# Patient Record
Sex: FEMALE | Race: WHITE | Employment: OTHER | URBAN - METROPOLITAN AREA
[De-identification: names, ages, dates, MRNs, and addresses within clinical notes are randomized per-mention and may not be internally consistent; named-entity substitution may affect disease eponyms.]

---

## 2018-03-23 ENCOUNTER — HOSPITAL ENCOUNTER (OUTPATIENT)
Age: 38
Discharge: HOME | End: 2018-03-23
Attending: EMERGENCY MEDICINE
Payer: MEDICAID

## 2018-03-23 VITALS
DIASTOLIC BLOOD PRESSURE: 77 MMHG | HEIGHT: 65 IN | BODY MASS INDEX: 33.32 KG/M2 | TEMPERATURE: 99 F | HEART RATE: 93 BPM | RESPIRATION RATE: 18 BRPM | OXYGEN SATURATION: 99 % | SYSTOLIC BLOOD PRESSURE: 124 MMHG | WEIGHT: 200 LBS

## 2018-03-23 DIAGNOSIS — R05.8 NONPRODUCTIVE COUGH: ICD-10-CM

## 2018-03-23 DIAGNOSIS — J01.40 ACUTE NON-RECURRENT PANSINUSITIS: Primary | ICD-10-CM

## 2018-03-23 LAB — S PYO AG THROAT QL: NEGATIVE

## 2018-03-23 PROCEDURE — 99203 OFFICE O/P NEW LOW 30 MIN: CPT

## 2018-03-23 PROCEDURE — 87430 STREP A AG IA: CPT

## 2018-03-23 RX ORDER — BENZONATATE 100 MG/1
100 CAPSULE ORAL 3 TIMES DAILY PRN
Qty: 30 CAPSULE | Refills: 0 | Status: SHIPPED | OUTPATIENT
Start: 2018-03-23 | End: 2018-04-13 | Stop reason: ALTCHOICE

## 2018-03-23 RX ORDER — AZITHROMYCIN 250 MG/1
TABLET, FILM COATED ORAL
Qty: 1 PACKAGE | Refills: 0 | Status: SHIPPED | OUTPATIENT
Start: 2018-03-23 | End: 2018-03-28

## 2018-03-23 NOTE — ED PROVIDER NOTES
[facility]    History   Patient presents with:  Sore Throat    Stated Complaint: Sore Throat    HPI    The patient is a 77-year-old female with no significant past medical history presents now with the ab motor strength is 5 out of 5 and symmetric in the upper and lower extremities bilaterally  Extremities: No focal swelling or tenderness  Skin: No pallor, no redness or warmth to the touch      ED Course     Labs Reviewed   [facility] POCT RAPID STREP - Normal

## 2018-04-13 ENCOUNTER — OFFICE VISIT (OUTPATIENT)
Dept: INTERNAL MEDICINE CLINIC | Facility: CLINIC | Age: 38
End: 2018-04-13

## 2018-04-13 VITALS
DIASTOLIC BLOOD PRESSURE: 62 MMHG | WEIGHT: 234 LBS | RESPIRATION RATE: 18 BRPM | TEMPERATURE: 98 F | BODY MASS INDEX: 38.52 KG/M2 | SYSTOLIC BLOOD PRESSURE: 126 MMHG | HEART RATE: 89 BPM | OXYGEN SATURATION: 99 % | HEIGHT: 65.5 IN

## 2018-04-13 DIAGNOSIS — Z12.4 PAP SMEAR FOR CERVICAL CANCER SCREENING: ICD-10-CM

## 2018-04-13 DIAGNOSIS — Z00.00 WELLNESS EXAMINATION: Primary | ICD-10-CM

## 2018-04-13 PROCEDURE — 88175 CYTOPATH C/V AUTO FLUID REDO: CPT | Performed by: INTERNAL MEDICINE

## 2018-04-13 PROCEDURE — 99385 PREV VISIT NEW AGE 18-39: CPT | Performed by: INTERNAL MEDICINE

## 2018-04-13 NOTE — PROGRESS NOTES
Pt's  verified  Per Dr Eben Hays sent thin prep pap specimen for testing to [address] [place] [address]

## 2018-04-13 NOTE — PROGRESS NOTES
HPI:    Patient ID: Jaja Bojorquez is a 40year old female. Pt here to get established as a new patient . Has no past medical hx . Family hx of CAD in fatherm and OA in mother. HAving problems with weeight gain. . Has a sedentary job.     Review masses  Normal ext genitalia  Cervix normal PAP taken   No masses    Neurological: She is alert and oriented to person, place, and time. Skin: Skin is warm and dry. Psychiatric: She has a normal mood and affect.  Her behavior is normal. Judgment and tho

## 2018-04-16 ENCOUNTER — NURSE ONLY (OUTPATIENT)
Dept: INTERNAL MEDICINE CLINIC | Facility: CLINIC | Age: 38
End: 2018-04-16

## 2018-04-16 DIAGNOSIS — Z00.00 WELLNESS EXAMINATION: ICD-10-CM

## 2018-04-16 PROCEDURE — 36415 COLL VENOUS BLD VENIPUNCTURE: CPT | Performed by: INTERNAL MEDICINE

## 2018-04-16 PROCEDURE — 80061 LIPID PANEL: CPT | Performed by: INTERNAL MEDICINE

## 2018-04-16 PROCEDURE — 80050 GENERAL HEALTH PANEL: CPT | Performed by: INTERNAL MEDICINE

## 2018-12-05 NOTE — LETTER
[facility]  Administration of Anesthesia  Sandee DEL CASTILLO agree to be cared for by a physician anesthesiologist alone and/or with a nurse anesthetist, who is specially trained to monitor me and give me medicine to put me to sleep or keep me comfortable during my procedure    I understand that my anesthesiologist and/or anesthetist is not an employee or agent of [facility] or [employer]. He or she works for [facility]    As the patient asking for anesthesia services, I agree to:  Allow the anesthesiologist (anesthesia doctor) to give me medicine and do additional procedures as necessary. Some examples are: Starting or using an “IV” to give me medicine, fluids or blood during my procedure, and having a breathing tube placed to help me breathe when I’m asleep (intubation). In the event that my heart stops working properly, I understand that my anesthesiologist will make every effort to sustain my life, unless otherwise directed by [facility] Do Not Resuscitate documents.  Tell my anesthesia doctor before my procedure:  If I am pregnant.  The last time that I ate or drank.  iii. All of the medicines I take (including prescriptions, herbal supplements, and pills I can buy without a prescription (including street drugs/illegal medications). Failure to inform my anesthesiologist about these medicines may increase my risk of anesthetic complications.  iv.If I am allergic to anything or have had a reaction to anesthesia before.  I understand how the anesthesia medicine will help me (benefits).  I understand that with any type of anesthesia medicine there are risks:  The most common risks are: nausea, vomiting, sore throat, muscle soreness, damage to my eyes, mouth, or teeth (from breathing tube placement).  Rare risks include: remembering what happened during my procedure, allergic reactions to medications, injury to my airway, heart, lungs, vision, nerves, or  Telephone Encounter by Edyta Rodriguez at 03/27/18 09:01 AM     Author:  Edyta Rodriguez Service:  (none) Author Type:       Filed:  03/27/18 09:03 AM Encounter Date:  3/27/2018 Status:  Signed     :  Edyta Rodriguez ()              TRUNG XAVIER    Patient Age: 39 year old    ACCT STATUS: COPAY  MESSAGE:[KS1.1T]   Pt asking to have labs ordered. Has not had in while. Knows that Dr Walker not in office.  Advised Clinical Asst will check and return call[KS1.1M]   Next and Last Visit with Provider and Department  Next visit with VINEET WALKER is on No match found  Next visit with [facility] is on No match found  Last visit with VINEET WALKER was on 09/12/2016 at  6:20 PM in [facility]  Last visit with [facility] was on 09/07/2017 at  4:00 PM in [facility]     WEIGHT AND HEIGHT: As of 09/12/2016 weight is 174 lbs.(78.926 kg). Height is 5' 6\"(1.676 m).   BMI is 28.10 kg/(m^2) calculated from:     Height 5' 6\" (1.676 m) as of 9/12/16     Weight 174 lb (78.926 kg) as of 9/12/16[KS1.1T]      No Known Allergies[KS1.2T]  Current outpatient prescriptions       Medication  Sig Dispense Refill   • clindamycin (CLINDAGEL) 1 % gel APPLY EXTERNALLY TO THE AFFECTED AREA TWICE DAILY AS DIRECTED 30 g 1   • sumatriptan (IMITREX) 100 MG tablet TAKE 1 TABLET BY MOUTH AS NEEDED FOR MIGRAINE 30 Tab 0   • sumatriptan (IMITREX) 100 MG tablet TAKE 1 TABLET BY MOUTH AS NEEDED MIGRAINE 30 Tab 0   • clindamycin (CLINDAGEL) 1 % gel APPLY EXTERNALLY TO THE AFFECTED AREA TWICE DAILY AS DIRECTED 30 g 1   • fluocinolone (SYNALAR) 0.025 % ointment APPLY EXTERNALLY TO THE AFFECTED AREA TWICE DAILY 30 g 1      PHARMACY to use:           Pharmacy preference(s) on file:    [facility] [address]    CALL BACK INFO:[KS1.1T] Ok to leave response (including medical information) with family member or on answering  machine[KS1.1M]  ROUTING:[KS1.1T] ROUTE TO COVERING PHYSICIAN for response.[KS1.1M]        PCP: Jr Crandall MD         INS: Payor: [employer] / Plan: *No Plan* / Product Type: *No Product type* / Note: This is the primary coverage, but no account was found for this location or the patient's primary location.   ADDRESS:  [address] [place] [address][KS1.1T]       Revision History        User Key Date/Time User Provider Type Action    > KS1.2 03/27/18 09:03 AM Edyta Rodriguez  Sign     KS1.1 03/27/18 09:01 AM Edyta Rodriguez      M - Manual, T - Template             muscles and in extremely rare instances death.  My doctor has explained to me other choices available to me for my care (alternatives).  Pregnant Patients (“epidural”):  I understand that the risks of having an epidural (medicine given into my back to help control pain during labor), include itching, low blood pressure, difficulty urinating, headache or slowing of the baby’s heart. Very rare risks include infection, bleeding, seizure, irregular heart rhythms and nerve injury.  Regional Anesthesia (“spinal”, “epidural”, & “nerve blocks”):  I understand that rare but potential complications include headache, bleeding, infection, seizure, irregular heart rhythms, and nerve injury.    _____________________________________________________________________________  Patient (or Representative) Signature/Relationship to Patient  Date   Time    _____________________________________________________________________________   Name (if used)    Language/Organization   Time    _____________________________________________________________________________  Nurse Anesthetist Signature     Date   Time  _____________________________________________________________________________  Anesthesiologist Signature     Date   Time  I have discussed the procedure and information above with the patient (or patient’s representative) and answered their questions. The patient or their representative has agreed to have anesthesia services.    _____________________________________________________________________________  Witness        Date   Time  I have verified that the signature is that of the patient or patient’s representative, and that it was signed before the procedure  Patient Name: Sandee Eddy     : 1980                 Printed: 2025 at 8:06 AM    Medical Record #: [id number]                                            Page 1 of 1  ----------ANESTHESIA CONSENT----------

## 2019-10-28 ENCOUNTER — OFFICE VISIT (OUTPATIENT)
Dept: INTERNAL MEDICINE CLINIC | Facility: CLINIC | Age: 39
End: 2019-10-28
Payer: MEDICAID

## 2019-10-28 VITALS
OXYGEN SATURATION: 95 % | HEART RATE: 81 BPM | BODY MASS INDEX: 38.75 KG/M2 | HEIGHT: 65.5 IN | SYSTOLIC BLOOD PRESSURE: 120 MMHG | WEIGHT: 235.38 LBS | DIASTOLIC BLOOD PRESSURE: 80 MMHG

## 2019-10-28 DIAGNOSIS — Z00.00 WELLNESS EXAMINATION: Primary | ICD-10-CM

## 2019-10-28 DIAGNOSIS — N39.3 STRESS INCONTINENCE IN FEMALE: ICD-10-CM

## 2019-10-28 PROCEDURE — 88175 CYTOPATH C/V AUTO FLUID REDO: CPT | Performed by: INTERNAL MEDICINE

## 2019-10-28 PROCEDURE — 99395 PREV VISIT EST AGE 18-39: CPT | Performed by: INTERNAL MEDICINE

## 2019-10-28 NOTE — PROGRESS NOTES
HPI:    Patient ID: Aurora Shah is a 44year old female. HPI Pt here for an annual exam and for eval of urinary incontinence- had a vaginal sling procedure in the past.  Has normal menses - quite heavy and painful. . Vaginal deliveries.     Revie

## 2019-10-29 ENCOUNTER — NURSE ONLY (OUTPATIENT)
Dept: INTERNAL MEDICINE CLINIC | Facility: CLINIC | Age: 39
End: 2019-10-29
Payer: MEDICAID

## 2019-10-29 DIAGNOSIS — Z00.00 WELLNESS EXAMINATION: ICD-10-CM

## 2019-10-29 DIAGNOSIS — Z00.00 ANNUAL PHYSICAL EXAM: Primary | ICD-10-CM

## 2019-10-29 PROCEDURE — 36415 COLL VENOUS BLD VENIPUNCTURE: CPT | Performed by: INTERNAL MEDICINE

## 2019-10-29 NOTE — PROGRESS NOTES
Attempted blood draw, pt was hard stick- sent her to hospital as she has been fasting since lastnight.     CV

## 2019-12-04 ENCOUNTER — APPOINTMENT (OUTPATIENT)
Dept: LAB | Facility: REFERENCE LAB | Age: 39
End: 2019-12-04
Attending: INTERNAL MEDICINE
Payer: MEDICAID

## 2019-12-04 DIAGNOSIS — Z00.00 WELLNESS EXAMINATION: ICD-10-CM

## 2019-12-04 PROCEDURE — 80053 COMPREHEN METABOLIC PANEL: CPT

## 2019-12-04 PROCEDURE — 80061 LIPID PANEL: CPT

## 2019-12-04 PROCEDURE — 82306 VITAMIN D 25 HYDROXY: CPT

## 2019-12-04 PROCEDURE — 36415 COLL VENOUS BLD VENIPUNCTURE: CPT

## 2019-12-04 PROCEDURE — 84443 ASSAY THYROID STIM HORMONE: CPT

## 2020-11-06 ENCOUNTER — APPOINTMENT (OUTPATIENT)
Dept: GENERAL RADIOLOGY | Facility: HOSPITAL | Age: 40
End: 2020-11-06
Payer: MEDICAID

## 2020-11-06 ENCOUNTER — HOSPITAL ENCOUNTER (EMERGENCY)
Facility: HOSPITAL | Age: 40
Discharge: HOME | End: 2020-11-06
Attending: EMERGENCY MEDICINE
Payer: MEDICAID

## 2020-11-06 VITALS
TEMPERATURE: 98 F | WEIGHT: 230 LBS | OXYGEN SATURATION: 97 % | RESPIRATION RATE: 17 BRPM | BODY MASS INDEX: 38.32 KG/M2 | SYSTOLIC BLOOD PRESSURE: 131 MMHG | HEIGHT: 65 IN | HEART RATE: 84 BPM | DIASTOLIC BLOOD PRESSURE: 83 MMHG

## 2020-11-06 DIAGNOSIS — R07.9 ACUTE CHEST PAIN: Primary | ICD-10-CM

## 2020-11-06 DIAGNOSIS — B34.9 VIRAL SYNDROME: ICD-10-CM

## 2020-11-06 PROCEDURE — 99284 EMERGENCY DEPT VISIT MOD MDM: CPT

## 2020-11-06 PROCEDURE — 84484 ASSAY OF TROPONIN QUANT: CPT | Performed by: EMERGENCY MEDICINE

## 2020-11-06 PROCEDURE — 93005 ELECTROCARDIOGRAM TRACING: CPT

## 2020-11-06 PROCEDURE — 80048 BASIC METABOLIC PNL TOTAL CA: CPT

## 2020-11-06 PROCEDURE — 81025 URINE PREGNANCY TEST: CPT

## 2020-11-06 PROCEDURE — 85025 COMPLETE CBC W/AUTO DIFF WBC: CPT

## 2020-11-06 PROCEDURE — 71045 X-RAY EXAM CHEST 1 VIEW: CPT | Performed by: EMERGENCY MEDICINE

## 2020-11-06 PROCEDURE — 84484 ASSAY OF TROPONIN QUANT: CPT

## 2020-11-06 PROCEDURE — 96374 THER/PROPH/DIAG INJ IV PUSH: CPT

## 2020-11-06 PROCEDURE — 85025 COMPLETE CBC W/AUTO DIFF WBC: CPT | Performed by: EMERGENCY MEDICINE

## 2020-11-06 PROCEDURE — 93010 ELECTROCARDIOGRAM REPORT: CPT | Performed by: EMERGENCY MEDICINE

## 2020-11-06 PROCEDURE — 80048 BASIC METABOLIC PNL TOTAL CA: CPT | Performed by: EMERGENCY MEDICINE

## 2020-11-06 RX ORDER — CYCLOBENZAPRINE HCL 10 MG
10 TABLET ORAL 3 TIMES DAILY PRN
Qty: 20 TABLET | Refills: 0 | Status: SHIPPED | OUTPATIENT
Start: 2020-11-06 | End: 2020-11-13

## 2020-11-06 RX ORDER — IBUPROFEN 600 MG/1
600 TABLET ORAL EVERY 8 HOURS PRN
Qty: 30 TABLET | Refills: 0 | Status: SHIPPED | OUTPATIENT
Start: 2020-11-06 | End: 2020-11-13

## 2020-11-06 RX ORDER — KETOROLAC TROMETHAMINE 15 MG/ML
15 INJECTION, SOLUTION INTRAMUSCULAR; INTRAVENOUS ONCE
Status: COMPLETED | OUTPATIENT
Start: 2020-11-06 | End: 2020-11-06

## 2020-11-06 NOTE — ED INITIAL ASSESSMENT (HPI)
Left sided chest pains, consistent x 2 days.  Denies sob  Also sore throat, chill 2 days ago, but denies fever

## 2020-11-06 NOTE — ED PROVIDER NOTES
Patient Seen in: [facility] AND [facility] Emergency Department      History   Patient presents with:  Chest Pain Angina    Stated Complaint: chest pain/chills     HPI    55-year-old female presents for complaint of chest pain.   Pain began today, is left upper c present. Eyes:      General: Lids are normal.      Extraocular Movements: Extraocular movements intact. Pupils: Pupils are equal, round, and reactive to light.    Neck:      Musculoskeletal: Full passive range of motion without pain and normal range DIFFERENTIAL WITH PLATELET    Narrative: The following orders were created for panel order CBC WITH DIFFERENTIAL WITH PLATELET.   Procedure                               Abnormality         Status                     --------- vasculature. MEDIAST/SAL:   No visible mass or adenopathy. LUNGS/PLEURA: Normal.  No significant pulmonary parenchymal abnormalities. No effusion or pleural thickening. BONES: Chronic fracture deformity left clavicle. OTHER: Negative.           Orly Chery

## 2020-11-11 ENCOUNTER — TELEPHONE (OUTPATIENT)
Dept: INTERNAL MEDICINE CLINIC | Facility: CLINIC | Age: 40
End: 2020-11-11

## 2020-11-11 NOTE — TELEPHONE ENCOUNTER
Pt was seen in the ER and was having chest pain then. She tested positive for Covid. Now she is having pain in her left arm and her fingers are feeling weird.  Please advise, she made a video visit appointment for November 23rd which was Dr Florence weiss

## 2020-11-23 ENCOUNTER — TELEMEDICINE (OUTPATIENT)
Dept: INTERNAL MEDICINE CLINIC | Facility: CLINIC | Age: 40
End: 2020-11-23
Payer: MEDICAID

## 2020-11-23 DIAGNOSIS — G56.92 NEUROPATHY, ARM, LEFT: Primary | ICD-10-CM

## 2020-11-23 PROCEDURE — 99212 OFFICE O/P EST SF 10 MIN: CPT | Performed by: INTERNAL MEDICINE

## 2020-11-24 NOTE — PROGRESS NOTES
Virtual Telephone Check-In    Laurel Arimo verbally consents to a Virtual/Telephone Check-In visit on 11/23/20. Patient has been referred to he [facility] website at [email or web address] to review the yearly Consent to Treat document.     Patient anitanirav

## 2020-12-03 ENCOUNTER — TELEPHONE (OUTPATIENT)
Dept: INTERNAL MEDICINE CLINIC | Facility: CLINIC | Age: 40
End: 2020-12-03

## 2020-12-03 NOTE — TELEPHONE ENCOUNTER
Patient is asking for a return call back from Dr. Audie Dakin regarding the lingering pain that she still is experiencing in her chest and arm after Covid.     She did schedule the appointment with the specialist like the doctor asked her to schedule, but her ap

## 2020-12-23 ENCOUNTER — PROCEDURE VISIT (OUTPATIENT)
Dept: NEUROLOGY | Facility: CLINIC | Age: 40
End: 2020-12-23
Payer: MEDICAID

## 2020-12-23 VITALS — HEIGHT: 65 IN | BODY MASS INDEX: 37.49 KG/M2 | WEIGHT: 225 LBS

## 2020-12-23 DIAGNOSIS — G56.92 NEUROPATHY, ARM, LEFT: ICD-10-CM

## 2020-12-23 PROCEDURE — 3008F BODY MASS INDEX DOCD: CPT | Performed by: PHYSICAL MEDICINE & REHABILITATION

## 2020-12-23 PROCEDURE — 95886 MUSC TEST DONE W/N TEST COMP: CPT | Performed by: PHYSICAL MEDICINE & REHABILITATION

## 2020-12-23 PROCEDURE — 95908 NRV CNDJ TST 3-4 STUDIES: CPT | Performed by: PHYSICAL MEDICINE & REHABILITATION

## 2020-12-23 NOTE — PROCEDURES
[address] [place] [address]  Phone: [phone]  Fax: [phone] REPORT          Patient: Denese April Hand Dominance:  Right   Patient ID: [id number] Referring Dr:  Bacilio Wilkinson 3) There is no electrodiagnostic evidence of any other mononeuropathy, brachial plexopathy, cervical radiculopathy, or myopathy in the left upper extremity. Findings may account for some, but not all of the patient’s stated complaints.      Shay Sanabria

## 2021-01-12 ENCOUNTER — OFFICE VISIT (OUTPATIENT)
Dept: INTERNAL MEDICINE CLINIC | Facility: CLINIC | Age: 41
End: 2021-01-12
Payer: MEDICAID

## 2021-01-12 VITALS
WEIGHT: 225 LBS | HEART RATE: 95 BPM | DIASTOLIC BLOOD PRESSURE: 72 MMHG | BODY MASS INDEX: 37.04 KG/M2 | SYSTOLIC BLOOD PRESSURE: 114 MMHG | HEIGHT: 65.5 IN | OXYGEN SATURATION: 98 %

## 2021-01-12 DIAGNOSIS — Z00.00 WELLNESS EXAMINATION: Primary | ICD-10-CM

## 2021-01-12 DIAGNOSIS — Z12.31 SCREENING MAMMOGRAM, ENCOUNTER FOR: ICD-10-CM

## 2021-01-12 DIAGNOSIS — E66.01 CLASS 2 SEVERE OBESITY DUE TO EXCESS CALORIES WITH SERIOUS COMORBIDITY AND BODY MASS INDEX (BMI) OF 35.0 TO 35.9 IN ADULT (HCC): ICD-10-CM

## 2021-01-12 PROBLEM — G56.02 CARPAL TUNNEL SYNDROME OF LEFT WRIST: Status: ACTIVE | Noted: 2021-01-12

## 2021-01-12 LAB
ALBUMIN SERPL-MCNC: 3.8 G/DL (ref 3.4–5)
ALBUMIN/GLOB SERPL: 1.1 {RATIO} (ref 1–2)
ALP LIVER SERPL-CCNC: 56 U/L
ALT SERPL-CCNC: 33 U/L
ANION GAP SERPL CALC-SCNC: 4 MMOL/L (ref 0–18)
AST SERPL-CCNC: 22 U/L (ref 15–37)
BASOPHILS # BLD AUTO: 0.03 X10(3) UL (ref 0–0.2)
BASOPHILS NFR BLD AUTO: 0.6 %
BILIRUB SERPL-MCNC: 0.6 MG/DL (ref 0.1–2)
BUN BLD-MCNC: 8 MG/DL (ref 7–18)
BUN/CREAT SERPL: 10.8 (ref 10–20)
CALCIUM BLD-MCNC: 9 MG/DL (ref 8.5–10.1)
CHLORIDE SERPL-SCNC: 108 MMOL/L (ref 98–112)
CHOLEST SMN-MCNC: 199 MG/DL (ref ?–200)
CO2 SERPL-SCNC: 27 MMOL/L (ref 21–32)
CREAT BLD-MCNC: 0.74 MG/DL
DEPRECATED RDW RBC AUTO: 46.3 FL (ref 35.1–46.3)
EOSINOPHIL # BLD AUTO: 0.09 X10(3) UL (ref 0–0.7)
EOSINOPHIL NFR BLD AUTO: 1.7 %
ERYTHROCYTE [DISTWIDTH] IN BLOOD BY AUTOMATED COUNT: 13.5 % (ref 11–15)
GLOBULIN PLAS-MCNC: 3.6 G/DL (ref 2.8–4.4)
GLUCOSE BLD-MCNC: 88 MG/DL (ref 70–99)
HCT VFR BLD AUTO: 41.7 %
HDLC SERPL-MCNC: 84 MG/DL (ref 40–59)
HGB BLD-MCNC: 12.9 G/DL
IMM GRANULOCYTES # BLD AUTO: 0.01 X10(3) UL (ref 0–1)
IMM GRANULOCYTES NFR BLD: 0.2 %
LDLC SERPL CALC-MCNC: 86 MG/DL (ref ?–100)
LYMPHOCYTES # BLD AUTO: 1.39 X10(3) UL (ref 1–4)
LYMPHOCYTES NFR BLD AUTO: 26 %
M PROTEIN MFR SERPL ELPH: 7.4 G/DL (ref 6.4–8.2)
MCH RBC QN AUTO: 28.5 PG (ref 26–34)
MCHC RBC AUTO-ENTMCNC: 30.9 G/DL (ref 31–37)
MCV RBC AUTO: 92.3 FL
MONOCYTES # BLD AUTO: 0.34 X10(3) UL (ref 0.1–1)
MONOCYTES NFR BLD AUTO: 6.4 %
NEUTROPHILS # BLD AUTO: 3.49 X10 (3) UL (ref 1.5–7.7)
NEUTROPHILS # BLD AUTO: 3.49 X10(3) UL (ref 1.5–7.7)
NEUTROPHILS NFR BLD AUTO: 65.1 %
NONHDLC SERPL-MCNC: 115 MG/DL (ref ?–130)
OSMOLALITY SERPL CALC.SUM OF ELEC: 286 MOSM/KG (ref 275–295)
PATIENT FASTING Y/N/NP: YES
PATIENT FASTING Y/N/NP: YES
PLATELET # BLD AUTO: 238 10(3)UL (ref 150–450)
POTASSIUM SERPL-SCNC: 3.7 MMOL/L (ref 3.5–5.1)
RBC # BLD AUTO: 4.52 X10(6)UL
SODIUM SERPL-SCNC: 139 MMOL/L (ref 136–145)
T4 FREE SERPL-MCNC: 1.2 NG/DL (ref 0.8–1.7)
TRIGL SERPL-MCNC: 145 MG/DL (ref 30–149)
TSI SER-ACNC: 1.77 MIU/ML (ref 0.36–3.74)
VLDLC SERPL CALC-MCNC: 29 MG/DL (ref 0–30)
WBC # BLD AUTO: 5.4 X10(3) UL (ref 4–11)

## 2021-01-12 PROCEDURE — 84439 ASSAY OF FREE THYROXINE: CPT | Performed by: INTERNAL MEDICINE

## 2021-01-12 PROCEDURE — 99396 PREV VISIT EST AGE 40-64: CPT | Performed by: INTERNAL MEDICINE

## 2021-01-12 PROCEDURE — 84443 ASSAY THYROID STIM HORMONE: CPT | Performed by: INTERNAL MEDICINE

## 2021-01-12 PROCEDURE — 3078F DIAST BP <80 MM HG: CPT | Performed by: INTERNAL MEDICINE

## 2021-01-12 PROCEDURE — 85025 COMPLETE CBC W/AUTO DIFF WBC: CPT | Performed by: INTERNAL MEDICINE

## 2021-01-12 PROCEDURE — 3074F SYST BP LT 130 MM HG: CPT | Performed by: INTERNAL MEDICINE

## 2021-01-12 PROCEDURE — 80053 COMPREHEN METABOLIC PANEL: CPT | Performed by: INTERNAL MEDICINE

## 2021-01-12 PROCEDURE — 3008F BODY MASS INDEX DOCD: CPT | Performed by: INTERNAL MEDICINE

## 2021-01-12 PROCEDURE — 80061 LIPID PANEL: CPT | Performed by: INTERNAL MEDICINE

## 2021-01-12 RX ORDER — PHENTERMINE HYDROCHLORIDE 15 MG/1
15 CAPSULE ORAL EVERY MORNING
Qty: 30 CAPSULE | Refills: 0 | Status: SHIPPED | OUTPATIENT
Start: 2021-01-12 | End: 2021-06-29 | Stop reason: DRUGHIGH

## 2021-01-12 NOTE — PROGRESS NOTES
HPI:    Patient ID: Abhishek Cast is a 36year old female. HPIPt here for annual physical. Has some residual left sided chest discomfort following a Covid infection. Struggling with weight - has failed diet alone.   Would like to try medical weigh Prescriptions      No prescriptions requested or ordered in this encounter       Imaging & Referrals:  INFLUENZA REFUSED [facility] SCREENING BILAT (CPT=77067)       ND#[id number]

## 2021-01-28 ENCOUNTER — HOSPITAL ENCOUNTER (OUTPATIENT)
Dept: MAMMOGRAPHY | Age: 41
Discharge: HOME | End: 2021-01-28
Attending: INTERNAL MEDICINE
Payer: MEDICAID

## 2021-01-28 DIAGNOSIS — Z12.31 SCREENING MAMMOGRAM, ENCOUNTER FOR: ICD-10-CM

## 2021-01-28 PROCEDURE — 77067 SCR MAMMO BI INCL CAD: CPT | Performed by: INTERNAL MEDICINE

## 2021-01-28 PROCEDURE — 77063 BREAST TOMOSYNTHESIS BI: CPT | Performed by: INTERNAL MEDICINE

## 2021-02-09 ENCOUNTER — OFFICE VISIT (OUTPATIENT)
Dept: INTERNAL MEDICINE CLINIC | Facility: CLINIC | Age: 41
End: 2021-02-09
Payer: MEDICAID

## 2021-02-09 VITALS
HEIGHT: 65.5 IN | BODY MASS INDEX: 37.04 KG/M2 | SYSTOLIC BLOOD PRESSURE: 120 MMHG | DIASTOLIC BLOOD PRESSURE: 80 MMHG | HEART RATE: 100 BPM | WEIGHT: 225 LBS | TEMPERATURE: 97 F | OXYGEN SATURATION: 99 %

## 2021-02-09 DIAGNOSIS — Z71.3 WEIGHT LOSS COUNSELING, ENCOUNTER FOR: Primary | ICD-10-CM

## 2021-02-09 PROCEDURE — 3008F BODY MASS INDEX DOCD: CPT | Performed by: INTERNAL MEDICINE

## 2021-02-09 PROCEDURE — 3074F SYST BP LT 130 MM HG: CPT | Performed by: INTERNAL MEDICINE

## 2021-02-09 PROCEDURE — 99213 OFFICE O/P EST LOW 20 MIN: CPT | Performed by: INTERNAL MEDICINE

## 2021-02-09 PROCEDURE — 3079F DIAST BP 80-89 MM HG: CPT | Performed by: INTERNAL MEDICINE

## 2021-02-09 RX ORDER — PHENTERMINE HYDROCHLORIDE 37.5 MG/1
37.5 TABLET ORAL
Qty: 30 TABLET | Refills: 1 | Status: SHIPPED | OUTPATIENT
Start: 2021-02-09 | End: 2022-01-17

## 2021-02-09 NOTE — PROGRESS NOTES
HPI:    Patient ID: Giacomo Yang is a 36year old female. HPI pt here for fu after a month of Phentermine rx. Has not lost any weight . Did feel some apetite effect but did not note any weight loss. Did not have any side effects.   Would like t

## 2021-03-30 ENCOUNTER — OFFICE VISIT (OUTPATIENT)
Dept: INTERNAL MEDICINE CLINIC | Facility: CLINIC | Age: 41
End: 2021-03-30
Payer: MEDICAID

## 2021-03-30 VITALS
DIASTOLIC BLOOD PRESSURE: 80 MMHG | WEIGHT: 217 LBS | OXYGEN SATURATION: 98 % | HEIGHT: 65.5 IN | HEART RATE: 92 BPM | BODY MASS INDEX: 35.72 KG/M2 | SYSTOLIC BLOOD PRESSURE: 110 MMHG

## 2021-03-30 DIAGNOSIS — Z71.3 WEIGHT LOSS COUNSELING, ENCOUNTER FOR: Primary | ICD-10-CM

## 2021-03-30 PROCEDURE — 3008F BODY MASS INDEX DOCD: CPT | Performed by: INTERNAL MEDICINE

## 2021-03-30 PROCEDURE — 99213 OFFICE O/P EST LOW 20 MIN: CPT | Performed by: INTERNAL MEDICINE

## 2021-03-30 PROCEDURE — 3079F DIAST BP 80-89 MM HG: CPT | Performed by: INTERNAL MEDICINE

## 2021-03-30 PROCEDURE — 3074F SYST BP LT 130 MM HG: CPT | Performed by: INTERNAL MEDICINE

## 2021-03-30 RX ORDER — PHENTERMINE HYDROCHLORIDE 37.5 MG/1
37.5 TABLET ORAL
Qty: 30 TABLET | Refills: 2 | Status: SHIPPED | OUTPATIENT
Start: 2021-03-30 | End: 2021-06-29

## 2021-03-30 NOTE — PROGRESS NOTES
HPI/Subjective:   Patient ID: Mariposa Loera is a 36year old female. HPIPt here for a fu on weight loss medication- has been on phentermine for 2  Months and has lost 8 lbs. Feels more energy. No side effects from the meds.   Not excercising forma

## 2021-06-29 ENCOUNTER — OFFICE VISIT (OUTPATIENT)
Dept: INTERNAL MEDICINE CLINIC | Facility: CLINIC | Age: 41
End: 2021-06-29
Payer: MEDICAID

## 2021-06-29 VITALS
HEART RATE: 92 BPM | OXYGEN SATURATION: 99 % | HEIGHT: 65.5 IN | DIASTOLIC BLOOD PRESSURE: 78 MMHG | WEIGHT: 215 LBS | SYSTOLIC BLOOD PRESSURE: 102 MMHG | BODY MASS INDEX: 35.39 KG/M2

## 2021-06-29 DIAGNOSIS — Z86.16 HISTORY OF COVID-19: ICD-10-CM

## 2021-06-29 DIAGNOSIS — Z71.3 WEIGHT LOSS COUNSELING, ENCOUNTER FOR: Primary | ICD-10-CM

## 2021-06-29 PROBLEM — E66.9 OBESITY (BMI 35.0-39.9 WITHOUT COMORBIDITY): Status: ACTIVE | Noted: 2021-06-29

## 2021-06-29 PROCEDURE — 3008F BODY MASS INDEX DOCD: CPT | Performed by: INTERNAL MEDICINE

## 2021-06-29 PROCEDURE — 3074F SYST BP LT 130 MM HG: CPT | Performed by: INTERNAL MEDICINE

## 2021-06-29 PROCEDURE — 99214 OFFICE O/P EST MOD 30 MIN: CPT | Performed by: INTERNAL MEDICINE

## 2021-06-29 PROCEDURE — 3078F DIAST BP <80 MM HG: CPT | Performed by: INTERNAL MEDICINE

## 2021-06-29 RX ORDER — PHENTERMINE HYDROCHLORIDE 37.5 MG/1
37.5 TABLET ORAL
Qty: 30 TABLET | Refills: 2 | Status: SHIPPED | OUTPATIENT
Start: 2021-06-29 | End: 2022-01-17

## 2021-06-29 NOTE — PROGRESS NOTES
HPI/Subjective:   Patient ID: Freddy Wick is a 39year old female. HPIPt here for a fu on weight loss with there use of Phentermine - has lost over 15 lbs from the start of the rx. No side effects encountered.   History/Other:   Review of System

## 2021-06-30 ENCOUNTER — LAB ENCOUNTER (OUTPATIENT)
Dept: LAB | Facility: REFERENCE LAB | Age: 41
End: 2021-06-30
Attending: INTERNAL MEDICINE
Payer: MEDICAID

## 2021-06-30 DIAGNOSIS — Z86.16 HISTORY OF COVID-19: ICD-10-CM

## 2021-06-30 LAB — SARS-COV-2 IGG+IGM SERPL QL IA: REACTIVE

## 2021-06-30 PROCEDURE — 36415 COLL VENOUS BLD VENIPUNCTURE: CPT

## 2021-06-30 PROCEDURE — 86769 SARS-COV-2 COVID-19 ANTIBODY: CPT

## 2021-11-26 RX ORDER — PHENTERMINE HYDROCHLORIDE 37.5 MG/1
TABLET ORAL
Qty: 30 TABLET | Refills: 0 | Status: SHIPPED | OUTPATIENT
Start: 2021-11-26 | End: 2022-01-17

## 2022-01-17 ENCOUNTER — OFFICE VISIT (OUTPATIENT)
Dept: INTERNAL MEDICINE CLINIC | Facility: CLINIC | Age: 42
End: 2022-01-17
Payer: MEDICAID

## 2022-01-17 VITALS — WEIGHT: 216 LBS | HEIGHT: 65.5 IN | BODY MASS INDEX: 35.56 KG/M2

## 2022-01-17 DIAGNOSIS — J22 ACUTE LOWER RESPIRATORY INFECTION: Primary | ICD-10-CM

## 2022-01-17 DIAGNOSIS — Z71.3 WEIGHT LOSS COUNSELING, ENCOUNTER FOR: ICD-10-CM

## 2022-01-17 PROCEDURE — 3008F BODY MASS INDEX DOCD: CPT | Performed by: INTERNAL MEDICINE

## 2022-01-17 PROCEDURE — 99214 OFFICE O/P EST MOD 30 MIN: CPT | Performed by: INTERNAL MEDICINE

## 2022-01-17 RX ORDER — PHENTERMINE HYDROCHLORIDE 37.5 MG/1
37.5 TABLET ORAL
Qty: 30 TABLET | Refills: 1 | Status: SHIPPED | OUTPATIENT
Start: 2022-01-17

## 2022-01-17 RX ORDER — AZITHROMYCIN 250 MG/1
TABLET, FILM COATED ORAL
Qty: 6 TABLET | Refills: 0 | Status: SHIPPED | OUTPATIENT
Start: 2022-01-17 | End: 2022-01-22

## 2022-01-17 NOTE — PROGRESS NOTES
Subjective:   Patient ID: Alfredito Alfaro is a 39year old female. Cough  Associated symptoms include ear pain. Pertinent negatives include no chills, fever, shortness of breath or wheezing. Patient here with a a hacking cough for over a week.   [facility] & Referrals:  None

## 2022-01-18 DIAGNOSIS — Z86.16 HISTORY OF COVID-19: Primary | ICD-10-CM

## 2022-03-17 ENCOUNTER — OFFICE VISIT (OUTPATIENT)
Dept: INTERNAL MEDICINE CLINIC | Facility: CLINIC | Age: 42
End: 2022-03-17
Payer: MEDICAID

## 2022-03-17 VITALS
SYSTOLIC BLOOD PRESSURE: 110 MMHG | DIASTOLIC BLOOD PRESSURE: 70 MMHG | HEIGHT: 65.5 IN | WEIGHT: 220 LBS | BODY MASS INDEX: 36.21 KG/M2

## 2022-03-17 DIAGNOSIS — Z12.31 SCREENING MAMMOGRAM, ENCOUNTER FOR: ICD-10-CM

## 2022-03-17 DIAGNOSIS — Z71.3 WEIGHT LOSS COUNSELING, ENCOUNTER FOR: ICD-10-CM

## 2022-03-17 DIAGNOSIS — Z00.00 WELLNESS EXAMINATION: Primary | ICD-10-CM

## 2022-03-17 PROCEDURE — 99396 PREV VISIT EST AGE 40-64: CPT | Performed by: INTERNAL MEDICINE

## 2022-03-17 PROCEDURE — 3078F DIAST BP <80 MM HG: CPT | Performed by: INTERNAL MEDICINE

## 2022-03-17 PROCEDURE — 3008F BODY MASS INDEX DOCD: CPT | Performed by: INTERNAL MEDICINE

## 2022-03-17 PROCEDURE — 3074F SYST BP LT 130 MM HG: CPT | Performed by: INTERNAL MEDICINE

## 2022-03-17 RX ORDER — PHENTERMINE HYDROCHLORIDE 37.5 MG/1
37.5 TABLET ORAL
Qty: 30 TABLET | Refills: 3 | Status: SHIPPED | OUTPATIENT
Start: 2022-03-17

## 2022-04-07 ENCOUNTER — HOSPITAL ENCOUNTER (OUTPATIENT)
Dept: MAMMOGRAPHY | Age: 42
Discharge: HOME | End: 2022-04-07
Attending: INTERNAL MEDICINE
Payer: MEDICAID

## 2022-04-07 ENCOUNTER — LAB ENCOUNTER (OUTPATIENT)
Dept: LAB | Facility: REFERENCE LAB | Age: 42
End: 2022-04-07
Attending: INTERNAL MEDICINE
Payer: MEDICAID

## 2022-04-07 DIAGNOSIS — Z86.16 HISTORY OF COVID-19: ICD-10-CM

## 2022-04-07 DIAGNOSIS — Z00.00 ROUTINE GENERAL MEDICAL EXAMINATION AT A HEALTH CARE FACILITY: Primary | ICD-10-CM

## 2022-04-07 DIAGNOSIS — Z12.31 SCREENING MAMMOGRAM, ENCOUNTER FOR: ICD-10-CM

## 2022-04-07 LAB
ALBUMIN SERPL-MCNC: 3.7 G/DL (ref 3.4–5)
ALBUMIN/GLOB SERPL: 1.1 {RATIO} (ref 1–2)
ALP LIVER SERPL-CCNC: 45 U/L
ALT SERPL-CCNC: 56 U/L
ANION GAP SERPL CALC-SCNC: 6 MMOL/L (ref 0–18)
AST SERPL-CCNC: 46 U/L (ref 15–37)
BASOPHILS # BLD AUTO: 0.02 X10(3) UL (ref 0–0.2)
BASOPHILS NFR BLD AUTO: 0.6 %
BILIRUB SERPL-MCNC: 0.3 MG/DL (ref 0.1–2)
BUN BLD-MCNC: 8 MG/DL (ref 7–18)
BUN/CREAT SERPL: 11.6 (ref 10–20)
CALCIUM BLD-MCNC: 8.8 MG/DL (ref 8.5–10.1)
CHLORIDE SERPL-SCNC: 105 MMOL/L (ref 98–112)
CHOLEST SERPL-MCNC: 161 MG/DL (ref ?–200)
CO2 SERPL-SCNC: 29 MMOL/L (ref 21–32)
CREAT BLD-MCNC: 0.69 MG/DL
CRP SERPL HS-MCNC: 10.9 MG/L (ref ?–3)
DEPRECATED RDW RBC AUTO: 44.5 FL (ref 35.1–46.3)
EOSINOPHIL # BLD AUTO: 0.1 X10(3) UL (ref 0–0.7)
EOSINOPHIL NFR BLD AUTO: 2.8 %
ERYTHROCYTE [DISTWIDTH] IN BLOOD BY AUTOMATED COUNT: 13.2 % (ref 11–15)
FASTING PATIENT LIPID ANSWER: YES
FASTING STATUS PATIENT QL REPORTED: YES
GLOBULIN PLAS-MCNC: 3.5 G/DL (ref 2.8–4.4)
GLUCOSE BLD-MCNC: 87 MG/DL (ref 70–99)
HCT VFR BLD AUTO: 41.4 %
HDLC SERPL-MCNC: 79 MG/DL (ref 40–59)
HGB BLD-MCNC: 13.1 G/DL
IMM GRANULOCYTES # BLD AUTO: 0.01 X10(3) UL (ref 0–1)
IMM GRANULOCYTES NFR BLD: 0.3 %
LDLC SERPL CALC-MCNC: 69 MG/DL (ref ?–100)
LYMPHOCYTES # BLD AUTO: 1.07 X10(3) UL (ref 1–4)
LYMPHOCYTES NFR BLD AUTO: 29.5 %
MCH RBC QN AUTO: 28.8 PG (ref 26–34)
MCHC RBC AUTO-ENTMCNC: 31.6 G/DL (ref 31–37)
MCV RBC AUTO: 91 FL
MONOCYTES # BLD AUTO: 0.45 X10(3) UL (ref 0.1–1)
MONOCYTES NFR BLD AUTO: 12.4 %
NEUTROPHILS # BLD AUTO: 1.98 X10 (3) UL (ref 1.5–7.7)
NEUTROPHILS # BLD AUTO: 1.98 X10(3) UL (ref 1.5–7.7)
NEUTROPHILS NFR BLD AUTO: 54.4 %
NONHDLC SERPL-MCNC: 82 MG/DL (ref ?–130)
OSMOLALITY SERPL CALC.SUM OF ELEC: 288 MOSM/KG (ref 275–295)
PLATELET # BLD AUTO: 172 10(3)UL (ref 150–450)
POTASSIUM SERPL-SCNC: 4.4 MMOL/L (ref 3.5–5.1)
PROT SERPL-MCNC: 7.2 G/DL (ref 6.4–8.2)
RBC # BLD AUTO: 4.55 X10(6)UL
SARS-COV-2 IGG+IGM SERPL QL IA: REACTIVE
SODIUM SERPL-SCNC: 140 MMOL/L (ref 136–145)
TRIGL SERPL-MCNC: 64 MG/DL (ref 30–149)
TSI SER-ACNC: 1.42 MIU/ML (ref 0.36–3.74)
VLDLC SERPL CALC-MCNC: 10 MG/DL (ref 0–30)
WBC # BLD AUTO: 3.6 X10(3) UL (ref 4–11)

## 2022-04-07 PROCEDURE — 86769 SARS-COV-2 COVID-19 ANTIBODY: CPT

## 2022-04-07 PROCEDURE — 77067 SCR MAMMO BI INCL CAD: CPT | Performed by: INTERNAL MEDICINE

## 2022-04-07 PROCEDURE — 85025 COMPLETE CBC W/AUTO DIFF WBC: CPT | Performed by: INTERNAL MEDICINE

## 2022-04-07 PROCEDURE — 80061 LIPID PANEL: CPT

## 2022-04-07 PROCEDURE — 80053 COMPREHEN METABOLIC PANEL: CPT | Performed by: INTERNAL MEDICINE

## 2022-04-07 PROCEDURE — 86141 C-REACTIVE PROTEIN HS: CPT | Performed by: INTERNAL MEDICINE

## 2022-04-07 PROCEDURE — 77063 BREAST TOMOSYNTHESIS BI: CPT | Performed by: INTERNAL MEDICINE

## 2022-04-07 PROCEDURE — 36415 COLL VENOUS BLD VENIPUNCTURE: CPT | Performed by: INTERNAL MEDICINE

## 2022-04-07 PROCEDURE — 84443 ASSAY THYROID STIM HORMONE: CPT | Performed by: INTERNAL MEDICINE

## 2022-06-09 RX ORDER — PHENTERMINE HYDROCHLORIDE 37.5 MG/1
TABLET ORAL
Qty: 30 TABLET | Refills: 0 | Status: SHIPPED | OUTPATIENT
Start: 2022-06-09

## 2022-08-30 ENCOUNTER — OFFICE VISIT (OUTPATIENT)
Dept: INTERNAL MEDICINE CLINIC | Facility: CLINIC | Age: 42
End: 2022-08-30
Payer: MEDICAID

## 2022-08-30 VITALS
BODY MASS INDEX: 37.53 KG/M2 | WEIGHT: 228 LBS | DIASTOLIC BLOOD PRESSURE: 70 MMHG | HEIGHT: 65.5 IN | SYSTOLIC BLOOD PRESSURE: 110 MMHG

## 2022-08-30 DIAGNOSIS — R06.83 LOUD SNORING: ICD-10-CM

## 2022-08-30 DIAGNOSIS — J02.9 PHARYNGITIS, UNSPECIFIED ETIOLOGY: Primary | ICD-10-CM

## 2022-08-30 DIAGNOSIS — M79.672 LEFT FOOT PAIN: ICD-10-CM

## 2022-08-30 PROCEDURE — 3074F SYST BP LT 130 MM HG: CPT | Performed by: INTERNAL MEDICINE

## 2022-08-30 PROCEDURE — 99214 OFFICE O/P EST MOD 30 MIN: CPT | Performed by: INTERNAL MEDICINE

## 2022-08-30 PROCEDURE — 3008F BODY MASS INDEX DOCD: CPT | Performed by: INTERNAL MEDICINE

## 2022-08-30 PROCEDURE — 3078F DIAST BP <80 MM HG: CPT | Performed by: INTERNAL MEDICINE

## 2022-08-30 RX ORDER — PHENTERMINE HYDROCHLORIDE 37.5 MG/1
37.5 TABLET ORAL
Qty: 30 TABLET | Refills: 3 | Status: SHIPPED | OUTPATIENT
Start: 2022-08-30

## 2022-08-30 RX ORDER — AZITHROMYCIN 250 MG/1
TABLET, FILM COATED ORAL
Qty: 6 TABLET | Refills: 0 | Status: SHIPPED | OUTPATIENT
Start: 2022-08-30 | End: 2022-09-04

## 2022-08-31 ENCOUNTER — HOSPITAL ENCOUNTER (OUTPATIENT)
Dept: GENERAL RADIOLOGY | Age: 42
Discharge: HOME | End: 2022-08-31
Attending: INTERNAL MEDICINE
Payer: MEDICAID

## 2022-08-31 DIAGNOSIS — M79.672 LEFT FOOT PAIN: ICD-10-CM

## 2022-08-31 PROCEDURE — 73630 X-RAY EXAM OF FOOT: CPT | Performed by: INTERNAL MEDICINE

## 2022-09-16 LAB
CONTROL LINE PRESENT WITH A CLEAR BACKGROUND (YES/NO): YES YES/NO
KIT LOT #: NORMAL NUMERIC

## 2022-11-17 ENCOUNTER — OFFICE VISIT (OUTPATIENT)
Dept: SLEEP CENTER | Age: 42
End: 2022-11-17
Attending: INTERNAL MEDICINE
Payer: MEDICAID

## 2022-11-17 DIAGNOSIS — R06.83 LOUD SNORING: ICD-10-CM

## 2022-11-17 PROCEDURE — 95806 SLEEP STUDY UNATT&RESP EFFT: CPT

## 2022-12-02 ENCOUNTER — OFFICE VISIT (OUTPATIENT)
Dept: PODIATRY CLINIC | Facility: CLINIC | Age: 42
End: 2022-12-02
Payer: MEDICAID

## 2022-12-02 DIAGNOSIS — M79.672 LEFT FOOT PAIN: ICD-10-CM

## 2022-12-02 DIAGNOSIS — M19.072 ARTHRITIS OF FIRST METATARSOPHALANGEAL (MTP) JOINT OF LEFT FOOT: Primary | ICD-10-CM

## 2022-12-02 PROCEDURE — 99203 OFFICE O/P NEW LOW 30 MIN: CPT | Performed by: PODIATRIST

## 2022-12-02 RX ORDER — METHYLPREDNISOLONE 4 MG/1
TABLET ORAL
Qty: 1 EACH | Refills: 0 | Status: SHIPPED | OUTPATIENT
Start: 2022-12-02

## 2022-12-03 DIAGNOSIS — G47.33 OBSTRUCTIVE SLEEP APNEA SYNDROME: Primary | ICD-10-CM

## 2022-12-09 RX ORDER — PHENTERMINE HYDROCHLORIDE 37.5 MG/1
37.5 TABLET ORAL
Qty: 30 TABLET | Refills: 3 | Status: SHIPPED | OUTPATIENT
Start: 2022-12-09

## 2022-12-28 ENCOUNTER — OFFICE VISIT (OUTPATIENT)
Dept: PODIATRY CLINIC | Facility: CLINIC | Age: 42
End: 2022-12-28
Payer: MEDICAID

## 2022-12-28 ENCOUNTER — HOSPITAL ENCOUNTER (OUTPATIENT)
Dept: GENERAL RADIOLOGY | Age: 42
Discharge: HOME | End: 2022-12-28
Attending: PODIATRIST
Payer: MEDICAID

## 2022-12-28 DIAGNOSIS — M79.672 LEFT FOOT PAIN: ICD-10-CM

## 2022-12-28 DIAGNOSIS — M19.072 ARTHRITIS OF FIRST METATARSOPHALANGEAL (MTP) JOINT OF LEFT FOOT: Primary | ICD-10-CM

## 2022-12-28 DIAGNOSIS — M19.072 ARTHRITIS OF FIRST METATARSOPHALANGEAL (MTP) JOINT OF LEFT FOOT: ICD-10-CM

## 2022-12-28 PROCEDURE — 99212 OFFICE O/P EST SF 10 MIN: CPT | Performed by: PODIATRIST

## 2022-12-28 PROCEDURE — 73630 X-RAY EXAM OF FOOT: CPT | Performed by: PODIATRIST

## 2022-12-28 RX ORDER — MELOXICAM 7.5 MG/1
7.5 TABLET ORAL DAILY PRN
Qty: 30 TABLET | Refills: 1 | Status: SHIPPED | OUTPATIENT
Start: 2022-12-28 | End: 2023-02-26

## 2023-01-26 ENCOUNTER — OFFICE VISIT (OUTPATIENT)
Dept: INTERNAL MEDICINE CLINIC | Facility: CLINIC | Age: 43
End: 2023-01-26
Payer: MEDICAID

## 2023-01-26 VITALS
OXYGEN SATURATION: 99 % | HEIGHT: 64.5 IN | BODY MASS INDEX: 38.85 KG/M2 | DIASTOLIC BLOOD PRESSURE: 74 MMHG | HEART RATE: 105 BPM | SYSTOLIC BLOOD PRESSURE: 126 MMHG | WEIGHT: 230.38 LBS

## 2023-01-26 DIAGNOSIS — R00.0 TACHYCARDIA: Primary | ICD-10-CM

## 2023-01-26 DIAGNOSIS — Z83.3 FAMILY HISTORY OF DIABETES MELLITUS: ICD-10-CM

## 2023-01-26 LAB
ATRIAL RATE: 90 BPM
P AXIS: 52 DEGREES
P-R INTERVAL: 154 MS
Q-T INTERVAL: 372 MS
QRS DURATION: 90 MS
QTC CALCULATION (BEZET): 455 MS
R AXIS: 23 DEGREES
T AXIS: 27 DEGREES
VENTRICULAR RATE: 90 BPM

## 2023-01-26 PROCEDURE — 3078F DIAST BP <80 MM HG: CPT | Performed by: INTERNAL MEDICINE

## 2023-01-26 PROCEDURE — 3074F SYST BP LT 130 MM HG: CPT | Performed by: INTERNAL MEDICINE

## 2023-01-26 PROCEDURE — 99214 OFFICE O/P EST MOD 30 MIN: CPT | Performed by: INTERNAL MEDICINE

## 2023-01-26 PROCEDURE — 93000 ELECTROCARDIOGRAM COMPLETE: CPT | Performed by: INTERNAL MEDICINE

## 2023-01-26 PROCEDURE — 3008F BODY MASS INDEX DOCD: CPT | Performed by: INTERNAL MEDICINE

## 2023-02-02 ENCOUNTER — OFFICE VISIT (OUTPATIENT)
Dept: PULMONOLOGY | Facility: CLINIC | Age: 43
End: 2023-02-02

## 2023-02-02 VITALS
OXYGEN SATURATION: 99 % | SYSTOLIC BLOOD PRESSURE: 110 MMHG | BODY MASS INDEX: 39 KG/M2 | WEIGHT: 232 LBS | DIASTOLIC BLOOD PRESSURE: 76 MMHG | HEART RATE: 103 BPM

## 2023-02-02 DIAGNOSIS — G47.33 OSA (OBSTRUCTIVE SLEEP APNEA): Primary | ICD-10-CM

## 2023-02-02 PROCEDURE — 99243 OFF/OP CNSLTJ NEW/EST LOW 30: CPT | Performed by: PHYSICIAN ASSISTANT

## 2023-02-02 PROCEDURE — 3078F DIAST BP <80 MM HG: CPT | Performed by: PHYSICIAN ASSISTANT

## 2023-02-02 PROCEDURE — 3074F SYST BP LT 130 MM HG: CPT | Performed by: PHYSICIAN ASSISTANT

## 2023-02-02 NOTE — PROGRESS NOTES
Faxed signed orders for new CPAP Supplies, sleep study, office visit notes prior to sleep study from Dr. Halie Tesfaye (8/30/22), office visit notes from 2/2/23 and facesheet to [facility]. Received confirmation.

## 2023-02-06 NOTE — PROGRESS NOTES
Recd CPAP set-up confirm from [facility] requesting pt fu w/ provider between 3/9/23 and 5/7/23. Pt is scheduled for 4/4/23 @ 12p.

## 2023-02-09 RX ORDER — PHENTERMINE HYDROCHLORIDE 37.5 MG/1
37.5 TABLET ORAL
Qty: 30 TABLET | Refills: 3 | Status: SHIPPED | OUTPATIENT
Start: 2023-02-09

## 2023-02-22 ENCOUNTER — LAB ENCOUNTER (OUTPATIENT)
Dept: LAB | Facility: REFERENCE LAB | Age: 43
End: 2023-02-22
Attending: INTERNAL MEDICINE
Payer: MEDICAID

## 2023-02-22 DIAGNOSIS — R00.0 TACHYCARDIA: ICD-10-CM

## 2023-02-22 DIAGNOSIS — Z83.3 FAMILY HISTORY OF DIABETES MELLITUS: ICD-10-CM

## 2023-02-22 LAB
ALBUMIN SERPL-MCNC: 3.8 G/DL (ref 3.4–5)
ALBUMIN/GLOB SERPL: 1 {RATIO} (ref 1–2)
ALP LIVER SERPL-CCNC: 50 U/L
ALT SERPL-CCNC: 28 U/L
ANION GAP SERPL CALC-SCNC: 5 MMOL/L (ref 0–18)
AST SERPL-CCNC: 17 U/L (ref 15–37)
BILIRUB SERPL-MCNC: 0.5 MG/DL (ref 0.1–2)
BUN BLD-MCNC: 12 MG/DL (ref 7–18)
BUN/CREAT SERPL: 15.2 (ref 10–20)
CALCIUM BLD-MCNC: 9.1 MG/DL (ref 8.5–10.1)
CHLORIDE SERPL-SCNC: 109 MMOL/L (ref 98–112)
CHOLEST SERPL-MCNC: 196 MG/DL (ref ?–200)
CO2 SERPL-SCNC: 25 MMOL/L (ref 21–32)
CREAT BLD-MCNC: 0.79 MG/DL
EST. AVERAGE GLUCOSE BLD GHB EST-MCNC: 108 MG/DL (ref 68–126)
FASTING PATIENT LIPID ANSWER: YES
FASTING STATUS PATIENT QL REPORTED: YES
GFR SERPLBLD BASED ON 1.73 SQ M-ARVRAT: 96 ML/MIN/1.73M2 (ref 60–?)
GLOBULIN PLAS-MCNC: 3.7 G/DL (ref 2.8–4.4)
GLUCOSE BLD-MCNC: 94 MG/DL (ref 70–99)
HBA1C MFR BLD: 5.4 % (ref ?–5.7)
HDLC SERPL-MCNC: 85 MG/DL (ref 40–59)
LDLC SERPL CALC-MCNC: 88 MG/DL (ref ?–100)
NONHDLC SERPL-MCNC: 111 MG/DL (ref ?–130)
OSMOLALITY SERPL CALC.SUM OF ELEC: 288 MOSM/KG (ref 275–295)
POTASSIUM SERPL-SCNC: 4.5 MMOL/L (ref 3.5–5.1)
PROT SERPL-MCNC: 7.5 G/DL (ref 6.4–8.2)
SODIUM SERPL-SCNC: 139 MMOL/L (ref 136–145)
T4 FREE SERPL-MCNC: 1 NG/DL (ref 0.8–1.7)
TRIGL SERPL-MCNC: 137 MG/DL (ref 30–149)
TSI SER-ACNC: 1.98 MIU/ML (ref 0.36–3.74)
VLDLC SERPL CALC-MCNC: 22 MG/DL (ref 0–30)

## 2023-02-22 PROCEDURE — 83036 HEMOGLOBIN GLYCOSYLATED A1C: CPT

## 2023-02-22 PROCEDURE — 80061 LIPID PANEL: CPT

## 2023-02-22 PROCEDURE — 84443 ASSAY THYROID STIM HORMONE: CPT

## 2023-02-22 PROCEDURE — 36415 COLL VENOUS BLD VENIPUNCTURE: CPT

## 2023-02-22 PROCEDURE — 84439 ASSAY OF FREE THYROXINE: CPT

## 2023-02-22 PROCEDURE — 80053 COMPREHEN METABOLIC PANEL: CPT

## 2023-03-21 ENCOUNTER — APPOINTMENT (OUTPATIENT)
Dept: GENERAL RADIOLOGY | Age: 43
End: 2023-03-21
Attending: NURSE PRACTITIONER
Payer: MEDICAID

## 2023-03-21 ENCOUNTER — HOSPITAL ENCOUNTER (OUTPATIENT)
Age: 43
Discharge: HOME | End: 2023-03-21
Payer: MEDICAID

## 2023-03-21 ENCOUNTER — PATIENT MESSAGE (OUTPATIENT)
Dept: INTERNAL MEDICINE CLINIC | Facility: CLINIC | Age: 43
End: 2023-03-21

## 2023-03-21 VITALS
RESPIRATION RATE: 18 BRPM | TEMPERATURE: 97 F | OXYGEN SATURATION: 99 % | SYSTOLIC BLOOD PRESSURE: 129 MMHG | HEART RATE: 102 BPM | DIASTOLIC BLOOD PRESSURE: 85 MMHG

## 2023-03-21 DIAGNOSIS — M54.12 CERVICAL RADICULOPATHY: Primary | ICD-10-CM

## 2023-03-21 LAB
#MXD IC: 0.5 X10ˆ3/UL (ref 0.1–1)
ATRIAL RATE: 107 BPM
B-HCG UR QL: NEGATIVE
BUN BLD-MCNC: 11 MG/DL (ref 7–18)
CHLORIDE BLD-SCNC: 106 MMOL/L (ref 98–112)
CO2 BLD-SCNC: 25 MMOL/L (ref 21–32)
CREAT BLD-MCNC: 0.7 MG/DL
DDIMER WHOLE BLOOD: <200 NG/ML DDU (ref ?–400)
GFR SERPLBLD BASED ON 1.73 SQ M-ARVRAT: 111 ML/MIN/1.73M2 (ref 60–?)
GLUCOSE BLD-MCNC: 88 MG/DL (ref 70–99)
HCT VFR BLD AUTO: 40.8 %
HCT VFR BLD CALC: 46 %
HGB BLD-MCNC: 13 G/DL
ISTAT IONIZED CALCIUM FOR CHEM 8: 1.22 MMOL/L (ref 1.12–1.32)
LYMPHOCYTES # BLD AUTO: 2 X10ˆ3/UL (ref 1–4)
LYMPHOCYTES NFR BLD AUTO: 27.9 %
MCH RBC QN AUTO: 29 PG (ref 26–34)
MCHC RBC AUTO-ENTMCNC: 31.9 G/DL (ref 31–37)
MCV RBC AUTO: 90.9 FL (ref 80–100)
MIXED CELL %: 7.6 %
NEUTROPHILS # BLD AUTO: 4.6 X10ˆ3/UL (ref 1.5–7.7)
NEUTROPHILS NFR BLD AUTO: 64.5 %
P AXIS: 58 DEGREES
P-R INTERVAL: 156 MS
PLATELET # BLD AUTO: 185 X10ˆ3/UL (ref 150–450)
POTASSIUM BLD-SCNC: 4.2 MMOL/L (ref 3.6–5.1)
Q-T INTERVAL: 344 MS
QRS DURATION: 86 MS
QTC CALCULATION (BEZET): 459 MS
R AXIS: 27 DEGREES
RBC # BLD AUTO: 4.49 X10ˆ6/UL
SODIUM BLD-SCNC: 140 MMOL/L (ref 136–145)
T AXIS: 22 DEGREES
TROPONIN I BLD-MCNC: <0.02 NG/ML
VENTRICULAR RATE: 107 BPM
WBC # BLD AUTO: 7.1 X10ˆ3/UL (ref 4–11)

## 2023-03-21 PROCEDURE — 93000 ELECTROCARDIOGRAM COMPLETE: CPT | Performed by: NURSE PRACTITIONER

## 2023-03-21 PROCEDURE — 36415 COLL VENOUS BLD VENIPUNCTURE: CPT | Performed by: NURSE PRACTITIONER

## 2023-03-21 PROCEDURE — 85025 COMPLETE CBC W/AUTO DIFF WBC: CPT | Performed by: NURSE PRACTITIONER

## 2023-03-21 PROCEDURE — 81025 URINE PREGNANCY TEST: CPT | Performed by: NURSE PRACTITIONER

## 2023-03-21 PROCEDURE — 71046 X-RAY EXAM CHEST 2 VIEWS: CPT | Performed by: NURSE PRACTITIONER

## 2023-03-21 PROCEDURE — 99214 OFFICE O/P EST MOD 30 MIN: CPT | Performed by: NURSE PRACTITIONER

## 2023-03-21 PROCEDURE — 80047 BASIC METABLC PNL IONIZED CA: CPT | Performed by: NURSE PRACTITIONER

## 2023-03-21 PROCEDURE — 84484 ASSAY OF TROPONIN QUANT: CPT | Performed by: NURSE PRACTITIONER

## 2023-03-21 RX ORDER — CYCLOBENZAPRINE HCL 10 MG
10 TABLET ORAL 3 TIMES DAILY PRN
Qty: 15 TABLET | Refills: 0 | Status: SHIPPED | OUTPATIENT
Start: 2023-03-21 | End: 2023-03-25

## 2023-03-21 RX ORDER — PREDNISONE 20 MG/1
40 TABLET ORAL DAILY
Qty: 8 TABLET | Refills: 0 | Status: SHIPPED | OUTPATIENT
Start: 2023-03-21 | End: 2023-03-25

## 2023-03-21 NOTE — TELEPHONE ENCOUNTER
Returned call to patient messaging re: intense right arm pain and finger numbness. Per patient she began experiencing unprovoked, intermittent Rt shoulder pain with increasingly intense arm pain from right elbow down through her fingers, with 3 finger becoming numb. Denies any skin color discoloration or temperature variance in rt hand/ fingers (no mottling based on patient description). She can still move her are and grasp items with her hand, but pain is 8/10 intensity and the the intermittent intervals w/o intense pain are becoming shorter. Denies any cardiac-like chest pain, SOB/ dyspnea or other symptoms. Has tried OTC pain medication at home w/o any relief. No open appt slots w/ Dr Pearl Ugalde this week. Patient directed to seek evaluation @ [facility] and she agrees to go there now.

## 2023-03-21 NOTE — ED INITIAL ASSESSMENT (HPI)
Pt reports right sided neck/shoulder pain beginning Friday. No injury or trauma. Taking motrin with minimal relief.

## 2023-03-21 NOTE — DISCHARGE INSTRUCTIONS
You may take Motrin 600mg as needed every 6-8 hours. Take this with food. Take the steroid daily as directed. Take it at the same time each day. If additional pain control is needed, you may use a muscle relaxant as directed however be aware this medication can cause drowsiness. Rest from exacerbating activities but do not stay sedentary. Follow up with your primary care provider within the next 3 days - if symptoms do not resolve, you may need further treatment. Seek additional care in the ER immediately for numbness in your groin, loss of bowel or bladder function, inability to walk, or other new/worsening symptoms.

## 2023-04-04 ENCOUNTER — OFFICE VISIT (OUTPATIENT)
Dept: PULMONOLOGY | Facility: CLINIC | Age: 43
End: 2023-04-04

## 2023-04-04 VITALS
BODY MASS INDEX: 39.32 KG/M2 | SYSTOLIC BLOOD PRESSURE: 131 MMHG | WEIGHT: 236 LBS | DIASTOLIC BLOOD PRESSURE: 91 MMHG | RESPIRATION RATE: 14 BRPM | OXYGEN SATURATION: 99 % | HEIGHT: 65 IN | HEART RATE: 104 BPM

## 2023-04-04 DIAGNOSIS — G47.33 OSA (OBSTRUCTIVE SLEEP APNEA): Primary | ICD-10-CM

## 2023-04-04 PROBLEM — E66.01 MORBID (SEVERE) OBESITY DUE TO EXCESS CALORIES (HCC): Status: ACTIVE | Noted: 2023-04-04

## 2023-04-04 PROCEDURE — 99213 OFFICE O/P EST LOW 20 MIN: CPT | Performed by: PHYSICIAN ASSISTANT

## 2023-04-04 PROCEDURE — 3080F DIAST BP >= 90 MM HG: CPT | Performed by: PHYSICIAN ASSISTANT

## 2023-04-04 PROCEDURE — 3075F SYST BP GE 130 - 139MM HG: CPT | Performed by: PHYSICIAN ASSISTANT

## 2023-04-04 PROCEDURE — 3008F BODY MASS INDEX DOCD: CPT | Performed by: PHYSICIAN ASSISTANT

## 2023-04-04 NOTE — PROGRESS NOTES
Faxed signed SWO for oral appliance, sleep study, office visit notes and face sheet to [address] [place] [address]. Received confirmation. Faxed signed orders for new CPAP mask, office visit notes and face sheet to [facility]. Received Confirmation.

## 2023-04-04 NOTE — PATIENT INSTRUCTIONS
We will try a new style of CPAP mask that may be easier to tolerate. Let's try this for another 1-2 months. In the meantime, we will refer you to [facility] in event that you are unable to tolerate CPAP. You may be candidate for oral appliance therapy. Please let me know in 1-2 months which direction you decide to go - if you would like to continue CPAP or if you are going to pursue oral appliance therapy in combination with weight loss.

## 2023-04-04 NOTE — PROGRESS NOTES
Faxed office visit notes from 4/4/23 to Latoya Acosta at [phone] for CPAP compliance. Received confirmation.

## 2023-04-26 ENCOUNTER — OFFICE VISIT (OUTPATIENT)
Dept: INTERNAL MEDICINE CLINIC | Facility: CLINIC | Age: 43
End: 2023-04-26
Payer: MEDICAID

## 2023-04-26 VITALS
BODY MASS INDEX: 38.99 KG/M2 | HEART RATE: 110 BPM | DIASTOLIC BLOOD PRESSURE: 86 MMHG | RESPIRATION RATE: 17 BRPM | OXYGEN SATURATION: 97 % | HEIGHT: 65 IN | WEIGHT: 234 LBS | SYSTOLIC BLOOD PRESSURE: 128 MMHG

## 2023-04-26 DIAGNOSIS — G62.9 NEUROPATHY: Primary | ICD-10-CM

## 2023-04-26 DIAGNOSIS — G47.33 OSA (OBSTRUCTIVE SLEEP APNEA): ICD-10-CM

## 2023-04-26 DIAGNOSIS — E66.9 OBESITY (BMI 35.0-39.9 WITHOUT COMORBIDITY): ICD-10-CM

## 2023-04-26 DIAGNOSIS — R00.0 TACHYCARDIA: ICD-10-CM

## 2023-04-26 DIAGNOSIS — G56.01 CARPAL TUNNEL SYNDROME OF RIGHT WRIST: ICD-10-CM

## 2023-04-26 PROBLEM — E66.01 MORBID (SEVERE) OBESITY DUE TO EXCESS CALORIES (HCC): Status: RESOLVED | Noted: 2023-04-04 | Resolved: 2023-04-26

## 2023-04-26 PROBLEM — G56.02 CARPAL TUNNEL SYNDROME OF LEFT WRIST: Status: RESOLVED | Noted: 2021-01-12 | Resolved: 2023-04-26

## 2023-04-26 PROCEDURE — 99214 OFFICE O/P EST MOD 30 MIN: CPT | Performed by: FAMILY MEDICINE

## 2023-04-26 PROCEDURE — 3074F SYST BP LT 130 MM HG: CPT | Performed by: FAMILY MEDICINE

## 2023-04-26 PROCEDURE — 3079F DIAST BP 80-89 MM HG: CPT | Performed by: FAMILY MEDICINE

## 2023-04-26 PROCEDURE — 3008F BODY MASS INDEX DOCD: CPT | Performed by: FAMILY MEDICINE

## 2023-04-26 NOTE — ASSESSMENT & PLAN NOTE
She is slightly tachycardic in the office-I advised that she have a discussion with her primary care physician about long-term use of phentermine in light of her tachycardia. Already had EKG done. Consider blood work evaluation in the future for tachycardia.

## 2023-04-26 NOTE — ASSESSMENT & PLAN NOTE
Currently on phentermine 37.5 mg, primarily being managed by her PCP at this time. Can stay on this medication for now. She is slightly tachycardic in the office-I advised that she have a discussion with her primary care physician about long-term use of phentermine in light of her tachycardia.

## 2023-04-26 NOTE — ASSESSMENT & PLAN NOTE
Patient with what appears to be neuropathy which is associated with arm as well as chest discomfort. She already had a negative cardiac work-up done in the emergency department. She does have some neuropathy in the left upper extremity, however it is most predominantly in the right side at this time. Spurling's exam is positive with radicular symptoms to the right upper extremity. Phalen's maneuver also positive with numbness to the right hand. Could be a component of carpal tunnel, cubital tunnel as well as herniated disc in the neck contributing to all of her symptoms  Advised starting a wrist splint on both her wrists. Keep arms and body straight when sleeping at night. EMG ordered for further evaluation. She has plans for physical therapy. Discussed considering medication such as gabapentin-she declines for now, however she would like to start she can let me know. For now can continue use ibuprofen as needed. Heating pad as needed as well. Can follow-up in 3 months as needed. Can consider an MRI of the cervical spine depending on progress.

## 2023-04-30 ENCOUNTER — HOSPITAL ENCOUNTER (OUTPATIENT)
Age: 43
Discharge: HOME | End: 2023-04-30
Payer: MEDICAID

## 2023-04-30 VITALS
BODY MASS INDEX: 38.32 KG/M2 | SYSTOLIC BLOOD PRESSURE: 131 MMHG | DIASTOLIC BLOOD PRESSURE: 86 MMHG | OXYGEN SATURATION: 100 % | HEART RATE: 95 BPM | TEMPERATURE: 98 F | RESPIRATION RATE: 18 BRPM | WEIGHT: 230 LBS | HEIGHT: 65 IN

## 2023-04-30 DIAGNOSIS — J02.0 ACUTE STREPTOCOCCAL PHARYNGITIS: Primary | ICD-10-CM

## 2023-04-30 LAB — S PYO AG THROAT QL: POSITIVE

## 2023-04-30 PROCEDURE — 99213 OFFICE O/P EST LOW 20 MIN: CPT | Performed by: PHYSICIAN ASSISTANT

## 2023-04-30 PROCEDURE — 87880 STREP A ASSAY W/OPTIC: CPT | Performed by: PHYSICIAN ASSISTANT

## 2023-04-30 RX ORDER — CEFDINIR 300 MG/1
300 CAPSULE ORAL 2 TIMES DAILY
Qty: 20 CAPSULE | Refills: 0 | Status: SHIPPED | OUTPATIENT
Start: 2023-04-30 | End: 2023-05-10

## 2023-04-30 RX ORDER — IBUPROFEN 600 MG/1
TABLET ORAL
Qty: 20 TABLET | Refills: 0 | Status: SHIPPED | OUTPATIENT
Start: 2023-04-30

## 2023-04-30 NOTE — ED INITIAL ASSESSMENT (HPI)
Pt c/o sore throat since Tuesday. + bilateral gland swelling.  States has been taking ibuprofen with little to no relief

## 2023-10-13 RX ORDER — PHENTERMINE HYDROCHLORIDE 37.5 MG/1
37.5 TABLET ORAL
Qty: 30 TABLET | Refills: 0 | Status: SHIPPED | OUTPATIENT
Start: 2023-10-13

## 2023-10-23 ENCOUNTER — OFFICE VISIT (OUTPATIENT)
Dept: INTERNAL MEDICINE CLINIC | Facility: CLINIC | Age: 43
End: 2023-10-23
Payer: MEDICAID

## 2023-10-23 VITALS
HEART RATE: 95 BPM | WEIGHT: 241.38 LBS | BODY MASS INDEX: 40.22 KG/M2 | OXYGEN SATURATION: 96 % | DIASTOLIC BLOOD PRESSURE: 80 MMHG | HEIGHT: 65 IN | SYSTOLIC BLOOD PRESSURE: 122 MMHG

## 2023-10-23 DIAGNOSIS — R10.9 ACUTE LEFT FLANK PAIN: Primary | ICD-10-CM

## 2023-10-23 DIAGNOSIS — N12 PYELONEPHRITIS: ICD-10-CM

## 2023-10-23 DIAGNOSIS — E66.9 OBESITY (BMI 35.0-39.9 WITHOUT COMORBIDITY): ICD-10-CM

## 2023-10-23 LAB
APPEARANCE: CLEAR
BILIRUBIN: NEGATIVE
GLUCOSE (URINE DIPSTICK): NEGATIVE MG/DL
KETONES (URINE DIPSTICK): NEGATIVE MG/DL
MULTISTIX LOT#: ABNORMAL NUMERIC
NITRITE, URINE: NEGATIVE
PH, URINE: 6 (ref 4.5–8)
PROTEIN (URINE DIPSTICK): NEGATIVE MG/DL
SPECIFIC GRAVITY: 1.03 (ref 1–1.03)
URINE-COLOR: YELLOW
UROBILINOGEN,SEMI-QN: 0.2 MG/DL (ref 0–1.9)

## 2023-10-23 PROCEDURE — 87086 URINE CULTURE/COLONY COUNT: CPT | Performed by: INTERNAL MEDICINE

## 2023-10-23 RX ORDER — SODIUM FLUORIDE 5 MG/ML
1 PASTE, DENTIFRICE DENTAL DAILY
COMMUNITY
Start: 2023-08-03

## 2023-10-23 RX ORDER — PHENTERMINE HYDROCHLORIDE 37.5 MG/1
37.5 TABLET ORAL
Qty: 30 TABLET | Refills: 2 | Status: SHIPPED | OUTPATIENT
Start: 2023-10-23

## 2023-10-23 RX ORDER — CIPROFLOXACIN 250 MG/1
250 TABLET, FILM COATED ORAL 2 TIMES DAILY
Qty: 14 TABLET | Refills: 0 | Status: SHIPPED | OUTPATIENT
Start: 2023-10-23 | End: 2023-11-02

## 2023-10-23 NOTE — ADDENDUM NOTE
Addended by: Eben Roach on: 10/23/2023 04:24 PM     Modules accepted: Orders Helical Rim Text: The closure involved the helical rim.

## 2023-10-25 ENCOUNTER — HOSPITAL ENCOUNTER (OUTPATIENT)
Dept: ULTRASOUND IMAGING | Age: 43
Discharge: HOME | End: 2023-10-25
Attending: INTERNAL MEDICINE

## 2023-10-25 DIAGNOSIS — R10.9 ACUTE LEFT FLANK PAIN: ICD-10-CM

## 2023-10-25 PROCEDURE — 76775 US EXAM ABDO BACK WALL LIM: CPT | Performed by: INTERNAL MEDICINE

## 2024-01-02 ENCOUNTER — APPOINTMENT (OUTPATIENT)
Dept: GENERAL RADIOLOGY | Age: 44
End: 2024-01-02
Payer: MEDICAID

## 2024-01-02 ENCOUNTER — HOSPITAL ENCOUNTER (OUTPATIENT)
Age: 44
Discharge: HOME | End: 2024-01-02
Payer: MEDICAID

## 2024-01-02 ENCOUNTER — TELEPHONE (OUTPATIENT)
Dept: ORTHOPEDICS CLINIC | Facility: CLINIC | Age: 44
End: 2024-01-02

## 2024-01-02 VITALS
HEART RATE: 107 BPM | RESPIRATION RATE: 16 BRPM | OXYGEN SATURATION: 100 % | SYSTOLIC BLOOD PRESSURE: 132 MMHG | DIASTOLIC BLOOD PRESSURE: 89 MMHG | TEMPERATURE: 97 F

## 2024-01-02 DIAGNOSIS — M25.561 RIGHT KNEE PAIN, UNSPECIFIED CHRONICITY: Primary | ICD-10-CM

## 2024-01-02 DIAGNOSIS — S83.91XA SPRAIN OF RIGHT KNEE, UNSPECIFIED LIGAMENT, INITIAL ENCOUNTER: ICD-10-CM

## 2024-01-02 DIAGNOSIS — R52 PAIN: Primary | ICD-10-CM

## 2024-01-02 DIAGNOSIS — Z01.89 ENCOUNTER FOR LOWER EXTREMITY COMPARISON IMAGING STUDY: ICD-10-CM

## 2024-01-02 PROCEDURE — 73560 X-RAY EXAM OF KNEE 1 OR 2: CPT

## 2024-01-02 PROCEDURE — 99213 OFFICE O/P EST LOW 20 MIN: CPT

## 2024-01-02 NOTE — ED PROVIDER NOTES
Patient Seen in: [facility]      History     Chief Complaint   Patient presents with    Knee Pain     Stated Complaint: R Knee Pain    Subjective:   Sandee is a 43-year-old female presenting to the immediate care complaining of right knee pain.  Patient states she was skiing and she twisted her knee while skiing 4 days ago.  She has had continued pain to the knee since then, some swelling and difficulty putting weight on the knee.  Patient denies any weakness, numbness, tingling.  She denies any other injury or trauma.  Patient has no other complaints or concerns.          Objective:   Past Medical History:   Diagnosis Date    Obesity (BMI 35.0-39.9 without comorbidity) 06/29/2021    HERRERA (obstructive sleep apnea) 02/02/2023              Past Surgical History:   Procedure Laterality Date    OTHER SURGICAL HISTORY      RT LEG SURGERY- PINS PLACED AND REMOVED                Social History     Socioeconomic History    Marital status:    Tobacco Use    Smoking status: Never    Smokeless tobacco: Never   Substance and Sexual Activity    Alcohol use: Yes     Comment: OCC    Drug use: No              Review of Systems   Musculoskeletal:         Knee pain   All other systems reviewed and are negative.      Positive for stated complaint: R Knee Pain  Other systems are as noted in HPI.  Constitutional and vital signs reviewed.      All other systems reviewed and negative except as noted above.    Physical Exam     ED Triage Vitals [01/02/24 1358]   /89   Pulse 107   Resp 16   Temp 97.4 °F (36.3 °C)   Temp src Temporal   SpO2 100 %   O2 Device None (Room air)       Current:/89   Pulse 107   Temp 97.4 °F (36.3 °C) (Temporal)   Resp 16   LMP 09/01/2023 (Approximate)   SpO2 100%         Physical Exam  Vitals and nursing note reviewed.   Constitutional:       General: She is not in acute distress.     Appearance: Normal appearance. She is not ill-appearing, toxic-appearing or diaphoretic.   HENT:       Head: Normocephalic.   Cardiovascular:      Rate and Rhythm: Normal rate.   Pulmonary:      Effort: Pulmonary effort is normal.   Musculoskeletal:      Cervical back: Normal range of motion.      Right knee: Swelling present. No deformity, effusion, erythema, ecchymosis, lacerations, bony tenderness or crepitus. Decreased range of motion. Tenderness present. Normal alignment. Normal pulse.      Instability Tests: Lateral Art test positive. Medial Art test negative.      Left knee: Normal.      Comments: Positive CMS.  2+ DP and PT pulses.  Capillary refill less than 2 seconds.  Patient has decreased range of motion to the right knee secondary to pain.  The right lower leg, thigh, calf, hip, ankle and foot are unremarkable.  Patient has no calf pain, tenderness, swelling, erythema or warmth. No abrasions or lacerations noted.  No ecchymosis noted.  No obvious deformity noted.  No drainage or discharge noted.  Patient has anterior knee tenderness with palpation. Moderate swelling to the entire right knee.  Positive lateral Art's test.     Skin:     General: Skin is warm and dry.      Capillary Refill: Capillary refill takes less than 2 seconds.   Neurological:      General: No focal deficit present.      Mental Status: She is alert and oriented to person, place, and time.   Psychiatric:         Mood and Affect: Mood normal.         Behavior: Behavior normal.         Thought Content: Thought content normal.         Judgment: Judgment normal.              ED Course   Labs Reviewed - No data to display  XR KNEE (1 OR 2 VIEWS), RIGHT (CPT=73560)    Result Date: 1/2/2024  CONCLUSION: Normal examination.     Dictated by (CST): Hi Abarca MD on 1/02/2024 at 2:52 PM     Finalized by (CST): Hi Abarca MD on 1/02/2024 at 2:53 PM                [facility]           Medical Decision Making  Multiple medical diagnoses were considered including but not limited to bony versus soft tissue injury to the right  knee.  Patient is well appearing, non-toxic and in no acute distress.  Vital signs are stable.   There is no fracture on x-ray per the radiology read.  I independently reviewed the films, there are no obvious signs of fracture.  Patient's history and physical exam are consistent with a sprain.  Patient has a ligamentous injury/tear.  Recommended that she make an appointment to follow-up with Ortho soon as possible.  Patient has a knee immobilizer and crutches to be used at home. Recommended RICE.  Recommended using Tylenol and Motrin for pain.  Recommended that if the patient develop any numbness, tingling, acutely worsening pain, swelling or any other concerns or complaints they go to the emergency department.  Recommended that if patient has persistent pain they make an appointment to follow-up with the orthopedic specialist.  Otherwise recommended follow-up with primary care doctor.  ED precautions discussed.  Patient advised to follow up with PCP in 2-3 days.  Patient agrees with this plan of care.  Patient verbalizes understanding of discharge instructions and plan of care.      Amount and/or Complexity of Data Reviewed  Radiology: ordered and independent interpretation performed. Decision-making details documented in ED Course.    Risk  OTC drugs.        Disposition and Plan     Clinical Impression:  1. Pain    2. Sprain of right knee, unspecified ligament, initial encounter         Disposition:  Discharge  1/2/2024  2:57 pm    Follow-up:  Kasey Calderon MD  [address] [facility] [address]  [phone]          Sonja Donnelly PA  [address] [place] [address]  [phone]                Medications Prescribed:  Discharge Medication List as of 1/2/2024  2:59 PM

## 2024-01-02 NOTE — TELEPHONE ENCOUNTER
Need weight  bearing (standing) views. Ordered xrays for both knees (left knee comparison view only.) please schedule with pt. Thanks!

## 2024-01-02 NOTE — ED INITIAL ASSESSMENT (HPI)
Pt c/o right knee pain, after falling while skiing on Saturday. Taking ibuprofen every 6 hours. Pt reports being unable to ambulate d/t pain

## 2024-01-02 NOTE — TELEPHONE ENCOUNTER
Patient scheduled with Dr. Womack for right knee pain. Patient has a 2 view xray in epic, please advise if additional imaging needed.    Future Appointments   Date Time Provider Department Center   1/8/2024  9:40 AM [id number]   1/23/2024 10:30 AM Sylvia Garcia MD [facility]  [facility]

## 2024-01-02 NOTE — DISCHARGE INSTRUCTIONS
There is no fracture on your x-ray.  You likely have a sprain of your knee.  Wear the splint and use crutches for comfort.  Rest, ice and elevate.  Take Tylenol and Motrin for pain as needed.  If you develop any numbness, tingling, acutely worsening pain, swelling or any other concerning complaints you should go to the emergency department.  Make an appointment to see the orthopedic specialist.  Otherwise follow-up with your primary care doctor.

## 2024-01-05 ENCOUNTER — HOSPITAL ENCOUNTER (OUTPATIENT)
Dept: MRI IMAGING | Age: 44
Discharge: HOME | End: 2024-01-05
Attending: ORTHOPAEDIC SURGERY
Payer: MEDICAID

## 2024-01-05 ENCOUNTER — OFFICE VISIT (OUTPATIENT)
Dept: ORTHOPEDICS CLINIC | Facility: CLINIC | Age: 44
End: 2024-01-05
Payer: MEDICAID

## 2024-01-05 ENCOUNTER — HOSPITAL ENCOUNTER (OUTPATIENT)
Dept: GENERAL RADIOLOGY | Age: 44
Discharge: HOME | End: 2024-01-05
Attending: ORTHOPAEDIC SURGERY
Payer: MEDICAID

## 2024-01-05 VITALS — BODY MASS INDEX: 40.15 KG/M2 | HEIGHT: 65 IN | WEIGHT: 241 LBS

## 2024-01-05 DIAGNOSIS — Z01.89 ENCOUNTER FOR LOWER EXTREMITY COMPARISON IMAGING STUDY: ICD-10-CM

## 2024-01-05 DIAGNOSIS — S89.90XA TRAUMATIC INJURY OF KNEE: Primary | ICD-10-CM

## 2024-01-05 DIAGNOSIS — S89.90XA TRAUMATIC INJURY OF KNEE: ICD-10-CM

## 2024-01-05 PROCEDURE — 99204 OFFICE O/P NEW MOD 45 MIN: CPT | Performed by: ORTHOPAEDIC SURGERY

## 2024-01-05 PROCEDURE — 3008F BODY MASS INDEX DOCD: CPT | Performed by: ORTHOPAEDIC SURGERY

## 2024-01-05 PROCEDURE — 73562 X-RAY EXAM OF KNEE 3: CPT | Performed by: ORTHOPAEDIC SURGERY

## 2024-01-05 PROCEDURE — 73721 MRI JNT OF LWR EXTRE W/O DYE: CPT | Performed by: ORTHOPAEDIC SURGERY

## 2024-01-05 RX ORDER — MELOXICAM 15 MG/1
15 TABLET ORAL DAILY
Qty: 14 TABLET | Refills: 1 | Status: SHIPPED | OUTPATIENT
Start: 2024-01-05

## 2024-01-05 NOTE — H&P
Orthopaedic Surgery  [address] [place] [id number]  [phone]     NEW PATIENT VISIT - HISTORY AND PHYSICAL EXAMINATION     Name: Sandee Rodriguez   MRN: [id number]  Date: 1/5/2024     CC: Right Knee Pain    REFERRED BY: Kasey Calderon MD    HPI:   Sandee Eddy is a very pleasant 43 year old female who presents today for evaluation of right knee pain ongoing since 12/30/2023 when she was skiing and lost her balance.  Since that time she has had pain up to 1 out of 10 with some weakness and instability and a sense of pinching.  She has had to modify her activities she has trialed some anti-inflammatory medications that have not significantly helped.  Notably she has a history of a right leg surgery in 1998.  She is yet to trial any additional conservative measures.    She is well-known to our team for having had her daughter undergo ACL reconstruction approximately a year and a half ago.    PMH:   Past Medical History:   Diagnosis Date    Obesity (BMI 35.0-39.9 without comorbidity) 06/29/2021    HERRERA (obstructive sleep apnea) 02/02/2023       PAST SURGICAL HX:  Past Surgical History:   Procedure Laterality Date    OTHER SURGICAL HISTORY      RT LEG SURGERY- PINS PLACED AND REMOVED       FAMILY HX:  Family History   Problem Relation Age of Onset    Heart Disorder Father        ALLERGIES:  Ampicillin and Penicillins    MEDICATIONS:   Current Outpatient Medications   Medication Sig Dispense Refill    Phentermine HCl 37.5 MG Oral Tab Take 1 tablet (37.5 mg total) by mouth every morning before breakfast. 30 tablet 2    ibuprofen 600 MG Oral Tab Take 1 tablet (600 mg total) by mouth every 6 hours with food 20 tablet 0       ROS: A comprehensive 14 point review of systems was performed and was negative aside from the aforementioned per history of present illness.    SOCIAL HX:  Social History     Tobacco Use    Smoking status: Never    Smokeless tobacco: Never   Substance Use Topics    Alcohol  use: Yes     Comment: OCC       PE:   Vitals:    01/05/24 0819   Weight: 241 lb (109.3 kg)   Height: 5' 5\" (1.651 m)     Estimated body mass index is 40.1 kg/m² as calculated from the following:    Height as of this encounter: 5' 5\" (1.651 m).    Weight as of this encounter: 241 lb (109.3 kg).    Physical Exam  Constitutional:       Appearance: Normal appearance.   HENT:      Head: Normocephalic and atraumatic.   Eyes:      Extraocular Movements: Extraocular movements intact.   Neck:      Musculoskeletal: Normal range of motion and neck supple.   Cardiovascular:      Pulses: Normal pulses.   Pulmonary:      Effort: Pulmonary effort is normal. No respiratory distress.   Abdominal:      General: There is no distension.   Skin:     General: Skin is warm.      Capillary Refill: Capillary refill takes less than 2 seconds.      Findings: No bruising.   Neurological:      General: No focal deficit present.      Mental Status: Alert.   Psychiatric:         Mood and Affect: Mood normal.     Examination of the right knee demonstrates:   Skin is intact, warm and dry.   Atrophy: mild    Effusion: large    Joint line tenderness: both  Crepitation: none   Art: Positive   Patellar mobility: apprehension  J-sign: significant    ROM: Extension lacking 10 degrees  Flexion 90 degrees  ACL:   Slight increase in laxity    PCL:  Negative Posterior Drawer  Collateral Ligaments:  Increased varus and valgus laxity.  Strength: significantly weak   Hip joint: normal pain-free ROM   Gait:  using assistive device   Leg length: equal and symmetric  Alignment:  neutral     No obvious peripheral edema noted.   Distal neurovascular exam demonstrates normal perfusion, intact sensation to light touch and full strength.     Examination of the contralateral knee demonstrates:  No significant atrophy, swelling or effusion. Full range of motion. Neurovascularly intact distally    Radiographic Examination/Diagnostics:  Knee XR personally viewed,  independently interpreted and radiology report was reviewed.    XR KNEE (1 OR 2 VIEWS), RIGHT (CPT=73560)    Result Date: 1/2/2024  PROCEDURE: XR KNEE (1 OR 2 VIEWS), RIGHT (CPT=73560)  COMPARISON: None.  INDICATIONS: Generalized right knee pain post skiing accident 3 days.  TECHNIQUE: 2 views were obtained.   FINDINGS:  BONES: Normal. No significant arthropathy, fracture or acute abnormality. SOFT TISSUES: Negative. No visible soft tissue swelling. EFFUSION: None visible. OTHER: Negative.         CONCLUSION: Normal examination.     Dictated by (CST): Hi Abarca MD on 1/02/2024 at 2:52 PM     Finalized by (CST): Hi Abarca MD on 1/02/2024 at 2:53 PM            IMPRESSION: Sandee Eddy is a 43 year old female with traumatic knee injury after skiing incident on 12/30/2023.  This merits further investigation with MRI scan to further characterize internal derangement.    PLAN:   We had a detailed discussion outlining the etiology, anatomy, pathophysiology, and natural history of traumatic knee injuries. Imaging was reviewed in detail and correlated to a 3-dimensional model of the knee.     In light of the acute traumatic incident, loss of normal function, and  failure to progress conservatively we recommend an MRI to evaluate the integrity of the patient's meniscus / ACL. The patient will follow up after imaging.   Differential diagnosis includes but not limited to: cartilage injury/loose body, meniscus tear/injury, ACL tear, bone marrow edema, and osteoarthritis.     External records were also reviewed for pertinent historical findings contributing to the patients undiagnosed new problem with uncertain prognosis.     The patient had the opportunity to ask questions, and all questions were answered appropriately.         FOLLOW-UP:  Return to clinic after MRI completion.       Beverly Womack MD  Knee, Shoulder, & Elbow Surgery / Sports Medicine Specialist  Orthopaedic Surgery  [address] [place] [address]  [place] [address]   [email or web address]  [email or web address]  t: [phone]  o: [phone]  f: [phone]    This note was dictated using Dragon software.  While it was briefly proofread prior to completion, some grammatical, spelling, and word choice errors due to dictation may still occur.

## 2024-01-09 ENCOUNTER — OFFICE VISIT (OUTPATIENT)
Dept: PHYSICAL THERAPY | Age: 44
End: 2024-01-09
Attending: ORTHOPAEDIC SURGERY
Payer: MEDICAID

## 2024-01-09 DIAGNOSIS — S83.411A COMPLETE TEAR OF MEDIAL COLLATERAL LIGAMENT OF RIGHT KNEE, INITIAL ENCOUNTER: ICD-10-CM

## 2024-01-09 DIAGNOSIS — S83.271A COMPLEX TEAR OF LATERAL MENISCUS OF RIGHT KNEE AS CURRENT INJURY, INITIAL ENCOUNTER: ICD-10-CM

## 2024-01-09 DIAGNOSIS — S83.511A RUPTURE OF ANTERIOR CRUCIATE LIGAMENT OF RIGHT KNEE, INITIAL ENCOUNTER: Primary | ICD-10-CM

## 2024-01-09 PROCEDURE — 97016 VASOPNEUMATIC DEVICE THERAPY: CPT | Performed by: PHYSICAL THERAPIST

## 2024-01-09 PROCEDURE — 97110 THERAPEUTIC EXERCISES: CPT | Performed by: PHYSICAL THERAPIST

## 2024-01-09 PROCEDURE — 97161 PT EVAL LOW COMPLEX 20 MIN: CPT | Performed by: PHYSICAL THERAPIST

## 2024-01-09 NOTE — PROGRESS NOTES
LOWER EXTREMITY EVALUATION:     Diagnosis:   Rupture of anterior cruciate ligament of right knee, initial encounter (S83.511A)  Complex tear of lateral meniscus of right knee as current injury, initial encounter (S83.271A)  Complete tear of medial collateral ligament of right knee, initial encounter (S83.411A)      Referring Provider: Vu  Date of Evaluation:    1/9/2024    Precautions:  None Next MD visit:   none scheduled  Date of Surgery: n/a     PATIENT SUMMARY   Sandee Eddy is a 43 year old female who presents to therapy today with complaints of (R) knee pain after hurting it when skiing on 12/30/23. She states she slipped on a patch of ice. She reports inability to bear weight on the (R) LE, pain, stiffness and swelling. She has no previous knee injuries in the past but did have lower leg surgery. Pt reports that she is expecting to have ACL reconstruction surgery in the near future and is here for pre surgical rehab.     Pt describes pain level current 4/10, at best 3/10, at worst 7/10.     Current functional limitations include walking unsupported, prolonged standing, chair transfers, car transfers, ambulation over uneven surfaces, stair climbing, squatting, driving, and performing house chores.     Sandee describes prior level of function unrestricted to daily ADL's. Pt goals include return to normal activities.   Past medical history was reviewed with Sandee. Significant findings include  has a past medical history of Obesity (BMI 35.0-39.9 without comorbidity) (06/29/2021) and HERRERA (obstructive sleep apnea) (02/02/2023).      MRI (R) of knee on 1/5/24: \"CONCLUSION:       1. Full-thickness tears through proximal ligament fibers of the ACL and MCL with associated ligamentous laxity and extensive regional edema.      2. Radial tear of the posterior horn lateral meniscus near the posterior root insertion.      3. Shallow impaction injury of the terminal sulcus of the lateral femoral condyle with bone  contusions peripherally within the medial and lateral femoral condyles and posteriorly within the lateral tibial plateau.  No acute chondral injury.      4. Chronic high-grade partial-thickness chondral defect superiorly along the lateral patellar facet, as above.      5. Laxity of the distal patellar tendon with moderate surrounding edema.  No clearly defined tendon disruption.  This may be positional.  Correlate with clinical evidence of patellar tendon dysfunction.       6. Mild/moderate knee joint effusion.  Moderate subcutaneous edema circumferentially about the knee. \"    ASSESSMENT  Sandee presents to physical therapy evaluation with primary c/o (R) knee pain and swelling after a skiing accident. The results of the objective tests and measures show impairments in AROM/PROM, soft tissue restrictions, edema, weight bearing, strength, and gait mechanics.  Functional deficits include but are not limited to walking unsupported, prolonged standing, chair transfers, car transfers, ambulation over uneven surfaces, stair climbing, squatting, driving, and performing house chores. .  Signs and symptoms are consistent with diagnosis of (R) ACL rupture, MCL tear, and lateral meniscus tear. Pt and PT discussed evaluation findings, pathology, POC and HEP.  Pt voiced understanding and performs HEP correctly without reported pain. Skilled Physical Therapy is medically necessary to address the above impairments and reach functional goals.     OBJECTIVE:   Observation: moderate (R) knee edema  Palpation: globalize tenderness of (R) knee  Sensation: WNL    AROM: (* denotes performed with pain)  Hip Knee   All WNL Flexion: R 102*; L 132  Extension: R -15; L +2          Flexibility:  Hip Flexor: R WNL, L WNL  Hamstrings: R WNL; L WNL  Quads: R +; L WNL  Gastroc-soleus: R +; L WNL    Strength/MMT: (* denotes performed with pain)  Hip Knee   Flexion: R 5/5; L 5/5  Extension: R 4-/5; L 4/5  Abduction: R 4-/5; L 4/5  ER: R 3+/5; L 5/5    Flexion: R 4/5; L 5/5  Extension: R 4-/5; L 4/5          Gait: pt ambulates on level ground with assistive device of BL axillary crutches, NWB in hinge brace.     Balance: Deferred     Today’s Treatment and Response:   Pt education was provided on exam findings, treatment diagnosis, treatment plan, expectations, and prognosis. Pt was also provided recommendations for activity modifications, possible soreness after evaluation, modalities as needed [ice/heat], detrimental fear avoidance behaviors, importance of remaining active, and shoe wear.  Patient was instructed in and issued a HEP for:     Access Code: [email or web address]   URL: [email or web address]  Date: 01/09/2024  Prepared by: Batsheva Beckwith    Exercises (30 minutes)  - Supine Single Leg Ankle Pumps  - 3 x daily - 7 x weekly - 3 sets - 10 reps  - Long Sitting Calf Stretch with Strap  - 3 x daily - 7 x weekly - 3 sets - 30 seconds hold  - Supine Heel Slide with Strap  - 3 x daily - 7 x weekly - 3 sets - 10 reps - 5 seconds hold  - Supine Knee Extension Mobilization with Weight  - 3 x daily - 7 x weekly - 2 minutes hold  - Supine Quad Set  - 3 x daily - 7 x weekly - 10 reps - 10 seconds hold  - Supine Straight Leg Raises  - 2 x daily - 7 x weekly - 3 sets - 10 reps  - Sidelying Hip Abduction  - 2 x daily - 7 x weekly - 3 sets - 10 reps  - Seated Hamstring Stretch  - 3 x daily - 7 x weekly - 3 sets - 30 seconds hold  - Standing Weight Shift Side to Side  - 1 x daily - 7 x weekly - 3 sets - 10 reps  - Stride Stance Weight Shift  - 1 x daily - 7 x weekly - 3 sets - 10 reps    Manual tech: PROM of (R) knee flexion and extension x 5 min    Gait training: PWB and 2 axillary crutches with hinge braced locked at 0 deg (2 min)    Modalities: game ready x15 min (R) knee elevated    Charges: PT Eval Low Complexity, TherEx: 2 units, Vasopneumatic device: 1 unit      Total Timed Treatment: 30 min     Total Treatment Time: 55 min     PLAN OF CARE:    Goals: (to be met in 6  visits)  Pt will improve knee extension ROM to 0 deg to allow proper heel strike during gait and terminal knee extension in stance   Pt will improve knee AROM flexion to >130 degrees to improve ability to perform don/doff shoes and socks with ease   Pt will improve quad strength to 5/5 to ascend 1 flight of stairs reciprocally without UE assist   Pt will increase hip and knee strength to grossly 4+/5 to be able to get up and down from the floor safely   Pt will demonstrate increased hip ER/ABD strength to 4+/5 to perform stepping and squatting activities without excessive femoral IR/ADD   Pt will improve SLS to >20 s to improve safety with gait on uneven surfaces such as grass and gravel  Pt will be independent and compliant with comprehensive HEP to maintain progress achieved in PT      Frequency / Duration: Patient will be seen for 1-2 x/week or a total of 6 visits over a 90 day period. Treatment will include: Gait training, Manual Therapy, Therapeutic Activities, Therapeutic Exercise, Home Exercise Program instruction, and Modalities to include: Electrical stimulation (unattended) and vasopneumatic device    Education or treatment limitation: None  Rehab Potential:fair    LEFS Score  LEFS Score: 1.25 % (1/5/2024  5:03 PM)      Patient/Family/Caregiver was advised of these findings, precautions, and treatment options and has agreed to actively participate in planning and for this course of care.    Thank you for your referral. Please co-sign or sign and return this letter via fax as soon as possible to [phone]. If you have any questions, please contact me at Dept: [phone]    Sincerely,  Electronically signed by therapist: Batsheva Beckwith, PT  Physician's certification required: Yes  I certify the need for these services furnished under this plan of treatment and while under my care.    X___________________________________________________ Date____________________    Certification From: 1/9/2024  To:4/8/2024

## 2024-01-15 NOTE — PROGRESS NOTES
Diagnosis:   Rupture of anterior cruciate ligament of right knee, initial encounter (S83.511A)  Complex tear of lateral meniscus of right knee as current injury, initial encounter (S83.271A)  Complete tear of medial collateral ligament of right knee, initial encounter (S83.411A)         Referring Provider: Vu  Date of Evaluation:    1/9/24    Precautions:  None Next MD visit:   none scheduled  Date of Surgery: n/a   Insurance Primary/Secondary: BL[employer]OSS MEDICAID / N/A     # Auth Visits: 10 visits (1/9-3/9/24)            Subjective: Pt reports she was able to walk at home yesterday all day with out the brace and no crutches. But last night and this morning she felt more pulling and discomfort with exercises.     Pain: 1/10      Objective:     AROM: (* denotes performed with pain)  Knee   Flexion: R 115*; L 132  Extension: R 0; L +2         Gait: pt ambulates on level ground w/o assistive device, no hinge brace, mild trendelenburg and lacking TKE       Assessment: Pt demonstrating improved weigh bearing with ability to walk short distances w/o assistive device. She is introduced to NMES to promote quad recruitment and reduce extensor lad with SLR. Progressed her strengthening with 4 way leg raises, bridges and minin squats. She tolerates tx fairly well.       Goals:   (to be met in 6 visits)  Pt will improve knee extension ROM to 0 deg to allow proper heel strike during gait and terminal knee extension in stance   Pt will improve knee AROM flexion to >130 degrees to improve ability to perform don/doff shoes and socks with ease   Pt will improve quad strength to 5/5 to ascend 1 flight of stairs reciprocally without UE assist   Pt will increase hip and knee strength to grossly 4+/5 to be able to get up and down from the floor safely   Pt will demonstrate increased hip ER/ABD strength to 4+/5 to perform stepping and squatting activities without excessive femoral IR/ADD   Pt will improve SLS to >20 s to improve  safety with gait on uneven surfaces such as grass and gravel  Pt will be independent and compliant with comprehensive HEP to maintain progress achieved in PT      Plan: Progress ROM and strength as tolerated, progress to step ups next tx and leg press  Date: 2024  TX#: 2/10 Date:                 TX#: 3/ Date:                 TX#: 4/ Date:                 TX#: 5/ Date:   Tx#: 6/   TherEx: 55 min  -Nustep: 6 min  -Heel slides w/ strap : x20   -quad sets w/ NMES: x5 min (5sec on/5sec off)  -SLR w/ NMES: 2x10 reps  -s/l hip abd: 2x10  -Prone leg ext: 2x10  -SAQ: 0#, 2x10  -Prone TKE:2x10  -Mini squats: 2HHA, x10  -gait trainin min no assistive device   -Standing TKE: Gray TB, x20  -standing heel raises: 2x10  -SLB: 1-2 HHA, 2x30 sec    Hold for future  Step ups  Leg press   TRX squats   Wobble board balance  RSB wall squats                HEP: Access Code: [email or web address]  URL: [email or web address]  Date: 2024  Prepared by: Batsheva Beckwith    Exercises  - Long Sitting Calf Stretch with Strap  - 3 x daily - 7 x weekly - 3 sets - 30 seconds hold  - Supine Heel Slide with Strap  - 3 x daily - 7 x weekly - 3 sets - 10 reps - 5 seconds hold  - Supine Quad Set  - 3 x daily - 7 x weekly - 10 reps - 10 seconds hold  - Supine Straight Leg Raises  - 2 x daily - 7 x weekly - 3 sets - 10 reps  - Supine Bridge  - 1 x daily - 7 x weekly - 3 sets - 10 reps  - Sidelying Hip Abduction  - 2 x daily - 7 x weekly - 3 sets - 10 reps  - Clam with Resistance  - 1 x daily - 7 x weekly - 3 sets - 10 reps  - Beginner Prone Single Leg Raise  - 1 x daily - 7 x weekly - 3 sets - 10 reps  - Prone Terminal Knee Extension  - 1 x daily - 7 x weekly - 3 sets - 10 reps  - Prone Quadriceps Stretch with Strap  - 1 x daily - 7 x weekly - 3 sets - 10 reps  - Mini Squat with Counter Support  - 1 x daily - 7 x weekly - 3 sets - 10 reps  - Standing Heel Raises  - 1 x daily - 7 x weekly - 3 sets - 10 reps  - Standing Marching  - 1 x daily - 7 x  weekly - 3 sets - 10 reps  - Standing Knee Flexion Strengthening at Chair  - 1 x daily - 7 x weekly - 3 sets - 10 reps    Charges: TherEx: 4 units         Total Timed Treatment: 55 min  Total Treatment Time: 55 min

## 2024-01-16 ENCOUNTER — OFFICE VISIT (OUTPATIENT)
Dept: PHYSICAL THERAPY | Age: 44
End: 2024-01-16
Attending: ORTHOPAEDIC SURGERY
Payer: MEDICAID

## 2024-01-16 PROCEDURE — 97110 THERAPEUTIC EXERCISES: CPT | Performed by: PHYSICAL THERAPIST

## 2024-01-17 NOTE — PROGRESS NOTES
Diagnosis:   Rupture of anterior cruciate ligament of right knee, initial encounter (S83.511A)  Complex tear of lateral meniscus of right knee as current injury, initial encounter (S83.271A)  Complete tear of medial collateral ligament of right knee, initial encounter (S83.411A)         Referring Provider: Vu  Date of Evaluation:    1/9/24    Precautions:  None Next MD visit:   none scheduled  Date of Surgery: n/a   Insurance Primary/Secondary: BL[employer]OSS MEDICAID / N/A     # Auth Visits: 10 visits (1/9-3/9/24)            Subjective: Pt reports no pain now but she wakes up with some knee aching. After the HEP she feels fine. She has her surgery scheduled for in a couple of weeks.     Pain: 0/10      Objective:     AROM: (* denotes performed with pain)  Knee   Flexion: R 125*; L 132  Extension: R +2; L +2         Gait: pt ambulates on level ground w/o assistive device, no hinge brace, mild trendelenburg and lacking TKE       Assessment: Pt demonstrating near full knee AROM and normalized gait w/o assistive device. She is progressed with various weight bearing strength and balance exercises. She reports fatigue at end of tx. Plan to transition to [facility] next tx if all is well. Plan to see pt post ACL reconstruction surgery in February.       Goals:   (to be met in 6 visits)  Pt will improve knee extension ROM to 0 deg to allow proper heel strike during gait and terminal knee extension in stance   Pt will improve knee AROM flexion to >130 degrees to improve ability to perform don/doff shoes and socks with ease   Pt will improve quad strength to 5/5 to ascend 1 flight of stairs reciprocally without UE assist   Pt will increase hip and knee strength to grossly 4+/5 to be able to get up and down from the floor safely   Pt will demonstrate increased hip ER/ABD strength to 4+/5 to perform stepping and squatting activities without excessive femoral IR/ADD   Pt will improve SLS to >20 s to improve safety with gait on uneven  surfaces such as grass and gravel  Pt will be independent and compliant with comprehensive HEP to maintain progress achieved in PT      Plan: Transition to HEP after next visit.  Date: 2024  TX#: 2/10 Date: 24               TX#: 3/10 Date:                 TX#: / Date:                 TX#: / Date:   Tx#: 6/   TherEx: 55 min  -Nustep: 6 min  -Heel slides w/ strap : x20   -quad sets w/ NMES: x5 min (5sec on/5sec off)  -SLR w/ NMES: 2x10 reps  -s/l hip abd: 2x10  -Prone leg ext: 2x10  -SAQ: 0#, 2x10  -Prone TKE:2x10  -Mini squats: 2HHA, x10  -gait trainin min no assistive device   -Standing TKE: Gray TB, x20  -standing heel raises: 2x10  -SLB: 1-2 HHA, 2x30 sec    Hold for future  Step ups  Leg press   TRX squats   Wobble board balance  RSB wall squats   TherEx: 45 min  -Nustep: 6 min  -Heel slides w/ strap : x20   -RSB HS curls: x20   -RSB bridges: 2x10  -clams: RTB, 2x10  -SLR: 2x20 reps  -Mini squats:  no HHA, x10  -Standing TKE: Gray TB, x20  -SLB:  2x30 sec  Step ups: 4\" box 2x10  Leg press: 75#, 3x10  SL leg press: 50#, 3x10  -lateral walk: RTB, 2x50 feet  -monster walk: RTB, 2x50 feet   -standing hip abductions: RTB, 2x10 each leg  -Bosu lunges: 2x10   -airex step up and march: 2x10    Hold for future  TRX squats   Wobble board balance  RSB wall squats             HEP: Access Code: [email or web address]  URL: [email or web address]  Date: 2024  Prepared by: Batsheva Beckwith    Exercises  - Long Sitting Calf Stretch with Strap  - 3 x daily - 7 x weekly - 3 sets - 30 seconds hold  - Supine Heel Slide with Strap  - 3 x daily - 7 x weekly - 3 sets - 10 reps - 5 seconds hold  - Supine Quad Set  - 3 x daily - 7 x weekly - 10 reps - 10 seconds hold  - Supine Straight Leg Raises  - 2 x daily - 7 x weekly - 3 sets - 10 reps  - Supine Bridge  - 1 x daily - 7 x weekly - 3 sets - 10 reps  - Sidelying Hip Abduction  - 2 x daily - 7 x weekly - 3 sets - 10 reps  - Clam with Resistance  - 1 x daily - 7 x weekly - 3  sets - 10 reps  - Beginner Prone Single Leg Raise  - 1 x daily - 7 x weekly - 3 sets - 10 reps  - Prone Terminal Knee Extension  - 1 x daily - 7 x weekly - 3 sets - 10 reps  - Prone Quadriceps Stretch with Strap  - 1 x daily - 7 x weekly - 3 sets - 10 reps  - Mini Squat with Counter Support  - 1 x daily - 7 x weekly - 3 sets - 10 reps  - Standing Heel Raises  - 1 x daily - 7 x weekly - 3 sets - 10 reps  - Standing Marching  - 1 x daily - 7 x weekly - 3 sets - 10 reps  - Standing Knee Flexion Strengthening at Chair  - 1 x daily - 7 x weekly - 3 sets - 10 reps    Charges: TherEx: 3 units         Total Timed Treatment: 45 min  Total Treatment Time: 45 min

## 2024-01-18 ENCOUNTER — TELEPHONE (OUTPATIENT)
Dept: PHYSICAL THERAPY | Facility: HOSPITAL | Age: 44
End: 2024-01-18

## 2024-01-19 ENCOUNTER — OFFICE VISIT (OUTPATIENT)
Dept: ORTHOPEDICS CLINIC | Facility: CLINIC | Age: 44
End: 2024-01-19
Payer: MEDICAID

## 2024-01-19 ENCOUNTER — TELEPHONE (OUTPATIENT)
Dept: ORTHOPEDICS CLINIC | Facility: CLINIC | Age: 44
End: 2024-01-19

## 2024-01-19 ENCOUNTER — OFFICE VISIT (OUTPATIENT)
Dept: PHYSICAL THERAPY | Age: 44
End: 2024-01-19
Attending: ORTHOPAEDIC SURGERY
Payer: MEDICAID

## 2024-01-19 ENCOUNTER — HOSPITAL ENCOUNTER (OUTPATIENT)
Dept: GENERAL RADIOLOGY | Age: 44
Discharge: HOME | End: 2024-01-19
Attending: ORTHOPAEDIC SURGERY
Payer: MEDICAID

## 2024-01-19 DIAGNOSIS — S83.411A COMPLETE TEAR OF MEDIAL COLLATERAL LIGAMENT OF RIGHT KNEE, INITIAL ENCOUNTER: ICD-10-CM

## 2024-01-19 DIAGNOSIS — S83.511A RUPTURE OF ANTERIOR CRUCIATE LIGAMENT OF RIGHT KNEE, INITIAL ENCOUNTER: Primary | ICD-10-CM

## 2024-01-19 DIAGNOSIS — S83.231A COMPLEX TEAR OF MEDIAL MENISCUS OF RIGHT KNEE AS CURRENT INJURY, INITIAL ENCOUNTER: ICD-10-CM

## 2024-01-19 DIAGNOSIS — S83.271A COMPLEX TEAR OF LATERAL MENISCUS OF RIGHT KNEE AS CURRENT INJURY, INITIAL ENCOUNTER: ICD-10-CM

## 2024-01-19 DIAGNOSIS — S46.002A INJURY OF LEFT ROTATOR CUFF, INITIAL ENCOUNTER: ICD-10-CM

## 2024-01-19 PROCEDURE — 99417 PROLNG OP E/M EACH 15 MIN: CPT | Performed by: ORTHOPAEDIC SURGERY

## 2024-01-19 PROCEDURE — 97110 THERAPEUTIC EXERCISES: CPT | Performed by: PHYSICAL THERAPIST

## 2024-01-19 PROCEDURE — 73030 X-RAY EXAM OF SHOULDER: CPT | Performed by: ORTHOPAEDIC SURGERY

## 2024-01-19 PROCEDURE — 99215 OFFICE O/P EST HI 40 MIN: CPT | Performed by: ORTHOPAEDIC SURGERY

## 2024-01-19 NOTE — TELEPHONE ENCOUNTER
CALLED PATIENT AND WE SCHEDULED SURGERY, POST OP AND WENT OVER PRE OPERATIVE PROCEDURES. PCP CLEARANCE SENT AND ALL QUESTIONS ANSWERED.

## 2024-01-19 NOTE — TELEPHONE ENCOUNTER
Date of Surgery: 2024        Post Op Appt:  2024 2:00PM    Case ID: [id number]     Notes: Lets please add for  at [facility], thanks!     WAITING LIST FOR EARLIER SURGERY        OR BOOKING SHEET KNEE  Location: [x] [facility]                    [] [facility]   Name: Sandee Eddy  MRN: [id number]   : 1980  Diagnosis:  [x] Rupture of anterior cruciate ligament of right knee, initial encounter [S83.511A]  Disposition:    [x] Ambulatory  [] Overnight for HERRERA  [] Overnight for observation and pain control  [] Inpatient procedure     Operative Time Required: 2.5 hours ([facility])   Procedure:  Antibiotics: 2 g cefazolin within 60 minutes of surgical incision (3 g if > 120 kg)  Laterality:                  [x] RIGHT KNEE        [] LEFT KNEE                      [] BILATERAL  Procedures:                    [x] Arthroscopy                                                      [x] ACL Reconstruction (54268)                                      [] BTB autograft                  [] Quad autograft             [x] Hamstring autograft                       [] Partial Medial Meniscectomy (26117)                    [x] Medial Meniscus Repair (33024)                    [] Partial Lateral Meniscectomy (67271)                    [] Lateral Meniscus Repair (13036)                                                          [x] Synovectomy (39194)     Additional info:   [x] PCP Clearance Needed  [] C - arm  [x] TXA at Time of Surgery  [x] Physical Therapy [facility] [place] - Batsheva / Ronda / Rohan  [x] DME Rx Needed  [] Appt with Dr. Womack needed  Implants needed: Arthrex / [employer]  Positioning Equipment: Supine with Lateral Post

## 2024-01-19 NOTE — H&P
Orthopaedic Surgery  [address] [place] [id number]  [phone]     PRE SURGICAL - HISTORY AND PHYSICAL EXAMINATION     Name: Sandee Rodriguez   MRN: [id number]  Date: 1/19/2024     CC: Right Knee Pain and instability     REFERRED BY: Kasey Calderon MD    HPI:   Sandee Eddy is a very pleasant 43 year old female who presents today for evaluation of Right knee pain and instability and recent completion of MRI demonstrating anterior cruciate ligament rupture.     To summarize: right knee pain ongoing since 12/30/2023 when she was skiing and lost her balance.  Since that time she has had pain up to 1 out of 10 with some weakness and instability and a sense of pinching.  She has had to modify her activities she has trialed some anti-inflammatory medications that have not significantly helped.  Notably she has a history of a right leg surgery in 1998.  She is yet to trial any additional conservative measures.      She is currently engaged in PT and has been improving. She is able to walk around the house without a brace. She denies any instability.    She additionally reports left shoulder pain.    PMH:   Past Medical History:   Diagnosis Date    Obesity (BMI 35.0-39.9 without comorbidity) 06/29/2021    HERRERA (obstructive sleep apnea) 02/02/2023       PAST SURGICAL HX:  Past Surgical History:   Procedure Laterality Date    OTHER SURGICAL HISTORY      RT LEG SURGERY- PINS PLACED AND REMOVED       FAMILY HX:  Family History   Problem Relation Age of Onset    Heart Disorder Father        ALLERGIES:  Ampicillin and Penicillins    MEDICATIONS:   Current Outpatient Medications   Medication Sig Dispense Refill    Meloxicam 15 MG Oral Tab Take 1 tablet (15 mg total) by mouth daily. 14 tablet 1    Phentermine HCl 37.5 MG Oral Tab Take 1 tablet (37.5 mg total) by mouth every morning before breakfast. 30 tablet 2    ibuprofen 600 MG Oral Tab Take 1 tablet (600 mg total) by mouth every 6 hours with food 20  tablet 0       ROS: A comprehensive 14 point review of systems was performed and was negative aside from the aforementioned per history of present illness.    SOCIAL HX:  Social History     Tobacco Use    Smoking status: Never    Smokeless tobacco: Never   Substance Use Topics    Alcohol use: Yes     Comment: OCC       PE:   There were no vitals filed for this visit.  Estimated body mass index is 40.1 kg/m² as calculated from the following:    Height as of 1/5/24: 5' 5\" (1.651 m).    Weight as of 1/5/24: 241 lb.    Physical Exam  Constitutional:       Appearance: Normal appearance.   HENT:      Head: Normocephalic and atraumatic.   Eyes:      Extraocular Movements: Extraocular movements intact.   Neck:      Musculoskeletal: Normal range of motion and neck supple.   Cardiovascular:      Pulses: Normal pulses.   Pulmonary:      Effort: Pulmonary effort is normal. No respiratory distress.   Abdominal:      General: There is no distension.   Skin:     General: Skin is warm.      Capillary Refill: Capillary refill takes less than 2 seconds.      Findings: No bruising.   Neurological:      General: No focal deficit present.      Mental Status: Alert.   Psychiatric:         Mood and Affect: Mood normal.     Examination of the right knee demonstrates:     Skin is intact, warm and dry.   Atrophy: none    Effusion: none    Joint line tenderness: medial  Crepitation: none   Art: Positive   Patellar mobility: normal without apprehension  J-sign: none    ROM: Extension full  Flexion 120 degrees  ACL:  2B Lachman, 2+ Pivot Shift   PCL:  Negative Posterior Drawer  Collateral Ligaments: Stable to Varus and Valgus stress at 0 and 30 degrees  Strength: mild weakness   Hip joint: normal pain-free ROM   Gait:  mildly antalgic   Leg length: equal and symmetric  Alignment:  neutral     No obvious peripheral edema noted.   Distal neurovascular exam demonstrates normal perfusion, intact sensation to light touch and full strength.      Examination of the contralateral knee demonstrates:  No significant atrophy, swelling or effusion. Full range of motion. Neurovascularly intact distally.    Examination of the left shoulder reveals 4 out of 5 strength in supraspinatus, infraspinatus and pain with subscapularis strength testing.  Positive Ellsworth sign.  Positive Sood impingement findings.  Concern for rotator cuff pathology.  Pain with palpation overlying the biceps groove.    Radiographic Examination/Diagnostics:  XR and MRI of the knee personally viewed, independently interpreted and radiology report was reviewed.    MRI KNEE, RIGHT (QNX=54672)    Result Date: 1/5/2024  PROCEDURE:  MRI KNEE, RIGHT (CPT=73721)  COMPARISON:  None.  INDICATIONS:  S89.90XA Traumatic injury of knee  TECHNIQUE:  Axial, coronal, and sagittal proton density with and without fat saturation images were obtained.  PATIENT STATED HISTORY: (As transcribed by Technologist)  Patient has pain through out right knee due to skiing accident on Saturday.    FINDINGS:  LIGAMENTS:          Full-thickness tear through proximal ligament fibers of the ACL with associated ligamentous laxity and extensive regional edema (series 601, image 18).  Normal PCL.  Full-thickness tear through proximal ligament fibers of the MCL with  ligamentous laxity and extensive regional edema (series 501, image 19). MENISCI:            Normal medial meniscus.  Radial tear of the posterior horn lateral meniscus near the posterior root insertion (series 501, image 25). TENDONS:            Patellar tendon laxity mid with moderate regional edema, though without clear tendon disruption.  This may be positional. MUSCULATURE:        No evidence of strain, edema, or atrophy. BONY COMPARTMENTS:  No fracture or malalignment.  Shallow impaction injury of the terminal sulcus of the lateral femoral condyle with bone contusions peripherally within the medial and lateral femoral condyles and posteriorly within the  lateral tibial plateau.  Articular cartilage is preserved within the medial and lateral compartments.  Focal high-grade partial-thickness chondral defect superiorly along the lateral patellar facet measures 5 x 5 mm in cross-section with reactive subchondral marrow edema and cystic change indicating a chronic process. SYNOVIUM:           Mild/moderate knee joint effusion.  Small Corcoran's cyst contains low signal debris.  Moderate subcutaneous edema circumferentially about the knee.  The popliteal neurovascular bundle is within normal limits.             CONCLUSION:     1. Full-thickness tears through proximal ligament fibers of the ACL and MCL with associated ligamentous laxity and extensive regional edema.    2. Radial tear of the posterior horn lateral meniscus near the posterior root insertion.    3. Shallow impaction injury of the terminal sulcus of the lateral femoral condyle with bone contusions peripherally within the medial and lateral femoral condyles and posteriorly within the lateral tibial plateau.  No acute chondral injury.    4. Chronic high-grade partial-thickness chondral defect superiorly along the lateral patellar facet, as above.    5. Laxity of the distal patellar tendon with moderate surrounding edema.  No clearly defined tendon disruption.  This may be positional.  Correlate with clinical evidence of patellar tendon dysfunction.     6. Mild/moderate knee joint effusion.  Moderate subcutaneous edema circumferentially about the knee.     LOCATION:  [facility]          Dictated by (CST): Jane Hernandez MD on 1/05/2024 at 10:58 AM     Finalized by (CST): Jane Hernandez MD on 1/05/2024 at 11:12 AM       XR KNEE (3 VIEWS), LEFT (CPT=73562)    Result Date: 1/5/2024  PROCEDURE:  XR KNEE ROUTINE (3 VIEWS), LEFT (CPT=73562)  TECHNIQUE:  Three views were obtained including patellar view.  COMPARISON:  None.  INDICATIONS:  Right knee pain, left knee for comparison  PATIENT STATED HISTORY: (As transcribed by  Technologist)  Patient here for ortho evaluation. Patient stated right knee pain after injury while skiing on 12/30/23. Left knee for comparison      FINDINGS:  No fracture or dislocation.  Mild medial joint space narrowing.            CONCLUSION:  Mild medial joint space narrowing.   LOCATION:  [facility]   Dictated by (CST): Sg Sawant MD on 1/05/2024 at 9:35 AM     Finalized by (CST): Sg Sawant MD on 1/05/2024 at 9:36 AM       XR KNEE, COMPLETE (4 OR MORE VIEWS), RIGHT (CPT=73564)    Result Date: 1/5/2024  PROCEDURE:  XR KNEE, COMPLETE (4 OR MORE VIEWS), RIGHT (CPT=73564)  TECHNIQUE:  AP, lateral, sunrise, and tunnel views were obtained  COMPARISON:  None.  INDICATIONS:  Right knee pain  PATIENT STATED HISTORY: (As transcribed by Technologist)  Patient here for ortho evaluation. Patient stated right knee pain after injury while skiing on 12/30/23. Left knee for comparison    FINDINGS:  No fracture or dislocation.  Mild medial joint space narrowing.  Small marginal osteophytes of patella.            CONCLUSION:  Mild medial and patellofemoral osteoarthritic changes.   LOCATION:  [facility]   Dictated by (CST): Sg Sawant MD on 1/05/2024 at 9:34 AM     Finalized by (CST): Sg Sawant MD on 1/05/2024 at 9:35 AM       XR KNEE (1 OR 2 VIEWS), RIGHT (CPT=73560)    Result Date: 1/2/2024  PROCEDURE: XR KNEE (1 OR 2 VIEWS), RIGHT (CPT=73560)  COMPARISON: None.  INDICATIONS: Generalized right knee pain post skiing accident 3 days.  TECHNIQUE: 2 views were obtained.   FINDINGS:  BONES: Normal. No significant arthropathy, fracture or acute abnormality. SOFT TISSUES: Negative. No visible soft tissue swelling. EFFUSION: None visible. OTHER: Negative.         CONCLUSION: Normal examination.     Dictated by (CST): Hi Abarca MD on 1/02/2024 at 2:52 PM     Finalized by (CST): Hi Abarca MD on 1/02/2024 at 2:53 PM          XR SHOULDER, COMPLETE (MIN 2 VIEWS), LEFT (CPT=73030)    Result Date: 1/19/2024  CONCLUSION:  No  acute fracture or other acute bony process.  LOCATION:  [facility]   Dictated by (CST): Rodriguez Garzon MD on 1/19/2024 at 11:40 AM     Finalized by (CST): Rodriguez Garzon MD on 1/19/2024 at 11:40 AM            IMPRESSION: Sandee Eddy is a 43 year old female with right anterior cruciate ligament rupture, medial collateral ligament tear, and complex medial meniscus tear sustained 12/30/23 after a skiing accident.  To successfully achieve goals of return to functional activity and avoiding future knee instability events, I advocate for anterior cruciate ligament reconstruction. Graft recommendation of Hamstring (quadrupled Semitendinosis and Gracilis) autograft.  She will additionally undergo meniscus repair.    Patient also presents with concern for left rotator cuff injury. We elected to order an MRI for further evaluation.    PLAN:   We had a detailed discussion outlining the etiology, anatomy, pathophysiology, and natural history of anterior cruciate ligament injuries. Imaging was reviewed in detail and correlated to a 3-dimensional model of the knee.     I had a detailed discussion with the patient  regarding an overview in approach to anterior cruciate ligament injuries. We discussed the role of possible nonsurgical management versus operative ACL reconstruction, ultimately, we agreed that proceeding with surgical intervention would likely offer the best opportunity for symptomatic relief and functional recovery.     I used radiographic studies and a 3-dimensional model to outline her pathology, as well as general surgical principles. I outlined that the ACL is a dynamic structure that loses integrity after injury, which does not render it capable of repair.  As such, reconstruction is required with a separate structure to take the place of the native ACL.  This can be accomplished via allograft, cadaveric tissue, or autograft-the patient's native tissue.  My recommendation is to proceed with autograft  reconstruction as this is the gold standard for return to play, healing rates, and reduced risk of reinjury.    Amongst different types of graft choices for anterior cruciate ligament reconstruction, I outlined patellar tendon autograft which includes 2 small 20 mm segments of bone from the patella and tibia, quadriceps tendon autograft which is in all soft tissue graft, and hamstring tendon autograft which involves the semitendinosis and gracilis tendons.  Each of these graft choices has its own strengths and weaknesses.  For an active individual such as Sandee, I recommended hamstring tendon autograft as this has overall reduced postsurgical pain and relatively timely clinical recovery with minimal risk of arthrofibrosis.     We reviewed the risks associated with arthroscopic-assisted anterior cruciate ligament (ACL) reconstruction. In particular we discussed risks that include, but are not limited to infection, potential transient or permanent injury to nerves or blood vessels, joint stiffness, persistent pain, need for future operation, failure of healing, wound complications, failure of therapeutic intervention, risk of re-injury, fixation failure, deep vein thrombosis and pulmonary embolism. We discussed the proposed rehabilitation timeline as well as expected postoperative restrictions. Sandee voiced a good understanding of treatment options, risks and benefits, postoperative instructions, rehabilitation timeline, and restrictions. She was given the opportunity to ask questions, which were all answered to the best of my ability and to her satisfaction. Sandee will work with my office to arrange a time for surgery and obtain any medical clearance information necessary. My pre-operative information packet, which details the process and answers many FAQ's will be provided. She was encouraged to call the office with any further questions or concerns.     Regarding the shoulder, I elected to order an MRI scan of the  shoulder to further characterize internal derangement, I will plan to follow-up with the patient after completion of the MRI scan.     I spent 60 minutes in preparation to see the patient, counseling/education of relevant pathology, discussing imaging results, ordering physical therapy intervention - including pre and post surgery, DME fitting, surgical counseling and care coordination.        FOLLOW-UP:  Post-Operative Visit, POD 6 with Sincer CORIE Donnelly PA-C. Post op XR of the knee at this visit.         Beverly Womack MD  Knee, Shoulder, & Elbow Surgery / Sports Medicine Specialist  Orthopaedic Surgery  [address] [place] [address].[facility]  [email or web address]  t: [phone]  o: [phone]  f: [phone]      This note was dictated using Dragon software.  While it was briefly proofread prior to completion, some grammatical, spelling, and word choice errors due to dictation may still occur.

## 2024-01-19 NOTE — H&P (VIEW-ONLY)
Orthopaedic Surgery  [address] [place] [id number]  [phone]     PRE SURGICAL - HISTORY AND PHYSICAL EXAMINATION     Name: Sandee Rodriguez   MRN: [id number]  Date: 1/19/2024     CC: Right Knee Pain and instability     REFERRED BY: Kasey Calderon MD    HPI:   Sandee Eddy is a very pleasant 43 year old female who presents today for evaluation of Right knee pain and instability and recent completion of MRI demonstrating anterior cruciate ligament rupture.     To summarize: right knee pain ongoing since 12/30/2023 when she was skiing and lost her balance.  Since that time she has had pain up to 1 out of 10 with some weakness and instability and a sense of pinching.  She has had to modify her activities she has trialed some anti-inflammatory medications that have not significantly helped.  Notably she has a history of a right leg surgery in 1998.  She is yet to trial any additional conservative measures.      She is currently engaged in PT and has been improving. She is able to walk around the house without a brace. She denies any instability.    She additionally reports left shoulder pain.    PMH:   Past Medical History:   Diagnosis Date    Obesity (BMI 35.0-39.9 without comorbidity) 06/29/2021    HERRERA (obstructive sleep apnea) 02/02/2023       PAST SURGICAL HX:  Past Surgical History:   Procedure Laterality Date    OTHER SURGICAL HISTORY      RT LEG SURGERY- PINS PLACED AND REMOVED       FAMILY HX:  Family History   Problem Relation Age of Onset    Heart Disorder Father        ALLERGIES:  Ampicillin and Penicillins    MEDICATIONS:   Current Outpatient Medications   Medication Sig Dispense Refill    Meloxicam 15 MG Oral Tab Take 1 tablet (15 mg total) by mouth daily. 14 tablet 1    Phentermine HCl 37.5 MG Oral Tab Take 1 tablet (37.5 mg total) by mouth every morning before breakfast. 30 tablet 2    ibuprofen 600 MG Oral Tab Take 1 tablet (600 mg total) by mouth every 6 hours with food 20  tablet 0       ROS: A comprehensive 14 point review of systems was performed and was negative aside from the aforementioned per history of present illness.    SOCIAL HX:  Social History     Tobacco Use    Smoking status: Never    Smokeless tobacco: Never   Substance Use Topics    Alcohol use: Yes     Comment: OCC       PE:   There were no vitals filed for this visit.  Estimated body mass index is 40.1 kg/m² as calculated from the following:    Height as of 1/5/24: 5' 5\" (1.651 m).    Weight as of 1/5/24: 241 lb.    Physical Exam  Constitutional:       Appearance: Normal appearance.   HENT:      Head: Normocephalic and atraumatic.   Eyes:      Extraocular Movements: Extraocular movements intact.   Neck:      Musculoskeletal: Normal range of motion and neck supple.   Cardiovascular:      Pulses: Normal pulses.   Pulmonary:      Effort: Pulmonary effort is normal. No respiratory distress.   Abdominal:      General: There is no distension.   Skin:     General: Skin is warm.      Capillary Refill: Capillary refill takes less than 2 seconds.      Findings: No bruising.   Neurological:      General: No focal deficit present.      Mental Status: Alert.   Psychiatric:         Mood and Affect: Mood normal.     Examination of the right knee demonstrates:     Skin is intact, warm and dry.   Atrophy: none    Effusion: none    Joint line tenderness: medial  Crepitation: none   Art: Positive   Patellar mobility: normal without apprehension  J-sign: none    ROM: Extension full  Flexion 120 degrees  ACL:  2B Lachman, 2+ Pivot Shift   PCL:  Negative Posterior Drawer  Collateral Ligaments: Stable to Varus and Valgus stress at 0 and 30 degrees  Strength: mild weakness   Hip joint: normal pain-free ROM   Gait:  mildly antalgic   Leg length: equal and symmetric  Alignment:  neutral     No obvious peripheral edema noted.   Distal neurovascular exam demonstrates normal perfusion, intact sensation to light touch and full strength.      Examination of the contralateral knee demonstrates:  No significant atrophy, swelling or effusion. Full range of motion. Neurovascularly intact distally.    Examination of the left shoulder reveals 4 out of 5 strength in supraspinatus, infraspinatus and pain with subscapularis strength testing.  Positive Commerce sign.  Positive Sood impingement findings.  Concern for rotator cuff pathology.  Pain with palpation overlying the biceps groove.    Radiographic Examination/Diagnostics:  XR and MRI of the knee personally viewed, independently interpreted and radiology report was reviewed.    MRI KNEE, RIGHT (AZT=30509)    Result Date: 1/5/2024  PROCEDURE:  MRI KNEE, RIGHT (CPT=73721)  COMPARISON:  None.  INDICATIONS:  S89.90XA Traumatic injury of knee  TECHNIQUE:  Axial, coronal, and sagittal proton density with and without fat saturation images were obtained.  PATIENT STATED HISTORY: (As transcribed by Technologist)  Patient has pain through out right knee due to skiing accident on Saturday.    FINDINGS:  LIGAMENTS:          Full-thickness tear through proximal ligament fibers of the ACL with associated ligamentous laxity and extensive regional edema (series 601, image 18).  Normal PCL.  Full-thickness tear through proximal ligament fibers of the MCL with  ligamentous laxity and extensive regional edema (series 501, image 19). MENISCI:            Normal medial meniscus.  Radial tear of the posterior horn lateral meniscus near the posterior root insertion (series 501, image 25). TENDONS:            Patellar tendon laxity mid with moderate regional edema, though without clear tendon disruption.  This may be positional. MUSCULATURE:        No evidence of strain, edema, or atrophy. BONY COMPARTMENTS:  No fracture or malalignment.  Shallow impaction injury of the terminal sulcus of the lateral femoral condyle with bone contusions peripherally within the medial and lateral femoral condyles and posteriorly within the  lateral tibial plateau.  Articular cartilage is preserved within the medial and lateral compartments.  Focal high-grade partial-thickness chondral defect superiorly along the lateral patellar facet measures 5 x 5 mm in cross-section with reactive subchondral marrow edema and cystic change indicating a chronic process. SYNOVIUM:           Mild/moderate knee joint effusion.  Small Corcoran's cyst contains low signal debris.  Moderate subcutaneous edema circumferentially about the knee.  The popliteal neurovascular bundle is within normal limits.             CONCLUSION:     1. Full-thickness tears through proximal ligament fibers of the ACL and MCL with associated ligamentous laxity and extensive regional edema.    2. Radial tear of the posterior horn lateral meniscus near the posterior root insertion.    3. Shallow impaction injury of the terminal sulcus of the lateral femoral condyle with bone contusions peripherally within the medial and lateral femoral condyles and posteriorly within the lateral tibial plateau.  No acute chondral injury.    4. Chronic high-grade partial-thickness chondral defect superiorly along the lateral patellar facet, as above.    5. Laxity of the distal patellar tendon with moderate surrounding edema.  No clearly defined tendon disruption.  This may be positional.  Correlate with clinical evidence of patellar tendon dysfunction.     6. Mild/moderate knee joint effusion.  Moderate subcutaneous edema circumferentially about the knee.     LOCATION:  [facility]          Dictated by (CST): Jane Hernandez MD on 1/05/2024 at 10:58 AM     Finalized by (CST): Jane Hernandez MD on 1/05/2024 at 11:12 AM       XR KNEE (3 VIEWS), LEFT (CPT=73562)    Result Date: 1/5/2024  PROCEDURE:  XR KNEE ROUTINE (3 VIEWS), LEFT (CPT=73562)  TECHNIQUE:  Three views were obtained including patellar view.  COMPARISON:  None.  INDICATIONS:  Right knee pain, left knee for comparison  PATIENT STATED HISTORY: (As transcribed by  Technologist)  Patient here for ortho evaluation. Patient stated right knee pain after injury while skiing on 12/30/23. Left knee for comparison      FINDINGS:  No fracture or dislocation.  Mild medial joint space narrowing.            CONCLUSION:  Mild medial joint space narrowing.   LOCATION:  [facility]   Dictated by (CST): Sg Sawant MD on 1/05/2024 at 9:35 AM     Finalized by (CST): Sg Sawant MD on 1/05/2024 at 9:36 AM       XR KNEE, COMPLETE (4 OR MORE VIEWS), RIGHT (CPT=73564)    Result Date: 1/5/2024  PROCEDURE:  XR KNEE, COMPLETE (4 OR MORE VIEWS), RIGHT (CPT=73564)  TECHNIQUE:  AP, lateral, sunrise, and tunnel views were obtained  COMPARISON:  None.  INDICATIONS:  Right knee pain  PATIENT STATED HISTORY: (As transcribed by Technologist)  Patient here for ortho evaluation. Patient stated right knee pain after injury while skiing on 12/30/23. Left knee for comparison    FINDINGS:  No fracture or dislocation.  Mild medial joint space narrowing.  Small marginal osteophytes of patella.            CONCLUSION:  Mild medial and patellofemoral osteoarthritic changes.   LOCATION:  [facility]   Dictated by (CST): Sg Sawant MD on 1/05/2024 at 9:34 AM     Finalized by (CST): Sg Sawant MD on 1/05/2024 at 9:35 AM       XR KNEE (1 OR 2 VIEWS), RIGHT (CPT=73560)    Result Date: 1/2/2024  PROCEDURE: XR KNEE (1 OR 2 VIEWS), RIGHT (CPT=73560)  COMPARISON: None.  INDICATIONS: Generalized right knee pain post skiing accident 3 days.  TECHNIQUE: 2 views were obtained.   FINDINGS:  BONES: Normal. No significant arthropathy, fracture or acute abnormality. SOFT TISSUES: Negative. No visible soft tissue swelling. EFFUSION: None visible. OTHER: Negative.         CONCLUSION: Normal examination.     Dictated by (CST): Hi Abarca MD on 1/02/2024 at 2:52 PM     Finalized by (CST): Hi Abarca MD on 1/02/2024 at 2:53 PM          XR SHOULDER, COMPLETE (MIN 2 VIEWS), LEFT (CPT=73030)    Result Date: 1/19/2024  CONCLUSION:  No  acute fracture or other acute bony process.  [facility]   Dictated by (CST): Rodriguez Garzon MD on 1/19/2024 at 11:40 AM     Finalized by (CST): Rodriguez Garzon MD on 1/19/2024 at 11:40 AM            IMPRESSION: Sandee Eddy is a 43 year old female with right anterior cruciate ligament rupture, medial collateral ligament tear, and complex medial meniscus tear sustained 12/30/23 after a skiing accident.  To successfully achieve goals of return to functional activity and avoiding future knee instability events, I advocate for anterior cruciate ligament reconstruction. Graft recommendation of Hamstring (quadrupled Semitendinosis and Gracilis) autograft.  She will additionally undergo meniscus repair.    Patient also presents with concern for left rotator cuff injury. We elected to order an MRI for further evaluation.    PLAN:   We had a detailed discussion outlining the etiology, anatomy, pathophysiology, and natural history of anterior cruciate ligament injuries. Imaging was reviewed in detail and correlated to a 3-dimensional model of the knee.     I had a detailed discussion with the patient  regarding an overview in approach to anterior cruciate ligament injuries. We discussed the role of possible nonsurgical management versus operative ACL reconstruction, ultimately, we agreed that proceeding with surgical intervention would likely offer the best opportunity for symptomatic relief and functional recovery.     I used radiographic studies and a 3-dimensional model to outline her pathology, as well as general surgical principles. I outlined that the ACL is a dynamic structure that loses integrity after injury, which does not render it capable of repair.  As such, reconstruction is required with a separate structure to take the place of the native ACL.  This can be accomplished via allograft, cadaveric tissue, or autograft-the patient's native tissue.  My recommendation is to proceed with autograft  reconstruction as this is the gold standard for return to play, healing rates, and reduced risk of reinjury.    Amongst different types of graft choices for anterior cruciate ligament reconstruction, I outlined patellar tendon autograft which includes 2 small 20 mm segments of bone from the patella and tibia, quadriceps tendon autograft which is in all soft tissue graft, and hamstring tendon autograft which involves the semitendinosis and gracilis tendons.  Each of these graft choices has its own strengths and weaknesses.  For an active individual such as Sandee, I recommended hamstring tendon autograft as this has overall reduced postsurgical pain and relatively timely clinical recovery with minimal risk of arthrofibrosis.     We reviewed the risks associated with arthroscopic-assisted anterior cruciate ligament (ACL) reconstruction. In particular we discussed risks that include, but are not limited to infection, potential transient or permanent injury to nerves or blood vessels, joint stiffness, persistent pain, need for future operation, failure of healing, wound complications, failure of therapeutic intervention, risk of re-injury, fixation failure, deep vein thrombosis and pulmonary embolism. We discussed the proposed rehabilitation timeline as well as expected postoperative restrictions. Sandee voiced a good understanding of treatment options, risks and benefits, postoperative instructions, rehabilitation timeline, and restrictions. She was given the opportunity to ask questions, which were all answered to the best of my ability and to her satisfaction. Sandee will work with my office to arrange a time for surgery and obtain any medical clearance information necessary. My pre-operative information packet, which details the process and answers many FAQ's will be provided. She was encouraged to call the office with any further questions or concerns.     Regarding the shoulder, I elected to order an MRI scan of the  shoulder to further characterize internal derangement, I will plan to follow-up with the patient after completion of the MRI scan.     I spent 60 minutes in preparation to see the patient, counseling/education of relevant pathology, discussing imaging results, ordering physical therapy intervention - including pre and post surgery, DME fitting, surgical counseling and care coordination.        FOLLOW-UP:  Post-Operative Visit, POD 6 with Sincer CORIE Donnelly PA-C. Post op XR of the knee at this visit.         Beverly Womack MD  Knee, Shoulder, & Elbow Surgery / Sports Medicine Specialist  Orthopaedic Surgery  [address] [place] [address].[email or web address]  [email or web address]  t: [phone]  o: [phone]  f: [phone]      This note was dictated using Dragon software.  While it was briefly proofread prior to completion, some grammatical, spelling, and word choice errors due to dictation may still occur.

## 2024-01-19 NOTE — PROGRESS NOTES
OR BOOKING SHEET KNEE  Location: [x] [facility]   [] [facility]   Name: Sandee Eddy  MRN: [id number]   : 1980  Diagnosis:  [x] Rupture of anterior cruciate ligament of right knee, initial encounter [S83.511A]  Disposition:    [x] Ambulatory  [] Overnight for HERRERA  [] Overnight for observation and pain control  [] Inpatient procedure    Operative Time Required: 2.5 hours ([facility])   Procedure:  Antibiotics: 2 g cefazolin within 60 minutes of surgical incision (3 g if > 120 kg)  Laterality: [x] RIGHT KNEE [] LEFT KNEE                      [] BILATERAL  Procedures:   [x] Arthroscopy       [x] ACL Reconstruction (45680)    [] BTB autograft      [] Quad autograft             [x] Hamstring autograft     [] Partial Medial Meniscectomy (87316)   [x] Medial Meniscus Repair (50262)   [] Partial Lateral Meniscectomy (20971)   [x] Lateral Meniscus Repair (17787)       [x] Synovectomy (86723)    Additional info:   [x] PCP Clearance Needed  [] C - arm  [x] TXA at Time of Surgery  [x] Physical Therapy [facility] [place] - Batsheva / Ronda / Rohan  [x] DME Rx Needed  [] Appt with Dr. Womack needed  Implants needed: Arthrex / [employer]  Positioning Equipment: Supine with Lateral Post
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
## 2024-01-22 ENCOUNTER — OFFICE VISIT (OUTPATIENT)
Dept: INTERNAL MEDICINE CLINIC | Facility: CLINIC | Age: 44
End: 2024-01-22
Payer: MEDICAID

## 2024-01-22 VITALS
DIASTOLIC BLOOD PRESSURE: 80 MMHG | HEIGHT: 65 IN | OXYGEN SATURATION: 98 % | HEART RATE: 104 BPM | SYSTOLIC BLOOD PRESSURE: 120 MMHG | WEIGHT: 236 LBS | BODY MASS INDEX: 39.32 KG/M2

## 2024-01-22 DIAGNOSIS — S83.511D RUPTURE OF ANTERIOR CRUCIATE LIGAMENT OF RIGHT KNEE, SUBSEQUENT ENCOUNTER: ICD-10-CM

## 2024-01-22 DIAGNOSIS — M23.200 OLD PERIPHERAL TEAR OF LATERAL MENISCUS OF RIGHT KNEE: ICD-10-CM

## 2024-01-22 DIAGNOSIS — Z01.818 PREOPERATIVE CLEARANCE: Primary | ICD-10-CM

## 2024-01-22 PROCEDURE — 99214 OFFICE O/P EST MOD 30 MIN: CPT | Performed by: INTERNAL MEDICINE

## 2024-01-22 PROCEDURE — 3074F SYST BP LT 130 MM HG: CPT | Performed by: INTERNAL MEDICINE

## 2024-01-22 PROCEDURE — 3008F BODY MASS INDEX DOCD: CPT | Performed by: INTERNAL MEDICINE

## 2024-01-22 PROCEDURE — 3079F DIAST BP 80-89 MM HG: CPT | Performed by: INTERNAL MEDICINE

## 2024-01-22 RX ORDER — TRANEXAMIC ACID 10 MG/ML
1000 INJECTION, SOLUTION INTRAVENOUS ONCE
Status: CANCELLED | OUTPATIENT
Start: 2024-01-22 | End: 2024-01-22

## 2024-01-22 NOTE — PROGRESS NOTES
Diagnosis:   Rupture of anterior cruciate ligament of right knee, initial encounter (S83.511A)  Complex tear of lateral meniscus of right knee as current injury, initial encounter (S83.271A)  Complete tear of medial collateral ligament of right knee, initial encounter (S83.411A)         Referring Provider: Vu  Date of Evaluation:    1/9/24    Precautions:  None Next MD visit:   none scheduled  Date of Surgery: n/a   Insurance Primary/Secondary: BLUE [employer]OSS MEDICAID / N/A     # Auth Visits: 10 visits (1/9-3/9/24)            Subjective: Pt reports no pain now but she wakes up with some knee aching. After the HEP she feels fine. She has her surgery scheduled for in a couple of weeks.     Pain: 0/10      Objective:     AROM: (* denotes performed with pain)  Knee   Flexion: R 130*; L 132  Extension: R +2; L +2         Gait: pt ambulates on level ground w/o assistive device, no hinge brace, mild trendelenburg and lacking TKE       Assessment: Pt presenting with normalized knee flexion AROM, however, presents with reduced knee extensions. Educated pt on importance of regaining full knee extension mobility prior to ACL reconstructive surgery. She is able to progress various strength and balance exercises in weight bearing positions. She demonstrates a mild extensor lag during SLR. Her HEP is updated and she is educated on expectations post surgically.       Goals:   (to be met in 6 visits)  Pt will improve knee extension ROM to 0 deg to allow proper heel strike during gait and terminal knee extension in stance   Pt will improve knee AROM flexion to >130 degrees to improve ability to perform don/doff shoes and socks with ease   Pt will improve quad strength to 5/5 to ascend 1 flight of stairs reciprocally without UE assist   Pt will increase hip and knee strength to grossly 4+/5 to be able to get up and down from the floor safely   Pt will demonstrate increased hip ER/ABD strength to 4+/5 to perform stepping and squatting  activities without excessive femoral IR/ADD   Pt will improve SLS to >20 s to improve safety with gait on uneven surfaces such as grass and gravel  Pt will be independent and compliant with comprehensive HEP to maintain progress achieved in PT      Plan: Plan to see pt post operatively for Evaluation.   Date: 2024  TX#: 2/10 Date: 24               TX#: 3/10 Date: 24                TX#: 4/10 Date:                 TX#:  Date:   Tx#: /   TherEx: 55 min  -Nustep: 6 min  -Heel slides w/ strap : x20   -quad sets w/ NMES: x5 min (5sec on/5sec off)  -SLR w/ NMES: 2x10 reps  -s/l hip abd: 2x10  -Prone leg ext: 2x10  -SAQ: 0#, 2x10  -Prone TKE:2x10  -Mini squats: 2HHA, x10  -gait trainin min no assistive device   -Standing TKE: Gray TB, x20  -standing heel raises: 2x10  -SLB: 1-2 HHA, 2x30 sec    Hold for future  Step ups  Leg press   TRX squats   Wobble board balance  RSB wall squats   TherEx: 45 min  -Nustep: 6 min  -Heel slides w/ strap : x20   -RSB HS curls: x20   -RSB bridges: 2x10  -clams: RTB, 2x10  -SLR: 2x20 reps  -Mini squats:  no HHA, x10  -Standing TKE: Gray TB, x20  -SLB:  2x30 sec  Step ups: 4\" box 2x10  Leg press: 75#, 3x10  SL leg press: 50#, 3x10  -lateral walk: RTB, 2x50 feet  -monster walk: RTB, 2x50 feet   -standing hip abductions: RTB, 2x10 each leg  -Bosu lunges: 2x10   -airex step up and march: 2x10    Hold for future  TRX squats   Wobble board balance  RSB wall squats TherEx: 55 min  -Nustep: 6 min  -prone quad stretch: 3x30 sec  -prone TKE: 2x10  -SL bridges: 2x10  -clams: RTB, 2x10  -SLR: 2x20 reps  -sit to stand:  no HHA, 2x10  -SLB in mini squat: 2x30 sec  -SLB w/ clock reach: 2x to fatigue  -lateral walk: RTB, 2x50 feet  -Monster walk: 2x50 feet  Step ups: 6\" box 2x10  Anterior step down: 2\", 2x10  Leg press: 81#, 3x10  SL leg press: 50#, 3x10  -standing hip 3 way: RTB, 2x10 each leg      Hold for future  TRX squats   Wobble board balance  RSB wall squats            HEP:  Access Code: [email or web address]  URL: [email or web address]  Date: 01/23/2024  Prepared by: Batsheva Beckwith    Exercises  - Seated Knee Extension Stretch with Chair  - 1 x daily - 7 x weekly - 3 sets - 10 reps  - Supine Straight Leg Raises  - 2 x daily - 7 x weekly - 3 sets - 10 reps  - Prone Terminal Knee Extension  - 1 x daily - 7 x weekly - 3 sets - 10 reps  - Sit to Stand Without Arm Support  - 1 x daily - 7 x weekly - 3 sets - 10 reps  - Standing Heel Raises  - 1 x daily - 7 x weekly - 3 sets - 10 reps  - Single Leg Balance with Clock Reach  - 1 x daily - 7 x weekly - 3 sets - 10 reps  - Single-Leg Quarter Squat   - 1 x daily - 7 x weekly - 3 sets - 10 reps  - Step Up  - 1 x daily - 7 x weekly - 3 sets - 10 reps  - Side Stepping with Resistance at Feet  - 1 x daily - 7 x weekly - 3 sets - 10 reps  - Forward Monster Walks  - 1 x daily - 7 x weekly - 3 sets - 10 reps  - Backward Monster Walks  - 1 x daily - 7 x weekly - 3 sets - 10 reps  - Hip Abduction with Resistance Loop  - 1 x daily - 7 x weekly - 3 sets - 10 reps  - Hip Extension with Resistance Loop  - 1 x daily - 7 x weekly - 3 sets - 10 reps  - Standing Hip Flexion with Resistance Loop  - 1 x daily - 7 x weekly - 3 sets - 10 reps    Charges: TherEx: 4 units         Total Timed Treatment:55 min  Total Treatment Time: 55 min
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
## 2024-01-23 ENCOUNTER — OFFICE VISIT (OUTPATIENT)
Dept: PHYSICAL THERAPY | Age: 44
End: 2024-01-23
Attending: ORTHOPAEDIC SURGERY
Payer: MEDICAID

## 2024-01-23 PROCEDURE — 97110 THERAPEUTIC EXERCISES: CPT | Performed by: PHYSICAL THERAPIST

## 2024-01-23 NOTE — TELEPHONE ENCOUNTER
PATIENT IS CLEARED BY PCP FOR SURGERY. OFFICE VISIT 1/22/2024 IN [facility]

## 2024-01-24 ENCOUNTER — TELEPHONE (OUTPATIENT)
Dept: ADMINISTRATIVE | Age: 44
End: 2024-01-24

## 2024-01-24 DIAGNOSIS — S46.002A INJURY OF LEFT ROTATOR CUFF, INITIAL ENCOUNTER: Primary | ICD-10-CM

## 2024-01-24 NOTE — TELEPHONE ENCOUNTER
Beverly Womack MD   to Me  Shy Velazquez       1/24/24  3:24 PM   I placed a new order, I believe the previous diagnosis association was an error, this is now been corrected.    Thanks!   No

## 2024-01-24 NOTE — TELEPHONE ENCOUNTER
Hello,    This test was ordered with wrong body part information.  Please correct as there is no clinical to support a Left Shoulder Mri.  Patient is scheduled.     Referral [id number]         Shy FONTENOT  Patient Referral Representative  Prior Authorizations & Referrals  [facility]

## 2024-01-25 ENCOUNTER — LABORATORY ENCOUNTER (OUTPATIENT)
Dept: LAB | Age: 44
End: 2024-01-25
Attending: ORTHOPAEDIC SURGERY
Payer: MEDICAID

## 2024-01-25 DIAGNOSIS — Z01.818 PRE-OP TESTING: ICD-10-CM

## 2024-01-25 LAB
CHLORIDE SERPL-SCNC: 103 MMOL/L (ref 98–112)
CO2 SERPL-SCNC: 28 MMOL/L (ref 21–32)
POTASSIUM SERPL-SCNC: 4.1 MMOL/L (ref 3.5–5.1)
SODIUM SERPL-SCNC: 138 MMOL/L (ref 136–145)

## 2024-01-25 PROCEDURE — 80051 ELECTROLYTE PANEL: CPT

## 2024-01-25 PROCEDURE — 36415 COLL VENOUS BLD VENIPUNCTURE: CPT

## 2024-01-26 ENCOUNTER — APPOINTMENT (OUTPATIENT)
Dept: PHYSICAL THERAPY | Age: 44
End: 2024-01-26
Attending: ORTHOPAEDIC SURGERY
Payer: MEDICAID

## 2024-01-30 ENCOUNTER — APPOINTMENT (OUTPATIENT)
Dept: PHYSICAL THERAPY | Age: 44
End: 2024-01-30
Attending: ORTHOPAEDIC SURGERY
Payer: MEDICAID

## 2024-02-05 ENCOUNTER — ANESTHESIA EVENT (OUTPATIENT)
Dept: SURGERY | Facility: HOSPITAL | Age: 44
End: 2024-02-05
Payer: MEDICAID

## 2024-02-05 RX ORDER — MELOXICAM 15 MG/1
15 TABLET ORAL DAILY
Qty: 14 TABLET | Refills: 1 | Status: SHIPPED | OUTPATIENT
Start: 2024-02-05

## 2024-02-05 RX ORDER — TRAMADOL HYDROCHLORIDE 50 MG/1
TABLET ORAL
Qty: 20 TABLET | Refills: 1 | Status: SHIPPED | OUTPATIENT
Start: 2024-02-05

## 2024-02-05 RX ORDER — ONDANSETRON 4 MG/1
TABLET, FILM COATED ORAL
Qty: 8 TABLET | Refills: 0 | Status: SHIPPED | OUTPATIENT
Start: 2024-02-05

## 2024-02-05 NOTE — DISCHARGE INSTRUCTIONS
ACL Reconstruction  Post-Operative Guidelines    This document will help you plan for your post-operative recovery course following surgery. Please read and retain this information for future reference. Many of the questions you may have later can be answered by referring to this information.    DIET   Begin with clear liquids and light foods (jellos, soups, etc.).   Progress to your normal diet if you are not nauseated.       POST OPERATIVE BRACE   A hinged knee brace is to be worn for 3-4 weeks after surgery.   Please sleep with this brace on. See additional instructions for brace details.   Okay to remove ACE wrap for ice therapy, but compression is helpful during the first 2 weeks.        CRUTCHES   Week 1: Toe-touch weight-bearing with 2 crutches   Approximately 20 lbs - lightly resting the foot on the floor.     Week 2: Full weight (wearing the brace in extension) as tolerated.  Wean to 1 crutch during the 2nd week, then discontinue crutches or transition to a cane.  You may discontinue the crutches during the second week when you are comfortable with full weight on the leg. Your physical therapist may guide you with this process.     IF MENISCUS REPAIRED :     Week 1-4: Toe-touch weight-bearing with 2 crutches   Approximately 20 lbs - lightly resting the foot on the floor.     You may discontinue the crutches during the second week when you are comfortable with full weight on the leg. Your physical therapist may guide you with this process.       WOUND CARE   Please keep the ACE wrap on under the brace for the first week, you may remove the ACE wrap for ice therapy.  Underneath the ACE wrap are waterproof bandages, please keep these in place until your first post-op appointment with Dr. Womack.  Under the waterproof bandages is Dermabond, this is a surgical glue and tape that is used in conjunction with absorbable stitches to close the incision.   Please do not touch the Dermabond or place any ointments  lotions or creams directly over the incisions.  You may shower after removing the ACE wrap by placing Saran wrap around the leg and covering the bandages.  NO soaking of the operative leg (ie: bath or pool) is allowed until 6 weeks after surgery.       PAIN MANAGEMENT  Local numbing medications, or peripheral nerve block are injected into the wound around the knee at the time of surgery. These will wear off within 8-24 hours and it is not uncommon for you to encounter more pain on the first or second day after surgery when swelling peaks.   Do not drive a car or operate machinery while taking the narcotic medication.   Please avoid alcohol use while taking the narcotic pain medication.     NON-NARCOTIC PAIN MEDICATIONS   Extra-Strength Tylenol (Acetaminophen) 500mg Tablets (available over the counter)  Indication: pain, non-narcotic pain reliever  Use: Take 1-2 tablets every 6 hours.  Do not exceed 8 tablets in a 24-hour period.    Mobic (Meloxicam) 15mg Tablets (Prescription sent to pharmacy)  Indication: pain and anti-inflammatory, non-narcotic pain reliever  Use: Take 1 tablets daily with meals for the first 14 days after surgery.  Side Effects: upset stomach, acid reflux.  If this occurs, stop the medication.  \  You received a drug called Toradol which is an anti inflammatory at: 11:30am   Do not take any anti inflammatory like Motrin, Aleve, or Ibuprofen until after: 5:30pm    If you are allowed to take anti inflammatories  Please report any suspected allergic reactions or bleeding issues to your doctor.         NARCOTIC PAIN MEDICATIONS  OPIOIDS/NARCOTICS are prescription pain medications.  One of the following medications will be prescribed and should only be used if adequate pain control is not achieved with combination of ice, Aleve, and extra-strength Tylenol outlined above.   Side effects include constipation, nausea, drowsiness, dry mouth, itchiness.  Use a 0-10 pain scale to decide how much medication  to take:                                   Pain 0-4/10: No narcotics necessary                                  Pain 5-7/10: Take one tablet                                    Pain 8-10/10: Take two tablets   Oxycodone (Roxicodone) 5mg tablet  Indication: pain 5/10 or greater.  Narcotic pain medication.  Use: 1-2 tabs every 4-6 hours as needed for pain.  Do not exceed more than 10 tabs in any 24-hour time period unless otherwise directed.  Tramadol (Ultram) 50mg tablet  Indication: pain 5/10 or greater.  Non-opioid narcotic like medication  Use: 1-2 tabs every 6 hours as needed for pain.  Do not take more than 8 tablets in any 24-hour time period.      medications to manage side effects  Narcotics may cause nausea and constipation. Bowel regimen is recommended.  Colace (Docusate) 100 mg - available OTC  Indication:  constipation, stool softener.  Take consistently while on narcotics.  Use:  Take 1 pill three times per day while you are taking narcotics.    Senokot (Senna) 8.6 mg - available OTC  Indication: constipation, stool laxative.  Take consistently while on narcotics.   Use: Take 2 pills at bedtime.  Increase to 2 pills twice daily if no bowel movement by post-surgery day 2.    Miralax (Polyethylene Glycol) 17.6 g - available OTC  Indication: constipation, stool laxative.  Take if above medications are not working.  Use: Take one dose at bedtime.  In addition to above with no bowel movement by post-surgery day 2.   Zofran (Ondansetron ODT) 4 mg- Prescription sent to pharmacy  Indication: nausea.  Take as needed.  Use: Take 1 pill AS NEEDED every 8 hours, do not exceed 3 pills in 24 hours      ACTIVITY   Elevate the operative leg to chest level whenever possible to decrease swelling.   Do not place pillows under knees (i.e. do not maintain knee in a flexed or bent position), but rather place pillows under the foot/ankle.   Use crutches to assist with walking - Do not walk without brace on.   Do not engage in  activities which increase knee pain/swelling (prolonged periods of standing or walking) for the first 4 weeks following surgery.   Avoid long periods of sitting (without leg elevated) or long distance traveling for 4 weeks.   NO driving until instructed otherwise by physician.   May return to sedentary work ONLY or school 3-4 days after surgery, if pain is tolerable.       ICE THERAPY   Icing is very important in the initial post-operative period and should begin immediately after surgery.   You can remove the ACE wrap for ice therapy  Use icing machine continuously or ice packs (if machine not prescribed) for 30-45 minutes every 2 hours daily until your first post-operative visit - remember to keep leg elevated to level of chest while icing. Care should be taken with icing to avoid frostbite to the skin.   You do not need to wake up in the middle of the night to change over the ice machine or icepacks unless you are uncomfortable.         EXERCISE   Begin exercises 24 hours after surgery (straight leg raises, quad sets, heel slides, and ankle pumps, unless otherwise instructed.   Discomfort and knee stiffness are normal for a few days following surgery. It is safe to bend your knee in a non-weightbearing position when performing exercises unless otherwise instructed. Avoid flexing past 90 degrees.   Complete exercises 3-4 times daily until your first post-operative visit - your motion goals are to have complete extension (straightening) and 90 degrees of flexion (bending) at your first post- operative appointment unless otherwise instructed.   Perform ankle pumps continuously throughout the day to reduce the risk of developing a blood clot in your calf.   Formal physical therapy (PT) typically begins as soon as possible, ideally 1-3 days after surgery.       EMERGENCIES**   Contact Dr. Womack’s Team via [facility] or [phone] if any of the following are present:     Painful swelling or numbness (note that some  swelling and numbness is normal).   Unrelenting pain.  Fever (over 101° - it is normal to have a low-grade fever for the first day or two following surgery)   Redness around incisions.   Color change in foot or ankle.   Continuous drainage or bleeding from incision (a small amount of drainage is expected).   Difficulty breathing.   Excessive nausea/vomiting   Calf pain.   If you have an emergency after office hours or on the weekend, contact the office at [phone] and you will be connected to our pager service. This will connect you with the Physician on call.   If you have an emergency that requires immediate attention proceed to the nearest emergency room.       FOLLOW-UP CARE/QUESTIONS   Your first post-operative appointment will be 7-14 days following surgery for wound evaluation, knee X-rays and to answer any questions you have regarding the procedure.   Typically, the first physical therapy appointment following ACL reconstruction is made for 1-3 days following surgery. This prescription will be communicated prior to surgery.  If you have any further questions please contact Dr. Womack’s team directly.        Frequently Asked Questions: ACL Reconstruction Surgery    What is the ACL?  The Anterior Cruciate Ligament (ACL) is a ligament within the knee joint that connects the femur (thigh bone) to the tibia (shin bone). The ACL is important for stabilizing the knee joint to allow for pivoting and twisting motions that are common in sports.     How does an ACL Injury occur?  ACL injuries happen most commonly in high-risk sports such as football, soccer, basketball, gymnastics and skiing in which the athletes perform sudden stops, aggressive changes in direction, or jumping and landing. They can also be caused by a collision with other athletes (such as a tackle) in which the knee is forcibly bent in an unnatural way.     Why does the ACL get injured?  There are more than 200,000 ACL injuries in the [place]  [facility] [place] every year, making it the most common knee injury among athletes of all ages and competition levels. At the time of injury, the knee is usually slightly bent leading to excessive force on the ACL which may cause an injury ranging from a minor strain to a complete tear. Women are at two to four times higher risk that men for ACL injury, this is due to an anatomic difference in hip-knee angle, muscle strength, and hormonal influences. Other predisposing factors include poor conditioning, improper equipment (shoes, ski bindings/boots), or adverse weather conditions. Artificial turf results greater friction and is responsible for more knee injuries that on grass surfaces.     What are the symptoms of an ACL injury?  At the time of injury, you may hear a “pop” followed by immediate pain and swelling of the knee. As the swelling resolves, there may be bruising and a sense of the knee buckling or feeling unstable. You may not be able to bend or straighten the knee as much as the other (uninjured) side. While some may not be able to walk or stand for prolonged times, sometimes the pain and swelling completely resolves. Although, it is important to know that continuing activities with an unstable knee can cause further damage to structures in the knee such as the meniscus and cartilage.     How is an ACL injury diagnosed?  Sports Medicine specialists such as Dr. Womack can diagnose an ACL injury by listening to the patient’s story of how the injury occurred and performing stability tests during physical examination. As part of the comprehensive evaluation, x-rays of the knee and MRI (magnetic resonance imaging) scan are also done to check for any additional injuries that may occur up to 50% of the time.     What are the different types of ACL injury?  Grade 1 - ACL Sprain   This is where the ACL is stretched, resulting is a minor injury to its fibers. Initially, the patient may have similar symptoms as other types of  ACL injury including swelling, pain, and inability to walk normally. Fortunately, Grade 1 ACL sprains can be treated with non-operative treatment including, rest, ice, elevation, compression, and anti-inflammatory medications. Patients are typically able to return to activity within 2-4 weeks after injury.     Grade 2 - Partial ACL Tear  In this injury, the ACL fibers are partially torn. Depending on how much of the ACL is injured, the knee may or may not be unstable. Some patients may do well with non-operative treatment and if there is minor knee instability this can be compensated by knee bracing and physical therapy to strengthen surrounding knee muscles. Surgery may be needed if the knee is too unstable to perform the patient’s desired activities, such as return to sport.     Grade 3 - Complete ACL Tear  Complete tearing of the ACL fibers causes the knee to become unstable. Surgery is needed in this setting for returning to an active lifestyle and to help prevent further injuries within the knee due to instability.       When is the best time for ACL surgery?  The best time for surgery typically occurs 1-2 weeks after the initial injury, this allows for the swelling and pain of the knee to be reduced. Patients that undergo surgery with full knee range of motion (ability to bend and straighten) have an easier time regaining this ability after surgery. It is often helpful to attend 1-2 physical therapy sessions prior to surgery to strengthen muscles around the knee and speed up the recovery process after surgery.     What is the treatment for an ACL injury?  With a high-grade ACL injury, the fibers are damaged beyond repair; this is similar to that of a rope being pulled apart. As a result, the ACL needs to replaced (termed reconstruction) with a material that will take the role of the ACL. This procedure is performed using knee arthroscopy, a minimally invasive technique that allows for a clear view of the  entire knee joint to treat other problems such as a torn meniscus at the same time.     ACL reconstruction can be done with the patient’s own tissue (autograft) or donated cadaveric tissue (allograft). Studies have universally shown that autograft is superior to allograft for quality of healing and reliability in ACL reconstruction. Preferred autografts that we use for ACL reconstruction include:  Patellar Tendon - The central 1/3 of the patellar tendon with 2 blocks of bone on each end is used to replace an injured ACL. The remaining ends of the patellar tendon are stitched together and it heals uneventfully. This graft is most commonly used in contact athletes aiming to return to high level sports.     Quadriceps Tendon - The central 1/3 of the quadriceps tendon with or without a single bone block from the patella (kneecap) is used to replace the ACL. The remaining ends of the quadriceps tendon are stitched together allowing for complete healing. This graft is gaining popularity, it is often used for revision (repeat) surgery as well as primary surgery if deemed to be the best option.     Hamstring Tendons - Two of the four hamstring tendons (Semitendinosis and Gracilis) are used to reconstruct the ACL. Over time, the remaining hamstring muscles grow to balance out the minor loss of hamstring strength. This graft is best for slightly lower demand athletes that will not be performing cutting / pivoting activities regularly. This graft often has the least challenging recovery and rehabilitation of the 3 options.       How long before I can play sports after an ACL reconstruction?  Recovery time is an individualized process for each patient and depends on multiple factors including additional procedures at the time of ACL reconstruction and graft selection. Successful return to sports requires fully regaining muscle strength, balance, and stamina to a level equal to or preferably better than prior to the injury.   Vu carefully prescribes a protocol for physical therapy, strength training, and a focused return to sport regimen that can be accomplished in 8-12 months after reconstruction. Along each step of the way, our team evaluates strength and progression to guide the recovery.       What are the risks of ACL surgery?   There are two primary risks associated with ACL surgery. The first risk is infection. While very uncommon, infections do occur and are typically associated with poor wound healing. As such, we recommend keeping these wounds dry for at least 2 weeks after surgery. Please do not use ointments or other compounds on these wounds until instructed to do so by the staff.  Again, smoking interferes with wound healing, so discontinuing smoking 2 weeks prior and following surgery is recommended.   Blood clots (DVT, deep vein thrombosis) occur rarely following all types of surgery. Your best bet in decreasing likelihood of a clot is to GET UP and MOVING following surgery. Moving your feet and ankles, ambulating, ranging your knee, doing leg lifts etc. all contribute to keeping the blood moving in your legs circulating. This in turn helps to prevent clotting. If you feel pain in your calf area, or note swelling there - immediately notify the office staff. A quick and painless test (ultrasound) can be arranged to see if you have a DVT. Again, these issues are rare, but if you do experience a clot, you will need to take a blood thinner (Warfarin, Coumadin) until the clot disappears.      How often do I come back to the office?  You will need to come back to the office at 1 week, 6-8 weeks, 4 months, 6-7 months, and 1 year after surgery.

## 2024-02-06 ENCOUNTER — HOSPITAL ENCOUNTER (OUTPATIENT)
Facility: HOSPITAL | Age: 44
Setting detail: HOSPITAL OUTPATIENT SURGERY
Discharge: HOME | End: 2024-02-06
Attending: ORTHOPAEDIC SURGERY | Admitting: ORTHOPAEDIC SURGERY
Payer: MEDICAID

## 2024-02-06 ENCOUNTER — APPOINTMENT (OUTPATIENT)
Dept: GENERAL RADIOLOGY | Facility: HOSPITAL | Age: 44
End: 2024-02-06
Attending: ORTHOPAEDIC SURGERY
Payer: MEDICAID

## 2024-02-06 ENCOUNTER — TELEPHONE (OUTPATIENT)
Dept: ORTHOPEDICS CLINIC | Facility: CLINIC | Age: 44
End: 2024-02-06

## 2024-02-06 ENCOUNTER — ANESTHESIA (OUTPATIENT)
Dept: SURGERY | Facility: HOSPITAL | Age: 44
End: 2024-02-06
Payer: MEDICAID

## 2024-02-06 VITALS
WEIGHT: 231 LBS | DIASTOLIC BLOOD PRESSURE: 85 MMHG | HEART RATE: 91 BPM | TEMPERATURE: 97 F | RESPIRATION RATE: 18 BRPM | HEIGHT: 65 IN | OXYGEN SATURATION: 100 % | BODY MASS INDEX: 38.49 KG/M2 | SYSTOLIC BLOOD PRESSURE: 134 MMHG

## 2024-02-06 DIAGNOSIS — S83.511A RUPTURE OF ANTERIOR CRUCIATE LIGAMENT OF RIGHT KNEE, INITIAL ENCOUNTER: Primary | ICD-10-CM

## 2024-02-06 DIAGNOSIS — Z98.890 S/P ACL RECONSTRUCTION: ICD-10-CM

## 2024-02-06 DIAGNOSIS — Z01.818 PRE-OP TESTING: Primary | ICD-10-CM

## 2024-02-06 LAB — B-HCG UR QL: NEGATIVE

## 2024-02-06 PROCEDURE — 81025 URINE PREGNANCY TEST: CPT

## 2024-02-06 PROCEDURE — 0MQN4ZZ REPAIR RIGHT KNEE BURSA AND LIGAMENT, PERCUTANEOUS ENDOSCOPIC APPROACH: ICD-10-PCS | Performed by: ORTHOPAEDIC SURGERY

## 2024-02-06 PROCEDURE — 0SBC4ZZ EXCISION OF RIGHT KNEE JOINT, PERCUTANEOUS ENDOSCOPIC APPROACH: ICD-10-PCS | Performed by: ORTHOPAEDIC SURGERY

## 2024-02-06 PROCEDURE — 76000 FLUOROSCOPY <1 HR PHYS/QHP: CPT | Performed by: ORTHOPAEDIC SURGERY

## 2024-02-06 PROCEDURE — 76942 ECHO GUIDE FOR BIOPSY: CPT | Performed by: STUDENT IN AN ORGANIZED HEALTH CARE EDUCATION/TRAINING PROGRAM

## 2024-02-06 PROCEDURE — 0MRN47Z REPLACEMENT OF RIGHT KNEE BURSA AND LIGAMENT WITH AUTOLOGOUS TISSUE SUBSTITUTE, PERCUTANEOUS ENDOSCOPIC APPROACH: ICD-10-PCS | Performed by: ORTHOPAEDIC SURGERY

## 2024-02-06 PROCEDURE — 0SQC4ZZ REPAIR RIGHT KNEE JOINT, PERCUTANEOUS ENDOSCOPIC APPROACH: ICD-10-PCS | Performed by: ORTHOPAEDIC SURGERY

## 2024-02-06 PROCEDURE — 0LBL0ZZ EXCISION OF RIGHT UPPER LEG TENDON, OPEN APPROACH: ICD-10-PCS | Performed by: ORTHOPAEDIC SURGERY

## 2024-02-06 DEVICE — IMPLANTABLE DEVICE: Type: IMPLANTABLE DEVICE | Site: KNEE | Status: FUNCTIONAL

## 2024-02-06 RX ORDER — HYDROMORPHONE HYDROCHLORIDE 1 MG/ML
0.2 INJECTION, SOLUTION INTRAMUSCULAR; INTRAVENOUS; SUBCUTANEOUS EVERY 5 MIN PRN
Status: DISCONTINUED | OUTPATIENT
Start: 2024-02-06 | End: 2024-02-06

## 2024-02-06 RX ORDER — POLYMYXIN B 500000 [USP'U]/1
INJECTION, POWDER, LYOPHILIZED, FOR SOLUTION INTRAMUSCULAR; INTRAVENOUS AS NEEDED
Status: DISCONTINUED | OUTPATIENT
Start: 2024-02-06 | End: 2024-02-06 | Stop reason: HOSPADM

## 2024-02-06 RX ORDER — HYDROCODONE BITARTRATE AND ACETAMINOPHEN 5; 325 MG/1; MG/1
1 TABLET ORAL ONCE AS NEEDED
Status: COMPLETED | OUTPATIENT
Start: 2024-02-06 | End: 2024-02-06

## 2024-02-06 RX ORDER — MIDAZOLAM HYDROCHLORIDE 1 MG/ML
INJECTION INTRAMUSCULAR; INTRAVENOUS AS NEEDED
Status: DISCONTINUED | OUTPATIENT
Start: 2024-02-06 | End: 2024-02-06 | Stop reason: SURG

## 2024-02-06 RX ORDER — SCOLOPAMINE TRANSDERMAL SYSTEM 1 MG/1
1 PATCH, EXTENDED RELEASE TRANSDERMAL ONCE
Status: DISCONTINUED | OUTPATIENT
Start: 2024-02-06 | End: 2024-02-06

## 2024-02-06 RX ORDER — PHENYLEPHRINE HCL 10 MG/ML
VIAL (ML) INJECTION AS NEEDED
Status: DISCONTINUED | OUTPATIENT
Start: 2024-02-06 | End: 2024-02-06 | Stop reason: SURG

## 2024-02-06 RX ORDER — MIDAZOLAM HYDROCHLORIDE 1 MG/ML
1 INJECTION INTRAMUSCULAR; INTRAVENOUS EVERY 5 MIN PRN
Status: DISCONTINUED | OUTPATIENT
Start: 2024-02-06 | End: 2024-02-06

## 2024-02-06 RX ORDER — SODIUM CHLORIDE, SODIUM LACTATE, POTASSIUM CHLORIDE, CALCIUM CHLORIDE 600; 310; 30; 20 MG/100ML; MG/100ML; MG/100ML; MG/100ML
INJECTION, SOLUTION INTRAVENOUS CONTINUOUS
Status: DISCONTINUED | OUTPATIENT
Start: 2024-02-06 | End: 2024-02-06

## 2024-02-06 RX ORDER — ALBUTEROL SULFATE 2.5 MG/3ML
2.5 SOLUTION RESPIRATORY (INHALATION) AS NEEDED
Status: DISCONTINUED | OUTPATIENT
Start: 2024-02-06 | End: 2024-02-06

## 2024-02-06 RX ORDER — ONDANSETRON 2 MG/ML
INJECTION INTRAMUSCULAR; INTRAVENOUS
Status: COMPLETED
Start: 2024-02-06 | End: 2024-02-06

## 2024-02-06 RX ORDER — VANCOMYCIN HYDROCHLORIDE 500 MG/10ML
INJECTION, POWDER, LYOPHILIZED, FOR SOLUTION INTRAVENOUS CONTINUOUS PRN
Status: COMPLETED | OUTPATIENT
Start: 2024-02-06 | End: 2024-02-06

## 2024-02-06 RX ORDER — METOCLOPRAMIDE HYDROCHLORIDE 5 MG/ML
INJECTION INTRAMUSCULAR; INTRAVENOUS AS NEEDED
Status: DISCONTINUED | OUTPATIENT
Start: 2024-02-06 | End: 2024-02-06 | Stop reason: SURG

## 2024-02-06 RX ORDER — LABETALOL HYDROCHLORIDE 5 MG/ML
5 INJECTION, SOLUTION INTRAVENOUS EVERY 5 MIN PRN
Status: DISCONTINUED | OUTPATIENT
Start: 2024-02-06 | End: 2024-02-06

## 2024-02-06 RX ORDER — MEPERIDINE HYDROCHLORIDE 25 MG/ML
12.5 INJECTION INTRAMUSCULAR; INTRAVENOUS; SUBCUTANEOUS AS NEEDED
Status: DISCONTINUED | OUTPATIENT
Start: 2024-02-06 | End: 2024-02-06

## 2024-02-06 RX ORDER — ACETAMINOPHEN 500 MG
1000 TABLET ORAL ONCE AS NEEDED
Status: COMPLETED | OUTPATIENT
Start: 2024-02-06 | End: 2024-02-06

## 2024-02-06 RX ORDER — ACETAMINOPHEN 500 MG
1000 TABLET ORAL ONCE
Status: DISCONTINUED | OUTPATIENT
Start: 2024-02-06 | End: 2024-02-06 | Stop reason: HOSPADM

## 2024-02-06 RX ORDER — ONDANSETRON 2 MG/ML
INJECTION INTRAMUSCULAR; INTRAVENOUS AS NEEDED
Status: DISCONTINUED | OUTPATIENT
Start: 2024-02-06 | End: 2024-02-06 | Stop reason: SURG

## 2024-02-06 RX ORDER — CEFAZOLIN SODIUM/WATER 2 G/20 ML
2 SYRINGE (ML) INTRAVENOUS ONCE
Status: COMPLETED | OUTPATIENT
Start: 2024-02-06 | End: 2024-02-06

## 2024-02-06 RX ORDER — HYDROMORPHONE HYDROCHLORIDE 1 MG/ML
0.4 INJECTION, SOLUTION INTRAMUSCULAR; INTRAVENOUS; SUBCUTANEOUS EVERY 5 MIN PRN
Status: DISCONTINUED | OUTPATIENT
Start: 2024-02-06 | End: 2024-02-06

## 2024-02-06 RX ORDER — LIDOCAINE HYDROCHLORIDE 10 MG/ML
INJECTION, SOLUTION EPIDURAL; INFILTRATION; INTRACAUDAL; PERINEURAL AS NEEDED
Status: DISCONTINUED | OUTPATIENT
Start: 2024-02-06 | End: 2024-02-06 | Stop reason: SURG

## 2024-02-06 RX ORDER — RUFINAMIDE 40 MG/ML
1 SUSPENSION ORAL DAILY
COMMUNITY

## 2024-02-06 RX ORDER — HYDROMORPHONE HYDROCHLORIDE 1 MG/ML
0.6 INJECTION, SOLUTION INTRAMUSCULAR; INTRAVENOUS; SUBCUTANEOUS EVERY 5 MIN PRN
Status: DISCONTINUED | OUTPATIENT
Start: 2024-02-06 | End: 2024-02-06

## 2024-02-06 RX ORDER — PROCHLORPERAZINE EDISYLATE 5 MG/ML
5 INJECTION INTRAMUSCULAR; INTRAVENOUS EVERY 8 HOURS PRN
Status: DISCONTINUED | OUTPATIENT
Start: 2024-02-06 | End: 2024-02-06

## 2024-02-06 RX ORDER — HYDROMORPHONE HYDROCHLORIDE 1 MG/ML
INJECTION, SOLUTION INTRAMUSCULAR; INTRAVENOUS; SUBCUTANEOUS
Status: COMPLETED
Start: 2024-02-06 | End: 2024-02-06

## 2024-02-06 RX ORDER — DEXAMETHASONE SODIUM PHOSPHATE 4 MG/ML
VIAL (ML) INJECTION AS NEEDED
Status: DISCONTINUED | OUTPATIENT
Start: 2024-02-06 | End: 2024-02-06 | Stop reason: SURG

## 2024-02-06 RX ORDER — TRANEXAMIC ACID 10 MG/ML
1000 INJECTION, SOLUTION INTRAVENOUS ONCE
Status: COMPLETED | OUTPATIENT
Start: 2024-02-06 | End: 2024-02-06

## 2024-02-06 RX ORDER — NALOXONE HYDROCHLORIDE 0.4 MG/ML
80 INJECTION, SOLUTION INTRAMUSCULAR; INTRAVENOUS; SUBCUTANEOUS AS NEEDED
Status: DISCONTINUED | OUTPATIENT
Start: 2024-02-06 | End: 2024-02-06

## 2024-02-06 RX ORDER — ONDANSETRON 2 MG/ML
4 INJECTION INTRAMUSCULAR; INTRAVENOUS EVERY 6 HOURS PRN
Status: DISCONTINUED | OUTPATIENT
Start: 2024-02-06 | End: 2024-02-06

## 2024-02-06 RX ORDER — KETAMINE HYDROCHLORIDE 50 MG/ML
INJECTION, SOLUTION INTRAMUSCULAR; INTRAVENOUS AS NEEDED
Status: DISCONTINUED | OUTPATIENT
Start: 2024-02-06 | End: 2024-02-06 | Stop reason: SURG

## 2024-02-06 RX ORDER — CEFAZOLIN SODIUM/WATER 2 G/20 ML
SYRINGE (ML) INTRAVENOUS
Status: DISCONTINUED
Start: 2024-02-06 | End: 2024-02-06

## 2024-02-06 RX ORDER — HYDROCODONE BITARTRATE AND ACETAMINOPHEN 5; 325 MG/1; MG/1
2 TABLET ORAL ONCE AS NEEDED
Status: COMPLETED | OUTPATIENT
Start: 2024-02-06 | End: 2024-02-06

## 2024-02-06 RX ORDER — KETOROLAC TROMETHAMINE 30 MG/ML
INJECTION, SOLUTION INTRAMUSCULAR; INTRAVENOUS AS NEEDED
Status: DISCONTINUED | OUTPATIENT
Start: 2024-02-06 | End: 2024-02-06 | Stop reason: SURG

## 2024-02-06 RX ADMIN — LIDOCAINE HYDROCHLORIDE 50 MG: 10 INJECTION, SOLUTION EPIDURAL; INFILTRATION; INTRACAUDAL; PERINEURAL at 09:20:00

## 2024-02-06 RX ADMIN — ONDANSETRON 4 MG: 2 INJECTION INTRAMUSCULAR; INTRAVENOUS at 11:15:00

## 2024-02-06 RX ADMIN — CEFAZOLIN SODIUM/WATER 2 G: 2 G/20 ML SYRINGE (ML) INTRAVENOUS at 09:35:00

## 2024-02-06 RX ADMIN — KETAMINE HYDROCHLORIDE 50 MG: 50 INJECTION, SOLUTION INTRAMUSCULAR; INTRAVENOUS at 10:30:00

## 2024-02-06 RX ADMIN — KETOROLAC TROMETHAMINE 30 MG: 30 INJECTION, SOLUTION INTRAMUSCULAR; INTRAVENOUS at 11:30:00

## 2024-02-06 RX ADMIN — PHENYLEPHRINE HCL 50 MCG: 10 MG/ML VIAL (ML) INJECTION at 09:40:00

## 2024-02-06 RX ADMIN — METOCLOPRAMIDE HYDROCHLORIDE 10 MG: 5 INJECTION INTRAMUSCULAR; INTRAVENOUS at 09:20:00

## 2024-02-06 RX ADMIN — DEXAMETHASONE SODIUM PHOSPHATE 4 MG: 4 MG/ML VIAL (ML) INJECTION at 09:20:00

## 2024-02-06 RX ADMIN — MIDAZOLAM HYDROCHLORIDE 1 MG: 1 INJECTION INTRAMUSCULAR; INTRAVENOUS at 09:15:00

## 2024-02-06 RX ADMIN — MIDAZOLAM HYDROCHLORIDE 1 MG: 1 INJECTION INTRAMUSCULAR; INTRAVENOUS at 09:10:00

## 2024-02-06 RX ADMIN — SODIUM CHLORIDE, SODIUM LACTATE, POTASSIUM CHLORIDE, CALCIUM CHLORIDE: 600; 310; 30; 20 INJECTION, SOLUTION INTRAVENOUS at 09:10:00

## 2024-02-06 RX ADMIN — TRANEXAMIC ACID 1000 MG: 10 INJECTION, SOLUTION INTRAVENOUS at 09:30:00

## 2024-02-06 RX ADMIN — PHENYLEPHRINE HCL 50 MCG: 10 MG/ML VIAL (ML) INJECTION at 09:45:00

## 2024-02-06 NOTE — ANESTHESIA PROCEDURE NOTES
[facility]    Date/Time: 2/6/2024 9:21 AM    Performed by: Ata Gonzalez MD  Authorized by: Ata Gonzalez MD      General Information and Staff    Start Time:  2/6/2024 9:21 AM  End Time:  2/6/2024 9:25 AM  Anesthesiologist:  Ata Gonzalez MD  Performed by:  Anesthesiologist  Patient Location:  OR    Block Placement: Post Induction  Site Identification: real time ultrasound guided and image stored and retrievable    Block site/laterality marked before start: site marked  Reason for Block: at surgeon's request and post-op pain management    Preanesthetic Checklist: 2 patient identifers, IV checked, site marked, risks and benefits discussed, monitors and equipment checked, pre-op evaluation, timeout performed, anesthesia consent, sterile technique used, no prohibitive neurological deficits and no local skin infection at insertion site      Procedure Details    Patient Position:  Supine  Prep: ChloraPrep    Monitoring:  Cardiac monitor, continuous pulse ox and blood pressure cuff  Block Type:  Adductor canal  Laterality:  Right  Injection Technique:  Single-shot    Needle    Needle Type:  Short-bevel and echogenic  Needle Gauge:  21 G  Needle Length:  100 mm  Needle Localization:  Ultrasound guidance  Reason for Ultrasound Use: appropriate spread of the medication was noted in real time and no ultrasound evidence of intravascular and/or intraneural injection            Assessment    Injection Assessment:  Good spread noted, negative resistance, negative aspiration for heme, incremental injection, low pressure, local visualized surrounding nerve on ultrasound and no pain on injection  Paresthesia Pain:  None  Heart Rate Change: No    - Patient tolerated block procedure well without evidence of immediate block related complications.     Medications  2/6/2024 9:21 AM      Additional Comments    Medication:  Ropivacaine 0.5% 20 mL, PF Dexamethasone 2 mg

## 2024-02-06 NOTE — ANESTHESIA POSTPROCEDURE EVALUATION
[facility]    [facility]:  [facility]   Age/Gender 43 year old female MRN [id number]   Location [facility] POST ANESTHESIA CARE UNIT Attending Beverly Womack MD   Hosp Day # 0 PCP Kasey Calderon MD       Anesthesia Post-op Note    RIGHT KNEE ARTHROSCOPY WITH ANTERIOR CRUCIATE LIGAMENT RECONSTRUCTION, HAMSTRING AUTOGRAFT, LATERAL MENISCUS REPAIR, AND SYNOVECTOMY AND MCL REPAIR    Procedure Summary       Date: 02/06/24 Room / Location:  MAIN OR [address] [place] [address] MAIN OR    Anesthesia Start: 0910 Anesthesia Stop: 1212    Procedure: RIGHT KNEE ARTHROSCOPY WITH ANTERIOR CRUCIATE LIGAMENT RECONSTRUCTION, HAMSTRING AUTOGRAFT, LATERAL MENISCUS REPAIR, AND SYNOVECTOMY AND MCL REPAIR (Right) Diagnosis:       Rupture of anterior cruciate ligament of right knee, initial encounter      (Rupture of anterior cruciate ligament of right knee, initial encounter [S83.511A])    Surgeons: Beverly Womack MD Anesthesiologist: Ata Gonzalez MD    Anesthesia Type: general ASA Status: 2            Anesthesia Type: general    Vitals Value Taken Time   /84 02/06/24 1215   Temp 97.8 °F (36.6 °C) 02/06/24 1215   Pulse 84 02/06/24 1215   Resp 14 02/06/24 1215   SpO2 95 % 02/06/24 1215   Vitals shown include unfiled device data.    Patient Location: PACU    Anesthesia Type: general    Airway Patency: patent and extubated    Postop Pain Control: adequate    Mental Status: mildly sedated but able to meaningfully participate in the post-anesthesia evaluation    Nausea/Vomiting: none    Cardiopulmonary/Hydration status: stable euvolemic    Complications: no apparent anesthesia related complications    Postop vital signs: stable    Dental Exam: Unchanged from Preop    Patient to be discharged from PACU when criteria met.

## 2024-02-06 NOTE — ANESTHESIA PREPROCEDURE EVALUATION
PRE-OP EVALUATION    Patient Name: Sandee Eddy    Admit Diagnosis: Rupture of anterior cruciate ligament of right knee, initial encounter [S83.511A]    Pre-op Diagnosis: Rupture of anterior cruciate ligament of right knee, initial encounter [S83.983A]    RIGHT KNEE ARTHROSCOPY ANTERIOR CRUCIATE LIGAMENT RECONSTRUCTION HAMSTRING AUTOGRAFT MEDIAL MENISCUS REPAIR SYNOVECTOMY    Anesthesia Procedure: RIGHT KNEE ARTHROSCOPY ANTERIOR CRUCIATE LIGAMENT RECONSTRUCTION HAMSTRING AUTOGRAFT MEDIAL MENISCUS REPAIR SYNOVECTOMY (Right)    Surgeon(s) and Role:     * Beverly Womack MD - Primary    Pre-op vitals reviewed.  Temp: 98.2 °F (36.8 °C)  Pulse: 100  Resp: 16  BP: 121/81  SpO2: 100 %  Body mass index is 38.44 kg/m².    Current medications reviewed.  Hospital Medications:   acetaminophen (Tylenol Extra Strength) tab 1,000 mg  1,000 mg Oral Once    scopolamine (Transderm-Scop) 1 MG/3DAYS patch 1 patch  1 patch Transdermal Once    lactated ringers infusion   Intravenous Continuous    ceFAZolin (Ancef) 2 g in 20mL IV syringe premix  2 g Intravenous Once    tranexamic acid in sodium chloride 0.7% (Cyklokapron) 1000 mg/100mL infusion premix 1,000 mg  1,000 mg Intravenous Once    ceFAZolin (Ancef) 2 g/20mL IV syringe premix           Outpatient Medications:     Medications Prior to Admission   Medication Sig Dispense Refill Last Dose    vitamin E 200 UNITS Oral Cap Take 1 capsule (200 Units total) by mouth daily.   1/28/2024    Cholecalciferol (VITAMIN D3) 25 MCG (1000 UT) Oral Cap Take 1 tablet by mouth daily.   1/28/2024    multivitamin Oral Chew Tab Chew 1 tablet by mouth daily.   1/28/2024    Phentermine HCl 37.5 MG Oral Tab Take 1 tablet (37.5 mg total) by mouth every morning before breakfast. 30 tablet 2 1/28/2024    ibuprofen 600 MG Oral Tab Take 1 tablet (600 mg total) by mouth every 6 hours with food 20 tablet 0 1/28/2024       Allergies: Ampicillin and Penicillins      Anesthesia Evaluation    Patient summary  reviewed.    Anesthetic Complications  (+) history of anesthetic complications  History of: PONV       GI/Hepatic/Renal    Negative GI/hepatic/renal ROS.                             Cardiovascular        Exercise tolerance: good     MET: >4    (+) obesity                                       Endo/Other    Negative endo/other ROS.                              Pulmonary                    (+) sleep apnea and no treatment      Neuro/Psych    Negative neuro/psych ROS.                                  Past Surgical History:   Procedure Laterality Date    OTHER SURGICAL HISTORY      RT LEG SURGERY- PINS PLACED AND REMOVED 2-3 times     Social History     Socioeconomic History    Marital status:    Tobacco Use    Smoking status: Never    Smokeless tobacco: Never   Vaping Use    Vaping Use: Never used   Substance and Sexual Activity    Alcohol use: Not Currently     Comment: OCC    Drug use: No     History   Drug Use No     Available pre-op labs reviewed.     Lab Results   Component Value Date     01/25/2024    K 4.1 01/25/2024     01/25/2024    CO2 28.0 01/25/2024            Airway      Mallampati: III  Mouth opening: >3 FB  TM distance: > 6 cm  Neck ROM: full Cardiovascular    Cardiovascular exam normal.  Rhythm: regular  Rate: normal     Dental    Dentition appears grossly intact         Pulmonary    Pulmonary exam normal.  Breath sounds clear to auscultation bilaterally.               Other findings              ASA: 2   Plan: general  NPO status verified and patient meets guidelines.  Patient has not taken beta blockers in last 24 hours.  Post-procedure pain management plan discussed with surgeon and patient.    Comment: I spoke with the patient and discussed the risks of general anesthesia, which include nausea and vomiting; sore throat; injury to the lips, gums, teeth, and eyes; cardiac, pulmonary, and neurologic events; aspiration; and allergic reactions. The patient understands these risks and  consents to receiving general anesthesia for this procedure.  Plan/risks discussed with: patient                Present on Admission:  **None**

## 2024-02-06 NOTE — OPERATIVE REPORT
OPERATIVE REPORT  ATTENDING SURGEON: JOHN CARDOSO MD  ASSISTANT(S): Esteban Ríos (AJ) & Sonja Donnelly PA-C  STATEMENT OF MEDICAL NECESSITY  The aid of assistant Esteban Donnelly PA-C (AJ) was needed for patient positioning, preparation, drape, retraction,  wound closure, dressing application and critical portions of the procedure.     PRELIMINARY DIAGNOSIS:  1.  Right Anterior Cruciate Ligament Rupture  2.  Right Lateral Meniscus Tear   3.  Right medial collateral ligament rupture     POSTOPERATIVE DIAGNOSIS:  1.  Right Anterior Cruciate Ligament Rupture  2.  Right Knee Intra-articular Synovitis  3.  Right Lateral Meniscus Tear   4.  Right medial collateral ligament rupture    THE OPERATION:  1.  Right Arthroscopically Aided Anterior Cruciate Ligament Reconstruction with Hamstring Autograft (51298)  2.  Right Knee Arthroscopy with Major Synovectomy (Medial / patellofemoral / lateral compartments) (88121)  3.  Right Knee Arthroscopic Lateral Meniscus Repair (35174)   4.  Right medial collateral ligament repair    Modifier 22: Patient had body mass index of 38.4 as well as a complex constellation of injuries including a complete radial lateral meniscus root tear which added approximately 35% additional surgical time coupled with navigating the patient's anatomy.  This merited additional complexity.    ANESTHESIA: GA + Regional Block  ANESTHESIOLOGIST:  MD Carlos  ANTIBIOTICS: Cefazolin 2g within 60 minutes of surgical incision.    IMPLANTS:   Implant Name Type Inv. Item Serial No.  Lot No. LRB No. Used Action   IMPLANT FIX W/ FIBERTAPE FOR INTERNALBRACE - SN/A  IMPLANT FIX W/ FIBERTAPE FOR INTERNALBRACE N/A [employer] WD [id number] Right 1 Implanted   ANCHOR SUT 4.75X19.1MM PEEK 2ND FIX SWIVELOCK - SN/A  ANCHOR SUT 4.75X19.1MM PEEK 2ND FIX SWIVELOCK N/A [employer] WD [id number] Right 1 Implanted   SCREW INTFR PEEK-OPTM 25MM 11 - SN/A  SCREW INTFR PEEK-OPTM 25MM 11 N/A Terrance Whitney  [employer] WD [id number] Right 1 Implanted   ANCHOR SUT L19.1MM DIA4.75MM PEEK FT KNOTLESS - SN/A  ANCHOR SUT L19.1MM DIA4.75MM PEEK FT KNOTLESS N/A [employer]  [id number] Right 1 Implanted     PATHOLOGY/CULTURES: None  ESTIMATED BLOOD LOSS: No data recorded    COMPLICATIONS: No intraoperative nor immediate postoperative complications noted.  COUNTS: All sponge and needle counts were correct at the conclusion of the procedure.  TOURNIQUET TIME: 10 Minutes  OPERATIVE FINDINGS:  Evidence of full-thickness anterior cruciate ligament rupture successfully reconstructed with dual hamstring tendon autograft ACL reconstruction with 10.5 mm graft additionally augmented with internal brace.  Excellent stability achieved with button fixation on the femur as well as swivel lock and backup 11 mm x 25 mm screw station on the tibia.  Additional complete radial lateral meniscus tear identified near the lateral meniscus root managed with root repair construct with additional accessory tunnel that was fixated onto Vu lock anchor on the tibia.  Additional sutures from the state swivel locks on the anteromedial tibia were passed through the medial collateral ligament to augment and repair the known medial laterally and the tear to add additional stability.  Successful lateral meniscus repair, adequate synovectomy ACL reconstruction.  Adequate additional stability to medial collateral ligament at conclusion of procedure.    Indications:  Sandee Eddy is a 43 year old female with right anterior cruciate ligament rupture, medial collateral ligament tear, and complex medial meniscus tear sustained 12/30/23 after a skiing accident.  To successfully achieve goals of return to functional activity and avoiding future knee instability events, I advocate for anterior cruciate ligament reconstruction. Graft recommendation of Hamstring (quadrupled Semitendinosis and Gracilis) autograft.  She will additionally undergo meniscus repair.      Operative and nonoperative options were discussed with her in my office and we ultimately agreed that surgery would provide the highest likelihood of symptomatic relief and functional improvement.  The risks and benefits of surgery as well as the postoperative rehabilitation plan were reviewed. She voiced a good understanding of treatment options, risks and benefits, and alternatives to surgery. We discuss in detail the anticipated rehabilitation timeline and commitment to physical therapy that was required to attain a satisfactory result. She was given the opportunity to ask questions, which were all answered to the best of my ability and to her satisfaction. Sandee wished to proceed.   On the day of surgery, the Sandee was seen in the preoperative area.  I confirmed her identity by name and birthday. We reviewed and confirmed the surgical consent including laterality.  The surgical site was marked with my initials.  We re-reviewed the risks and benefits of the procedure and I answered all additional questions to her satisfaction.      OPERATIVE REPORT:   Sandee was taken to the operating room in stable condition.    The patient was positioned in the supine position. They subsequently underwent standard prep and drape. A nonsterile tourniquet was applied to the operative limb. An adjustable lateral post was utilized along with foot positioner at end of bed to allow for flexion. A preoperative timeout was performed confirming the correct surgical site, procedure, and preoperative imaging was reviewed.      Exam under anesthesia demonstrated a pivot shift and 2B Lachman's.  There was evidence of additional instability to valgus stress testing which was most pronounced at 30 degrees of flexion.  There was no posterior instability.      After time out, the procedure commenced with Hamstring Clarita. Tourniquet was used only during graft harvest for a total of 10 minutes. A longitudinal incision was made over the pes  tendons.  Incision was carried down thorugh skin with a scalpel and then though subcutaneous tissue with bovie electro-cautery to ensure hemostasis.  The sartorius fascia was identified and incised longitudinally using a fresh #15 blade and pickups followed by Naoma scissors proximally.  Particular care was taken not to injure the underlying semitendinosus or gracilis tendons, which were subsequently localized and isolated with penrose retraction.  Soft tissue bands were released from the hamstring tendons proximally. Finger palpation confirmed adequate soft tissue release.  A tendon stripper was used in a standard fashion to harvest the semitendinosus and gracilis tendons proximally from its muscle-tendon junction.       Diagnostic knee arthroscopy was performed with standard anterolateral visualization portal was made with a #15 blade and a 4.0 mm arthroscope was introduced.     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   Gutters Normal appearance without   Lateral compartment Grade 2 tibial cartilage  Grade 2 femoral cartilage  Evidence of complete radial lateral meniscus tear at the zone F margin although not completely at the level of the root ultimately managed with lateral meniscus root repair with an additional accessory tunnel.  This was done by passing 2 suture tape lengths through the lateral meniscus.  Drilling and accessory tunnel at the location of the tear and shuttling this through the tibia.  This was ultimately fixed into an independent with a lock on the anteromedial tibia.   Medial compartment Grade 1 tibial cartilage  Grade 1 femoral cartilage  Intact medial meniscus  No pathologic plica, evidence of gapping of the medial compartment suggestive of medial collateral laxity and injury    Ligaments ACL torn   PCL intact  Popliteus intact    Other Inflamed synovium and pathologic tissue carefully debrided and excised with the aid of 4.0 mm curved mechanical shaver and Coblation to maintain hemostasis.  This was done on medial, patellofemoral and lateral compartments.          The intercondylar notch was identified and the remnant of the torn anterior cruciate ligament itself was then debrided using a 5.5mm arthroscopic shaver. Bony anatomy of the medial wall of the lateral femoral condyle was then debrided using the shaver and electrocautery. A notchplasty was performed in order to obtain the optimize the space to place the anterior cruciate ligament graft. The femoral tunnel entry point was marked with an offset guide.       The femoral socket was drilled by placing a flexible guidepin thru the anteromedial portal that was inserted in the appropriate position that it been previously marked.  The flexible guidewire was bent away from the medial femoral condyle. The knee was placed in a slight hyperflexion position. This was then drilled with a Size: 10.5 mm flexible reamer. The femoral socket was drilled to 25mm depth in order to accommodate and recess the hamstring tendon autograft.  A 4.5 mm reamer was also additionally utilized to facilitate inner diameter drilling for flipping of the button.  With both femoral and tibial tunnels established, a #2 Orthocord passing suture was placed to facilitate graft shuttling. The posterior wall was visualized and demonstrated the back wall of the femoral tunnel was intact. A Yankauer suction tip was used to extract bone debris and ensure an intact femoral tunnel. Shaver was introduced to remove any excess bony debris in the posterior aspect of the knee.     The tibial footprint of the ACL was identified a the posterior extent of the anterior horn of the lateral meniscus and approximately 7 mm anterior to the PCL. A Size: 10 mm tibial tunnel was established  using an ACL guide set to 55 degrees. The anteromedial bundle of the native ACL was preserved and upon passage of the guidewire it was noted to parallel these fibers. This was then augmented with a dilator to a size Size: 11 mm sequentially by 0.5 mm.     At this time, the sutures for the lateral meniscus repair were shuttled through an independent tunnel for successful fixation onto the anteromedial tibia for lateral meniscus repair.      The hamstring tendon autograft was passed using #2 Orthocord through the tibial tunnel and set it into the femoral tunnel.     The button was carefully visualized into the femoral tunnel and flipped on the cortex.  A mini C arm was introduced into the field to confirm appropriate positioning of the femoral button.  At this time the hamstring autograft was carefully shuttled into the femoral tunnel utilizing the mechanism of the button.  This was brought to the approximate 20 mm stu but not fully tensioned.    The leg was brought into full extension and shown to not have any notch impingement.  The arthroscope was removed. The knee was then cycled in order to pretension the graft.  A Lucy boot was applied to the limb and allowed to tension to 20 pounds force.  Multiple cycling attempts were done to remove any creep from the graft.      Once this was done, we then reduced the knee in terminal extension, with slight hyperextension and then set the tibial side using a 11x25 mm PEEK smith and nephew tibial screw. There was a 1A Lachman at conclusion of fixation.  Additional supplementary backup fixation was provided with a 4.75 mm swivel lock anchor on the tibial shaft.  Proximally the femoral end of the graft was retensioned and tied with 5 sequential knots to minimize any slippage.  These were then carefully cut.    The stay suture on the anteromedial tibia swivel lock anchors which was a #2 FiberWire was then carefully passed through the medial collateral ligament with a free  needle and multiple passes were made ultimately leading to fixation and repair of the medial collateral ligament with additional tensioning to valgus stress appreciable at conclusion of the procedure.      The wound itself was then thoroughly irrigated using antibiotic saline (dilute Polymyxin B and Vancomycin). The tibial tunnel footprint area was additionally closed with interrupted figure-of-eight 0 Vicryl stitches. The deep subcutaneous layer was then closed using 0 Monocryl. This was then followed by 2-0 Monocryl for the subcutaneous and skin followed by 3-0 Monocryl subcuticular. Portal sites from the arthroscopy case were then closed using 3-0 Moncryl. Dermabond and steri strips were applied. A compression dressing was then applied and Sandee was placed in a knee brace locked in extension.       The final count prior to closure was correct. The patient tolerated the procedure well without complications.     Sandee was taken to the recovery room in a stable condition with plan for ambulatory discharge to home. She was provided my postoperative discharge booklet, appropriate home exercises, script for outpatient physical therapy, and a copy of my rehabilitation guidelines.      POST OPERATIVE PLAN:  Activity Precautions: Toe-touch weightbearing x 2 weeks, no range of motion restrictions. Crutches to be weaned by Physical Therapy.   DVT Prophylaxis: Aspirin 81 mg daily during the toe-touch weightbearing portion.  Follow-up Visit: POD # 7-14      Beverly Womack MD  Knee, Shoulder, & Elbow Surgery / Sports Medicine Specialist  Orthopaedic Surgery  [address] [place] [address] [id number] [place] [email or web address]  [email or web address]  t: [phone]  o: [phone]  f: [phone]    This note was dictated using Dragon software.  While it was briefly proofread prior to completion, some grammatical, spelling, and word choice errors due to dictation may still occur.

## 2024-02-06 NOTE — ANESTHESIA PROCEDURE NOTES
Airway  Date/Time: 2/6/2024 9:21 AM  Urgency: elective    Airway not difficult    General Information and Staff    Patient location during procedure: OR  Anesthesiologist: Ata Gonzalez MD  Performed: anesthesiologist   Performed by: Ata Gonzalez MD  Authorized by: Ata Gonzalez MD      Indications and Patient Condition  Indications for airway management: anesthesia  Sedation level: deep  Preoxygenated: yes  Patient position: sniffing  Mask difficulty assessment: 1 - vent by mask    Final Airway Details  Final airway type: supraglottic airway      Successful airway: classic  Size 4       Number of attempts at approach: 1  Number of other approaches attempted: 0

## 2024-02-07 ENCOUNTER — OFFICE VISIT (OUTPATIENT)
Dept: PHYSICAL THERAPY | Age: 44
End: 2024-02-07
Attending: ORTHOPAEDIC SURGERY
Payer: MEDICAID

## 2024-02-07 PROCEDURE — 97016 VASOPNEUMATIC DEVICE THERAPY: CPT | Performed by: PHYSICAL THERAPIST

## 2024-02-07 PROCEDURE — 97110 THERAPEUTIC EXERCISES: CPT | Performed by: PHYSICAL THERAPIST

## 2024-02-07 PROCEDURE — 97161 PT EVAL LOW COMPLEX 20 MIN: CPT | Performed by: PHYSICAL THERAPIST

## 2024-02-07 NOTE — PROGRESS NOTES
POST-OP KNEE EVALUATION:     Diagnosis:   RIGHT KNEE ARTHROSCOPY WITH ANTERIOR CRUCIATE LIGAMENT RECONSTRUCTION, HAMSTRING AUTOGRAFT, LATERAL MENISCUS REPAIR, AND SYNOVECTOMY AND MCL REPAIR       Referring Provider: Vu  Date of Evaluation:    2/7/2024    Precautions:  Toe-touch weightbearing x 2 weeks, no range of motion restrictions. Crutches to be weaned by Physical Therapy.  Next MD visit:   2/12/24  Date of Surgery: 2/6/24     PATIENT SUMMARY   Sandee Eddy is a 43 year old female who presents to therapy today s/p (R) knee ACL reconstruction (hamstring autograft), lateral meniscus repair and MCL repair on 2/6/24 with complaints of post operative knee pain and swelling.She initially injured her knee at the end of 2023 during a skiing accident where she hit a patch of ice and fell. He did few visits of PT prior to surgery.     Pt describes pain level current 0/10, at best 0/10, at worst 5/10.   Current functional limitations include prolonged walking, walking over uneven surfaces, hiking, stair negotiation, driving, squatting, stooping, don/doff shoes and socks, lifting > 50 lbs from floor to waist, biking, floor to stand transfer, and sit to stand transfers.    Sandee describes prior level of function not restricted to ADL's, lifting ~50 lbs from floor, able to hike and skii. Pt goals include return to normal ADL and hiking.    Past medical history was reviewed with Sandee. Significant findings include  has a past medical history of motion sickness, Obesity (BMI 35.0-39.9 without comorbidity) (06/29/2021), HERRERA (obstructive sleep apnea) (02/02/2023), PONV (postoperative nausea and vomiting), and Sleep apnea, previous LE/foot surgery.         ASSESSMENT  Sandee presents to physical therapy evaluation with primary c/o (R) post operative knee pain and swelling. The results of the objective tests and measures show impairments in A/PROM, soft tissue restrictions, strength, edema, weight bearing, gait, body  mechanics, and balance.  Functional deficits include but are not limited to prolonged walking, walking over uneven surfaces, hiking, stair negotiation, driving, squatting, stooping, don/doff shoes and socks, lifting > 50 lbs from floor to waist, biking, floor to stand transfer, and sit to stand transfer.  Signs and symptoms are consistent with diagnosis of a/p (R) ACL reconstruction, MCL repair and lateral meniscus repair. Pt and PT discussed evaluation findings, pathology, POC and HEP.  Pt voiced understanding and performs HEP correctly without reported pain. Skilled Physical Therapy is medically necessary to address the above impairments and reach functional goals.     OBJECTIVE:   Observation/Skin: mild ecchymosis and 4 Tegaderm dressings donned  Palpation: Global TTP of (R) knee, not calf tenderness  Edema: at knee jt line R: mod edema, L WNL  Sensation: WNL    AROM: (* denotes performed with pain)   Knee    Flexion: R 57; L 132   Extension: R -10; L +8     PROM: (* denotes performed with pain)   Knee    Flexion: R 80; L 135   Extension: R -7; L +8     Patellar Mobility/Accessory motion: Deferred    Flexibility:   Hamstrings: R ++; L -  Gastroc-soleus: R +; L -  Quads: R ++; L -    Strength/MMT: (* denotes performed with pain)  Hip Knee   Flexion: R 4-/5; L 5/5  Extension: R 4-/5; L 5/5  Abduction: R 4-/5; L 4/5  ER: R NT; L 4/5 Flexion: R NT; L 5/5  Extension: R NT; L 5/5        Balance: deferred     Gait: pt ambulates on level ground with assistive device of 2 crutches, hinge brace donned and locked in full extension and NWB .     Today’s Treatment and Response:   Pt education was provided on exam findings, treatment diagnosis, treatment plan, expectations, and prognosis. Pt was also provided recommendations for activity modifications, possible soreness after evaluation, modalities as needed [ice/heat], and importance of remaining active. Patient was instructed in and issued a HEP for: Access Code:  [email or web address]  URL: [email or web address]  Date: 02/07/2024  Prepared by: Batsheva Beckwith    Exercises (35 min)  - Supine Single Leg Ankle Pumps  - 3 x daily - 7 x weekly - 3 sets - 10 reps  - Long Sitting Calf Stretch with Strap  - 3 x daily - 7 x weekly - 3 sets - 30 seconds hold  - Supine Heel Slide with Strap  - 3 x daily - 7 x weekly - 3 sets - 10 reps - 5 seconds hold  - Seated Knee Extension Stretch with Chair  - 1 x daily - 7 x weekly - 3 sets - 10 reps  - Supine Quad Set  - 3 x daily - 7 x weekly - 10 reps - 10 seconds hold  - Supine Short Arc Quad  - 1 x daily - 7 x weekly - 3 sets - 10 reps  - Supine Straight Leg Raises  - 2 x daily - 7 x weekly - 3 sets - 10 reps  - Sidelying Hip Abduction  - 2 x daily - 7 x weekly - 3 sets - 10 reps  - Seated Long Arc Quad  - 1 x daily - 7 x weekly - 3 sets - 10 reps  - Seated Knee Flexion AAROM  - 1 x daily - 7 x weekly - 3 sets - 10 reps      +Provided mod-min assist w/ SLR  +Performed quad sets w/ Russian stim 10 sec on/0ff x5 min  +prone leg Ext 2x10      Charges: PT Eval Low Complexity, TherEx: 2 units, Vasopneumatic device x 1 unit     Total Timed Treatment: 35 min     Total Treatment Time: 65 min     PLAN OF CARE:    Goals: (to be met in 6 weeks post op ~12 visits)   The patient will regain A/PROM knee flexion >135 degrees and 0 degrees of knee extension (8 visits)  The patinet will present without pain or limiting swelling after 8 visits  The patient will demonstrate a full quadricep contraction and SLR without extension lag (8 visits)  The patient will ambulate without visual deficiencies and ability to ambulate w/o hinged brace >3,000 steps/day (10 visits)  The patient will demonstrate ability to ascend stairs with good alignment and control (12 visits)       Goals: ((to be met before but no later then 4 months post op)   Pt will improve quad strength to 5/5 to perform 10 full SL squats with good control and alignment.  Pt will increase hip and knee strength  to grossly 5/5 to be able to perform agility training with good control and allignment  Pt will demonstrate increased hip ER/ABD strength to 5/5 to perform stepping, lunging and squatting and jumping activities without excessive femoral IR/ADD   Pt will demonstrate good control and mechanics with squatting and lunging activities in a full tx session with no cuing from therapist to facilitate return to lifting and hiking safely  Pt will be independent and compliant with comprehensive HEP to maintain progress achieved in PT       Frequency / Duration: Patient will be seen for 2 x/week or a total of 24 visits over a 90 day period.  Treatment will include: Gait training, Manual Therapy, Neuromuscular Re-education, Therapeutic Activities, Therapeutic Exercise, Home Exercise Program instruction, and Modalities to include: Electrical stimulation (unattended), vasopneumatic device    Education or treatment limitation: None  Rehab Potential:good    LEFS Score  LEFS Score: 15 % (2/7/2024  5:05 PM)      Patient/Family/Caregiver was advised of these findings, precautions, and treatment options and has agreed to actively participate in planning and for this course of care.    Thank you for your referral. Please co-sign or sign and return this letter via fax as soon as possible to [phone]. If you have any questions, please contact me at Dept: [phone]    Sincerely,  Electronically signed by therapist: Batsheva Beckwith, PT  Physician's certification required: Yes  I certify the need for these services furnished under this plan of treatment and while under my care.    X___________________________________________________ Date____________________    Certification From: 2/7/2024  To:5/7/2024

## 2024-02-08 NOTE — PROGRESS NOTES
Diagnosis:   RIGHT KNEE ARTHROSCOPY WITH ANTERIOR CRUCIATE LIGAMENT RECONSTRUCTION, HAMSTRING AUTOGRAFT, LATERAL MENISCUS REPAIR, AND SYNOVECTOMY AND MCL REPAIR        Referring Provider: Vu  Date of Evaluation:    2/7/24    Precautions:    Toe-touch weightbearing x 2 weeks, no range of motion restrictions. Crutches to be weaned by Physical Therapy.   Next MD visit:   none scheduled  Date of Surgery: n/a   Insurance Primary/Secondary: BLUE CROSS MEDICAID / N/A     # Auth Visits: Approved 10 visits from 1/9-3/9/24, auth #[id number]            Subjective: Pt reports she has been having more pain since the nerve block wore off.     Pain: 3/10      Objective:     AROM: (* denotes performed with pain)   Knee    Flexion: R 85; L 132   Extension: R 2*; L +8      PROM: (* denotes performed with pain)   Knee    Flexion: R 100; L 135   Extension: R 0; L +8       Assessment: Pt presenting with increased pain but making good gains in knee A/PROM. She is progressed with prone TKE's and prone leg extensions to promote quad and glut strength for return to normal ambulation. Pt tolerates tx well.       Goals:    (to be met in 6 weeks post op ~12 visits)   The patient will regain A/PROM knee flexion >135 degrees and 0 degrees of knee extension (8 visits)  The patinet will present without pain or limiting swelling after 8 visits  The patient will demonstrate a full quadricep contraction and SLR without extension lag (8 visits)  The patient will ambulate without visual deficiencies and ability to ambulate w/o hinged brace >3,000 steps/day (10 visits)  The patient will demonstrate ability to ascend stairs with good alignment and control (12 visits)        Goals: (to be met before but no later then 4 months post op)   Pt will improve quad strength to 5/5 to perform 10 full SL squats with good control and alignment.  Pt will increase hip and knee strength to grossly 5/5 to be able to perform agility training with good control and  allignment  Pt will demonstrate increased hip ER/ABD strength to 5/5 to perform stepping, lunging and squatting and jumping activities without excessive femoral IR/ADD   Pt will demonstrate good control and mechanics with squatting and lunging activities in a full tx session with no cuing from therapist to facilitate return to lifting and hiking safely  Pt will be independent and compliant with comprehensive HEP to maintain progress achieved in PT     Plan: Progress knee ROM and strength in NWB x2 weeks  Date: 2/9/2024  TX#: 5/10  (Post op visit #2) Date:                 TX#: 3/ Date:                 TX#: 4/ Date:                 TX#: 5/ Date:   Tx#: 6/   TherEx:50 min  -PROM knee flex/ext to tolerance  -Heel slides w/ strap: x20  -quad sets: Russian stim x5 min  -SAQ w/ Sudanese stim: x5 min  -SLR 2x10  -prone TKE's: 2x10  -prone leg ext: 2x10       Manual tech: 10 min  Game ready (R) knee                     HEP:  [email or web address]  URL: [email or web address]  Date: 02/07/2024  Prepared by: Batsheva Beckwith     Exercises (35 min)  - Supine Single Leg Ankle Pumps  - 3 x daily - 7 x weekly - 3 sets - 10 reps  - Long Sitting Calf Stretch with Strap  - 3 x daily - 7 x weekly - 3 sets - 30 seconds hold  - Supine Heel Slide with Strap  - 3 x daily - 7 x weekly - 3 sets - 10 reps - 5 seconds hold  - Seated Knee Extension Stretch with Chair  - 1 x daily - 7 x weekly - 3 sets - 10 reps  - Supine Quad Set  - 3 x daily - 7 x weekly - 10 reps - 10 seconds hold  - Supine Short Arc Quad  - 1 x daily - 7 x weekly - 3 sets - 10 reps  - Supine Straight Leg Raises  - 2 x daily - 7 x weekly - 3 sets - 10 reps  - Sidelying Hip Abduction  - 2 x daily - 7 x weekly - 3 sets - 10 reps  - Seated Long Arc Quad  - 1 x daily - 7 x weekly - 3 sets - 10 reps  - Seated Knee Flexion AAROM  - 1 x daily - 7 x weekly - 3 sets - 10 reps        +Provided mod-min assist w/ SLR  +Performed quad sets w/ Russian stim 10 sec on/0ff x5 min  +prone leg Ext  2x10    Charges: TherEx: 3 units, vasopneumatic devuice: x 1 unit         Total Timed Treatment: 50 min  Total Treatment Time: 60 min

## 2024-02-09 ENCOUNTER — OFFICE VISIT (OUTPATIENT)
Dept: PHYSICAL THERAPY | Age: 44
End: 2024-02-09
Attending: ORTHOPAEDIC SURGERY
Payer: MEDICAID

## 2024-02-09 PROCEDURE — 97016 VASOPNEUMATIC DEVICE THERAPY: CPT | Performed by: PHYSICAL THERAPIST

## 2024-02-09 PROCEDURE — 97110 THERAPEUTIC EXERCISES: CPT | Performed by: PHYSICAL THERAPIST

## 2024-02-12 ENCOUNTER — OFFICE VISIT (OUTPATIENT)
Dept: PHYSICAL THERAPY | Age: 44
End: 2024-02-12
Attending: ORTHOPAEDIC SURGERY
Payer: MEDICAID

## 2024-02-12 ENCOUNTER — HOSPITAL ENCOUNTER (OUTPATIENT)
Dept: GENERAL RADIOLOGY | Age: 44
Discharge: HOME | End: 2024-02-12
Attending: PHYSICIAN ASSISTANT
Payer: MEDICAID

## 2024-02-12 ENCOUNTER — OFFICE VISIT (OUTPATIENT)
Dept: ORTHOPEDICS CLINIC | Facility: CLINIC | Age: 44
End: 2024-02-12
Payer: MEDICAID

## 2024-02-12 DIAGNOSIS — Z98.890 S/P ACL RECONSTRUCTION: Primary | ICD-10-CM

## 2024-02-12 DIAGNOSIS — S46.002A INJURY OF LEFT ROTATOR CUFF, INITIAL ENCOUNTER: ICD-10-CM

## 2024-02-12 DIAGNOSIS — S83.511A RUPTURE OF ANTERIOR CRUCIATE LIGAMENT OF RIGHT KNEE, INITIAL ENCOUNTER: ICD-10-CM

## 2024-02-12 PROCEDURE — 99024 POSTOP FOLLOW-UP VISIT: CPT | Performed by: PHYSICIAN ASSISTANT

## 2024-02-12 PROCEDURE — 73562 X-RAY EXAM OF KNEE 3: CPT | Performed by: PHYSICIAN ASSISTANT

## 2024-02-12 PROCEDURE — 97110 THERAPEUTIC EXERCISES: CPT | Performed by: PHYSICAL THERAPIST

## 2024-02-12 RX ORDER — TRAMADOL HYDROCHLORIDE 50 MG/1
TABLET ORAL
Qty: 20 TABLET | Refills: 1 | Status: SHIPPED | OUTPATIENT
Start: 2024-02-12

## 2024-02-12 NOTE — PROGRESS NOTES
[facility]  [address] [place] [id number]  [phone]       Name: Sandee Rodriguez   MRN: [id number]  Date: 2/12/2024     REASON FOR VISIT: First Post-Surgical Visit   Surgery: Right knee ACL reconstruction with hamstring autograft, major synovectomy, lateral meniscus repair, MCL repair on 02/06/2024.     INTERVAL HISTORY:  Sandee Eddy is a 43 year old female who returns after the aforementioned procedure.  The post-operative course has been unremarkable with pain well controlled and overall progress noted.     Physical therapy was started and is progressing well.  The patient denies any calf pain or tenderness, fevers, chills, sweats or signs of active infection. The patient has been compliant with the postoperative protocol, and admits to normal bowel and bladder function. No acute issues.     ROS: ROS    PE:   There were no vitals filed for this visit.  Estimated body mass index is 38.44 kg/m² as calculated from the following:    Height as of 2/6/24: 5' 5\" (1.651 m).    Weight as of 2/6/24: 231 lb (104.8 kg).    Physical Exam  Constitutional:       Appearance: Normal appearance.   HENT:      Head: Normocephalic and atraumatic.   Eyes:      Extraocular Movements: Extraocular movements intact.   Neck:      Musculoskeletal: Normal range of motion and neck supple.   Cardiovascular:      Pulses: Normal pulses.   Pulmonary:      Effort: Pulmonary effort is normal. No respiratory distress.   Abdominal:      General: There is no distension.   Skin:     General: Skin is warm.      Capillary Refill: Capillary refill takes less than 2 seconds.      Findings: No bruising.   Neurological:      General: No focal deficit present.      Mental Status: She is alert.   Psychiatric:         Mood and Affect: Mood normal.     Examination of the right knee demonstrates:     Physical examination the patient is alert and oriented x3, well-developed, well-nourished, no acute  distress.     Tegaderm dressings were removed, and Steri-Strips were maintained and kept in place.    Incisional sites are clean dry intact without signs of active pathology.  Calf is soft and nontender to palpation.    The contralateral knee is without limitation in range of motion or strength, no positive provocative maneuvers.       Radiographic Examination/Diagnostics:    I personally viewed, independently interpreted and radiology report was reviewed.      XR KNEE (3 VIEWS), RIGHT (CPT=73562)    Result Date: 2/12/2024  PROCEDURE:  XR KNEE ROUTINE (3 VIEWS), RIGHT (CPT=73562)  TECHNIQUE:  Three views were obtained including patellar view.  COMPARISON:  [facility], XR, XR KNEE, COMPLETE (4 OR MORE VIEWS), RIGHT (CPT=73564), 1/05/2024, 8:08 AM.  INDICATIONS:  PATIENT STATED HISTORY: (As transcribed by Technologist)  Patient states orthopedic follow up post op right knee on 2/6/24.     FINDINGS:  Small orthopedic metallic plate seen along the lateral aspect of the distal femoral diametaphysis.  There are lucent screw opacities seen within the proximal tibial diaphysis.  There is no acute fracture, subluxation or dislocation.  There is a small suprapatellar joint effusion.  Arterial vascular calcifications are noted.  There is mild patellofemoral compartment narrowing.            CONCLUSION:  Postoperative changes.  Small suprapatellar joint effusion.  Patellofemoral compartment osteoarthritis.  No fracture or dislocation.   LOCATION:  [facility]   Dictated by (CST): Marck Negrete MD on 2/12/2024 at 2:18 PM     Finalized by (CST): Marck Negrete MD on 2/12/2024 at 2:20 PM       XR FLUOROSCOPY C-ARM TIME LESS THAN 1 HOUR (CPT=76000)    Result Date: 2/6/2024  PROCEDURE:  XR FLUOROSCOPY C-ARM TIME <1 HOUR  (CPT=76000)  INDICATIONS:  ARTHROSCOPY  COMPARISON:  [facility], XR, XR SHOULDER, COMPLETE (MIN 2 VIEWS), LEFT (CPT=73030), 1/19/2024, 10:42 AM.   TECHNIQUE:  FLUOROSCOPY IMAGES OBTAINED:  6 images FLUOROSCOPY  TIME:  Not reported due to technical/equipment error TECHNOLOGIST TIME:  2 hours RADIATION DOSE (AIR KERMA PRODUCT):  Not recorded due to technical/equipment error  FINDINGS:  Intraoperative fluoroscopic guidance provided for arthroscopy.  Please see the operative report for further details.            CONCLUSION:  Intraoperative fluoroscopic guidance for arthroscopy as above.   LOCATION:  [id number]    Dictated by (CST): Zohaib Montelongo MD on 2/06/2024 at 12:34 PM     Finalized by (CST): Zohaib Montelongo MD on 2/06/2024 at 12:37 PM       XR SHOULDER, COMPLETE (MIN 2 VIEWS), LEFT (CPT=73030)    Result Date: 1/19/2024  PROCEDURE:  XR SHOULDER, COMPLETE (MIN 2 VIEWS), LEFT (CPT=73030)  TECHNIQUE:  Multiple views were obtained.  COMPARISON:  None.  INDICATIONS:  S46.002A Injury of left rotator cuff, initial encounter  PATIENT STATED HISTORY: (As transcribed by Technologist)  Patient here for ortho evaluation. Patient stated left shoulder injury a few weeks ago    FINDINGS:  Negative for fracture, dislocation, deformity or other acute bony abnormalities.  No soft tissue abnormalities.             CONCLUSION:  No acute fracture or other acute bony process.  LOCATION:  [facility]   Dictated by (CST): Rodriguez Garzon MD on 1/19/2024 at 11:40 AM     Finalized by (CST): Rodriguez Garzon MD on 1/19/2024 at 11:40 AM         IMPRESSION: Sandee Eddy is a 43 year old female who presents 6 days s/p Right knee ACL reconstruction with hamstring autograft, major synovectomy, lateral meniscus repair, MCL repair on 02/06/2024.     PLAN:   We had a lengthy discussion with the patient regarding the patient's findings consistent with the expected postoperative course. We recommend continuation of physical therapy with rehabilitation efforts focused on strengthening, range of motion, functional ability, and return to baseline activity. The patient can continue to progress per protocol.    All questions were answered appropriately and the  patient was in agreement with the treatment plan.       FOLLOW-UP:  Return to clinic in four weeks. No imaging required at next visit.             Sincer ERIN Wade, PA-C Orthopedic Surgery / Sports Medicine Specialist  [facility]  [address] [place] [address].org  [email or web address]  t: [phone]  o: [phone]  f: [phone]    This note was dictated using Dragon software.  While it was briefly proofread prior to completion, some grammatical, spelling, and word choice errors due to dictation may still occur.

## 2024-02-12 NOTE — PROGRESS NOTES
Diagnosis:   RIGHT KNEE ARTHROSCOPY WITH ANTERIOR CRUCIATE LIGAMENT RECONSTRUCTION, HAMSTRING AUTOGRAFT, LATERAL MENISCUS REPAIR, AND SYNOVECTOMY AND MCL REPAIR        Referring Provider: Vu  Date of Evaluation:    2/7/24    Precautions:    Toe-touch weightbearing x 2 weeks, no range of motion restrictions. Crutches to be weaned by Physical Therapy.   Next MD visit:   none scheduled  Date of Surgery: 2/6/24   Insurance Primary/Secondary: BLUE CROSS MEDICAID / N/A     # Auth Visits: Approved 10 visits from 1/9-3/9/24, auth #[id number]            Subjective: Pt reports she noticed increased in swelling and pain. States that tylenol does not work for her and she is taking Ibuprofen.     Pain: 4/10      Objective:     AROM: (* denotes performed with pain)   Knee    Flexion: R 95*; L 132   Extension: R 0*; L +8      PROM: (* denotes performed with pain)   Knee    Flexion: R 105*; L 135   Extension: R +2*; L +8       Assessment: Pt presenting with increased pain and swelling this weekend and says that Tylenol did not help but Ibuprofen was able to help with pain and swelling. Pt presenting with gradually improving knee /PROM however with reduced quad recruitment which may be due to recently increased pain and swelling. Continued to utilize Russian stim to improve quad recruitment.       Goals:    (to be met in 6 weeks post op ~12 visits)   The patient will regain A/PROM knee flexion >135 degrees and 0 degrees of knee extension (8 visits)  The patinet will present without pain or limiting swelling after 8 visits  The patient will demonstrate a full quadricep contraction and SLR without extension lag (8 visits)  The patient will ambulate without visual deficiencies and ability to ambulate w/o hinged brace >3,000 steps/day (10 visits)  The patient will demonstrate ability to ascend stairs with good alignment and control (12 visits)        Goals: (to be met before but no later then 4 months post op)   Pt will improve quad  strength to 5/5 to perform 10 full SL squats with good control and alignment.  Pt will increase hip and knee strength to grossly 5/5 to be able to perform agility training with good control and allignment  Pt will demonstrate increased hip ER/ABD strength to 5/5 to perform stepping, lunging and squatting and jumping activities without excessive femoral IR/ADD   Pt will demonstrate good control and mechanics with squatting and lunging activities in a full tx session with no cuing from therapist to facilitate return to lifting and hiking safely  Pt will be independent and compliant with comprehensive HEP to maintain progress achieved in PT     Plan: Progress knee ROM and strength in NWB x2 weeks  Date: 2/9/2024  TX#: 5/10  (Post op visit #2) Date: 2/12/24                TX#: 6/10 approved   (Post op visit #3) Date:                 TX#: 4/ Date:                 TX#: 5/ Date:   Tx#: 6/   TherEx:50 min  -PROM knee flex/ext to tolerance  -Heel slides w/ strap: x20  -quad sets: Russian stim x5 min  -SAQ w/ Iranian stim: x5 min  -SLR 2x10  -prone TKE's: 2x10  -prone leg ext: 2x10 TherEx: 45 min  -PROM knee flex/ext to tolerance  -Heel slides w/ strap: x20  -quad sets: Russian stim x5 min, 5 sec on/5 sec off  -SAQ 0#, w/ Iranian stim: 10 sec on/off, x10 reps   -SLR:  2x10 and min assist  -prone quad stretch: 3x30 sec  -prone TKE's: 3x10  -prone leg ext: 3x10  -s/l hip abduction: 3x10  -LAQ: 0#, x10  -quad iso at 90 deg EOB: sub max 5 sec x20      Hold  Clams  RSB bridges  -HS curls EOB       Manual tech: 10 min  Game ready (R) knee                     HEP:  [email or web address]  URL: [email or web address]  Date: 02/07/2024  Prepared by: Batsheva Beckwith     Exercises (35 min)  - Supine Single Leg Ankle Pumps  - 3 x daily - 7 x weekly - 3 sets - 10 reps  - Long Sitting Calf Stretch with Strap  - 3 x daily - 7 x weekly - 3 sets - 30 seconds hold  - Supine Heel Slide with Strap  - 3 x daily - 7 x weekly - 3 sets - 10 reps - 5 seconds  hold  - Seated Knee Extension Stretch with Chair  - 1 x daily - 7 x weekly - 3 sets - 10 reps  - Supine Quad Set  - 3 x daily - 7 x weekly - 10 reps - 10 seconds hold  - Supine Short Arc Quad  - 1 x daily - 7 x weekly - 3 sets - 10 reps  - Supine Straight Leg Raises  - 2 x daily - 7 x weekly - 3 sets - 10 reps  - Sidelying Hip Abduction  - 2 x daily - 7 x weekly - 3 sets - 10 reps  - Seated Long Arc Quad  - 1 x daily - 7 x weekly - 3 sets - 10 reps  - Seated Knee Flexion AAROM  - 1 x daily - 7 x weekly - 3 sets - 10 reps        +Provided mod-min assist w/ SLR  +Performed quad sets w/ Russian stim 10 sec on/0ff x5 min  +prone leg Ext 2x10    Charges: TherEx: 3 units,         Total Timed Treatment: 45 min  Total Treatment Time: 45 min

## 2024-02-13 ENCOUNTER — APPOINTMENT (OUTPATIENT)
Dept: PHYSICAL THERAPY | Age: 44
End: 2024-02-13
Payer: MEDICAID

## 2024-02-14 NOTE — PROGRESS NOTES
Diagnosis:   RIGHT KNEE ARTHROSCOPY WITH ANTERIOR CRUCIATE LIGAMENT RECONSTRUCTION, HAMSTRING AUTOGRAFT, LATERAL MENISCUS REPAIR, AND SYNOVECTOMY AND MCL REPAIR        Referring Provider: uV  Date of Evaluation:    2/7/24    Precautions:    Toe-touch weightbearing x 2 weeks, no range of motion restrictions. Crutches to be weaned by Physical Therapy.   Next MD visit:   none scheduled  Date of Surgery: 2/6/24  (2 weeks on 2/20/24)   Insurance Primary/Secondary: BLUE CROSS MEDICAID / N/A     # Auth Visits: Approved 10 visits from 1/9-3/9/24, auth #[id number]            Subjective: Pt reports her pain is fluctuating everyday it is different. She tries to do her HEP 2x/ day but sometimes it hurts too much and does not do it. The knee pain was not too bad after last therapy session.     Pain: 0/10      Objective:     AROM: (* denotes performed with pain)   Knee    Flexion: R 100*; L 132   Extension: R 0*; L +8      PROM: (* denotes performed with pain)   Knee    Flexion: R 118*; L 135   Extension: R +2*; L +8       Assessment: Pt  making gradual improvements in PROM knee flexion. No major changes in knee extension and pt was educated on importance of stretching into extension few times a day and also to not rest with the knee positioned in flexion. She is progressed with swiss ball bridges and HS curls to promote glut and hamstring strength,       Goals:    (to be met in 6 weeks post op ~12 visits)   The patient will regain A/PROM knee flexion >135 degrees and 0 degrees of knee extension (8 visits)  The patinet will present without pain or limiting swelling after 8 visits  The patient will demonstrate a full quadricep contraction and SLR without extension lag (8 visits)  The patient will ambulate without visual deficiencies and ability to ambulate w/o hinged brace >3,000 steps/day (10 visits)  The patient will demonstrate ability to ascend stairs with good alignment and control (12 visits)        Goals: (to be met  before but no later then 4 months post op)   Pt will improve quad strength to 5/5 to perform 10 full SL squats with good control and alignment.  Pt will increase hip and knee strength to grossly 5/5 to be able to perform agility training with good control and allignment  Pt will demonstrate increased hip ER/ABD strength to 5/5 to perform stepping, lunging and squatting and jumping activities without excessive femoral IR/ADD   Pt will demonstrate good control and mechanics with squatting and lunging activities in a full tx session with no cuing from therapist to facilitate return to lifting and hiking safely  Pt will be independent and compliant with comprehensive HEP to maintain progress achieved in PT     Plan: Progress knee ROM and strength in NWB x2 weeks  Date: 2/9/2024  TX#: 5/10  (Post op visit #2) Date: 2/12/24                TX#: 6/10 approved   (Post op visit #3) Date: 2/16/24                TX#: 7/10 approved   (Post op visit #4) Date:                 TX#: 8/ Date:   Tx#: 9/   TherEx:50 min  -PROM knee flex/ext to tolerance  -Heel slides w/ strap: x20  -quad sets: Russian stim x5 min  -SAQ w/ Cuban stim: x5 min  -SLR 2x10  -prone TKE's: 2x10  -prone leg ext: 2x10 TherEx: 45 min  -PROM knee flex/ext to tolerance  -Heel slides w/ strap: x20  -quad sets: Russian stim x5 min, 5 sec on/5 sec off  -SAQ 0#, w/ Cuban stim: 10 sec on/off, x10 reps   -SLR:  2x10 and min assist  -prone quad stretch: 3x30 sec  -prone TKE's: 3x10  -prone leg ext: 3x10  -s/l hip abduction: 3x10  -LAQ: 0#, x10  -quad iso at 90 deg EOB: sub max 5 sec x20      Hold  Clams  RSB bridges  -HS curls EOB  TherEx: 55 min  -PROM knee flex/ext to tolerance  -Heel slides w/ strap: x20, x10 no strap  -RSB HS curls: w/ strap x20, 5 sec hold  -RSB bridges: 2x10  -quad sets: Russian stim x5 min, 5 sec on/5 sec off  -SAQ 0#, w/ Cuban stim: 5 sec on/off, x5 min   -SLR w/ Russian stim: 10 sec on/off :  x10   -prone quad stretch: 3x30 sec  -prone  TKE's: 3x10  -prone leg ext: 2#, 3x10  -s/l hip abduction: 2#, 3x10  -LAQ: 0#, x10  -quad iso at 90 and 60 deg EOB: sub max 10 sec x10 each  -HS curls EOB: GTB, 2x10  -prone hang: 3 min, 2# weight      Hold  Clams       Manual tech: 10 min  Game ready (R) knee                     HEP:  [email or web address]  URL: [email or web address]  Date: 02/07/2024  Prepared by: Batsheva Beckwith     Exercises (35 min)  - Supine Single Leg Ankle Pumps  - 3 x daily - 7 x weekly - 3 sets - 10 reps  - Long Sitting Calf Stretch with Strap  - 3 x daily - 7 x weekly - 3 sets - 30 seconds hold  - Supine Heel Slide with Strap  - 3 x daily - 7 x weekly - 3 sets - 10 reps - 5 seconds hold  - Seated Knee Extension Stretch with Chair  - 1 x daily - 7 x weekly - 3 sets - 10 reps  - Supine Quad Set  - 3 x daily - 7 x weekly - 10 reps - 10 seconds hold  - Supine Short Arc Quad  - 1 x daily - 7 x weekly - 3 sets - 10 reps  - Supine Straight Leg Raises  - 2 x daily - 7 x weekly - 3 sets - 10 reps  - Sidelying Hip Abduction  - 2 x daily - 7 x weekly - 3 sets - 10 reps  - Seated Long Arc Quad  - 1 x daily - 7 x weekly - 3 sets - 10 reps  - Seated Knee Flexion AAROM  - 1 x daily - 7 x weekly - 3 sets - 10 reps        +Provided mod-min assist w/ SLR  +Performed quad sets w/ Russian stim 10 sec on/0ff x5 min  +prone leg Ext 2x10    Charges: TherEx: 4 units,         Total Timed Treatment: 55 min  Total Treatment Time: 55 min

## 2024-02-16 ENCOUNTER — OFFICE VISIT (OUTPATIENT)
Dept: PHYSICAL THERAPY | Age: 44
End: 2024-02-16
Attending: ORTHOPAEDIC SURGERY
Payer: MEDICAID

## 2024-02-16 PROCEDURE — 97110 THERAPEUTIC EXERCISES: CPT | Performed by: PHYSICAL THERAPIST

## 2024-02-19 ENCOUNTER — OFFICE VISIT (OUTPATIENT)
Dept: PHYSICAL THERAPY | Age: 44
End: 2024-02-19
Attending: ORTHOPAEDIC SURGERY
Payer: MEDICAID

## 2024-02-19 PROCEDURE — 97110 THERAPEUTIC EXERCISES: CPT | Performed by: PHYSICAL THERAPIST

## 2024-02-19 NOTE — PROGRESS NOTES
Diagnosis:   RIGHT KNEE ARTHROSCOPY WITH ANTERIOR CRUCIATE LIGAMENT RECONSTRUCTION, HAMSTRING AUTOGRAFT, LATERAL MENISCUS REPAIR, AND SYNOVECTOMY AND MCL REPAIR        Referring Provider: Vu  Date of Evaluation:    2/7/24    Precautions:    Toe-touch weightbearing x 2 weeks, no range of motion restrictions. Crutches to be weaned by Physical Therapy.   Next MD visit:   none scheduled  Date of Surgery: 2/6/24  (2 weeks on 2/20/24)   Insurance Primary/Secondary: BLUE CROSS MEDICAID / N/A     # Auth Visits: Approved 10 visits from 1/9-3/9/24, auth #[id number]            Subjective: Pt reports her pain is fluctuating everyday it is different. She tries to do her HEP 2x/ day but sometimes it hurts too much and does not do it. The knee pain was not too bad after last therapy session.     Pain: 0/10      Objective:     AROM: (* denotes performed with pain)   Knee    Flexion: R 105*; L 132   Extension: R 0*; L +8      PROM: (* denotes performed with pain)   Knee    Flexion: R 120*; L 135   Extension: R +2*; L +8       Assessment: Pt nearly 2 weeks s/p surgery and is progressed today to WBAT per protocol. Her passive knee flexion is gradually improving. She continues to present w/o major changes in knee extension mobility.       Goals:    (to be met in 6 weeks post op ~12 visits)   The patient will regain A/PROM knee flexion >135 degrees and 0 degrees of knee extension (8 visits)  The patinet will present without pain or limiting swelling after 8 visits  The patient will demonstrate a full quadricep contraction and SLR without extension lag (8 visits)  The patient will ambulate without visual deficiencies and ability to ambulate w/o hinged brace >3,000 steps/day (10 visits)  The patient will demonstrate ability to ascend stairs with good alignment and control (12 visits)        Goals: (to be met before but no later then 4 months post op)   Pt will improve quad strength to 5/5 to perform 10 full SL squats with good control  and alignment.  Pt will increase hip and knee strength to grossly 5/5 to be able to perform agility training with good control and allignment  Pt will demonstrate increased hip ER/ABD strength to 5/5 to perform stepping, lunging and squatting and jumping activities without excessive femoral IR/ADD   Pt will demonstrate good control and mechanics with squatting and lunging activities in a full tx session with no cuing from therapist to facilitate return to lifting and hiking safely  Pt will be independent and compliant with comprehensive HEP to maintain progress achieved in PT     Plan: Progress weight bearing activities as tolerated  Date: 2/9/2024  TX#: 5/10  (Post op visit #2) Date: 2/12/24                TX#: 6/10 approved   (Post op visit #3) Date: 2/16/24                TX#: 7/10 approved   (Post op visit #4) Date:2/19/24                 TX#: 8/10 approved   (Post op visit #5) Date:   Tx#: 9/   TherEx:50 min  -PROM knee flex/ext to tolerance  -Heel slides w/ strap: x20  -quad sets: Russian stim x5 min  -SAQ w/ [place] stim: x5 min  -SLR 2x10  -prone TKE's: 2x10  -prone leg ext: 2x10 TherEx: 45 min  -PROM knee flex/ext to tolerance  -Heel slides w/ strap: x20  -quad sets: Russian stim x5 min, 5 sec on/5 sec off  -SAQ 0#, w/ [place] stim: 10 sec on/off, x10 reps   -SLR:  2x10 and min assist  -prone quad stretch: 3x30 sec  -prone TKE's: 3x10  -prone leg ext: 3x10  -s/l hip abduction: 3x10  -LAQ: 0#, x10  -quad iso at 90 deg EOB: sub max 5 sec x20      Hold  Clams  RSB bridges  -HS curls EOB  TherEx: 55 min  -PROM knee flex/ext to tolerance  -Heel slides w/ strap: x20, x10 no strap  -RSB HS curls: w/ strap x20, 5 sec hold  -RSB bridges: 2x10  -quad sets: Russian stim x5 min, 5 sec on/5 sec off  -SAQ 0#, w/ [place] stim: 5 sec on/off, x5 min   -SLR w/ Russian stim: 10 sec on/off :  x10   -prone quad stretch: 3x30 sec  -prone TKE's: 3x10  -prone leg ext: 2#, 3x10  -s/l hip abduction: 2#, 3x10  -LAQ: 0#, x10  -quad  iso at 90 and 60 deg EOB: sub max 10 sec x10 each  -HS curls EOB: GTB, 2x10  -prone hang: 3 min, 2# weight      Hold  Clams   TherEx: 60 min  -PROM knee flex/ext to tolerance  -Heel slides w/ strap: x20, x10 no strap  -RSB HS curls: w/ strap x20, 5 sec hold  -SAQ 0#, 2x10  -prone quad stretch: 3x30 sec  -prone TKE's: 3x10  -LAQ: 0#, x10  -quad iso at 90 and 60 deg EOB: sub max 10 sec x10 each  -prone hang: 2x3 min, 2# weight  -lateral weight shifts: x20  -Gait training with WBAT and brace locked at 0: x3 min        Hold  Marching  Mini squats        Manual tech: 10 min  Game ready (R) knee       HEP:  [id number]  URL: [email or web address]  Date: 02/07/2024  Prepared by: Batsheva Beckwith     Exercises (35 min)  - Supine Single Leg Ankle Pumps  - 3 x daily - 7 x weekly - 3 sets - 10 reps  - Long Sitting Calf Stretch with Strap  - 3 x daily - 7 x weekly - 3 sets - 30 seconds hold  - Supine Heel Slide with Strap  - 3 x daily - 7 x weekly - 3 sets - 10 reps - 5 seconds hold  - Seated Knee Extension Stretch with Chair  - 1 x daily - 7 x weekly - 3 sets - 10 reps  - Supine Quad Set  - 3 x daily - 7 x weekly - 10 reps - 10 seconds hold  - Supine Short Arc Quad  - 1 x daily - 7 x weekly - 3 sets - 10 reps  - Supine Straight Leg Raises  - 2 x daily - 7 x weekly - 3 sets - 10 reps  - Sidelying Hip Abduction  - 2 x daily - 7 x weekly - 3 sets - 10 reps  - Seated Long Arc Quad  - 1 x daily - 7 x weekly - 3 sets - 10 reps  - Seated Knee Flexion AAROM  - 1 x daily - 7 x weekly - 3 sets - 10 reps        +Provided mod-min assist w/ SLR  +Performed quad sets w/ Russian stim 10 sec on/0ff x5 min  +prone leg Ext 2x10    Charges: TherEx: 4 units,         Total Timed Treatment: 60 min  Total Treatment Time: 60 min

## 2024-02-21 ENCOUNTER — OFFICE VISIT (OUTPATIENT)
Dept: PHYSICAL THERAPY | Age: 44
End: 2024-02-21
Attending: ORTHOPAEDIC SURGERY
Payer: MEDICAID

## 2024-02-21 PROCEDURE — 97110 THERAPEUTIC EXERCISES: CPT | Performed by: PHYSICAL THERAPIST

## 2024-02-21 NOTE — PROGRESS NOTES
Diagnosis:   RIGHT KNEE ARTHROSCOPY WITH ANTERIOR CRUCIATE LIGAMENT RECONSTRUCTION, HAMSTRING AUTOGRAFT, LATERAL MENISCUS REPAIR, AND SYNOVECTOMY AND MCL REPAIR        Referring Provider: Vu  Date of Evaluation:    2/7/24    Precautions:    Toe-touch weightbearing x 2 weeks, no range of motion restrictions. Crutches to be weaned by Physical Therapy.   Next MD visit:   none scheduled  Date of Surgery: 2/6/24  (2 weeks on 2/20/24)   Insurance Primary/Secondary: BLUE CROSS MEDICAID / N/A     # Auth Visits: Approved 10 visits from 1/9-3/9/24, auth #[id number]            Subjective: Pt reports her pain is fluctuating everyday it is different. She tries to do her HEP 2x/ day but sometimes it hurts too much and does not do it. The knee pain was not too bad after last therapy session.     Pain: 0/10      Objective:     AROM: (* denotes performed with pain)   Knee    Flexion: R 112*; L 132   Extension: R +2*; L +8      PROM: (* denotes performed with pain)   Knee    Flexion: R 125*; L 135   Extension: R +5*; L +8       Assessment: Gradually improving knee ROM. She makes gains in knee extension ROM today. She progressed in weight bearing stability and strength exercises w/o complaints. She is progressed to no assistive device and brace locked at 0 with good tolerance.       Goals:    (to be met in 6 weeks post op ~12 visits)   The patient will regain A/PROM knee flexion >135 degrees and 0 degrees of knee extension (8 visits)  The patinet will present without pain or limiting swelling after 8 visits  The patient will demonstrate a full quadricep contraction and SLR without extension lag (8 visits)  The patient will ambulate without visual deficiencies and ability to ambulate w/o hinged brace >3,000 steps/day (10 visits)  The patient will demonstrate ability to ascend stairs with good alignment and control (12 visits)        Goals: (to be met before but no later then 4 months post op)   Pt will improve quad strength to 5/5  to perform 10 full SL squats with good control and alignment.  Pt will increase hip and knee strength to grossly 5/5 to be able to perform agility training with good control and allignment  Pt will demonstrate increased hip ER/ABD strength to 5/5 to perform stepping, lunging and squatting and jumping activities without excessive femoral IR/ADD   Pt will demonstrate good control and mechanics with squatting and lunging activities in a full tx session with no cuing from therapist to facilitate return to lifting and hiking safely  Pt will be independent and compliant with comprehensive HEP to maintain progress achieved in PT     Plan: PN next tx   Date: 2/9/2024  TX#: 5/10  (Post op visit #2) Date: 2/12/24                TX#: 6/10 approved   (Post op visit #3) Date: 2/16/24                TX#: 7/10 approved   (Post op visit #4) Date:2/19/24                 TX#: 8/10 approved   (Post op visit #5) Date:2/21/24                 TX#: 9/10 approved   (Post op visit #6)   TherEx:50 min  -PROM knee flex/ext to tolerance  -Heel slides w/ strap: x20  -quad sets: Russian stim x5 min  -SAQ w/ [place]er stim: x5 min  -SLR 2x10  -prone TKE's: 2x10  -prone leg ext: 2x10 TherEx: 45 min  -PROM knee flex/ext to tolerance  -Heel slides w/ strap: x20  -quad sets: Russian stim x5 min, 5 sec on/5 sec off  -SAQ 0#, w/ [place]er stim: 10 sec on/off, x10 reps   -SLR:  2x10 and min assist  -prone quad stretch: 3x30 sec  -prone TKE's: 3x10  -prone leg ext: 3x10  -s/l hip abduction: 3x10  -LAQ: 0#, x10  -quad iso at 90 deg EOB: sub max 5 sec x20      Hold  Clams  RSB bridges  -HS curls EOB  TherEx: 55 min  -PROM knee flex/ext to tolerance  -Heel slides w/ strap: x20, x10 no strap  -RSB HS curls: w/ strap x20, 5 sec hold  -RSB bridges: 2x10  -quad sets: Russian stim x5 min, 5 sec on/5 sec off  -SAQ 0#, w/ [place]er stim: 5 sec on/off, x5 min   -SLR w/ Russian stim: 10 sec on/off :  x10   -prone quad stretch: 3x30 sec  -prone TKE's: 3x10  -prone leg ext:  2#, 3x10  -s/l hip abduction: 2#, 3x10  -LAQ: 0#, x10  -quad iso at 90 and 60 deg EOB: sub max 10 sec x10 each  -HS curls EOB: GTB, 2x10  -prone hang: 3 min, 2# weight      Hold  Clams   TherEx: 60 min  -PROM knee flex/ext to tolerance  -Heel slides w/ strap: x20, x10 no strap  -RSB HS curls: w/ strap x20, 5 sec hold  -SAQ 0#, 2x10  -prone quad stretch: 3x30 sec  -prone TKE's: 3x10  -LAQ: 0#, x10  -quad iso at 90 and 60 deg EOB: sub max 10 sec x10 each  -prone hang: 2x3 min, 2# weight  -lateral weight shifts: x20  -Gait training with WBAT and brace locked at 0: x3 min        Hold  Marching  Mini squats     TherEx: 55 min  -PROM knee flex/ext to tolerance  -Heel slides w/ strap: x20, x10 no strap  -SAQ 0#, 2x10  -prone quad stretch: 3x30 sec  -prone TKE's: 3x10  -LAQ: 0#, x10  -quad iso at 90 and 60 deg EOB: sub max 10 sec x10 each  -prone hang: 2x3 min, 4# weight  -Gait training with brace locked and no assistive device, brace unlocked w/ 1 crutch: x5 min  -standing TKE: Blue TB: x30, 5 sec  -heel raises: 2x10  -Marching w/ 2 HHA: x20  -Mini squats w/ 2 HHA: 2x10  -TKE w/ ball at wall: 5 sec [id number]     Manual tech: 10 min  Game ready (R) knee       HEP:  Access Code: [email or web address]  URL: [email or web address]  Date: 02/21/2024  Prepared by: Batsheva Beckwith    Exercises  - Supine Heel Slide with Strap  - 3 x daily - 7 x weekly - 3 sets - 10 reps - 5 seconds hold  - Seated Knee Extension Stretch with Chair  - 1 x daily - 7 x weekly - 3 sets - 10 reps  - Supine Straight Leg Raises  - 2 x daily - 7 x weekly - 3 sets - 10 reps  - Sidelying Hip Abduction  - 2 x daily - 7 x weekly - 3 sets - 10 reps  - Seated Long Arc Quad  - 1 x daily - 7 x weekly - 3 sets - 10 reps  - Standing Heel Raises  - 1 x daily - 7 x weekly - 3 sets - 10 reps  - Standing Terminal Knee Extension with Resistance  - 1 x daily - 7 x weekly - 3 sets - 10 reps  - Standing March with Counter Support  - 1 x daily - 7 x weekly - 3 sets - 10 reps  - Mini  Squat with Counter Support  - 1 x daily - 7 x weekly - 3 sets - 10 reps        +Provided mod-min assist w/ SLR  +Performed quad sets w/ Russian stim 10 sec on/0ff x5 min  +prone leg Ext 2x10    Charges: TherEx: 4 units,         Total Timed Treatment: 55 min  Total Treatment Time: 55 min

## 2024-02-26 ENCOUNTER — PATIENT MESSAGE (OUTPATIENT)
Dept: ORTHOPEDICS CLINIC | Facility: CLINIC | Age: 44
End: 2024-02-26

## 2024-02-26 ENCOUNTER — OFFICE VISIT (OUTPATIENT)
Dept: PHYSICAL THERAPY | Age: 44
End: 2024-02-26
Attending: ORTHOPAEDIC SURGERY
Payer: MEDICAID

## 2024-02-26 DIAGNOSIS — S46.002A INJURY OF LEFT ROTATOR CUFF, INITIAL ENCOUNTER: Primary | ICD-10-CM

## 2024-02-26 PROCEDURE — 97110 THERAPEUTIC EXERCISES: CPT | Performed by: PHYSICAL THERAPIST

## 2024-02-26 NOTE — PROGRESS NOTES
Progress Summary  Pt has attended 7 visits in Physical Therapy.    Diagnosis:   RIGHT KNEE ARTHROSCOPY WITH ANTERIOR CRUCIATE LIGAMENT RECONSTRUCTION, HAMSTRING AUTOGRAFT, LATERAL MENISCUS REPAIR, AND SYNOVECTOMY AND MCL REPAIR        Referring Provider: Vu  Date of Evaluation:    2/7/24    Precautions:      Toe-touch weightbearing x 2 weeks, no range of motion restrictions. Crutches to be weaned by Physical Therapy.   Next MD visit:   none scheduled  Date of Surgery: 2/6/24  (3 weeks on 2/27/24)   Insurance Primary/Secondary: BLUE CROSS MEDICAID / N/A     # Auth Visits: 15 visits 2/26-4/26/24, auth #[id number]        Subjective: Pt reports she was walking on her leg yesterday with no crutches but by the end of the day it was aching. Pt is not consistent with home stretches due to pain and does not want to take pain meds.     Pain: 0/10      Objective:     AROM: (* denotes performed with pain)   Knee    Flexion: R 114*; L 132   Extension: R +0*; L +8      PROM: (* denotes performed with pain)   Knee    Flexion: R 127*; L 135   Extension: R +2*; L +8       Assessment: Pt is nearly 3 weeks s/p ACL reconstruction, MCL repair and meniscus repair and gradually progressing. Pt presenting to PT with loss of 15 deg of extension as compared to previous visits. She is educated heavily on importance of compliance to HEP to promote progress and prevent a knee contracture. She does regain the PROM of the knee in today's tx. Overall, since LE pt has demonstrated improved A/PROM of the knee, strength, quad recruitment, edema, weight bearing and gait mechanics. These improvement have allowed her to be able to tolerate standing and walking longer distances, perform sit to stand transfers, and don/doff shoes and socks.        Goals:    (to be met in 6 weeks post op ~12 visits)   The patient will regain A/PROM knee flexion >135 degrees and 0 degrees of knee extension-Progressing  The patinet will present without pain or limiting  swelling after 8 visits- -Progressing  The patient will demonstrate a full quadricep contraction and SLR without extension lag -Progressing  The patient will ambulate without visual deficiencies and ability to ambulate w/o hinged brace >3,000 steps/day -Progressing  The patient will demonstrate ability to ascend stairs with good alignment and control -Progressing        Goals: (to be met before but no later then 4 months post op)   Pt will improve quad strength to 5/5 to perform 10 full SL squats with good control and alignment.  Pt will increase hip and knee strength to grossly 5/5 to be able to perform agility training with good control and allignment  Pt will demonstrate increased hip ER/ABD strength to 5/5 to perform stepping, lunging and squatting and jumping activities without excessive femoral IR/ADD   Pt will demonstrate good control and mechanics with squatting and lunging activities in a full tx session with no cuing from therapist to facilitate return to lifting and hiking safely  Pt will be independent and compliant with comprehensive HEP to maintain progress achieved in PT       Plan: Continue skilled Physical Therapy 2 x/week or a total of 24 visits over a 90 day period. Treatment will include: therEx, neuro re-ed, manual tech.        Patient/Family/Caregiver was advised of these findings, precautions, and treatment options and has agreed to actively participate in planning and for this course of care.    Thank you for your referral. If you have any questions, please contact me at Dept: [phone].    Sincerely,  Electronically signed by therapist: Batsheva Beckwith, PT     Physician's certification required:  Yes  Please co-sign or sign and return this letter via fax as soon as possible to [phone].   I certify the need for these services furnished under this plan of treatment and while under my care.    X___________________________________________________ Date____________________    Certification  From: 2/26/2024  To:5/26/2024    Date: 2/16/24                TX#: 7/10 approved   (Post op visit #4) Date:2/19/24                 TX#: 8/10 approved   (Post op visit #5) Date:2/21/24                 TX#: 9/10 approved   (Post op visit #6) Date:2/26/24                 TX#: 1/15 approved   (Post op visit #7)    TherEx: 55 min  -PROM knee flex/ext to tolerance  -Heel slides w/ strap: x20, x10 no strap  -RSB HS curls: w/ strap x20, 5 sec hold  -RSB bridges: 2x10  -quad sets: Russian stim x5 min, 5 sec on/5 sec off  -SAQ 0#, w/ B[place]ian stim: 5 sec on/off, x5 min   -SLR w/ Russian stim: 10 sec on/off :  x10   -prone quad stretch: 3x30 sec  -prone TKE's: 3x10  -prone leg ext: 2#, 3x10  -s/l hip abduction: 2#, 3x10  -LAQ: 0#, x10  -quad iso at 90 and 60 deg EOB: sub max 10 sec x10 each  -HS curls EOB: GTB, 2x10  -prone hang: 3 min, 2# weight      Hold  Clams   TherEx: 60 min  -PROM knee flex/ext to tolerance  -Heel slides w/ strap: x20, x10 no strap  -RSB HS curls: w/ strap x20, 5 sec hold  -SAQ 0#, 2x10  -prone quad stretch: 3x30 sec  -prone TKE's: 3x10  -LAQ: 0#, x10  -quad iso at 90 and 60 deg EOB: sub max 10 sec x10 each  -prone hang: 2x3 min, 2# weight  -lateral weight shifts: x20  -Gait training with WBAT and brace locked at 0: x3 min        Hold  Marching  Mini squats     TherEx: 55 min  -PROM knee flex/ext to tolerance  -Heel slides w/ strap: x20, x10 no strap  -SAQ 0#, 2x10  -prone quad stretch: 3x30 sec  -prone TKE's: 3x10  -LAQ: 0#, x10  -quad iso at 90 and 60 deg EOB: sub max 10 sec x10 each  -prone hang: 2x3 min, 4# weight  -Gait training with brace locked and no assistive device, brace unlocked w/ 1 crutch: x5 min  -standing TKE: Blue TB: x30, 5 sec  -heel raises: 2x10  -Marching w/ 2 HHA: x20  -Mini squats w/ 2 HHA: 2x10  -TKE w/ ball at wall: 5 sec [id number]   TherEx: 55 min  -bike: 7 min  -PROM knee flex/ext to tolerance  -Heel slides w/ strap: x20, x10 no strap  -RSB HS curls: x20 w/ strap  -RSB bridges w/  knee extended: 2x10  -prone quad stretch: 3x30 sec  -prone hang: 2x3 min, 4# weight  -Gait training with no brace and no crutches: 2x75 feet  -standing TKE: Blue TB: x30, 5 sec  -Gastroc stretch on 1/2 FR: 10 sec x10  -gastroc stretch on step: 3x30 sec  -step ups 4\" box, concentric, eccentric and combo x10 each  -Mini squats w/ 2 HHA: 2x10  -TKE w/ ball at wall: 5 sec x20      Hold   add leg press and S:N         HEP:  Access Code: [email or web address]  URL: [email or web address]  Date: 02/21/2024  Prepared by: Batsheva Beckwith    Exercises  - Supine Heel Slide with Strap  - 3 x daily - 7 x weekly - 3 sets - 10 reps - 5 seconds hold  - Seated Knee Extension Stretch with Chair  - 1 x daily - 7 x weekly - 3 sets - 10 reps  - Supine Straight Leg Raises  - 2 x daily - 7 x weekly - 3 sets - 10 reps  - Sidelying Hip Abduction  - 2 x daily - 7 x weekly - 3 sets - 10 reps  - Seated Long Arc Quad  - 1 x daily - 7 x weekly - 3 sets - 10 reps  - Standing Heel Raises  - 1 x daily - 7 x weekly - 3 sets - 10 reps  - Standing Terminal Knee Extension with Resistance  - 1 x daily - 7 x weekly - 3 sets - 10 reps  - Standing March with Counter Support  - 1 x daily - 7 x weekly - 3 sets - 10 reps  - Mini Squat with Counter Support  - 1 x daily - 7 x weekly - 3 sets - 10 reps        +Provided mod-min assist w/ SLR  +Performed quad sets w/ Russian stim 10 sec on/0ff x5 min  +prone leg Ext 2x10    Charges: TherEx: 4 units,         Total Timed Treatment: 60 min  Total Treatment Time: 60 min

## 2024-02-26 NOTE — TELEPHONE ENCOUNTER
From: Sandee Eddy  To: Beverly Womack  Sent: 2/26/2024 9:29 AM CST  Subject: Left Shoulder MRI    Good morning Dr. Womack,    I called my insurance provider. They already declined shoulder MRI twice. I was able to schedule MRI for April 2nd. When I spoke with the lady from insurance she gave me the reason of why insurance declined the coverage.  She stated that they need proof from the doctor that there was three month treatment done prior to MRI such as x-ray, therapy, medication.   She stated that all of the requirements were provided to you.   Please help me out   Thank you   Sandee

## 2024-02-28 ENCOUNTER — OFFICE VISIT (OUTPATIENT)
Dept: PHYSICAL THERAPY | Age: 44
End: 2024-02-28
Attending: ORTHOPAEDIC SURGERY
Payer: MEDICAID

## 2024-02-28 PROCEDURE — 97110 THERAPEUTIC EXERCISES: CPT | Performed by: PHYSICAL THERAPIST

## 2024-02-28 NOTE — TELEPHONE ENCOUNTER
Hello,    Your MRI is scheduled for 4/2/24.  This case will be worked on toward the end of March closer to the scheduled date.    Thank you  Yissel MOSS

## 2024-02-28 NOTE — TELEPHONE ENCOUNTER
LOV 2/26/24    Pt has 2nd attempt at getting MRI of L shoulder pending.  Pt spoke with insurance to inquire why denied: needs xray (done), medications (doing), and PT first (requesting.)    Needs documentation specific to shoulder why MRI needed and what treatment plan is/done.    Failed Meloxicam and Tylenol.  Now doing higher dose ibuprofen and tylenol which is more effective.    Pt amenable to PT for L shoulder. Order pended. Please sign if appropriate.

## 2024-02-28 NOTE — PROGRESS NOTES
Diagnosis:   RIGHT KNEE ARTHROSCOPY WITH ANTERIOR CRUCIATE LIGAMENT RECONSTRUCTION, HAMSTRING AUTOGRAFT, LATERAL MENISCUS REPAIR, AND SYNOVECTOMY AND MCL REPAIR        Referring Provider: Vu  Date of Evaluation:    2/7/24  Last PN: 2/26/24    Precautions:      Toe-touch weightbearing x 2 weeks, no range of motion restrictions. Crutches to be weaned by Physical Therapy.   Next MD visit:   none scheduled  Date of Surgery: 2/6/24  (3 weeks on 2/27/24)   Insurance Primary/Secondary: BLUE CROSS MEDICAID / N/A     # Auth Visits: 15 visits 2/26-4/26/24, auth #[id number]        Subjective: Pt reports knee does not hurt unless I am doing the stretches    Pain: 0/10      Objective:     AROM: (* denotes performed with pain)   Knee    Flexion: R 117*; L 132   Extension: R +0*; L +8      PROM: (* denotes performed with pain)   Knee    Flexion: R 133*; L 135   Extension: R +2*; L +8       Assessment: Pt able to perform a full revolution on up right bike. She presents with improving knee extension and gait mechanics. Pt demonstrating improved control ascending a step and initiated descending steps which was difficult at first but improved improved control and depth with repetition. Pt tolerates tx well and demonstrates improved gait at end of tx.       Goals:    (to be met in 6 weeks post op ~12 visits)   The patient will regain A/PROM knee flexion >135 degrees and 0 degrees of knee extension-Progressing  The patinet will present without pain or limiting swelling after 8 visits- -Progressing  The patient will demonstrate a full quadricep contraction and SLR without extension lag -Progressing  The patient will ambulate without visual deficiencies and ability to ambulate w/o hinged brace >3,000 steps/day -Progressing  The patient will demonstrate ability to ascend stairs with good alignment and control -Progressing        Goals: (to be met before but no later then 4 months post op)   Pt will improve quad strength to 5/5 to  perform 10 full SL squats with good control and alignment.  Pt will increase hip and knee strength to grossly 5/5 to be able to perform agility training with good control and allignment  Pt will demonstrate increased hip ER/ABD strength to 5/5 to perform stepping, lunging and squatting and jumping activities without excessive femoral IR/ADD   Pt will demonstrate good control and mechanics with squatting and lunging activities in a full tx session with no cuing from therapist to facilitate return to lifting and hiking safely  Pt will be independent and compliant with comprehensive HEP to maintain progress achieved in PT       Plan:Progress end range PROM  Date: 2/16/24                TX#: 7/10 approved   (Post op visit #4) Date:2/19/24                 TX#: 8/10 approved   (Post op visit #5) Date:2/21/24                 TX#: 9/10 approved   (Post op visit #6) Date:2/26/24                 TX#: 1/15 approved   (Post op visit #7) Date:2/28/24                 TX#: 2/15 approved   (Post op visit #8)    TherEx: 55 min  -PROM knee flex/ext to tolerance  -Heel slides w/ strap: x20, x10 no strap  -RSB HS curls: w/ strap x20, 5 sec hold  -RSB bridges: 2x10  -quad sets: Russian stim x5 min, 5 sec on/5 sec off  -SAQ 0#, w/ Danish stim: 5 sec on/off, x5 min   -SLR w/ Russian stim: 10 sec on/off :  x10   -prone quad stretch: 3x30 sec  -prone TKE's: 3x10  -prone leg ext: 2#, 3x10  -s/l hip abduction: 2#, 3x10  -LAQ: 0#, x10  -quad iso at 90 and 60 deg EOB: sub max 10 sec x10 each  -HS curls EOB: GTB, 2x10  -prone hang: 3 min, 2# weight      Hold  Clams   TherEx: 60 min  -PROM knee flex/ext to tolerance  -Heel slides w/ strap: x20, x10 no strap  -RSB HS curls: w/ strap x20, 5 sec hold  -SAQ 0#, 2x10  -prone quad stretch: 3x30 sec  -prone TKE's: 3x10  -LAQ: 0#, x10  -quad iso at 90 and 60 deg EOB: sub max 10 sec x10 each  -prone hang: 2x3 min, 2# weight  -lateral weight shifts: x20  -Gait training with WBAT and brace locked at 0: x3  min        Hold  Marching  Mini squats     TherEx: 55 min  -PROM knee flex/ext to tolerance  -Heel slides w/ strap: x20, x10 no strap  -SAQ 0#, 2x10  -prone quad stretch: 3x30 sec  -prone TKE's: 3x10  -LAQ: 0#, x10  -quad iso at 90 and 60 deg EOB: sub max 10 sec x10 each  -prone hang: 2x3 min, 4# weight  -Gait training with brace locked and no assistive device, brace unlocked w/ 1 crutch: x5 min  -standing TKE: Blue TB: x30, 5 sec  -heel raises: 2x10  -Marching w/ 2 HHA: x20  -Mini squats w/ 2 HHA: 2x10  -TKE w/ ball at wall: 5 sec [id number]   TherEx: 55 min  -bike: 7 min  -PROM knee flex/ext to tolerance  -Heel slides w/ strap: x20, x10 no strap  -RSB HS curls: x20 w/ strap  -RSB bridges w/ knee extended: 2x10  -prone quad stretch: 3x30 sec  -prone hang: 2x3 min, 4# weight  -Gait training with no brace and no crutches: 2x75 feet  -standing TKE: Blue TB: x30, 5 sec  -Gastroc stretch on 1/2 FR: 10 sec x10  -gastroc stretch on step: 3x30 sec  -step ups 4\" box, concentric, eccentric and combo x10 each  -Mini squats w/ 2 HHA: 2x10  -TKE w/ ball at wall: 5 sec x20      Hold   add leg press and S:N TherEx: 55 min  -bike: 7 min  -PROM knee flex/ext to tolerance  -Heel slides w/ strap: x20, x10 no strap  -prone quad stretch: 3x30 sec  -prone hang: 2x3 min, 4# weight  -Gait training with no brace and no crutches: 2x75 feet  -standing TKE: Gray TB: x30, 5 sec  -step ups: 6\" box, 2x10  -2-4\" anterior step down: 2x10  -Mini squats w/ 2 HHA: 2x10  -standing marching: x20  -DL leg press 75#, 2x10  -DL press:  37#, 2x10  -SLB: 3x30 sec-hold          HEP:  Access Code: [email or web address]  URL: [email or web address]  Date: 02/21/2024  Prepared by: Batsheva Beckwith    Exercises  - Supine Heel Slide with Strap  - 3 x daily - 7 x weekly - 3 sets - 10 reps - 5 seconds hold  - Seated Knee Extension Stretch with Chair  - 1 x daily - 7 x weekly - 3 sets - 10 reps  - Supine Straight Leg Raises  - 2 x daily - 7 x weekly - 3 sets - 10 reps  -  Sidelying Hip Abduction  - 2 x daily - 7 x weekly - 3 sets - 10 reps  - Seated Long Arc Quad  - 1 x daily - 7 x weekly - 3 sets - 10 reps  - Standing Heel Raises  - 1 x daily - 7 x weekly - 3 sets - 10 reps  - Standing Terminal Knee Extension with Resistance  - 1 x daily - 7 x weekly - 3 sets - 10 reps  - Standing March with Counter Support  - 1 x daily - 7 x weekly - 3 sets - 10 reps  - Mini Squat with Counter Support  - 1 x daily - 7 x weekly - 3 sets - 10 reps        +Provided mod-min assist w/ SLR  +Performed quad sets w/ Russian stim 10 sec on/0ff x5 min  +prone leg Ext 2x10    Charges: TherEx: 4 units,         Total Timed Treatment: 55 min  Total Treatment Time: 55 min

## 2024-03-04 ENCOUNTER — OFFICE VISIT (OUTPATIENT)
Dept: PHYSICAL THERAPY | Age: 44
End: 2024-03-04
Attending: ORTHOPAEDIC SURGERY
Payer: MEDICAID

## 2024-03-04 PROCEDURE — 97110 THERAPEUTIC EXERCISES: CPT | Performed by: PHYSICAL THERAPIST

## 2024-03-04 NOTE — PROGRESS NOTES
Diagnosis:   RIGHT KNEE ARTHROSCOPY WITH ANTERIOR CRUCIATE LIGAMENT RECONSTRUCTION, HAMSTRING AUTOGRAFT, LATERAL MENISCUS REPAIR, AND SYNOVECTOMY AND MCL REPAIR        Referring Provider: Vu  Date of Evaluation:    2/7/24  Last PN: 2/26/24    Precautions:      Toe-touch weightbearing x 2 weeks, no range of motion restrictions. Crutches to be weaned by Physical Therapy.   Next MD visit:   none scheduled  Date of Surgery: 2/6/24  (3 weeks on 2/27/24)   Insurance Primary/Secondary: BLUE CROSS MEDICAID / N/A     # Auth Visits: 15 visits 2/26-4/26/24, auth #[id number]        Subjective: Pt reports her knee is doing good. States that when she walked a lot this weekend her whole lower leg got swollen and knee got stiff.     Pain: 0/10 currently, 5/10 after activities      Objective:     AROM: (* denotes performed with pain)   Knee    Flexion: R 125*; L 130   Extension: R +2*; L +8      PROM: (* denotes performed with pain)   Knee    Flexion: R 135*; L 135   Extension: R +5*; L +8       Assessment: Pt gradually demonstrating improvements in ROM. Gait and ROM improved at at end of tx. She is progressed to a higher step up but with mod compensation and moderately reduced eccentric quad control. She is progressed with a SLB in mini squat to improve SL stability and quad endurance. She tolerates tx well.       Goals:    (to be met in 6 weeks post op ~12 visits)   The patient will regain A/PROM knee flexion >135 degrees and 0 degrees of knee extension-Progressing  The patinet will present without pain or limiting swelling after 8 visits- -Progressing  The patient will demonstrate a full quadricep contraction and SLR without extension lag -Progressing  The patient will ambulate without visual deficiencies and ability to ambulate w/o hinged brace >3,000 steps/day -Progressing  The patient will demonstrate ability to ascend stairs with good alignment and control -Progressing        Goals: (to be met before but no later then 4  months post op)   Pt will improve quad strength to 5/5 to perform 10 full SL squats with good control and alignment.  Pt will increase hip and knee strength to grossly 5/5 to be able to perform agility training with good control and allignment  Pt will demonstrate increased hip ER/ABD strength to 5/5 to perform stepping, lunging and squatting and jumping activities without excessive femoral IR/ADD   Pt will demonstrate good control and mechanics with squatting and lunging activities in a full tx session with no cuing from therapist to facilitate return to lifting and hiking safely  Pt will be independent and compliant with comprehensive HEP to maintain progress achieved in PT       Plan:Progress end range PROM  Date:2/19/24                 TX#: 8/10 approved   (Post op visit #5) Date:2/21/24                 TX#: 9/10 approved   (Post op visit #6) Date:2/26/24                 TX#: 1/15 approved   (Post op visit #7) Date:2/28/24                 TX#: 2/15 approved   (Post op visit #8) Date:3/4/24                 TX#: 3/15 approved   (Post op visit #9)   TherEx: 60 min  -PROM knee flex/ext to tolerance  -Heel slides w/ strap: x20, x10 no strap  -RSB HS curls: w/ strap x20, 5 sec hold  -SAQ 0#, 2x10  -prone quad stretch: 3x30 sec  -prone TKE's: 3x10  -LAQ: 0#, x10  -quad iso at 90 and 60 deg EOB: sub max 10 sec x10 each  -prone hang: 2x3 min, 2# weight  -lateral weight shifts: x20  -Gait training with WBAT and brace locked at 0: x3 min        Hold  Marching  Mini squats     TherEx: 55 min  -PROM knee flex/ext to tolerance  -Heel slides w/ strap: x20, x10 no strap  -SAQ 0#, 2x10  -prone quad stretch: 3x30 sec  -prone TKE's: 3x10  -LAQ: 0#, x10  -quad iso at 90 and 60 deg EOB: sub max 10 sec x10 each  -prone hang: 2x3 min, 4# weight  -Gait training with brace locked and no assistive device, brace unlocked w/ 1 crutch: x5 min  -standing TKE: Blue TB: x30, 5 sec  -heel raises: 2x10  -Marching w/ 2 HHA: x20  -Mini squats w/ 2  HHA: 2x10  -TKE w/ ball at wall: 5 sec [id number]   TherEx: 55 min  -bike: 7 min  -PROM knee flex/ext to tolerance  -Heel slides w/ strap: x20, x10 no strap  -RSB HS curls: x20 w/ strap  -RSB bridges w/ knee extended: 2x10  -prone quad stretch: 3x30 sec  -prone hang: 2x3 min, 4# weight  -Gait training with no brace and no crutches: 2x75 feet  -standing TKE: Blue TB: x30, 5 sec  -Gastroc stretch on 1/2 FR: 10 sec x10  -gastroc stretch on step: 3x30 sec  -step ups 4\" box, concentric, eccentric and combo x10 each  -Mini squats w/ 2 HHA: 2x10  -TKE w/ ball at wall: 5 sec x20      Hold   add leg press and S:N TherEx: 55 min  -bike: 7 min  -PROM knee flex/ext to tolerance  -Heel slides w/ strap: x20, x10 no strap  -prone quad stretch: 3x30 sec  -prone hang: 2x3 min, 4# weight  -Gait training with no brace and no crutches: 2x75 feet  -standing TKE: Gray TB: x30, 5 sec  -step ups: 6\" box, 2x10  -2-4\" anterior step down: 2x10  -Mini squats w/ 2 HHA: 2x10  -standing marching: x20  -DL leg press 75#, 2x10  -SL press:  37#, 2x10  -SLB: 3x30 sec-hold TherEx: 55 min  -bike: 5 min  -PROM knee flex/ext to tolerance  -Heel slides w/ strap: x20, x10 no strap  -prone quad stretch: 3x30 sec  -prone hang: 2x3 min, 4# weight  -SAQ: 0#, 2x10  -Gait training with no brace and no crutches: 2x75 feet  -standing TKE: Gray TB: x30, 5 sec  -step ups: 6\" box, 2x10, 8\" box x10  -4\" anterior step down: 2x10  -TRX Mini squats w/ 2 HHA: 2x10  -Sit to stand: x10 R>L  -DL leg press 87#, 2x10  -SL press:  45#, 2x10  -SLB: 3x30 sec          HEP:  Access Code: [email or web address]  URL: [email or web address]  Date: 02/21/2024  Prepared by: Batsheva Beckwith    Exercises  - Supine Heel Slide with Strap  - 3 x daily - 7 x weekly - 3 sets - 10 reps - 5 seconds hold  - Seated Knee Extension Stretch with Chair  - 1 x daily - 7 x weekly - 3 sets - 10 reps  - Supine Straight Leg Raises  - 2 x daily - 7 x weekly - 3 sets - 10 reps  - Sidelying Hip Abduction  - 2 x daily -  7 x weekly - 3 sets - 10 reps  - Seated Long Arc Quad  - 1 x daily - 7 x weekly - 3 sets - 10 reps  - Standing Heel Raises  - 1 x daily - 7 x weekly - 3 sets - 10 reps  - Standing Terminal Knee Extension with Resistance  - 1 x daily - 7 x weekly - 3 sets - 10 reps  - Standing March with Counter Support  - 1 x daily - 7 x weekly - 3 sets - 10 reps  - Mini Squat with Counter Support  - 1 x daily - 7 x weekly - 3 sets - 10 reps        +Provided mod-min assist w/ SLR  +Performed quad sets w/ Russian stim 10 sec on/0ff x5 min  +prone leg Ext 2x10    Charges: TherEx: 4 units,         Total Timed Treatment: 55 min  Total Treatment Time: 55 min

## 2024-03-08 NOTE — PROGRESS NOTES
Progress Summary  Pt has attended 10 visits in Physical Therapy.    Diagnosis:   RIGHT KNEE ARTHROSCOPY WITH ANTERIOR CRUCIATE LIGAMENT RECONSTRUCTION, HAMSTRING AUTOGRAFT, LATERAL MENISCUS REPAIR, AND SYNOVECTOMY AND MCL REPAIR        Referring Provider: Vu  Date of Evaluation:    2/7/24  Last PN: 2/26/24    Precautions:      Toe-touch weightbearing x 2 weeks, no range of motion restrictions. Crutches to be weaned by Physical Therapy.   Next MD visit:   none scheduled  Date of Surgery: 2/6/24  (5 weeks on 3/12/24)   Insurance Primary/Secondary: BLUE CROSS MEDICAID / N/A     # Auth Visits: 15 visits 2/26-4/26/24, auth #[id number]        Subjective: Pt reports she once again spent a lot of time walking over the weekend and her knee and lower leg swelled up and pain came back. She states she is still not negotiating stairs the normal way. She is able to walk longer distances and stand for longer period of time. She has returned to driving and improved ability to perform sit to stand transfers.   Pain: 0/10 currently, 2/10 after walking a lot       Objective:     AROM: (* denotes performed with pain)   Knee    Flexion: R 122; L 130   Extension: R 0; L +5      PROM: (* denotes performed with pain)   Knee    Flexion: R 132; L 135   Extension: R +3*; L +5     Strength/MMT: (* denotes performed with pain)  Hip Knee   Flexion: R 4-/5; L 4/5  Extension: R 4-/5; L 4/5  Abduction: R 4-/5; L 4-/5  ER: R 4-/5; L 4/5 Flexion: R 3+/5; L 4-/5  Extension: R 3+/5 (sub max testing); L 4/5        Balance:   SLS on solid surface: R> 30 sec, L >30 sec  SLS on airex: R 11 sec, L 5 sec     Gait: pt ambulates on level ground with lacking active TKE at heel strike.     Assessment: Pt  is nearly 5 weeks s/p (R) ACL reconstruction, MCL repair and lateral meniscus repair and is progressing gradually. She continues to have fluctuating (R) knee and lower leg edema and pain with increased activities like prolonged walking. She has  demonstrated improved (R) quad recruitment, R LE strength, gait mechanics, body mechanics and soft tissue mobility. These objective gains have allowed her to return to driving, bending her knee to put on shoes/socks, getting dressed, walking longer distances, prolonged standing, sit to stand transfers, and improved stair negotiation. She continues to demonstrate end range knee flexion and extension restrictions, LE strength, balance, gait mechanics and body mechanics. She continues to be limited functionally in stair negotiation, squatting, lunging, kneeling, walking over uneven terrains, jogging, and dancing. Pt will continue to benefit from continued PT to return pt to PLOF.        Goals:    (to be met in 6 weeks post op ~12 visits)   The patient will regain A/PROM knee flexion >135 degrees and 0 degrees of knee extension-Progressing  The patinet will present without pain or limiting swelling after 8 visits- -Progressing  The patient will demonstrate a full quadricep contraction and SLR without extension lag -Progressing  The patient will ambulate without visual deficiencies and ability to ambulate w/o hinged brace >3,000 steps/day -MET  The patient will demonstrate ability to ascend stairs with good alignment and control -Progressing        Goals: (to be met before but no later then 4 months post op)   Pt will improve quad strength to 5/5 to perform 10 full SL squats with good control and alignment.  Pt will increase hip and knee strength to grossly 5/5 to be able to perform agility training with good control and allignment  Pt will demonstrate increased hip ER/ABD strength to 5/5 to perform stepping, lunging and squatting and jumping activities without excessive femoral IR/ADD   Pt will demonstrate good control and mechanics with squatting and lunging activities in a full tx session with no cuing from therapist to facilitate return to lifting and hiking safely  Pt will be independent and compliant with  comprehensive HEP to maintain progress achieved in PT       Plan: Continue skilled Physical Therapy 2 x/week or a total of 12 visits over a 90 day period. Treatment will include: therEx, neuro re-ed, manual tech       Patient/Family/Caregiver was advised of these findings, precautions, and treatment options and has agreed to actively participate in planning and for this course of care.    Thank you for your referral. If you have any questions, please contact me at Dept: [phone].    Sincerely,  Electronically signed by therapist: Batsheva Beckwith, PT     Physician's certification required:  Yes  Please co-sign or sign and return this letter via fax as soon as possible to [phone].   I certify the need for these services furnished under this plan of treatment and while under my care.    X___________________________________________________ Date____________________    Certification From: 3/11/2024  To:6/9/2024    Date:2/21/24                 [place]#: 9/10 approved   (Post op visit #6) Date:2/26/24                 [place]#: 1/15 approved   (Post op visit #7) Date:2/28/24                 [place]#: 2/15 approved   (Post op visit #8) Date:3/4/24                 [place]#: 3/15 approved   (Post op visit #9) Date:3/11/24                 [place]#: 4/15 approved   (Post op visit #10)   TherEx: 55 min  -PROM knee flex/ext to tolerance  -Heel slides w/ strap: x20, x10 no strap  -SAQ 0#, 2x10  -prone quad stretch: 3x30 sec  -prone TKE's: 3x10  -LAQ: 0#, x10  -quad iso at 90 and 60 deg EOB: sub max 10 sec x10 each  -prone hang: 2x3 min, 4# weight  -Gait training with brace locked and no assistive device, brace unlocked w/ 1 crutch: x5 min  -standing TKE: Blue TB: x30, 5 sec  -heel raises: 2x10  -Marching w/ 2 HHA: x20  -Mini squats w/ 2 HHA: 2x10  -TKE w/ ball at wall: 5 sec [id number]   TherEx: 55 min  -bike: 7 min  -PROM knee flex/ext to tolerance  -Heel slides w/ strap: x20, x10 no strap  -RSB HS curls: x20 w/ strap  -RSB bridges w/ knee extended:  2x10  -prone quad stretch: 3x30 sec  -prone hang: 2x3 min, 4# weight  -Gait training with no brace and no crutches: 2x75 feet  -standing TKE: Blue TB: x30, 5 sec  -Gastroc stretch on 1/2 FR: 10 sec x10  -gastroc stretch on step: 3x30 sec  -step ups 4\" box, concentric, eccentric and combo x10 each  -Mini squats w/ 2 HHA: 2x10  -TKE w/ ball at wall: 5 sec x20      Hold   add leg press and S:N TherEx: 55 min  -bike: 7 min  -PROM knee flex/ext to tolerance  -Heel slides w/ strap: x20, x10 no strap  -prone quad stretch: 3x30 sec  -prone hang: 2x3 min, 4# weight  -Gait training with no brace and no crutches: 2x75 feet  -standing TKE: Gray TB: x30, 5 sec  -step ups: 6\" box, 2x10  -2-4\" anterior step down: 2x10  -Mini squats w/ 2 HHA: 2x10  -standing marching: x20  -DL leg press 75#, 2x10  -SL press:  37#, 2x10  -SLB: 3x30 sec-hold TherEx: 55 min  -bike: 5 min  -PROM knee flex/ext to tolerance  -Heel slides w/ strap: x20, x10 no strap  -prone quad stretch: 3x30 sec  -prone hang: 2x3 min, 4# weight  -SAQ: 0#, 2x10  -Gait training with no brace and no crutches: 2x75 feet  -standing TKE: Gray TB: x30, 5 sec  -step ups: 6\" box, 2x10, 8\" box x10  -4\" anterior step down: 2x10  -TRX Mini squats w/ 2 HHA: 2x10  -Sit to stand: x10 R>L  -DL leg press 87#, 2x10  -SL press:  45#, 2x10  -SLB: 3x30 sec TherEx: 45 min  -bike: 5 min  -PROM knee flex/ext to tolerance  -Heel slides w/ strap: x20, x10 no strap  -prone quad stretch: 3x30 sec  -prone hang: 2x3 min, 4# weight  -Gait training with no brace and no crutches: 2x75 feet  -standing TKE: Gray TB: x30, 5 sec  -SL press:  62#, 2x10  -SLB on airex: 3x30 sec          HEP:  Access Code: [email or web address]  URL: [email or web address]  Date: 02/21/2024  Prepared by: Batsheva Beckwith    Exercises  - Supine Heel Slide with Strap  - 3 x daily - 7 x weekly - 3 sets - 10 reps - 5 seconds hold  - Seated Knee Extension Stretch with Chair  - 1 x daily - 7 x weekly - 3 sets - 10 reps  - Supine Straight Leg  Raises  - 2 x daily - 7 x weekly - 3 sets - 10 reps  - Sidelying Hip Abduction  - 2 x daily - 7 x weekly - 3 sets - 10 reps  - Seated Long Arc Quad  - 1 x daily - 7 x weekly - 3 sets - 10 reps  - Standing Heel Raises  - 1 x daily - 7 x weekly - 3 sets - 10 reps  - Standing Terminal Knee Extension with Resistance  - 1 x daily - 7 x weekly - 3 sets - 10 reps  - Standing March with Counter Support  - 1 x daily - 7 x weekly - 3 sets - 10 reps  - Mini Squat with Counter Support  - 1 x daily - 7 x weekly - 3 sets - 10 reps        +Provided mod-min assist w/ SLR  +Performed quad sets w/ Russian stim 10 sec on/0ff x5 min  +prone leg Ext 2x10    Charges: TherEx: 3 units,         Total Timed Treatment: 45 min  Total Treatment Time: 45 min

## 2024-03-11 ENCOUNTER — OFFICE VISIT (OUTPATIENT)
Dept: PHYSICAL THERAPY | Age: 44
End: 2024-03-11
Attending: ORTHOPAEDIC SURGERY
Payer: MEDICAID

## 2024-03-11 ENCOUNTER — OFFICE VISIT (OUTPATIENT)
Dept: ORTHOPEDICS CLINIC | Facility: CLINIC | Age: 44
End: 2024-03-11
Payer: MEDICAID

## 2024-03-11 DIAGNOSIS — Z98.890 S/P ACL RECONSTRUCTION: Primary | ICD-10-CM

## 2024-03-11 PROCEDURE — 97110 THERAPEUTIC EXERCISES: CPT | Performed by: PHYSICAL THERAPIST

## 2024-03-11 PROCEDURE — 99024 POSTOP FOLLOW-UP VISIT: CPT | Performed by: PHYSICIAN ASSISTANT

## 2024-03-11 NOTE — PROGRESS NOTES
[facility]  [address] [place] [id number]  [phone]       Name: Sandee Rodriguez   MRN: [id number]  Date: 3/11/2024     REASON FOR VISIT: First Post-Surgical Visit   Surgery: Right knee ACL reconstruction with hamstring autograft, major synovectomy, lateral meniscus repair, MCL repair on 02/06/2024.     INTERVAL HISTORY:  Sandee Eddy is a 43 year old female who returns after the aforementioned procedure.  The post-operative course has been unremarkable with pain well controlled and overall progress noted.     Physical therapy was started and is progressing well.  The patient denies any calf pain or tenderness, fevers, chills, sweats or signs of active infection. The patient has been compliant with the postoperative protocol, and admits to normal bowel and bladder function. No acute issues.     ROS: ROS    PE:   There were no vitals filed for this visit.  Estimated body mass index is 38.44 kg/m² as calculated from the following:    Height as of 2/6/24: 5' 5\" (1.651 m).    Weight as of 2/6/24: 231 lb (104.8 kg).    Physical Exam  Constitutional:       Appearance: Normal appearance.   HENT:      Head: Normocephalic and atraumatic.   Eyes:      Extraocular Movements: Extraocular movements intact.   Neck:      Musculoskeletal: Normal range of motion and neck supple.   Cardiovascular:      Pulses: Normal pulses.   Pulmonary:      Effort: Pulmonary effort is normal. No respiratory distress.   Abdominal:      General: There is no distension.   Skin:     General: Skin is warm.      Capillary Refill: Capillary refill takes less than 2 seconds.      Findings: No bruising.   Neurological:      General: No focal deficit present.      Mental Status: She is alert.   Psychiatric:         Mood and Affect: Mood normal.     Examination of the right knee demonstrates:     Physical examination the patient is alert and oriented x3, well-developed, well-nourished, no acute  distress.     1A Lachman, full ROM.     Incisional sites are clean dry intact without signs of active pathology.  Calf is soft and nontender to palpation.    The contralateral knee is without limitation in range of motion or strength, no positive provocative maneuvers.       Radiographic Examination/Diagnostics:    I personally viewed, independently interpreted and radiology report was reviewed.      XR KNEE (3 VIEWS), RIGHT (CPT=73562)    Result Date: 2/12/2024  PROCEDURE:  XR KNEE ROUTINE (3 VIEWS), RIGHT (CPT=73562)  TECHNIQUE:  Three views were obtained including patellar view.  COMPARISON:  [facility], XR, XR KNEE, COMPLETE (4 OR MORE VIEWS), RIGHT (CPT=73564), 1/05/2024, 8:08 AM.  INDICATIONS:  PATIENT STATED HISTORY: (As transcribed by Technologist)  Patient states orthopedic follow up post op right knee on 2/6/24.     FINDINGS:  Small orthopedic metallic plate seen along the lateral aspect of the distal femoral diametaphysis.  There are lucent screw opacities seen within the proximal tibial diaphysis.  There is no acute fracture, subluxation or dislocation.  There is a small suprapatellar joint effusion.  Arterial vascular calcifications are noted.  There is mild patellofemoral compartment narrowing.            CONCLUSION:  Postoperative changes.  Small suprapatellar joint effusion.  Patellofemoral compartment osteoarthritis.  No fracture or dislocation.   LOCATION:  [facility]   Dictated by (CST): Marck Negrete MD on 2/12/2024 at 2:18 PM     Finalized by (CST): Marck Negrete MD on 2/12/2024 at 2:20 PM       XR FLUOROSCOPY C-ARM TIME LESS THAN 1 HOUR (CPT=76000)    Result Date: 2/6/2024  PROCEDURE:  XR FLUOROSCOPY C-ARM TIME <1 HOUR  (CPT=76000)  INDICATIONS:  ARTHROSCOPY  COMPARISON:  [facility], XR, XR SHOULDER, COMPLETE (MIN 2 VIEWS), LEFT (CPT=73030), 1/19/2024, 10:42 AM.   TECHNIQUE:  FLUOROSCOPY IMAGES OBTAINED:  6 images FLUOROSCOPY TIME:  Not reported due to technical/equipment error TECHNOLOGIST  TIME:  2 hours RADIATION DOSE (AIR KERMA PRODUCT):  Not recorded due to technical/equipment error  FINDINGS:  Intraoperative fluoroscopic guidance provided for arthroscopy.  Please see the operative report for further details.            CONCLUSION:  Intraoperative fluoroscopic guidance for arthroscopy as above.   LOCATION:  [id number]    Dictated by (CST): Zohaib Montelongo MD on 2/06/2024 at 12:34 PM     Finalized by (CST): Zohaib Montelongo MD on 2/06/2024 at 12:37 PM       XR SHOULDER, COMPLETE (MIN 2 VIEWS), LEFT (CPT=73030)    Result Date: 1/19/2024  PROCEDURE:  XR SHOULDER, COMPLETE (MIN 2 VIEWS), LEFT (CPT=73030)  TECHNIQUE:  Multiple views were obtained.  COMPARISON:  None.  INDICATIONS:  S46.002A Injury of left rotator cuff, initial encounter  PATIENT STATED HISTORY: (As transcribed by Technologist)  Patient here for ortho evaluation. Patient stated left shoulder injury a few weeks ago    FINDINGS:  Negative for fracture, dislocation, deformity or other acute bony abnormalities.  No soft tissue abnormalities.             CONCLUSION:  No acute fracture or other acute bony process.  LOCATION:  [facility]   Dictated by (CST): Rodriguez Garzon MD on 1/19/2024 at 11:40 AM     Finalized by (CST): Rodriguez Garzon MD on 1/19/2024 at 11:40 AM         IMPRESSION: Sandee Eddy is a 43 year old female who presents 4.5 weeks  s/p Right knee ACL reconstruction with hamstring autograft, major synovectomy, lateral meniscus repair, MCL repair on 02/06/2024.     PLAN:   We had a lengthy discussion with the patient regarding the patient's findings consistent with the expected postoperative course. We recommend continuation of physical therapy with rehabilitation efforts focused on strengthening, range of motion, functional ability, and return to baseline activity. The patient can continue to progress per protocol.    All questions were answered appropriately and the patient was in agreement with the treatment plan.        FOLLOW-UP:  Return to clinic in eight weeks. No imaging required at next visit.             Sincer ERIN Wade, PA-C Orthopedic Surgery / Sports Medicine Specialist  [facility]  [address] [place] [address] [id number] [place] [address].org  [email or web address]  t: [phone]  o: [phone]  f: [phone]    This note was dictated using Dragon software.  While it was briefly proofread prior to completion, some grammatical, spelling, and word choice errors due to dictation may still occur.

## 2024-03-13 ENCOUNTER — OFFICE VISIT (OUTPATIENT)
Dept: PHYSICAL THERAPY | Age: 44
End: 2024-03-13
Attending: ORTHOPAEDIC SURGERY
Payer: MEDICAID

## 2024-03-13 PROCEDURE — 97110 THERAPEUTIC EXERCISES: CPT | Performed by: PHYSICAL THERAPIST

## 2024-03-13 NOTE — PROGRESS NOTES
Diagnosis:   RIGHT KNEE ARTHROSCOPY WITH ANTERIOR CRUCIATE LIGAMENT RECONSTRUCTION, HAMSTRING AUTOGRAFT, LATERAL MENISCUS REPAIR, AND SYNOVECTOMY AND MCL REPAIR        Referring Provider: Vu  Date of Evaluation:    2/7/24  Last PN: 3/11/24    Precautions:      Toe-touch weightbearing x 2 weeks, no range of motion restrictions. Crutches to be weaned by Physical Therapy.   Next MD visit:   none scheduled  Date of Surgery: 2/6/24  (5 weeks on 3/12/24)   Insurance Primary/Secondary: BLUE CROSS MEDICAID / N/A     # Auth Visits: 15 visits 2/26-4/26/24, auth #[id number]        Subjective: Pt reports that her knee is not hurting today.     Pain: 0/10 currently, 2/10 after walking a lot       Objective:        PROM: (* denotes performed with pain)   Knee    Flexion: R 132; L 135   Extension: R +3*; L +5       Balance:   SLS on solid surface: R> 30 sec, L >30 sec  SLS on airex: R 11 sec, L 5 sec     Gait: pt ambulates on level ground with lacking active TKE at heel strike.--> improved at end of tx     Assessment: Pt pt continues to present to PT with lacking TKE but improved post manual tech. She is progressed with step up to march to promote LE strength and stability. She also prgressed with leg press resistance with no pain.       Goals:    (to be met in 6 weeks post op ~12 visits)   The patient will regain A/PROM knee flexion >135 degrees and 0 degrees of knee extension-Progressing  The patinet will present without pain or limiting swelling after 8 visits- -Progressing  The patient will demonstrate a full quadricep contraction and SLR without extension lag -Progressing  The patient will ambulate without visual deficiencies and ability to ambulate w/o hinged brace >3,000 steps/day -MET  The patient will demonstrate ability to ascend stairs with good alignment and control -Progressing        Goals: (to be met before but no later then 4 months post op)   Pt will improve quad strength to 5/5 to perform 10 full SL squats  with good control and alignment.  Pt will increase hip and knee strength to grossly 5/5 to be able to perform agility training with good control and allignment  Pt will demonstrate increased hip ER/ABD strength to 5/5 to perform stepping, lunging and squatting and jumping activities without excessive femoral IR/ADD   Pt will demonstrate good control and mechanics with squatting and lunging activities in a full tx session with no cuing from therapist to facilitate return to lifting and hiking safely  Pt will be independent and compliant with comprehensive HEP to maintain progress achieved in PT       Plan: Progress with SL strengthening and stability exercises as tolerated   Date:2/26/24                 TX#: 1/15 approved   (Post op visit #7) Date:2/28/24                 TX#: 2/15 approved   (Post op visit #8) Date:3/4/24                 TX#: 3/15 approved   (Post op visit #9) Date:3/11/24                 TX#: 4/15 approved   (Post op visit #10) Date:3/13/24                 TX#: 5/15 approved   (Post op visit #11)   TherEx: 55 min  -bike: 7 min  -PROM knee flex/ext to tolerance  -Heel slides w/ strap: x20, x10 no strap  -RSB HS curls: x20 w/ strap  -RSB bridges w/ knee extended: 2x10  -prone quad stretch: 3x30 sec  -prone hang: 2x3 min, 4# weight  -Gait training with no brace and no crutches: 2x75 feet  -standing TKE: Blue TB: x30, 5 sec  -Gastroc stretch on 1/2 FR: 10 sec x10  -gastroc stretch on step: 3x30 sec  -step ups 4\" box, concentric, eccentric and combo x10 each  -Mini squats w/ 2 HHA: 2x10  -TKE w/ ball at wall: 5 sec x20      Hold   add leg press and S:N TherEx: 55 min  -bike: 7 min  -PROM knee flex/ext to tolerance  -Heel slides w/ strap: x20, x10 no strap  -prone quad stretch: 3x30 sec  -prone hang: 2x3 min, 4# weight  -Gait training with no brace and no crutches: 2x75 feet  -standing TKE: Gray TB: x30, 5 sec  -step ups: 6\" box, 2x10  -2-4\" anterior step down: 2x10  -Mini squats w/ 2 HHA: 2x10  -standing  marching: x20  -DL leg press 75#, 2x10  -SL press:  37#, 2x10  -SLB: 3x30 sec-hold TherEx: 55 min  -bike: 5 min  -PROM knee flex/ext to tolerance  -Heel slides w/ strap: x20, x10 no strap  -prone quad stretch: 3x30 sec  -prone hang: 2x3 min, 4# weight  -SAQ: 0#, 2x10  -Gait training with no brace and no crutches: 2x75 feet  -standing TKE: Gray TB: x30, 5 sec  -step ups: 6\" box, 2x10, 8\" box x10  -4\" anterior step down: 2x10  -TRX Mini squats w/ 2 HHA: 2x10  -Sit to stand: x10 R>L  -DL leg press 87#, 2x10  -SL press:  45#, 2x10  -SLB: 3x30 sec TherEx: 45 min  -bike: 5 min  -PROM knee flex/ext to tolerance  -Heel slides w/ strap: x20, x10 no strap  -prone quad stretch: 3x30 sec  -prone hang: 2x3 min, 4# weight  -Gait training with no brace and no crutches: 2x75 feet  -standing TKE: Gray TB: x30, 5 sec  -SL press:  62#, 2x10  -SLB on airex: 3x30 sec TherEx: 60 min  -bike: 5 min  -PROM knee flex/ext to tolerance  -Heel slides w/ strap: x20, x10 no strap  -prone quad stretch: 3x30 sec  -prone hang: 2x3 min, 4# weight  -prone TKE: 0#, 2x10  -standing TKE: Gray TB: x30, 5 sec  -step ups to march: 6\" box, 2x10,   -4\" anterior step down: 2x10  -sit to stand R=L: x20, R>L x5 reps  -TRX Mini squats w/ 2 HHA: 2x10  -DL leg press 100#, 3x10  -SL press:  62#, 3x10  -SLB on airex: 3x30 sec          HEP:  Access Code: [email or web address]  URL: [email or web address]  Date: 02/21/2024  Prepared by: Batsheva Beckwith    Exercises  - Supine Heel Slide with Strap  - 3 x daily - 7 x weekly - 3 sets - 10 reps - 5 seconds hold  - Seated Knee Extension Stretch with Chair  - 1 x daily - 7 x weekly - 3 sets - 10 reps  - Supine Straight Leg Raises  - 2 x daily - 7 x weekly - 3 sets - 10 reps  - Sidelying Hip Abduction  - 2 x daily - 7 x weekly - 3 sets - 10 reps  - Seated Long Arc Quad  - 1 x daily - 7 x weekly - 3 sets - 10 reps  - Standing Heel Raises  - 1 x daily - 7 x weekly - 3 sets - 10 reps  - Standing Terminal Knee Extension with Resistance  -  1 x daily - 7 x weekly - 3 sets - 10 reps  - Standing March with Counter Support  - 1 x daily - 7 x weekly - 3 sets - 10 reps  - Mini Squat with Counter Support  - 1 x daily - 7 x weekly - 3 sets - 10 reps        +Provided mod-min assist w/ SLR  +Performed quad sets w/ Russian stim 10 sec on/0ff x5 min  +prone leg Ext 2x10    Charges: TherEx: 4 units,         Total Timed Treatment: 60 min  Total Treatment Time: 60 min

## 2024-03-18 ENCOUNTER — APPOINTMENT (OUTPATIENT)
Dept: PHYSICAL THERAPY | Age: 44
End: 2024-03-18
Payer: MEDICAID

## 2024-03-18 ENCOUNTER — OFFICE VISIT (OUTPATIENT)
Dept: PHYSICAL THERAPY | Age: 44
End: 2024-03-18
Attending: ORTHOPAEDIC SURGERY
Payer: MEDICAID

## 2024-03-18 DIAGNOSIS — E66.9 OBESITY (BMI 35.0-39.9 WITHOUT COMORBIDITY): Primary | ICD-10-CM

## 2024-03-18 PROCEDURE — 97110 THERAPEUTIC EXERCISES: CPT | Performed by: PHYSICAL THERAPIST

## 2024-03-18 RX ORDER — PHENTERMINE HYDROCHLORIDE 37.5 MG/1
37.5 TABLET ORAL
Qty: 30 TABLET | Refills: 0 | Status: SHIPPED | OUTPATIENT
Start: 2024-03-18 | End: 2024-04-25

## 2024-03-18 NOTE — PROGRESS NOTES
Progress Summary  Pt has attended 12 visits in Physical Therapy.    Diagnosis:   RIGHT KNEE ARTHROSCOPY WITH ANTERIOR CRUCIATE LIGAMENT RECONSTRUCTION, HAMSTRING AUTOGRAFT, LATERAL MENISCUS REPAIR, AND SYNOVECTOMY AND MCL REPAIR          Added 3/18/24:  Injury of left rotator cuff, initial encounter (S46.002A)    Referring Provider: Vu  Date of Evaluation:    2/7/24  Last PN: 3/11/24    Precautions:      Toe-touch weightbearing x 2 weeks, no range of motion restrictions. Crutches to be weaned by Physical Therapy.   Next MD visit:   none scheduled  Date of Surgery: 2/6/24  (ACL post op 6 weeks on 3/19/24)   Insurance Primary/Secondary: BLUE CROSS MEDICAID / N/A     # Auth Visits: 15 visits 2/26-4/26/24, auth #[id number]        Subjective: Pt reports that her knee is getting better. Able to walk more with less pain. The bending and straightening stretches still hurt but improving. Noticing less swelling in the (R) knee. She has been dealing with (L) shoulder pain after her skiing accident but has not addressed the problem due to the (L) knee injuries were more extensive and needed surgery. Now that the knee is getting better she would like to address the (L) shoulder pain she has been experiencing since her accident. She feels pinching in the (L) shoulder and it comes and goes at random and worsens with activities. She is having pain with simple overhead lifting, don/doff shirts, pulling up her pants, and reaching behind her back. She does not having any popping or clicking.     Pain: 0/10 currently in the knee, (L) shoulder pain up to a 9/10 with activities and 4/10 at rest      Objective:        PROM: (* denotes performed with pain)   Knee    Flexion: R 132; L 135   Extension: R +5*; L +5       Balance:   SLS on airex: R 11 sec, L 5 sec     AROM: (* denotes performed with pain)  Shoulder    Flexion: R 180; L 130*  Abduction: R 180; L 118*  ER: R 65; L 55*  IR each behind the back: R T7; L T10*        Strength/MMT: (* denotes performed with pain)  Shoulder   Flexion: R 4/5; L 4/5  Abduction: R 4+/5; L 4/5  ER: R 4/5; L 3+/5*  IR: R 5/5; L 4-/5        Special tests:   O'lorin: L +  Empty can: L +   Barrie morales: L +    Assessment: Pt has been seen in skilled PT for post op rehabilitation of (R) ACL reconstruction, MCL repair and lateral meniscus repair and has been progressing well. She has demonstrated improved (R) knee edema, ROM, balance, gait mechanics and strength which has improved her tolerance to walking, standing, performing light house cores, driving, sit to stand transfers and stair climbing. However, she would like to address her (L) shoulder problem at this time which also occurred during a skiing accident. She received a PT order from her orthopedic doctor prior to being able to perform an MRI. She reports shoulder pain with all AROM planes, demonstrates reduced (L) shoulder AROM and strength. These impairments limit her ability to reach to over head cabinets, lift >20 lbs, push/pulling tasks, hair grooming, sleeping, heavier house chores, don/doff shirts/pants/coats, and reach to the back of her back to tuck in a shirt. Pt's presentation is most consistent with injury to the rotator cuff and potentially a labrum injury. Pt will benefit form continued PT to promote return to PLOF.        Goals:    (to be met in 6 weeks post op ~12 visits)   The patient will regain A/PROM knee flexion >135 degrees and 0 degrees of knee extension-MET  The patinet will present without pain or limiting swelling after 8 visits- -MET  The patient will demonstrate a full quadricep contraction and SLR without extension lag -Progressing  The patient will ambulate without visual deficiencies and ability to ambulate w/o hinged brace >3,000 steps/day -MET  The patient will demonstrate ability to ascend stairs with good alignment and control -Progressing        Goals: (to be met before but no later then 4 months post op)    Pt will improve quad strength to 5/5 to perform 10 full SL squats with good control and alignment. -Progressing  Pt will increase hip and knee strength to grossly 5/5 to be able to perform agility training with good control and allignment -Progressing  Pt will demonstrate increased hip ER/ABD strength to 5/5 to perform stepping, lunging and squatting and jumping activities without excessive femoral IR/ADD -Progressing  Pt will demonstrate good control and mechanics with squatting and lunging activities in a full tx session with no cuing from therapist to facilitate return to lifting and hiking safely -Progressing  Pt will be independent and compliant with comprehensive HEP to maintain progress achieved in PT- on going     New (L) Shoulder goals added 3/18/24  Pt will report improved ability to sleep without waking due to shoulder pain   Pt will improve shoulder flexion AROM to >160 degrees to be able to reach into overhead cabinets without pain or restriction   Pt will improve shoulder abduction AROM to >160 degrees to improve ability to don deodorant, don/doff shirts, and wash hair   Pt will increase shoulder AROM ER to >65 to be able to reach and fasten seatbelt   Pt will increase shoulder AROM IR to T7 to be able to reach in back pocket, tuck in shirt, and turn steering wheel without pain  Pt will improve shoulder strength throughout to 4/5 to improve function with lifting >20 lbs over head for house chores   Pt will demonstrate increased mid/low trap strength to 4/5 to promote improved shoulder mechanics and stabilization with lifting and reaching   Pt will be independent and compliant with comprehensive HEP to maintain progress achieved in PT        LEFS Score  LEFS Score: 15 % (2/7/2024  5:05 PM)  Post LEFS Score  Post LEFS Score: 61.25 % (3/19/2024 12:35 PM)  46.25 % improvement      QuickDASH Outcome Score  Score: 59.09 % (3/19/2024 12:36 PM)      Plan: Continue skilled Physical Therapy 2 x/week or a total  of 12 visits over a 90 day period. Treatment will include: therEx, neuro re-ed, manual tech, modalities prn       Patient/Family/Caregiver was advised of these findings, precautions, and treatment options and has agreed to actively participate in planning and for this course of care.    Thank you for your referral. If you have any questions, please contact me at Dept: [phone].    Sincerely,  Electronically signed by therapist: Batsheva Beckwith PT     Physician's certification required:  Yes  Please co-sign or sign and return this letter via fax as soon as possible to [phone].   I certify the need for these services furnished under this plan of treatment and while under my care.    X___________________________________________________ Date____________________    Certification From: 3/19/2024  To:6/17/2024    Date:3/4/24                 TX#: 3/15 approved   (Post op visit #9) Date:3/11/24                 TX#: 4/15 approved   (Post op visit #10) Date:3/13/24                 TX#: 5/15 approved   (Post op visit #11) Date:3/18/24                 TX#: 6/15 approved   (Post op visit #12)   TherEx: 55 min  -bike: 5 min  -PROM knee flex/ext to tolerance  -Heel slides w/ strap: x20, x10 no strap  -prone quad stretch: 3x30 sec  -prone hang: 2x3 min, 4# weight  -SAQ: 0#, 2x10  -Gait training with no brace and no crutches: 2x75 feet  -standing TKE: Gray TB: x30, 5 sec  -step ups: 6\" box, 2x10, 8\" box x10  -4\" anterior step down: 2x10  -TRX Mini squats w/ 2 HHA: 2x10  -Sit to stand: x10 R>L  -DL leg press 87#, 2x10  -SL press:  45#, 2x10  -SLB: 3x30 sec TherEx: 45 min  -bike: 5 min  -PROM knee flex/ext to tolerance  -Heel slides w/ strap: x20, x10 no strap  -prone quad stretch: 3x30 sec  -prone hang: 2x3 min, 4# weight  -Gait training with no brace and no crutches: 2x75 feet  -standing TKE: Gray TB: x30, 5 sec  -SL press:  62#, 2x10  -SLB on airex: 3x30 sec TherEx: 60 min  -bike: 5 min  -PROM knee flex/ext to  tolerance  -Heel slides w/ strap: x20, x10 no strap  -prone quad stretch: 3x30 sec  -prone hang: 2x3 min, 4# weight  -prone TKE: 0#, 2x10  -standing TKE: Gray TB: x30, 5 sec  -step ups to march: 6\" box, 2x10,   -4\" anterior step down: 2x10  -sit to stand R=L: x20, R>L x5 reps  -TRX Mini squats w/ 2 HHA: 2x10  -DL leg press 100#, 3x10  -SL press:  62#, 3x10  -SLB on airex: 3x30 sec TherEx (R) knee: 45 min  -reassessment   -bike: 5 min  -PROM knee flex/ext to tolerance  -Heel slides w/ strap: x20  -prone quad stretch: 3x30 sec  -prone hang: 2x3 min, 4# weight  -standing TKE: Gray TB: x30, 5 sec  -step ups to march: 6\" box, 2x10,   -4\" anterior step down: 2x10  -sit to stand: R>L 2x10 reps  -TRX Mini squats w/ 2 HHA: 2x10  -RSB wall squats: x10  -DL leg press 118#, 2x10  -SL press:  62#, 3x10  -SLB on airex: 3x30 sec  -monster walk: RTB, 2x50 feet  -lateral walk: RTB, 2x50 feet  -SLB 3 way taps: 2x to fatigue            TherEx (L) shoulder: 10 min   -(L) shoulder assessment and patient education in POC    Hold for future  (L) shoulder PROM  -self inferior GH capsule stretch: 2 min  -supine AAROM flexion hands together: 2x10  -seated FR flexion and abduction stretches: 10 sec x10 reps  -Pulleys: 2' scap plane  -rows:   -shoulder extensions:  -ER at side walkouts:    HEP:  Access Code: [email or web address]  URL: [email or web address]  Date: 02/21/2024  Prepared by: Batsheva Beckwith    Exercises  - Supine Heel Slide with Strap  - 3 x daily - 7 x weekly - 3 sets - 10 reps - 5 seconds hold  - Seated Knee Extension Stretch with Chair  - 1 x daily - 7 x weekly - 3 sets - 10 reps  - Supine Straight Leg Raises  - 2 x daily - 7 x weekly - 3 sets - 10 reps  - Sidelying Hip Abduction  - 2 x daily - 7 x weekly - 3 sets - 10 reps  - Seated Long Arc Quad  - 1 x daily - 7 x weekly - 3 sets - 10 reps  - Standing Heel Raises  - 1 x daily - 7 x weekly - 3 sets - 10 reps  - Standing Terminal Knee Extension with Resistance  - 1 x daily - 7 x  weekly - 3 sets - 10 reps  - Standing March with Counter Support  - 1 x daily - 7 x weekly - 3 sets - 10 reps  - Mini Squat with Counter Support  - 1 x daily - 7 x weekly - 3 sets - 10 reps        +Provided mod-min assist w/ SLR  +Performed quad sets w/ Russian stim 10 sec on/0ff x5 min  +prone leg Ext 2x10    Charges: TherEx: 4 units,         Total Timed Treatment: 55 min  Total Treatment Time: 55 min

## 2024-03-18 NOTE — TELEPHONE ENCOUNTER
A refill request was received for:  Requested Prescriptions     Pending Prescriptions Disp Refills    PHENTERMINE HCL 37.5 MG Oral Tab [Pharmacy Med Name: PHENTERMINE 37.5MG TABLETS] 30 tablet 0     Sig: TAKE 1 TABLET(37.5 MG) BY MOUTH EVERY MORNING BEFORE BREAKFAST     Last refill date:  10/23/23      Last office visit: 1/22/24      Future Appointments   Date Time Provider Department Center   3/18/2024  3:45 PM Batsheva Beckwith, PT [facility]   3/25/2024  8:45 AM Batsheva Beckwith, PT [facility]   4/1/2024  8:45 AM Batsheva Beckwith, PT [facility]   4/2/2024  2:30 PM WDR MRI RM1 (1.5T WIDE) [facility]   4/3/2024  8:45 AM Btasheva Beckwith, PT [facility]   4/9/2024  8:15 AM Batsheva Beckwith, PT [facility]   4/16/2024  8:15 AM Batsheva Beckwith, PT [facility]   4/23/2024  8:15 AM Batsheva Beckwith, PT [facility]   4/30/2024  8:15 AM Batsheva Beckwith, PT [facility]   5/7/2024  8:15 AM Batsheva Beckwith, PT [facility]   5/13/2024  9:50 AM Beverly Womack MD [id number]   5/14/2024  8:15 AM Batsheva Beckwith, PT [facility]

## 2024-03-22 ENCOUNTER — TELEPHONE (OUTPATIENT)
Dept: ORTHOPEDICS CLINIC | Facility: CLINIC | Age: 44
End: 2024-03-22

## 2024-03-22 DIAGNOSIS — Z98.890 S/P ACL RECONSTRUCTION: ICD-10-CM

## 2024-03-22 DIAGNOSIS — S83.511A RUPTURE OF ANTERIOR CRUCIATE LIGAMENT OF RIGHT KNEE, INITIAL ENCOUNTER: Primary | ICD-10-CM

## 2024-03-25 ENCOUNTER — OFFICE VISIT (OUTPATIENT)
Dept: PHYSICAL THERAPY | Age: 44
End: 2024-03-25
Attending: ORTHOPAEDIC SURGERY
Payer: MEDICAID

## 2024-03-25 PROCEDURE — 97110 THERAPEUTIC EXERCISES: CPT | Performed by: PHYSICAL THERAPIST

## 2024-03-25 NOTE — PROGRESS NOTES
Diagnosis:   RIGHT KNEE ARTHROSCOPY WITH ANTERIOR CRUCIATE LIGAMENT RECONSTRUCTION, HAMSTRING AUTOGRAFT, LATERAL MENISCUS REPAIR, AND SYNOVECTOMY AND MCL REPAIR          Added 3/18/24:  Injury of left rotator cuff, initial encounter (S46.002A)    Referring Provider: Vu  Date of Evaluation:    2/7/24  Last PN: 3/18/24    Precautions:      Toe-touch weightbearing x 2 weeks, no range of motion restrictions. Crutches to be weaned by Physical Therapy.   Next MD visit:   none scheduled  Date of Surgery: 2/6/24  (ACL post op 6 weeks on 3/19/24)   Insurance Primary/Secondary: BLUE CROSS MEDICAID / N/A     # Auth Visits: 15 visits 2/26-4/26/24, auth #[id number]        Subjective: Pt states she was walking yesterday nothing out of the ordinary but having some aching in the knee getting out of bed.     Pain: 1/10 currently in the knee, (L) shoulder pain up to a 9/10 with activities and 4/10 at rest      Objective:     Observation: Pt resenting with antalgic gait pattern and lacking active TKE       AROM: (* denotes performed with pain)  Shoulder    Flexion: R 180; L 145*  Abduction: R 180; L 118*  ER: R 65; L 60*  IR each behind the back: R T7; L T10*       Assessment: Pt demonstrating end range shoulder restrictions in scaption and ER on (L) shoulder demonstrating mild adhesive capsulitis and shoulder impingement. Pt demonstrates improved shoulder flexion AROM post tx as compared to previous tx but she also took pain meds prior to PT. She continues to require cuing for end range active TKE during gait and with exercises.        Goals:    (to be met in 6 weeks post op ~12 visits)   The patient will regain A/PROM knee flexion >135 degrees and 0 degrees of knee extension-MET  The patinet will present without pain or limiting swelling after 8 visits- -MET  The patient will demonstrate a full quadricep contraction and SLR without extension lag -Progressing  The patient will ambulate without visual deficiencies and ability to  ambulate w/o hinged brace >3,000 steps/day -MET  The patient will demonstrate ability to ascend stairs with good alignment and control -Progressing        Goals: (to be met before but no later then 4 months post op)   Pt will improve quad strength to 5/5 to perform 10 full SL squats with good control and alignment. -Progressing  Pt will increase hip and knee strength to grossly 5/5 to be able to perform agility training with good control and allignment -Progressing  Pt will demonstrate increased hip ER/ABD strength to 5/5 to perform stepping, lunging and squatting and jumping activities without excessive femoral IR/ADD -Progressing  Pt will demonstrate good control and mechanics with squatting and lunging activities in a full tx session with no cuing from therapist to facilitate return to lifting and hiking safely -Progressing  Pt will be independent and compliant with comprehensive HEP to maintain progress achieved in PT- on going     New (L) Shoulder goals added 3/18/24  Pt will report improved ability to sleep without waking due to shoulder pain   Pt will improve shoulder flexion AROM to >160 degrees to be able to reach into overhead cabinets without pain or restriction   Pt will improve shoulder abduction AROM to >160 degrees to improve ability to don deodorant, don/doff shirts, and wash hair   Pt will increase shoulder AROM ER to >65 to be able to reach and fasten seatbelt   Pt will increase shoulder AROM IR to T7 to be able to reach in back pocket, tuck in shirt, and turn steering wheel without pain  Pt will improve shoulder strength throughout to 4/5 to improve function with lifting >20 lbs over head for house chores   Pt will demonstrate increased mid/low trap strength to 4/5 to promote improved shoulder mechanics and stabilization with lifting and reaching   Pt will be independent and compliant with comprehensive HEP to maintain progress achieved in PT            Plan: Progress (R) knee strength/stability  and address (L) shoulder AAROM/gentle strengthening  Date:3/11/24                 TX#: 4/15 approved   (Post op visit #10) Date:3/13/24                 TX#: 5/15 approved   (Post op visit #11) Date:3/18/24                 TX#: 6/15 approved   (Post op visit #12) Date:3/25/24                 TX#: 7/15 approved   (Post op visit #13)      Manual Tech: 5 min  (L) GH post and inferior glide, level III-IV   TherEx: 45 min  -bike: 5 min  -PROM knee flex/ext to tolerance  -Heel slides w/ strap: x20, x10 no strap  -prone quad stretch: 3x30 sec  -prone hang: 2x3 min, 4# weight  -Gait training with no brace and no crutches: 2x75 feet  -standing TKE: Gray TB: x30, 5 sec  -SL press:  62#, 2x10  -SLB on airex: 3x30 sec TherEx: 60 min  -bike: 5 min  -PROM knee flex/ext to tolerance  -Heel slides w/ strap: x20, x10 no strap  -prone quad stretch: 3x30 sec  -prone hang: 2x3 min, 4# weight  -prone TKE: 0#, 2x10  -standing TKE: Gray TB: x30, 5 sec  -step ups to march: 6\" box, 2x10,   -4\" anterior step down: 2x10  -sit to stand R=L: x20, R>L x5 reps  -TRX Mini squats w/ 2 HHA: 2x10  -DL leg press 100#, 3x10  -SL press:  62#, 3x10  -SLB on airex: 3x30 sec TherEx (R) knee: 45 min  -reassessment   -bike: 5 min  -PROM knee flex/ext to tolerance  -Heel slides w/ strap: x20  -prone quad stretch: 3x30 sec  -prone hang: 2x3 min, 4# weight  -standing TKE: Gray TB: x30, 5 sec  -step ups to march: 6\" box, 2x10,   -4\" anterior step down: 2x10  -sit to stand: R>L 2x10 reps  -TRX Mini squats w/ 2 HHA: 2x10  -RSB wall squats: x10  -DL leg press 118#, 2x10  -SL press:  62#, 3x10  -SLB on airex: 3x30 sec  -monster walk: RTB, 2x50 feet  -lateral walk: RTB, 2x50 feet  -SLB 3 way taps: 2x to fatigue       TherEx (R) knee: 45 min  -bike: 5 min  -PROM knee flex/ext to tolerance  -prone hang: 2x3 min, 4# weight  -step ups to march: 6\" box, 3x10  -lateral step up: 2x10  -RSB wall squats: x10  -DL leg press 118#, 2x10  -SL press:  62#, 3x10  -SLB on airex: 3x30  sec  -SLB 3 way taps: 2x to fatigue     TherEx (L) shoulder: 10 min   -(L) shoulder assessment and patient education in POC    Hold for future  (L) shoulder PROM  -self inferior GH capsule stretch: 2 min  -supine AAROM flexion hands together: 2x10  -seated FR flexion and abduction stretches: 10 sec x10 reps  -Pulleys: 2' scap plane  -rows:   -shoulder extensions:  -ER at side walkouts:  TherEx (L) shoulder: 10 min   -(L) shoulder PROM  -self inferior GH capsule stretch: 2 min  -supine AAROM flexion w/ stick: 2x10  -wall wash: flexion x20 reps        Hold for future  -Pulleys: 2' scap plane  -rows:   -shoulder extensions:  -ER at side walkouts:    HEP:  Access Code: [email or web address]  URL: [email or web address]  Date: 02/21/2024  Prepared by: Batsheva Beckwith    Exercises  - Supine Heel Slide with Strap  - 3 x daily - 7 x weekly - 3 sets - 10 reps - 5 seconds hold  - Seated Knee Extension Stretch with Chair  - 1 x daily - 7 x weekly - 3 sets - 10 reps  - Supine Straight Leg Raises  - 2 x daily - 7 x weekly - 3 sets - 10 reps  - Sidelying Hip Abduction  - 2 x daily - 7 x weekly - 3 sets - 10 reps  - Seated Long Arc Quad  - 1 x daily - 7 x weekly - 3 sets - 10 reps  - Standing Heel Raises  - 1 x daily - 7 x weekly - 3 sets - 10 reps  - Standing Terminal Knee Extension with Resistance  - 1 x daily - 7 x weekly - 3 sets - 10 reps  - Standing March with Counter Support  - 1 x daily - 7 x weekly - 3 sets - 10 reps  - Mini Squat with Counter Support  - 1 x daily - 7 x weekly - 3 sets - 10 reps        +Provided mod-min assist w/ SLR  +Performed quad sets w/ Russian stim 10 sec on/0ff x5 min  +prone leg Ext 2x10    Charges: TherEx: 4 units         Total Timed Treatment: 60 min  Total Treatment Time: 60 min

## 2024-03-25 NOTE — PROGRESS NOTES
Diagnosis:   RIGHT KNEE ARTHROSCOPY WITH ANTERIOR CRUCIATE LIGAMENT RECONSTRUCTION, HAMSTRING AUTOGRAFT, LATERAL MENISCUS REPAIR, AND SYNOVECTOMY AND MCL REPAIR          Added 3/18/24:  Injury of left rotator cuff, initial encounter (S46.002A)    Referring Provider: Vu  Date of Evaluation:    2/7/24  Last PN: 3/18/24    Precautions:      Toe-touch weightbearing x 2 weeks, no range of motion restrictions. Crutches to be weaned by Physical Therapy.   Next MD visit:   none scheduled  Date of Surgery: 2/6/24  (ACL post op 6 weeks on 3/19/24)   Insurance Primary/Secondary: BLUE CROSS MEDICAID / N/A     # Auth Visits: 15 visits 2/26-4/26/24, auth #[id number]        Subjective: Pt states her leg got swollen yesterday and again some pain walking on it first thing in the morning. Shoulder pain persists and worse with specific movements.     Pain: 2/10 currently in the knee, (L) shoulder pain up to a 4/10 with reaching      Objective:     Observation: Pt resenting with antalgic gait pattern and lacking active TKE --> improved as compared to last tx but still lacking few degrees of knee extension.        AROM: (* denotes performed with pain)  Shoulder    Flexion: R 180; L 145*-NT  Abduction: R 180; L 118*-NT  ER: R 65; L 60*-NT  IR each behind the back: R T7; L T10*-NT      (L) shoulder PROM: ~150 flexion, 80 deg of ER in 90 abd    Assessment: Pt demonstrating improved tolerance to manual tech to the (L) GH joint mobilizations and PROM. She is progressed with gentle AROM of shoulder abd and ER in gravity minimized positions. Her knee extension improved post manual tech and stretching and her gait improved at end of tx. Pt educated on importance of performing knee extensions stretches regularly through out the day to promote improved gait mechanics and quad activation.       Goals:    (to be met in 6 weeks post op ~12 visits)   The patient will regain A/PROM knee flexion >135 degrees and 0 degrees of knee  extension-MET  The patinet will present without pain or limiting swelling after 8 visits- -MET  The patient will demonstrate a full quadricep contraction and SLR without extension lag -Progressing  The patient will ambulate without visual deficiencies and ability to ambulate w/o hinged brace >3,000 steps/day -MET  The patient will demonstrate ability to ascend stairs with good alignment and control -Progressing        Goals: (to be met before but no later then 4 months post op)   Pt will improve quad strength to 5/5 to perform 10 full SL squats with good control and alignment. -Progressing  Pt will increase hip and knee strength to grossly 5/5 to be able to perform agility training with good control and allignment -Progressing  Pt will demonstrate increased hip ER/ABD strength to 5/5 to perform stepping, lunging and squatting and jumping activities without excessive femoral IR/ADD -Progressing  Pt will demonstrate good control and mechanics with squatting and lunging activities in a full tx session with no cuing from therapist to facilitate return to lifting and hiking safely -Progressing  Pt will be independent and compliant with comprehensive HEP to maintain progress achieved in PT- on going     New (L) Shoulder goals added 3/18/24  Pt will report improved ability to sleep without waking due to shoulder pain   Pt will improve shoulder flexion AROM to >160 degrees to be able to reach into overhead cabinets without pain or restriction   Pt will improve shoulder abduction AROM to >160 degrees to improve ability to don deodorant, don/doff shirts, and wash hair   Pt will increase shoulder AROM ER to >65 to be able to reach and fasten seatbelt   Pt will increase shoulder AROM IR to T7 to be able to reach in back pocket, tuck in shirt, and turn steering wheel without pain  Pt will improve shoulder strength throughout to 4/5 to improve function with lifting >20 lbs over head for house chores   Pt will demonstrate  increased mid/low trap strength to 4/5 to promote improved shoulder mechanics and stabilization with lifting and reaching   Pt will be independent and compliant with comprehensive HEP to maintain progress achieved in PT        Plan: Progress (R) knee strength/stability and address (L) shoulder AAROM/gentle strengthening, progress gentle parascapular and ER strengthening  Date:3/11/24                 TX#: 4/15 approved   (Post op visit #10) Date:3/13/24                 TX#: 5/15 approved   (Post op visit #11) Date:3/18/24                 TX#: 6/15 approved   (Post op visit #12) Date:3/25/24                 TX#: 7/15 approved   (Post op visit #13) Date:3/26/24                 TX#: 8/15 approved   (Post op visit #14)      Manual Tech: 5 min  (L) GH post and inferior glide, level III-IV Manual Tech: 5 min  (L) GH post and inferior glide, level III-IV   TherEx: 45 min  -bike: 5 min  -PROM knee flex/ext to tolerance  -Heel slides w/ strap: x20, x10 no strap  -prone quad stretch: 3x30 sec  -prone hang: 2x3 min, 4# weight  -Gait training with no brace and no crutches: 2x75 feet  -standing TKE: Gray TB: x30, 5 sec  -SL press:  62#, 2x10  -SLB on airex: 3x30 sec TherEx: 60 min  -bike: 5 min  -PROM knee flex/ext to tolerance  -Heel slides w/ strap: x20, x10 no strap  -prone quad stretch: 3x30 sec  -prone hang: 2x3 min, 4# weight  -prone TKE: 0#, 2x10  -standing TKE: Gray TB: x30, 5 sec  -step ups to march: 6\" box, 2x10,   -4\" anterior step down: 2x10  -sit to stand R=L: x20, R>L x5 reps  -TRX Mini squats w/ 2 HHA: 2x10  -DL leg press 100#, 3x10  -SL press:  62#, 3x10  -SLB on airex: 3x30 sec TherEx (R) knee: 45 min  -reassessment   -bike: 5 min  -PROM knee flex/ext to tolerance  -Heel slides w/ strap: x20  -prone quad stretch: 3x30 sec  -prone hang: 2x3 min, 4# weight  -standing TKE: Gray TB: x30, 5 sec  -step ups to march: 6\" box, 2x10,   -4\" anterior step down: 2x10  -sit to stand: R>L 2x10 reps  -TRX Mini squats w/ 2 HHA:  2x10  -RSB wall squats: x10  -DL leg press 118#, 2x10  -SL press:  62#, 3x10  -SLB on airex: 3x30 sec  -monster walk: RTB, 2x50 feet  -lateral walk: RTB, 2x50 feet  -SLB 3 way taps: 2x to fatigue       TherEx (R) knee: 45 min  -bike: 5 min  -PROM knee flex/ext to tolerance  -prone hang: 2x3 min, 4# weight  -step ups to march: 6\" box, 3x10  -lateral step up: 2x10  -RSB wall squats: x10  -DL leg press 118#, 2x10  -SL press:  62#, 3x10  -SLB on airex: 3x30 sec  -SLB 3 way taps: 2x to fatigue TherEx (R) knee: 15 min  -bike/UBE: 5 min  -PROM knee flex/ext to tolerance  -prone hang: 2x3 min, 4# weight  -step ups to march: 6\" box, 3x10  -lateral step up 6\" nox to march: 2x10  -2\" step downs: 2x10       TherEx (L) shoulder: 10 min   -(L) shoulder assessment and patient education in POC    Hold for future  (L) shoulder PROM  -self inferior GH capsule stretch: 2 min  -supine AAROM flexion hands together: 2x10  -seated FR flexion and abduction stretches: 10 sec x10 reps  -Pulleys: 2' scap plane  -rows:   -shoulder extensions:  -ER at side walkouts:  TherEx (L) shoulder: 10 min   -(L) shoulder PROM  -self inferior GH capsule stretch: 2 min  -supine AAROM flexion w/ stick: 2x10  -wall wash: flexion x20 reps        Hold for future  -Pulleys: 2' scap plane  -rows:   -shoulder extensions:  -ER at side walkouts:  TherEx (L) shoulder: 25 min   -(L) shoulder PROM  -supine AAROM flexion w/ stick: 2x10  -s/l shoulder abd: 0#, 2x10  -s/l shoulder ER: 0#, 2x10  -wall wash: flexion x20 reps        Hold for future  -Pulleys: 2' scap plane  -rows:   -shoulder extensions:  -ER at side walkouts:    HEP:  Access Code: [email or web address]  URL: [email or web address]  Date: 02/21/2024  Prepared by: Batsheva Beckwith    Exercises  - Supine Heel Slide with Strap  - 3 x daily - 7 x weekly - 3 sets - 10 reps - 5 seconds hold  - Seated Knee Extension Stretch with Chair  - 1 x daily - 7 x weekly - 3 sets - 10 reps  - Supine Straight Leg Raises  - 2 x daily - 7  x weekly - 3 sets - 10 reps  - Sidelying Hip Abduction  - 2 x daily - 7 x weekly - 3 sets - 10 reps  - Seated Long Arc Quad  - 1 x daily - 7 x weekly - 3 sets - 10 reps  - Standing Heel Raises  - 1 x daily - 7 x weekly - 3 sets - 10 reps  - Standing Terminal Knee Extension with Resistance  - 1 x daily - 7 x weekly - 3 sets - 10 reps  - Standing March with Counter Support  - 1 x daily - 7 x weekly - 3 sets - 10 reps  - Mini Squat with Counter Support  - 1 x daily - 7 x weekly - 3 sets - 10 reps        +Provided mod-min assist w/ SLR  +Performed quad sets w/ Russian stim 10 sec on/0ff x5 min  +prone leg Ext 2x10    Charges: TherEx: 4 units         Total Timed Treatment: 45 min  Total Treatment Time: 45 min

## 2024-03-26 ENCOUNTER — OFFICE VISIT (OUTPATIENT)
Dept: PHYSICAL THERAPY | Age: 44
End: 2024-03-26
Attending: ORTHOPAEDIC SURGERY
Payer: MEDICAID

## 2024-03-26 PROCEDURE — 97110 THERAPEUTIC EXERCISES: CPT | Performed by: PHYSICAL THERAPIST

## 2024-04-01 ENCOUNTER — OFFICE VISIT (OUTPATIENT)
Dept: PHYSICAL THERAPY | Age: 44
End: 2024-04-01
Attending: ORTHOPAEDIC SURGERY
Payer: MEDICAID

## 2024-04-01 PROCEDURE — 97110 THERAPEUTIC EXERCISES: CPT | Performed by: PHYSICAL THERAPIST

## 2024-04-01 NOTE — PROGRESS NOTES
Diagnosis:   RIGHT KNEE ARTHROSCOPY WITH ANTERIOR CRUCIATE LIGAMENT RECONSTRUCTION, HAMSTRING AUTOGRAFT, LATERAL MENISCUS REPAIR, AND SYNOVECTOMY AND MCL REPAIR          Added 3/18/24:  Injury of left rotator cuff, initial encounter (S46.002A)    Referring Provider: Vu  Date of Evaluation:    2/7/24  Last PN: 3/18/24    Precautions:      Toe-touch weightbearing x 2 weeks, no range of motion restrictions. Crutches to be weaned by Physical Therapy.   Next MD visit:   none scheduled  Date of Surgery: 2/6/24  (ACL post op 6 weeks on 3/19/24)   Insurance Primary/Secondary: BLUE CROSS MEDICAID / N/A     # Auth Visits: 15 visits 2/26-4/26/24, auth #[id number]        Subjective: Pt states she is still having pain in her (L) shoulder with sleeping and ADL's. Her knee is only hurting when going up the stairs. The shoulder gets worse with certain movements.     Pain: 0/10 currently in the knee, (L) shoulder 2/10       Objective:     Observation: Pt resenting with mildly lacking TKE       AROM: (* denotes performed with pain)  Shoulder    Flexion: R 180; L 145*  Abduction: R 180; L 135*  ER: R 65; L 60*  IR each behind the back: R T7; L T9*        Assessment: Pt demonstrating with improved AROM of shoulder abduction. She is progressed with lunges and Tajik squats to promote quad strength and tolerates these well with fatige and no increase in knee pain. She is progressed with gentle RTC strengthening and endurance training. She tolerates this well.     Goals:    (to be met in 6 weeks post op ~12 visits)   The patient will regain A/PROM knee flexion >135 degrees and 0 degrees of knee extension-MET  The patinet will present without pain or limiting swelling after 8 visits- -MET  The patient will demonstrate a full quadricep contraction and SLR without extension lag -Progressing  The patient will ambulate without visual deficiencies and ability to ambulate w/o hinged brace >3,000 steps/day -MET  The patient will  demonstrate ability to ascend stairs with good alignment and control -Progressing        Goals: (to be met before but no later then 4 months post op)   Pt will improve quad strength to 5/5 to perform 10 full SL squats with good control and alignment. -Progressing  Pt will increase hip and knee strength to grossly 5/5 to be able to perform agility training with good control and allignment -Progressing  Pt will demonstrate increased hip ER/ABD strength to 5/5 to perform stepping, lunging and squatting and jumping activities without excessive femoral IR/ADD -Progressing  Pt will demonstrate good control and mechanics with squatting and lunging activities in a full tx session with no cuing from therapist to facilitate return to lifting and hiking safely -Progressing  Pt will be independent and compliant with comprehensive HEP to maintain progress achieved in PT- on going     New (L) Shoulder goals added 3/18/24  Pt will report improved ability to sleep without waking due to shoulder pain   Pt will improve shoulder flexion AROM to >160 degrees to be able to reach into overhead cabinets without pain or restriction   Pt will improve shoulder abduction AROM to >160 degrees to improve ability to don deodorant, don/doff shirts, and wash hair   Pt will increase shoulder AROM ER to >65 to be able to reach and fasten seatbelt   Pt will increase shoulder AROM IR to T7 to be able to reach in back pocket, tuck in shirt, and turn steering wheel without pain  Pt will improve shoulder strength throughout to 4/5 to improve function with lifting >20 lbs over head for house chores   Pt will demonstrate increased mid/low trap strength to 4/5 to promote improved shoulder mechanics and stabilization with lifting and reaching   Pt will be independent and compliant with comprehensive HEP to maintain progress achieved in PT        Plan: Progress (R) knee strength/stability and address (L) shoulder AAROM/gentle strengthening, progress gentle  parascapular and ER strengthening  Date:3/18/24                 TX#: 6/15 approved   (Post op visit #12) Date:3/25/24                 TX#: 7/15 approved   (Post op visit #13) Date:3/26/24                 TX#: 8/15 approved   (Post op visit #14) Date:4/1/24                 TX#: 9/15 approved   (Post op visit #15)    Manual Tech: 5 min  (L) GH post and inferior glide, level III-IV Manual Tech: 5 min  (L) GH post and inferior glide, level III-IV Manual Tech: 5 min  (L) GH post and inferior glide, level III-IV   TherEx (R) knee: 45 min  -reassessment   -bike: 5 min  -PROM knee flex/ext to tolerance  -Heel slides w/ strap: x20  -prone quad stretch: 3x30 sec  -prone hang: 2x3 min, 4# weight  -standing TKE: Gray TB: x30, 5 sec  -step ups to march: 6\" box, 2x10,   -4\" anterior step down: 2x10  -sit to stand: R>L 2x10 reps  -TRX Mini squats w/ 2 HHA: 2x10  -RSB wall squats: x10  -DL leg press 118#, 2x10  -SL press:  62#, 3x10  -SLB on airex: 3x30 sec  -monster walk: RTB, 2x50 feet  -lateral walk: RTB, 2x50 feet  -SLB 3 way taps: 2x to fatigue       TherEx (R) knee: 45 min  -bike: 5 min  -PROM knee flex/ext to tolerance  -prone hang: 2x3 min, 4# weight  -step ups to march: 6\" box, 3x10  -lateral step up: 2x10  -RSB wall squats: x10  -DL leg press 118#, 2x10  -SL press:  62#, 3x10  -SLB on airex: 3x30 sec  -SLB 3 way taps: 2x to fatigue TherEx (R) knee: 15 min  -bike/UBE: 5 min  -PROM knee flex/ext to tolerance  -prone hang: 2x3 min, 4# weight  -step ups to march: 6\" box, 3x10  -lateral step up 6\" nox to march: 2x10  -2\" step downs: 2x10   TherEx (R) knee: 25 min  -bike/UBE: 5 min  -PROM knee flex/ext to tolerance  -prone hang: 2x3 min, 4# weight  -step ups to march: 6\" box, 3x10  -2\" step downs: 2x10  -mini lunge at bar: 2x10  -SL press:  75#, 3x10  -SLB on airex: 3x30 sec  -Honduran squat: 2 HHA, 2x10         TherEx (L) shoulder: 10 min   -(L) shoulder assessment and patient education in POC    Hold for future  (L) shoulder  PROM  -self inferior GH capsule stretch: 2 min  -supine AAROM flexion hands together: 2x10  -seated FR flexion and abduction stretches: 10 sec x10 reps  -Pulleys: 2' scap plane  -rows:   -shoulder extensions:  -ER at side walkouts:  TherEx (L) shoulder: 10 min   -(L) shoulder PROM  -self inferior GH capsule stretch: 2 min  -supine AAROM flexion w/ stick: 2x10  -wall wash: flexion x20 reps        Hold for future  -Pulleys: 2' scap plane  -rows:   -shoulder extensions:  -ER at side walkouts:  TherEx (L) shoulder: 25 min   -(L) shoulder PROM  -supine AAROM flexion w/ stick: 2x10  -s/l shoulder abd: 0#, 2x10  -s/l shoulder ER: 0#, 2x10  -wall wash: flexion x20 reps        Hold for future  -Pulleys: 2' scap plane  -rows:   -shoulder extensions:  -ER at side walkouts:  TherEx (L) shoulder: 30 min   -(L) shoulder PROM  -supine AAROM flexion w/ stick: 2x10  -s/l shoulder abd: 2#, 3x10  -s/l shoulder ER: 2#, x20, x10  -wall wash: flexion x20 reps  -ER at side walkouts: BTB, 2x10        Hold for future  -Pulleys: 2' scap plane  -rows:   -shoulder extensions:     HEP:  Access Code: [email or web address]  URL: [email or web address]  Date: 02/21/2024  Prepared by: Batsheva Beckwith    Exercises  - Supine Heel Slide with Strap  - 3 x daily - 7 x weekly - 3 sets - 10 reps - 5 seconds hold  - Seated Knee Extension Stretch with Chair  - 1 x daily - 7 x weekly - 3 sets - 10 reps  - Supine Straight Leg Raises  - 2 x daily - 7 x weekly - 3 sets - 10 reps  - Sidelying Hip Abduction  - 2 x daily - 7 x weekly - 3 sets - 10 reps  - Seated Long Arc Quad  - 1 x daily - 7 x weekly - 3 sets - 10 reps  - Standing Heel Raises  - 1 x daily - 7 x weekly - 3 sets - 10 reps  - Standing Terminal Knee Extension with Resistance  - 1 x daily - 7 x weekly - 3 sets - 10 reps  - Standing March with Counter Support  - 1 x daily - 7 x weekly - 3 sets - 10 reps  - Mini Squat with Counter Support  - 1 x daily - 7 x weekly - 3 sets - 10 reps        +Provided mod-min  assist w/ SLR  +Performed quad sets w/ Russian stim 10 sec on/0ff x5 min  +prone leg Ext 2x10    Charges: TherEx: 4 units         Total Timed Treatment: 60 min  Total Treatment Time: 60 min

## 2024-04-03 ENCOUNTER — OFFICE VISIT (OUTPATIENT)
Dept: PHYSICAL THERAPY | Age: 44
End: 2024-04-03
Attending: ORTHOPAEDIC SURGERY
Payer: MEDICAID

## 2024-04-03 PROCEDURE — 97110 THERAPEUTIC EXERCISES: CPT | Performed by: PHYSICAL THERAPIST

## 2024-04-03 NOTE — PROGRESS NOTES
Diagnosis:   RIGHT KNEE ARTHROSCOPY WITH ANTERIOR CRUCIATE LIGAMENT RECONSTRUCTION, HAMSTRING AUTOGRAFT, LATERAL MENISCUS REPAIR, AND SYNOVECTOMY AND MCL REPAIR          Added 3/18/24:  Injury of left rotator cuff, initial encounter (S46.002A)    Referring Provider: Vu  Date of Evaluation:    2/7/24  Last PN: 3/18/24    Precautions:      Toe-touch weightbearing x 2 weeks, no range of motion restrictions. Crutches to be weaned by Physical Therapy.   Next MD visit:   none scheduled  Date of Surgery: 2/6/24  (ACL post op 6 weeks on 3/19/24)   Insurance Primary/Secondary: BLUE CROSS MEDICAID / N/A     # Auth Visits: 15 visits 2/26-4/26/24, auth #[id number]        Subjective: Pt states she does not think the therapy is helping her (L) shoulder. She took pain medications to help her get through PT today. She leaned into a heavy door at a restaurant and it caused a lot of (L) shoulder pain.     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 Focused today's tx on promoting (R) LE strength. Updated her HEP today to promote strengthening at home. She is not able to progress to a higher step up today due to \"pinching pain in the knee\" but tolerates well at 6\" box. She is instructed to perform step downs to depth she can tolerate w/o pain.     Goals:    (to be met in 6 weeks post op ~12 visits)   The patient will regain A/PROM knee flexion >135 degrees and 0 degrees of knee extension-MET  The patinet will present without pain or limiting swelling after 8 visits- -MET  The  patient will demonstrate a full quadricep contraction and SLR without extension lag -Progressing  The patient will ambulate without visual deficiencies and ability to ambulate w/o hinged brace >3,000 steps/day -MET  The patient will demonstrate ability to ascend stairs with good alignment and control -Progressing        Goals: (to be met before but no later then 4 months post op)   Pt will improve quad strength to 5/5 to perform 10 full SL squats with good control and alignment. -Progressing  Pt will increase hip and knee strength to grossly 5/5 to be able to perform agility training with good control and allignment -Progressing  Pt will demonstrate increased hip ER/ABD strength to 5/5 to perform stepping, lunging and squatting and jumping activities without excessive femoral IR/ADD -Progressing  Pt will demonstrate good control and mechanics with squatting and lunging activities in a full tx session with no cuing from therapist to facilitate return to lifting and hiking safely -Progressing  Pt will be independent and compliant with comprehensive HEP to maintain progress achieved in PT- on going     New (L) Shoulder goals added 3/18/24  Pt will report improved ability to sleep without waking due to shoulder pain   Pt will improve shoulder flexion AROM to >160 degrees to be able to reach into overhead cabinets without pain or restriction   Pt will improve shoulder abduction AROM to >160 degrees to improve ability to don deodorant, don/doff shirts, and wash hair   Pt will increase shoulder AROM ER to >65 to be able to reach and fasten seatbelt   Pt will increase shoulder AROM IR to T7 to be able to reach in back pocket, tuck in shirt, and turn steering wheel without pain  Pt will improve shoulder strength throughout to 4/5 to improve function with lifting >20 lbs over head for house chores   Pt will demonstrate increased mid/low trap strength to 4/5 to promote improved shoulder mechanics and stabilization with lifting  and reaching   Pt will be independent and compliant with comprehensive HEP to maintain progress achieved in PT        Plan: Progress (R) knee strength/stability and address (L) shoulder AAROM/gentle strengthening, progress gentle parascapular and ER strengthening  Date:3/18/24                 TX#: 6/15 approved   (Post op visit #12) Date:3/25/24                 TX#: 7/15 approved   (Post op visit #13) Date:3/26/24                 TX#: 8/15 approved   (Post op visit #14) Date:4/1/24                 TX#: 9/15 approved   (Post op visit #15) Date:4/3/24                 TX#: 10/15 approved   (Post op visit #16)    Manual Tech: 5 min  (L) GH post and inferior glide, level III-IV Manual Tech: 5 min  (L) GH post and inferior glide, level III-IV Manual Tech: 5 min  (L) GH post and inferior glide, level III-IV Manual Tech: 2 min  (L) GH post and inferior glide, level III-IV   TherEx (R) knee: 45 min  -reassessment   -bike: 5 min  -PROM knee flex/ext to tolerance  -Heel slides w/ strap: x20  -prone quad stretch: 3x30 sec  -prone hang: 2x3 min, 4# weight  -standing TKE: Gray TB: x30, 5 sec  -step ups to march: 6\" box, 2x10,   -4\" anterior step down: 2x10  -sit to stand: R>L 2x10 reps  -TRX Mini squats w/ 2 HHA: 2x10  -RSB wall squats: x10  -DL leg press 118#, 2x10  -SL press:  62#, 3x10  -SLB on airex: 3x30 sec  -monster walk: RTB, 2x50 feet  -lateral walk: RTB, 2x50 feet  -SLB 3 way taps: 2x to fatigue       TherEx (R) knee: 45 min  -bike: 5 min  -PROM knee flex/ext to tolerance  -prone hang: 2x3 min, 4# weight  -step ups to march: 6\" box, 3x10  -lateral step up: 2x10  -RSB wall squats: x10  -DL leg press 118#, 2x10  -SL press:  62#, 3x10  -SLB on airex: 3x30 sec  -SLB 3 way taps: 2x to fatigue TherEx (R) knee: 15 min  -bike/UBE: 5 min  -PROM knee flex/ext to tolerance  -prone hang: 2x3 min, 4# weight  -step ups to march: 6\" box, 3x10  -lateral step up 6\" nox to march: 2x10  -2\" step downs: 2x10   TherEx (R) knee: 25  min  -bike/UBE: 5 min  -PROM knee flex/ext to tolerance  -prone hang: 2x3 min, 4# weight  -step ups to march: 6\" box, 3x10  -2\" step downs: 2x10  -mini lunge at bar: 2x10  -SL press:  75#, 3x10  -SLB on airex: 3x30 sec  -[place] squat: 2 HHA, 2x10       TherEx (R) knee: 43 min  -bike/UBE: 5 min  -PROM knee flex/ext to tolerance  -prone hang: x5 min, 4# weight  -step ups to march: 6\" box, 3x10  -4\" step downs: 2x10  -mini lunge at bar: 2x10  -DL press: 125#, 3x10  -SL press:  75#, 3x10  -lateral walk: RTB, 2x50 feet  -Hip 3 way: RTB, 2x10 each leg  -sit to stand staggered: 2x10   TherEx (L) shoulder: 10 min   -(L) shoulder assessment and patient education in POC    Hold for future  (L) shoulder PROM  -self inferior GH capsule stretch: 2 min  -supine AAROM flexion hands together: 2x10  -seated FR flexion and abduction stretches: 10 sec x10 reps  -Pulleys: 2' scap plane  -rows:   -shoulder extensions:  -ER at side walkouts:  TherEx (L) shoulder: 10 min   -(L) shoulder PROM  -self inferior GH capsule stretch: 2 min  -supine AAROM flexion w/ stick: 2x10  -wall wash: flexion x20 reps        Hold for future  -Pulleys: 2' scap plane  -rows:   -shoulder extensions:  -ER at side walkouts:  TherEx (L) shoulder: 25 min   -(L) shoulder PROM  -supine AAROM flexion w/ stick: 2x10  -s/l shoulder abd: 0#, 2x10  -s/l shoulder ER: 0#, 2x10  -wall wash: flexion x20 reps        Hold for future  -Pulleys: 2' scap plane  -rows:   -shoulder extensions:  -ER at side walkouts:  TherEx (L) shoulder: 30 min   -(L) shoulder PROM  -supine AAROM flexion w/ stick: 2x10  -s/l shoulder abd: 2#, 3x10  -s/l shoulder ER: 2#, x20, x10  -wall wash: flexion x20 reps  -ER at side walkouts: BTB, 2x10        Hold for future  -Pulleys: 2' scap plane  -rows:   -shoulder extensions:   TherEx (L) shoulder: 10 min   -(L) shoulder PROM  -s/l shoulder abd: 1#, 3x10  -s/l shoulder ER: 2#, x20, x10          Hold for future  -Pulleys: 2' scap plane  -rows:    -shoulder extensions:   HEP: Access Code: [email or web address]  URL: [email or web address]  Date: 04/03/2024  Prepared by: Batsheva Beckwith    Exercises  - Seated Knee Extension Stretch with Chair  - 1 x daily - 7 x weekly - 3 sets - 10 reps  - Seated Long Arc Quad  - 1 x daily - 7 x weekly - 3 sets - 10 reps  - Staggered Sit-to-Stand  - 1 x daily - 7 x weekly - 3 sets - 10 reps  - Lunge with Counter Support  - 1 x daily - 7 x weekly - 3 sets - 10 reps  - Step Up  - 1 x daily - 7 x weekly - 3 sets - 10 reps  - Forward Step Down Touch with Heel  - 1 x daily - 7 x weekly - 3 sets - 10 reps  - Side Stepping with Resistance at Ankles  - 1 x daily - 7 x weekly - 3 sets - 10 reps  - Hip Abduction with Resistance Loop  - 1 x daily - 7 x weekly - 3 sets - 10 reps  - Hip Extension with Resistance Loop  - 1 x daily - 7 x weekly - 3 sets - 10 reps  - Standing Hip Flexion with Resistance Loop  - 1 x daily - 7 x weekly - 3 sets - 10 reps    Charges: TherEx: 4 units         Total Timed Treatment: 55 min  Total Treatment Time: 55 min

## 2024-04-08 ENCOUNTER — OFFICE VISIT (OUTPATIENT)
Dept: PHYSICAL THERAPY | Age: 44
End: 2024-04-08
Attending: ORTHOPAEDIC SURGERY
Payer: MEDICAID

## 2024-04-08 PROCEDURE — 97110 THERAPEUTIC EXERCISES: CPT | Performed by: PHYSICAL THERAPIST

## 2024-04-08 NOTE — PROGRESS NOTES
Diagnosis:   RIGHT KNEE ARTHROSCOPY WITH ANTERIOR CRUCIATE LIGAMENT RECONSTRUCTION, HAMSTRING AUTOGRAFT, LATERAL MENISCUS REPAIR, AND SYNOVECTOMY AND MCL REPAIR          Added 3/18/24:  Injury of left rotator cuff, initial encounter (S46.002A)    Referring Provider: Vu  Date of Evaluation:    2/7/24  Last PN: 3/18/24    Precautions:      Toe-touch weightbearing x 2 weeks, no range of motion restrictions. Crutches to be weaned by Physical Therapy.   Next MD visit:   none scheduled  Date of Surgery: 2/6/24  (ACL post op 6 weeks on 3/19/24)   Insurance Primary/Secondary: BLUE CROSS MEDICAID / N/A     # Auth Visits: 15 visits 2/26-4/26/24, auth #[id number]        Subjective: Pt states she is still having pain going up the stair like a sharp pain.      Pain: 0/10 currently in the knee, (L) shoulder 5/10 with activities      Objective:     Observation: Pt resenting with mildly lacking TKE--> gradually improving    Palpation: continued pain with post glide of (L) GH joint       AROM: (* denotes performed with pain)--NT not tested today  Shoulder 4/1/24   Flexion: R 180; L 145*  Abduction: R 180; L 135*  ER: R 65; L 60*  IR each behind the back: R T7; L T9*        Assessment: Pt continues to report shoulder pain w/o improvements. Pt was progressed with parascapular and RTC strengthening to improve tolerance to reaching activities. She reports mild shoulder discomfort with theres. She is progressed to SLB with hip abd into wall isometric to improve quad endurance and SL stability. She reports fatigue with this but no pain.     Goals:    (to be met in 6 weeks post op ~12 visits)   The patient will regain A/PROM knee flexion >135 degrees and 0 degrees of knee extension-MET  The patinet will present without pain or limiting swelling after 8 visits- -MET  The patient will demonstrate a full quadricep contraction and SLR without extension lag -Progressing  The patient will ambulate without visual deficiencies and ability to  ambulate w/o hinged brace >3,000 steps/day -MET  The patient will demonstrate ability to ascend stairs with good alignment and control -Progressing        Goals: (to be met before but no later then 4 months post op)   Pt will improve quad strength to 5/5 to perform 10 full SL squats with good control and alignment. -Progressing  Pt will increase hip and knee strength to grossly 5/5 to be able to perform agility training with good control and allignment -Progressing  Pt will demonstrate increased hip ER/ABD strength to 5/5 to perform stepping, lunging and squatting and jumping activities without excessive femoral IR/ADD -Progressing  Pt will demonstrate good control and mechanics with squatting and lunging activities in a full tx session with no cuing from therapist to facilitate return to lifting and hiking safely -Progressing  Pt will be independent and compliant with comprehensive HEP to maintain progress achieved in PT- on going     New (L) Shoulder goals added 3/18/24  Pt will report improved ability to sleep without waking due to shoulder pain   Pt will improve shoulder flexion AROM to >160 degrees to be able to reach into overhead cabinets without pain or restriction   Pt will improve shoulder abduction AROM to >160 degrees to improve ability to don deodorant, don/doff shirts, and wash hair   Pt will increase shoulder AROM ER to >65 to be able to reach and fasten seatbelt   Pt will increase shoulder AROM IR to T7 to be able to reach in back pocket, tuck in shirt, and turn steering wheel without pain  Pt will improve shoulder strength throughout to 4/5 to improve function with lifting >20 lbs over head for house chores   Pt will demonstrate increased mid/low trap strength to 4/5 to promote improved shoulder mechanics and stabilization with lifting and reaching   Pt will be independent and compliant with comprehensive HEP to maintain progress achieved in PT        Plan: Progress (R) knee strength/stability and  address (L) shoulder AAROM/gentle strengthening, progress gentle parascapular and ER strengthening  Date:3/25/24                 TX#: 7/15 approved   (Post op visit #13) Date:3/26/24                 TX#: 8/15 approved   (Post op visit #14) Date:4/1/24                 TX#: 9/15 approved   (Post op visit #15) Date:4/3/24                 TX#: 10/15 approved   (Post op visit #16) Date:4/8/24                 TX#: 11/15 approved   (Post op visit #17)   Manual Tech: 5 min  (L) GH post and inferior glide, level III-IV Manual Tech: 5 min  (L) GH post and inferior glide, level III-IV Manual Tech: 5 min  (L) GH post and inferior glide, level III-IV Manual Tech: 2 min  (L) GH post and inferior glide, level III-IV    TherEx (R) knee: 45 min  -bike: 5 min  -PROM knee flex/ext to tolerance  -prone hang: 2x3 min, 4# weight  -step ups to march: 6\" box, 3x10  -lateral step up: 2x10  -RSB wall squats: x10  -DL leg press 118#, 2x10  -SL press:  62#, 3x10  -SLB on airex: 3x30 sec  -SLB 3 way taps: 2x to fatigue TherEx (R) knee: 15 min  -bike/UBE: 5 min  -PROM knee flex/ext to tolerance  -prone hang: 2x3 min, 4# weight  -step ups to march: 6\" box, 3x10  -lateral step up 6\" nox to march: 2x10  -2\" step downs: 2x10   TherEx (R) knee: 25 min  -bike/UBE: 5 min  -PROM knee flex/ext to tolerance  -prone hang: 2x3 min, 4# weight  -step ups to march: 6\" box, 3x10  -2\" step downs: 2x10  -mini lunge at bar: 2x10  -SL press:  75#, 3x10  -SLB on airex: 3x30 sec  -[place] squat: 2 HHA, 2x10       TherEx (R) knee: 43 min  -bike/UBE: 5 min  -PROM knee flex/ext to tolerance  -prone hang: x5 min, 4# weight  -step ups to march: 6\" box, 3x10  -4\" step downs: 2x10  -mini lunge at bar: 2x10  -DL press: 125#, 3x10  -SL press:  75#, 3x10  -lateral walk: RTB, 2x50 feet  -Hip 3 way: RTB, 2x10 each leg  -sit to stand staggered: 2x10 TherEx (R) knee: 40 min  -bike/UBE: 5 min  -PROM knee flex/ext to tolerance  -prone hang: x5 min, 4# weight  -step ups to march:  6\" box, 3x10  -4\" step downs: 3x10  -mini lunge at bar: 2x10  -SL press:  75#, 2x20  -lateral walk: RTB, 2x50 feet  -Hip 3 way: RTB, 2x10 each leg  -sit to stand staggered: 2x10  -SLB w/ 3 way taps: 2x to fatigue  -SLB w/ hip abd iso: 10 sec x5 reps  -RTB kicks: 2x to fatigue  -SAQ to end range: x20   TherEx (L) shoulder: 10 min   -(L) shoulder PROM  -self inferior GH capsule stretch: 2 min  -supine AAROM flexion w/ stick: 2x10  -wall wash: flexion x20 reps        Hold for future  -Pulleys: 2' scap plane  -rows:   -shoulder extensions:  -ER at side walkouts:  TherEx (L) shoulder: 25 min   -(L) shoulder PROM  -supine AAROM flexion w/ stick: 2x10  -s/l shoulder abd: 0#, 2x10  -s/l shoulder ER: 0#, 2x10  -wall wash: flexion x20 reps        Hold for future  -Pulleys: 2' scap plane  -rows:   -shoulder extensions:  -ER at side walkouts:  TherEx (L) shoulder: 30 min   -(L) shoulder PROM  -supine AAROM flexion w/ stick: 2x10  -s/l shoulder abd: 2#, 3x10  -s/l shoulder ER: 2#, x20, x10  -wall wash: flexion x20 reps  -ER at side walkouts: BTB, 2x10        Hold for future  -Pulleys: 2' scap plane  -rows:   -shoulder extensions:   TherEx (L) shoulder: 10 min   -(L) shoulder PROM  -s/l shoulder abd: 1#, 3x10  -s/l shoulder ER: 2#, x20, x10          Hold for future  -Pulleys: 2' scap plane  -rows:   -shoulder extensions: TherEx (L) shoulder: 15 min   -shoulder ER at side: 2x10, BTB  -shoulder ext: BTB, 2x10  -Shoulder row: RTB thick, 2x10  -wall wash: 2x10    -ER stretch at wall w/ stick: 3x30 sec-hold           HEP: Access Code: [email or web address]  URL: [email or web address]  Date: 04/03/2024  Prepared by: Batsheva Beckwith    Exercises  - Seated Knee Extension Stretch with Chair  - 1 x daily - 7 x weekly - 3 sets - 10 reps  - Seated Long Arc Quad  - 1 x daily - 7 x weekly - 3 sets - 10 reps  - Staggered Sit-to-Stand  - 1 x daily - 7 x weekly - 3 sets - 10 reps  - Lunge with Counter Support  - 1 x daily - 7 x weekly - 3 sets - 10  reps  - Step Up  - 1 x daily - 7 x weekly - 3 sets - 10 reps  - Forward Step Down Touch with Heel  - 1 x daily - 7 x weekly - 3 sets - 10 reps  - Side Stepping with Resistance at Ankles  - 1 x daily - 7 x weekly - 3 sets - 10 reps  - Hip Abduction with Resistance Loop  - 1 x daily - 7 x weekly - 3 sets - 10 reps  - Hip Extension with Resistance Loop  - 1 x daily - 7 x weekly - 3 sets - 10 reps  - Standing Hip Flexion with Resistance Loop  - 1 x daily - 7 x weekly - 3 sets - 10 reps    Charges: TherEx: 4 units         Total Timed Treatment: 55 min  Total Treatment Time: 55 min

## 2024-04-09 ENCOUNTER — OFFICE VISIT (OUTPATIENT)
Dept: PHYSICAL THERAPY | Age: 44
End: 2024-04-09
Attending: ORTHOPAEDIC SURGERY
Payer: MEDICAID

## 2024-04-09 PROCEDURE — 97110 THERAPEUTIC EXERCISES: CPT | Performed by: PHYSICAL THERAPIST

## 2024-04-09 NOTE — PROGRESS NOTES
Diagnosis:   RIGHT KNEE ARTHROSCOPY WITH ANTERIOR CRUCIATE LIGAMENT RECONSTRUCTION, HAMSTRING AUTOGRAFT, LATERAL MENISCUS REPAIR, AND SYNOVECTOMY AND MCL REPAIR          Added 3/18/24:  Injury of left rotator cuff, initial encounter (S46.002A)    Referring Provider: Vu  Date of Evaluation:    2/7/24  Last PN: 3/18/24    Precautions:      Toe-touch weightbearing x 2 weeks, no range of motion restrictions. Crutches to be weaned by Physical Therapy.   Next MD visit:   none scheduled  Date of Surgery: 2/6/24  (ACL post op 6 weeks on 3/19/24)   Insurance Primary/Secondary: BLUE CROSS MEDICAID / N/A     # Auth Visits: 15 visits 2/26-4/26/24, auth #[id number]        Subjective: Pt states her knee is feeling better but the (L) shoulder pain continues with no improvements.      Pain: 0/10 currently in the knee, (L) shoulder 5/10 with activities      Objective:     Observation: improving eccentric quad control during step down and step ups          Assessment: Pt introduced to inferior GH joint self mobilization and inferior glide with shoulder ER stretch to promote improved ability to reach with less pain. She is progressed with lateral step ups and progressed in height on step up w/o reports of pain. She does continue to have some anterior knee pain with shoulder step downs.       Goals:    (to be met in 6 weeks post op ~12 visits)   The patient will regain A/PROM knee flexion >135 degrees and 0 degrees of knee extension-MET  The patinet will present without pain or limiting swelling after 8 visits- -MET  The patient will demonstrate a full quadricep contraction and SLR without extension lag -Progressing  The patient will ambulate without visual deficiencies and ability to ambulate w/o hinged brace >3,000 steps/day -MET  The patient will demonstrate ability to ascend stairs with good alignment and control -Progressing        Goals: (to be met before but no later then 4 months post op)   Pt will improve quad strength to  5/5 to perform 10 full SL squats with good control and alignment. -Progressing  Pt will increase hip and knee strength to grossly 5/5 to be able to perform agility training with good control and allignment -Progressing  Pt will demonstrate increased hip ER/ABD strength to 5/5 to perform stepping, lunging and squatting and jumping activities without excessive femoral IR/ADD -Progressing  Pt will demonstrate good control and mechanics with squatting and lunging activities in a full tx session with no cuing from therapist to facilitate return to lifting and hiking safely -Progressing  Pt will be independent and compliant with comprehensive HEP to maintain progress achieved in PT- on going     New (L) Shoulder goals added 3/18/24  Pt will report improved ability to sleep without waking due to shoulder pain   Pt will improve shoulder flexion AROM to >160 degrees to be able to reach into overhead cabinets without pain or restriction   Pt will improve shoulder abduction AROM to >160 degrees to improve ability to don deodorant, don/doff shirts, and wash hair   Pt will increase shoulder AROM ER to >65 to be able to reach and fasten seatbelt   Pt will increase shoulder AROM IR to T7 to be able to reach in back pocket, tuck in shirt, and turn steering wheel without pain  Pt will improve shoulder strength throughout to 4/5 to improve function with lifting >20 lbs over head for house chores   Pt will demonstrate increased mid/low trap strength to 4/5 to promote improved shoulder mechanics and stabilization with lifting and reaching   Pt will be independent and compliant with comprehensive HEP to maintain progress achieved in PT        Plan: Progress (R) knee strength/stability and address (L) shoulder AAROM/gentle strengthening, progress gentle parascapular and ER strengthening  Date:3/25/24                 TX#: 7/15 approved   (Post op visit #13) Date:3/26/24                 TX#: 8/15 approved   (Post op visit #14) Date:4/1/24                  TX#: 9/15 approved   (Post op visit #15) Date:4/3/24                 TX#: 10/15 approved   (Post op visit #16) Date:4/8/24                 TX#: 11/15 approved   (Post op visit #17) Date:4/9/24                 TX#: 12/15 approved   (Post op visit #18)   Manual Tech: 5 min  (L) GH post and inferior glide, level III-IV Manual Tech: 5 min  (L) GH post and inferior glide, level III-IV Manual Tech: 5 min  (L) GH post and inferior glide, level III-IV Manual Tech: 2 min  (L) GH post and inferior glide, level III-IV     TherEx (R) knee: 45 min  -bike: 5 min  -PROM knee flex/ext to tolerance  -prone hang: 2x3 min, 4# weight  -step ups to march: 6\" box, 3x10  -lateral step up: 2x10  -RSB wall squats: x10  -DL leg press 118#, 2x10  -SL press:  62#, 3x10  -SLB on airex: 3x30 sec  -SLB 3 way taps: 2x to fatigue TherEx (R) knee: 15 min  -bike/UBE: 5 min  -PROM knee flex/ext to tolerance  -prone hang: 2x3 min, 4# weight  -step ups to march: 6\" box, 3x10  -lateral step up 6\" nox to march: 2x10  -2\" step downs: 2x10   TherEx (R) knee: 25 min  -bike/UBE: 5 min  -PROM knee flex/ext to tolerance  -prone hang: 2x3 min, 4# weight  -step ups to march: 6\" box, 3x10  -2\" step downs: 2x10  -mini lunge at bar: 2x10  -SL press:  75#, 3x10  -SLB on airex: 3x30 sec  -Indonesian squat: 2 HHA, 2x10       TherEx (R) knee: 43 min  -bike/UBE: 5 min  -PROM knee flex/ext to tolerance  -prone hang: x5 min, 4# weight  -step ups to march: 6\" box, 3x10  -4\" step downs: 2x10  -mini lunge at bar: 2x10  -DL press: 125#, 3x10  -SL press:  75#, 3x10  -lateral walk: RTB, 2x50 feet  -Hip 3 way: RTB, 2x10 each leg  -sit to stand staggered: 2x10 TherEx (R) knee: 40 min  -bike/UBE: 5 min  -PROM knee flex/ext to tolerance  -prone hang: x5 min, 4# weight  -step ups to march: 6\" box, 3x10  -4\" step downs: 3x10  -mini lunge at bar: 2x10  -SL press:  75#, 2x20  -lateral walk: RTB, 2x50 feet  -Hip 3 way: RTB, 2x10 each leg  -sit to stand staggered: 2x10  -SLB  w/ 3 way taps: 2x to fatigue  -SLB w/ hip abd iso: 10 sec x5 reps  -RTB kicks: 2x to fatigue  -SAQ to end range: x20 TherEx (R) knee: 45 min  -bike/UBE: 5 min  -PROM knee flex/ext to tolerance  -prone hang: x3 min, 4# weight  -SAQ to end range: x30  -step ups to march: 6\" box, x10  -Lateral step ups 6\" box: x10  -Step up 8\" box: 2x10  -4\" step downs: 3x10  -mini lunge at bar: 2x10    -SL press:  75#, 2x20  -lateral walk: RTB, 2x50 feet  -Hip 3 way: RTB, 2x10 each leg  -sit to stand staggered: 2x10  -SLB w/ 3 way taps: 2x to fatigue  -SLB w/ hip abd iso: 10 sec x5 reps  -RTB kicks: 2x to fatigue     TherEx (L) shoulder: 10 min   -(L) shoulder PROM  -self inferior GH capsule stretch: 2 min  -supine AAROM flexion w/ stick: 2x10  -wall wash: flexion x20 reps        Hold for future  -Pulleys: 2' scap plane  -rows:   -shoulder extensions:  -ER at side walkouts:  TherEx (L) shoulder: 25 min   -(L) shoulder PROM  -supine AAROM flexion w/ stick: 2x10  -s/l shoulder abd: 0#, 2x10  -s/l shoulder ER: 0#, 2x10  -wall wash: flexion x20 reps        Hold for future  -Pulleys: 2' scap plane  -rows:   -shoulder extensions:  -ER at side walkouts:  TherEx (L) shoulder: 30 min   -(L) shoulder PROM  -supine AAROM flexion w/ stick: 2x10  -s/l shoulder abd: 2#, 3x10  -s/l shoulder ER: 2#, x20, x10  -wall wash: flexion x20 reps  -ER at side walkouts: BTB, 2x10        Hold for future  -Pulleys: 2' scap plane  -rows:   -shoulder extensions:   TherEx (L) shoulder: 10 min   -(L) shoulder PROM  -s/l shoulder abd: 1#, 3x10  -s/l shoulder ER: 2#, x20, x10          Hold for future  -Pulleys: 2' scap plane  -rows:   -shoulder extensions: TherEx (L) shoulder: 15 min   -shoulder ER at side: 2x10, BTB  -shoulder ext: BTB, 2x10  -Shoulder row: RTB thick, 2x10  -wall wash: 2x10    -ER stretch at wall w/ stick: 3x30 sec-hold         TherEx (L) shoulder: 15 min  -Inferior capsule stretch: 3 min   -ER stretch at wall w/ stick and inf capsular stretch: 3x30  sec  -shoulder ER at side: 2x10, BTB  -shoulder ext: BTB, 2x10  -Shoulder row: RTB thick, 2x10  -wall wash: 2x10  -wall clock taps 12-10 o'clock: 2x5 reps       HEP: Access Code: [email or web address]  URL: [email or web address]  Date: 04/03/2024  Prepared by: Batsheva Beckwith    Exercises  - Seated Knee Extension Stretch with Chair  - 1 x daily - 7 x weekly - 3 sets - 10 reps  - Seated Long Arc Quad  - 1 x daily - 7 x weekly - 3 sets - 10 reps  - Staggered Sit-to-Stand  - 1 x daily - 7 x weekly - 3 sets - 10 reps  - Lunge with Counter Support  - 1 x daily - 7 x weekly - 3 sets - 10 reps  - Step Up  - 1 x daily - 7 x weekly - 3 sets - 10 reps  - Forward Step Down Touch with Heel  - 1 x daily - 7 x weekly - 3 sets - 10 reps  - Side Stepping with Resistance at Ankles  - 1 x daily - 7 x weekly - 3 sets - 10 reps  - Hip Abduction with Resistance Loop  - 1 x daily - 7 x weekly - 3 sets - 10 reps  - Hip Extension with Resistance Loop  - 1 x daily - 7 x weekly - 3 sets - 10 reps  - Standing Hip Flexion with Resistance Loop  - 1 x daily - 7 x weekly - 3 sets - 10 reps    Charges: TherEx: 4 units         Total Timed Treatment: 60 min  Total Treatment Time: 60 min

## 2024-04-10 ENCOUNTER — APPOINTMENT (OUTPATIENT)
Dept: PHYSICAL THERAPY | Age: 44
End: 2024-04-10
Payer: MEDICAID

## 2024-04-15 ENCOUNTER — APPOINTMENT (OUTPATIENT)
Dept: PHYSICAL THERAPY | Age: 44
End: 2024-04-15
Payer: MEDICAID

## 2024-04-15 NOTE — PROGRESS NOTES
Diagnosis:   RIGHT KNEE ARTHROSCOPY WITH ANTERIOR CRUCIATE LIGAMENT RECONSTRUCTION, HAMSTRING AUTOGRAFT, LATERAL MENISCUS REPAIR, AND SYNOVECTOMY AND MCL REPAIR          Added 3/18/24:  Injury of left rotator cuff, initial encounter (S46.002A)    Referring Provider: Vu  Date of Evaluation:    2/7/24  Last PN: 3/18/24    Precautions:      Toe-touch weightbearing x 2 weeks, no range of motion restrictions. Crutches to be weaned by Physical Therapy.   Next MD visit:   none scheduled  Date of Surgery: 2/6/24  (ACL post op 6 weeks on 3/19/24)   Insurance Primary/Secondary: BLUE CROSS MEDICAID / N/A     # Auth Visits: 15 visits 2/26-4/26/24, auth #[id number]        Subjective: Pt states knee only huts when going up the stairs but she is scared to go down the regular way. Shoulder and elbow was hurting at night time  with neck stiffness.     Pain: 0/10 currently in the knee, (L) shoulder 5/10 with activities      Objective:     Observation: improving eccentric quad control during step down and step ups      Behind the back reach: (L) to T10 with pain    Assessment: Pt demonstrates improved ability to reach behind her back but her pain continues. She is progressed with SL mini squats at TRX and Vietnamese squats to continue to build unilateral quad and glut strength required to improve stair ascending and descending. Progressed to a 1 lbs flexion shoulder raise to improve ability to reach to overhead cabinets for dishes.       Goals:    (to be met in 6 weeks post op ~12 visits)   The patient will regain A/PROM knee flexion >135 degrees and 0 degrees of knee extension-MET  The patinet will present without pain or limiting swelling after 8 visits- -MET  The patient will demonstrate a full quadricep contraction and SLR without extension lag -Progressing  The patient will ambulate without visual deficiencies and ability to ambulate w/o hinged brace >3,000 steps/day -MET  The patient will demonstrate ability to ascend stairs  with good alignment and control -Progressing        Goals: (to be met before but no later then 4 months post op)   Pt will improve quad strength to 5/5 to perform 10 full SL squats with good control and alignment. -Progressing  Pt will increase hip and knee strength to grossly 5/5 to be able to perform agility training with good control and allignment -Progressing  Pt will demonstrate increased hip ER/ABD strength to 5/5 to perform stepping, lunging and squatting and jumping activities without excessive femoral IR/ADD -Progressing  Pt will demonstrate good control and mechanics with squatting and lunging activities in a full tx session with no cuing from therapist to facilitate return to lifting and hiking safely -Progressing  Pt will be independent and compliant with comprehensive HEP to maintain progress achieved in PT- on going     New (L) Shoulder goals added 3/18/24  Pt will report improved ability to sleep without waking due to shoulder pain   Pt will improve shoulder flexion AROM to >160 degrees to be able to reach into overhead cabinets without pain or restriction   Pt will improve shoulder abduction AROM to >160 degrees to improve ability to don deodorant, don/doff shirts, and wash hair   Pt will increase shoulder AROM ER to >65 to be able to reach and fasten seatbelt   Pt will increase shoulder AROM IR to T7 to be able to reach in back pocket, tuck in shirt, and turn steering wheel without pain  Pt will improve shoulder strength throughout to 4/5 to improve function with lifting >20 lbs over head for house chores   Pt will demonstrate increased mid/low trap strength to 4/5 to promote improved shoulder mechanics and stabilization with lifting and reaching   Pt will be independent and compliant with comprehensive HEP to maintain progress achieved in PT        Plan: Progress (R) knee strength/stability and address (L) shoulder AAROM/gentle strengthening, progress gentle parascapular and ER  strengthening  Date:4/3/24                 TX#: 10/15 approved   (Post op visit #16) Date:4/8/24                 TX#: 11/15 approved   (Post op visit #17) Date:4/9/24                 TX#: 12/15 approved   (Post op visit #18) Date:4/16/24                 TX#: 13/15 approved   (Post op visit #19)   Manual Tech: 2 min  (L) GH post and inferior glide, level III-IV      TherEx (R) knee: 43 min  -bike/UBE: 5 min  -PROM knee flex/ext to tolerance  -prone hang: x5 min, 4# weight  -step ups to march: 6\" box, 3x10  -4\" step downs: 2x10  -mini lunge at bar: 2x10  -DL press: 125#, 3x10  -SL press:  75#, 3x10  -lateral walk: RTB, 2x50 feet  -Hip 3 way: RTB, 2x10 each leg  -sit to stand staggered: 2x10 TherEx (R) knee: 40 min  -bike/UBE: 5 min  -PROM knee flex/ext to tolerance  -prone hang: x5 min, 4# weight  -step ups to march: 6\" box, 3x10  -4\" step downs: 3x10  -mini lunge at bar: 2x10  -SL press:  75#, 2x20  -lateral walk: RTB, 2x50 feet  -Hip 3 way: RTB, 2x10 each leg  -sit to stand staggered: 2x10  -SLB w/ 3 way taps: 2x to fatigue  -SLB w/ hip abd iso: 10 sec x5 reps  -RTB kicks: 2x to fatigue  -SAQ to end range: x20 TherEx (R) knee: 45 min  -bike/UBE: 5 min  -PROM knee flex/ext to tolerance  -prone hang: x3 min, 4# weight  -SAQ to end range: x30  -step ups to march: 6\" box, x10  -Lateral step ups 6\" box: x10  -Step up 8\" box: 2x10  -4\" step downs: 3x10  -mini lunge at bar: 2x10    -SL press:  75#, 2x20  -lateral walk: RTB, 2x50 feet  -Hip 3 way: RTB, 2x10 each leg  -sit to stand staggered: 2x10  -SLB w/ 3 way taps: 2x to fatigue  -SLB w/ hip abd iso: 10 sec x5 reps  -RTB kicks: 2x to fatigue   TherEx (R) knee: 45 min  -bike/UBE: 5 min  -PROM knee flex/ext to tolerance  -prone hang: x2 min, 4# weight  -SAQ to end range:2# x30  -step ups to march: 6\" box, x10  -Step up 8\" box: 2x10  -2\" step downs: 3x10  -SL press:  81#, 2x20  -lateral walk: RTB, 2x50 feet  -sit to stand staggered: 2x10  -SLB w/ 3 way taps RTB: 2x to  fatigue  -SLB w/ hip abd iso: 10 sec x5 reps  -SLB on airex: 3x30 sec  -RTB kicks: 2x to fatigue  -Croatian squat: 2x10  -Mini SL squat at TRX x10      Hold  Sled push  Lunge sliders    TherEx (L) shoulder: 10 min   -(L) shoulder PROM  -s/l shoulder abd: 1#, 3x10  -s/l shoulder ER: 2#, x20, x10          Hold for future  -Pulleys: 2' scap plane  -rows:   -shoulder extensions: TherEx (L) shoulder: 15 min   -shoulder ER at side: 2x10, BTB  -shoulder ext: BTB, 2x10  -Shoulder row: RTB thick, 2x10  -wall wash: 2x10    -ER stretch at wall w/ stick: 3x30 sec-hold         TherEx (L) shoulder: 15 min  -Inferior capsule stretch: 3 min   -ER stretch at wall w/ stick and inf capsular stretch: 3x30 sec  -shoulder ER at side: 2x10, BTB  -shoulder ext: BTB, 2x10  -Shoulder row: RTB thick, 2x10  -wall wash: 2x10  -wall clock taps 12-10 o'clock: 2x5 reps     TherEx (L) shoulder: 15 min  -ER stretch at wall w/ stick and inf capsular stretch: 3x30 sec  -behind the back stretch: 3x30 sec  -s/l shoulder flexion:2x10  S/l shoulder horiz abduction: 2x10  -shoulder ER at side: 2x10, BTB  -shoulder ext: BTB, 2x10  -wall wash:flex/abd 2x10  -Scaption raises: 1#, 2x10      Holdn  Serratus wall slide  Wall wash circles   HEP: Access Code: [email or web address]  URL: [email or web address]  Date: 04/03/2024  Prepared by: Batsheva Beckwith    Exercises  - Seated Knee Extension Stretch with Chair  - 1 x daily - 7 x weekly - 3 sets - 10 reps  - Seated Long Arc Quad  - 1 x daily - 7 x weekly - 3 sets - 10 reps  - Staggered Sit-to-Stand  - 1 x daily - 7 x weekly - 3 sets - 10 reps  - Lunge with Counter Support  - 1 x daily - 7 x weekly - 3 sets - 10 reps  - Step Up  - 1 x daily - 7 x weekly - 3 sets - 10 reps  - Forward Step Down Touch with Heel  - 1 x daily - 7 x weekly - 3 sets - 10 reps  - Side Stepping with Resistance at Ankles  - 1 x daily - 7 x weekly - 3 sets - 10 reps  - Hip Abduction with Resistance Loop  - 1 x daily - 7 x weekly - 3 sets - 10  reps  - Hip Extension with Resistance Loop  - 1 x daily - 7 x weekly - 3 sets - 10 reps  - Standing Hip Flexion with Resistance Loop  - 1 x daily - 7 x weekly - 3 sets - 10 reps    Charges: TherEx: 4 units         Total Timed Treatment: 60 min  Total Treatment Time: 60 min

## 2024-04-16 ENCOUNTER — OFFICE VISIT (OUTPATIENT)
Dept: PHYSICAL THERAPY | Age: 44
End: 2024-04-16
Attending: ORTHOPAEDIC SURGERY
Payer: MEDICAID

## 2024-04-16 PROCEDURE — 97110 THERAPEUTIC EXERCISES: CPT | Performed by: PHYSICAL THERAPIST

## 2024-04-19 ENCOUNTER — OFFICE VISIT (OUTPATIENT)
Dept: PHYSICAL THERAPY | Age: 44
End: 2024-04-19
Attending: ORTHOPAEDIC SURGERY
Payer: MEDICAID

## 2024-04-19 PROCEDURE — 97110 THERAPEUTIC EXERCISES: CPT | Performed by: PHYSICAL THERAPIST

## 2024-04-19 NOTE — PROGRESS NOTES
Diagnosis:   RIGHT KNEE ARTHROSCOPY WITH ANTERIOR CRUCIATE LIGAMENT RECONSTRUCTION, HAMSTRING AUTOGRAFT, LATERAL MENISCUS REPAIR, AND SYNOVECTOMY AND MCL REPAIR          Added 3/18/24:  Injury of left rotator cuff, initial encounter (S46.002A)    Referring Provider: Vu  Date of Evaluation:    2/7/24  Last PN: 3/18/24    Precautions:      Toe-touch weightbearing x 2 weeks, no range of motion restrictions. Crutches to be weaned by Physical Therapy.   Next MD visit:   none scheduled  Date of Surgery: 2/6/24  (ACL post op 6 weeks on 3/19/24)   Insurance Primary/Secondary: BLUE CROSS MEDICAID / N/A     # Auth Visits: 15 visits 2/26-4/26/24, auth #[id number]        Subjective: Pt states knee is feeling good but the (L) shoulder is not doing any better and it even aches at rest.     Pain: 0/10 currently in the knee, (L) shoulder 3-5/10 at rest      Objective:     Observation: improving eccentric quad control during step down and step ups      Behind the back reach: (L) to T10 with pain    Assessment: Pt progressed with shoulder ER walk outs and shoulder abd AROM to promote RTC and deltoid strength for improved reaching ability. She tolerates these exercises but with increase in pain. She is demonstrating gradually improving (R) LE  and reports no knee pain during 8\" step ups.       Goals:    (to be met in 6 weeks post op ~12 visits)   The patient will regain A/PROM knee flexion >135 degrees and 0 degrees of knee extension-MET  The patinet will present without pain or limiting swelling after 8 visits- -MET  The patient will demonstrate a full quadricep contraction and SLR without extension lag -Progressing  The patient will ambulate without visual deficiencies and ability to ambulate w/o hinged brace >3,000 steps/day -MET  The patient will demonstrate ability to ascend stairs with good alignment and control -Progressing        Goals: (to be met before but no later then 4 months post op)   Pt will improve quad  strength to 5/5 to perform 10 full SL squats with good control and alignment. -Progressing  Pt will increase hip and knee strength to grossly 5/5 to be able to perform agility training with good control and allignment -Progressing  Pt will demonstrate increased hip ER/ABD strength to 5/5 to perform stepping, lunging and squatting and jumping activities without excessive femoral IR/ADD -Progressing  Pt will demonstrate good control and mechanics with squatting and lunging activities in a full tx session with no cuing from therapist to facilitate return to lifting and hiking safely -Progressing  Pt will be independent and compliant with comprehensive HEP to maintain progress achieved in PT- on going     New (L) Shoulder goals added 3/18/24  Pt will report improved ability to sleep without waking due to shoulder pain   Pt will improve shoulder flexion AROM to >160 degrees to be able to reach into overhead cabinets without pain or restriction   Pt will improve shoulder abduction AROM to >160 degrees to improve ability to don deodorant, don/doff shirts, and wash hair   Pt will increase shoulder AROM ER to >65 to be able to reach and fasten seatbelt   Pt will increase shoulder AROM IR to T7 to be able to reach in back pocket, tuck in shirt, and turn steering wheel without pain  Pt will improve shoulder strength throughout to 4/5 to improve function with lifting >20 lbs over head for house chores   Pt will demonstrate increased mid/low trap strength to 4/5 to promote improved shoulder mechanics and stabilization with lifting and reaching   Pt will be independent and compliant with comprehensive HEP to maintain progress achieved in PT        Plan: Progress note next tx   Date:4/8/24                 TX#: 11/15 approved   (Post op visit #17) Date:4/9/24                 TX#: 12/15 approved   (Post op visit #18) Date:4/16/24                 TX#: 13/15 approved   (Post op visit #19) Date:4/19/24                 TX#: 14/15  approved   (Post op visit #20)         TherEx (R) knee: 40 min  -bike/UBE: 5 min  -PROM knee flex/ext to tolerance  -prone hang: x5 min, 4# weight  -step ups to march: 6\" box, 3x10  -4\" step downs: 3x10  -mini lunge at bar: 2x10  -SL press:  75#, 2x20  -lateral walk: RTB, 2x50 feet  -Hip 3 way: RTB, 2x10 each leg  -sit to stand staggered: 2x10  -SLB w/ 3 way taps: 2x to fatigue  -SLB w/ hip abd iso: 10 sec x5 reps  -RTB kicks: 2x to fatigue  -SAQ to end range: x20 TherEx (R) knee: 45 min  -bike/UBE: 5 min  -PROM knee flex/ext to tolerance  -prone hang: x3 min, 4# weight  -SAQ to end range: x30  -step ups to march: 6\" box, x10  -Lateral step ups 6\" box: x10  -Step up 8\" box: 2x10  -4\" step downs: 3x10  -mini lunge at bar: 2x10    -SL press:  75#, 2x20  -lateral walk: RTB, 2x50 feet  -Hip 3 way: RTB, 2x10 each leg  -sit to stand staggered: 2x10  -SLB w/ 3 way taps: 2x to fatigue  -SLB w/ hip abd iso: 10 sec x5 reps  -RTB kicks: 2x to fatigue   TherEx (R) knee: 45 min  -bike/UBE: 5 min  -PROM knee flex/ext to tolerance  -prone hang: x2 min, 4# weight  -SAQ to end range:2# x30  -step ups to march: 6\" box, x10  -Step up 8\" box: 2x10  -2\" step downs: 3x10  -SL press:  81#, 2x20  -lateral walk: RTB, 2x50 feet  -sit to stand staggered: 2x10  -SLB w/ 3 way taps RTB: 2x to fatigue  -SLB w/ hip abd iso: 10 sec x5 reps  -SLB on airex: 3x30 sec  -RTB kicks: 2x to fatigue  -[place] squat: 2x10  -Mini SL squat at TRX x10      Hold  Sled push  Lunge sliders  TherEx (R) knee: 30 min  -bike/UBE: 5 min  -PROM knee flex/ext to tolerance  -prone hang: x2 min, 4# weight  -SAQ to end range:2# x30  -Step up 8\" box: 3x10  -2\" step downs: 3x10    -SL press:  81#, 2x20  -lateral walk: RTB, 2x50 feet  -sit to stand staggered: 2x10  -SLB w/ 3 way taps RTB: 2x to fatigue  -SLB w/ hip abd iso: 10 sec x5 reps  -SLB on airex: 3x30 sec  -RTB kicks: 2x to fatigue  -[place] squat: 2x10  -Mini SL squat at TRX x10      Hold  Sled push  Lunge sliders     TherEx (L) shoulder: 15 min   -shoulder ER at side: 2x10, BTB  -shoulder ext: BTB, 2x10  -Shoulder row: RTB thick, 2x10  -wall wash: 2x10    -ER stretch at wall w/ stick: 3x30 sec-hold         TherEx (L) shoulder: 15 min  -Inferior capsule stretch: 3 min   -ER stretch at wall w/ stick and inf capsular stretch: 3x30 sec  -shoulder ER at side: 2x10, BTB  -shoulder ext: BTB, 2x10  -Shoulder row: RTB thick, 2x10  -wall wash: 2x10  -wall clock taps 12-10 o'clock: 2x5 reps     TherEx (L) shoulder: 15 min  -ER stretch at wall w/ stick and inf capsular stretch: 3x30 sec  -behind the back stretch: 3x30 sec  -s/l shoulder flexion:2x10  S/l shoulder horiz abduction: 2x10  -shoulder ER at side: 2x10, BTB  -shoulder ext: BTB, 2x10  -wall wash:flex/abd 2x10  -Scaption raises: 1#, 2x10      Holdn  Serratus wall slide  Wall wash circles TherEx (L) shoulder: 15 min  -ER stretch at wall w/ stick and inf capsular stretch: 3x30 sec  -behind the back stretch: 3x30 sec  -s/l shoulder flexion:2x10  S/l shoulder horiz abduction: 2x10  -shoulder ER at side: 2x10, BTB  -shoulder ext: BTB, 3x10  -[place]oud[place] Row: GTB, 3x10  -Shoulder ER walkouts: BTB, 2x10  -wall wash:flex/abd 2x10  -Scaption raises + abduction: 0#, 2x10      Holdn  Serratus wall slide  Wall wash circles   HEP: Access Code: [email or web address]  URL: [email or web address]  Date: 04/03/2024  Prepared by: Batsheva Beckwith    Exercises  - Seated Knee Extension Stretch with Chair  - 1 x daily - 7 x weekly - 3 sets - 10 reps  - Seated Long Arc Quad  - 1 x daily - 7 x weekly - 3 sets - 10 reps  - Staggered Sit-to-Stand  - 1 x daily - 7 x weekly - 3 sets - 10 reps  - Lunge with Counter Support  - 1 x daily - 7 x weekly - 3 sets - 10 reps  - Step Up  - 1 x daily - 7 x weekly - 3 sets - 10 reps  - Forward Step Down Touch with Heel  - 1 x daily - 7 x weekly - 3 sets - 10 reps  - Side Stepping with Resistance at Ankles  - 1 x daily - 7 x weekly - 3 sets - 10 reps  - Hip Abduction with  Resistance Loop  - 1 x daily - 7 x weekly - 3 sets - 10 reps  - Hip Extension with Resistance Loop  - 1 x daily - 7 x weekly - 3 sets - 10 reps  - Standing Hip Flexion with Resistance Loop  - 1 x daily - 7 x weekly - 3 sets - 10 reps    Charges: TherEx: 3 units         Total Timed Treatment: 45 min  Total Treatment Time: 45 min

## 2024-04-22 ENCOUNTER — APPOINTMENT (OUTPATIENT)
Dept: PHYSICAL THERAPY | Age: 44
End: 2024-04-22
Payer: MEDICAID

## 2024-04-23 ENCOUNTER — OFFICE VISIT (OUTPATIENT)
Dept: PHYSICAL THERAPY | Age: 44
End: 2024-04-23
Attending: ORTHOPAEDIC SURGERY
Payer: MEDICAID

## 2024-04-23 PROCEDURE — 97110 THERAPEUTIC EXERCISES: CPT | Performed by: PHYSICAL THERAPIST

## 2024-04-23 NOTE — PROGRESS NOTES
Progress Summary  Pt has attended 15 visits in Physical Therapy.    Diagnosis:   RIGHT KNEE ARTHROSCOPY WITH ANTERIOR CRUCIATE LIGAMENT RECONSTRUCTION, HAMSTRING AUTOGRAFT, LATERAL MENISCUS REPAIR, AND SYNOVECTOMY AND MCL REPAIR          Added 3/18/24:  Injury of left rotator cuff, initial encounter (S46.002A)    Referring Provider: Vu  Date of Evaluation:    2/7/24  Last PN: 3/18/24    Precautions:      Toe-touch weightbearing x 2 weeks, no range of motion restrictions. Crutches to be weaned by Physical Therapy.   Next MD visit:   none scheduled  Date of Surgery: 2/6/24  (ACL post op 6 weeks on 3/19/24)   Insurance Primary/Secondary: BLUE CROSS MEDICAID / N/A     # Auth Visits: 15 visits 2/26-4/26/24, auth #[id number]        Subjective: Pt states she is able to go up the stairs normally but with some discomfort in the (R) knee, walking down the stairs she is still hard and modifying it with a step to patten. Squatting all the way down is still difficult. Her shoulder feels pain at all times and increases with activities. She has not noticed any improvements in shoulder pain and function in the last 6 weeks in PT. It is still painful to sleep on, perform reaching in all directions and lifting for house chores and ADL's. Overall since PT she noted increase in pain symptoms.      Pain: 0/10 currently in the knee and 2/10 with stair climbing,  (L) shoulder pain 2/10 at rest and 5/10 with ADL's      Objective:     AROM: (* denotes performed with pain)   Knee    Flexion: R 135; L 135   Extension: R +5*; L +5         Balance:   SLS on airex: R 13 sec, L 17 sec                   Strength/MMT: (* denotes performed with pain)  Hip Knee   Flexion: R 4-/5; L 4/5  Extension: R 4-/5; L 4+/5  Abduction: R 4/5; L 4/5  ER: R 4-/5; L 4/5 Flexion: R 4-/5; L 4/5  Extension: R 4-/5; L 5/5             AROM: (* denotes performed with pain)  Shoulder    Flexion: R 180; L 125*  Abduction: R 180; L 125*  ER: R 65; L 57*  IR each behind  the back: R T7; L T10*        Strength/MMT: (* denotes performed with pain)  Shoulder   Flexion: R 4/5; L 4/5*  Abduction: R 4+/5; L 4-/5*  ER: R 4/5; L 3+/5*  IR: R 5/5; L 4-/5*         Special tests:   O'lorin: L +  Empty can: L +   Barrie morales: L +    Assessment: Pt is 11 weeks s/p (R) ACL reconstruction, lateral meniscus repair and MCL repair and is progressing gradually. She has demonstrated normalized (R) knee A/PROM, improved balance, improved gait and improved strength. These objective gains have improved her tolerance to standing >1 hour, walking > 1 mile, negotiating stairs, sit to stand transfers, driving, squatting and walking over uneven surfaces. She continues to demonstrate impairments in (R) LE strength, knee valgus and balance which limit her  in ability to perform lunging, descending stairs reciprocal pattern, continues to use a railing for assistance with stair climbing, kneeling, floor to stand transfers, heavier house chores, gardening and hiking. Pt also has been seen in skilled PT for her (L) shoulder pain the last 6 weeks and she has reported increase in pain and no major improvements in function. She has showed mild reduction in AROM, increased weakness and pain. Her (L) shoulder has not responded well to PT and at this time it would warrant an MRI to rule out internal derangement of the soft tissues. She is overall progressing well post operatively on the (R) knee and will benefit from continued PT to return pt safely to [facility].       Goals:    (to be met in 6 weeks post op ~12 visits)   The patient will regain A/PROM knee flexion >135 degrees and 0 degrees of knee extension-MET  The patinet will present without pain or limiting swelling after 8 visits- -MET  The patient will demonstrate a full quadricep contraction and SLR without extension lag -MET  The patient will ambulate without visual deficiencies and ability to ambulate w/o hinged brace >3,000 steps/day -MET  The patient will  demonstrate ability to ascend stairs with good alignment and control -Progressing        Goals: (to be met before but no later then 4 months post op)   Pt will improve quad strength to 5/5 to perform 10 full SL squats with good control and alignment. -Progressing  Pt will increase hip and knee strength to grossly 5/5 to be able to perform agility training with good control and allignment -Progressing  Pt will demonstrate increased hip ER/ABD strength to 5/5 to perform stepping, lunging and squatting and jumping activities without excessive femoral IR/ADD -Progressing  Pt will demonstrate good control and mechanics with squatting and lunging activities in a full tx session with no cuing from therapist to facilitate return to lifting and hiking safely -Progressing  Pt will be independent and compliant with comprehensive HEP to maintain progress achieved in PT- on going     New (L) Shoulder goals added 3/18/24  Pt will report improved ability to sleep without waking due to shoulder pain-No progress made   Pt will improve shoulder flexion AROM to >160 degrees to be able to reach into overhead cabinets without pain or restriction -No progress made   Pt will improve shoulder abduction AROM to >160 degrees to improve ability to don deodorant, don/doff shirts, and wash hair -No progress made   Pt will increase shoulder AROM ER to >65 to be able to reach and fasten seatbelt -No progress made   Pt will increase shoulder AROM IR to T7 to be able to reach in back pocket, tuck in shirt, and turn steering wheel without pain-No progress made   Pt will improve shoulder strength throughout to 4/5 to improve function with lifting >20 lbs over head for house chores -No progress made   Pt will demonstrate increased mid/low trap strength to 4/5 to promote improved shoulder mechanics and stabilization with lifting and reaching -Not tested  Pt will be independent and compliant with comprehensive HEP to maintain progress achieved in PT       LEFS Score  LEFS Score: 15 % (2/7/2024  5:05 PM)    Post LEFS Score  Post LEFS Score: 66.25 % (4/23/2024  8:19 AM)    51.25 % improvement      QuickDASH Outcome Score  Score: 59.09 % (3/19/2024 12:36 PM)    Post QuickDASH Outcome Score  Post Score: 56.82 % (4/23/2024  8:21 AM)    2.27 % improvement    Plan: Continue skilled Physical Therapy 1-2 x/week or a total of 12 visits over a 90 day period. Treatment will include: TherEx, neuro re-ed, manual tech       Patient/Family/Caregiver was advised of these findings, precautions, and treatment options and has agreed to actively participate in planning and for this course of care.    Thank you for your referral. If you have any questions, please contact me at Dept: [phone].    Sincerely,  Electronically signed by therapist: Batsheva Beckwith PT     Physician's certification required:  Yes  Please co-sign or sign and return this letter via fax as soon as possible to [phone].   I certify the need for these services furnished under this plan of treatment and while under my care.    X___________________________________________________ Date____________________    Certification From: 4/23/2024  To:7/22/2024    Date:4/8/24                 TX#: 11/15 approved   (Post op visit #17) Date:4/9/24                 TX#: 12/15 approved   (Post op visit #18) Date:4/16/24                 TX#: 13/15 approved   (Post op visit #19) Date:4/19/24                 TX#: 14/15 approved   (Post op visit #20) Date:4/23/24                 TX#: 15/15 approved   (Post op visit #21)          TherEx (R) knee: 40 min  -bike/UBE: 5 min  -PROM knee flex/ext to tolerance  -prone hang: x5 min, 4# weight  -step ups to march: 6\" box, 3x10  -4\" step downs: 3x10  -mini lunge at bar: 2x10  -SL press:  75#, 2x20  -lateral walk: RTB, 2x50 feet  -Hip 3 way: RTB, 2x10 each leg  -sit to stand staggered: 2x10  -SLB w/ 3 way taps: 2x to fatigue  -SLB w/ hip abd iso: 10 sec x5 reps  -RTB kicks: 2x to  fatigue  -SAQ to end range: x20 TherEx (R) knee: 45 min  -bike/UBE: 5 min  -PROM knee flex/ext to tolerance  -prone hang: x3 min, 4# weight  -SAQ to end range: x30  -step ups to march: 6\" box, x10  -Lateral step ups 6\" box: x10  -Step up 8\" box: 2x10  -4\" step downs: 3x10  -mini lunge at bar: 2x10    -SL press:  75#, 2x20  -lateral walk: RTB, 2x50 feet  -Hip 3 way: RTB, 2x10 each leg  -sit to stand staggered: 2x10  -SLB w/ 3 way taps: 2x to fatigue  -SLB w/ hip abd iso: 10 sec x5 reps  -RTB kicks: 2x to fatigue   TherEx (R) knee: 45 min  -bike/UBE: 5 min  -PROM knee flex/ext to tolerance  -prone hang: x2 min, 4# weight  -SAQ to end range:2# x30  -step ups to march: 6\" box, x10  -Step up 8\" box: 2x10  -2\" step downs: 3x10  -SL press:  81#, 2x20  -lateral walk: RTB, 2x50 feet  -sit to stand staggered: 2x10  -SLB w/ 3 way taps RTB: 2x to fatigue  -SLB w/ hip abd iso: 10 sec x5 reps  -SLB on airex: 3x30 sec  -RTB kicks: 2x to fatigue  -[place] squat: 2x10  -Mini SL squat at TRX x10      Hold  Sled push  Lunge sliders  TherEx (R) knee: 30 min  -bike/UBE: 5 min  -PROM knee flex/ext to tolerance  -prone hang: x2 min, 4# weight  -SAQ to end range:2# x30  -Step up 8\" box: 3x10  -2\" step downs: 3x10    -SL press:  81#, 2x20  -lateral walk: RTB, 2x50 feet  -sit to stand staggered: 2x10  -SLB w/ 3 way taps RTB: 2x to fatigue  -SLB w/ hip abd iso: 10 sec x5 reps  -SLB on airex: 3x30 sec  -RTB kicks: 2x to fatigue  -[place] squat: 2x10  -Mini SL squat at TRX x10      Hold  Sled push  Lunge sliders  TherEx (R) knee: 45 min  -reassessment   -bike/UBE: 5 min  -PROM knee flex/ext to tolerance  -prone hang: x2 min, 4# weight  -SAQ to end range:2# x30  -Step up 8\" box: 3x10  -2\" step downs: 3x10    -SL press:  81#, 2x20  -lateral walk: GTB, 2x50 feet  -sit to stand staggered: 2x10  -SLB w/ 3 way taps GTB: 2x to fatigue  -SLB w/ hip abd iso: 10 sec x5 reps  -SLB on airex: 3x30 sec  -GTB kicks: 2x to fatigue  -[place] squat:  2x10  -Mini SL squat at TRX x10  -Dl squats for depth: 2x10      Hold  Sled push  Lunge sliders   TherEx (L) shoulder: 15 min   -shoulder ER at side: 2x10, BTB  -shoulder ext: BTB, 2x10  -Shoulder row: RTB thick, 2x10  -wall wash: 2x10    -ER stretch at wall w/ stick: 3x30 sec-hold         TherEx (L) shoulder: 15 min  -Inferior capsule stretch: 3 min   -ER stretch at wall w/ stick and inf capsular stretch: 3x30 sec  -shoulder ER at side: 2x10, BTB  -shoulder ext: BTB, 2x10  -Shoulder row: RTB thick, 2x10  -wall wash: 2x10  -wall clock taps 12-10 o'clock: 2x5 reps     TherEx (L) shoulder: 15 min  -ER stretch at wall w/ stick and inf capsular stretch: 3x30 sec  -behind the back stretch: 3x30 sec  -s/l shoulder flexion:2x10  S/l shoulder horiz abduction: 2x10  -shoulder ER at side: 2x10, BTB  -shoulder ext: BTB, 2x10  -wall wash:flex/abd 2x10  -Scaption raises: 1#, 2x10      Holdn  Serratus wall slide  Wall wash circles TherEx (L) shoulder: 15 min  -ER stretch at wall w/ stick and inf capsular stretch: 3x30 sec  -behind the back stretch: 3x30 sec  -s/l shoulder flexion:2x10  S/l shoulder horiz abduction: 2x10  -shoulder ER at side: 2x10, BTB  -shoulder ext: BTB, 3x10  -Shoudler Row: GTB, 3x10  -Shoulder ER walkouts: BTB, 2x10  -wall wash:flex/abd 2x10  -Scaption raises + abduction: 0#, 2x10      Holdn  Serratus wall slide  Wall wash circles TherEx (L) shoulder: 5 min  -reassessment   HEP: Access Code: [email or web address]  URL: [email or web address]  Date: 04/03/2024  Prepared by: Batsheva Beckwith    Exercises  - Seated Knee Extension Stretch with Chair  - 1 x daily - 7 x weekly - 3 sets - 10 reps  - Seated Long Arc Quad  - 1 x daily - 7 x weekly - 3 sets - 10 reps  - Staggered Sit-to-Stand  - 1 x daily - 7 x weekly - 3 sets - 10 reps  - Lunge with Counter Support  - 1 x daily - 7 x weekly - 3 sets - 10 reps  - Step Up  - 1 x daily - 7 x weekly - 3 sets - 10 reps  - Forward Step Down Touch with Heel  - 1 x daily - 7 x weekly -  3 sets - 10 reps  - Side Stepping with Resistance at Ankles  - 1 x daily - 7 x weekly - 3 sets - 10 reps  - Hip Abduction with Resistance Loop  - 1 x daily - 7 x weekly - 3 sets - 10 reps  - Hip Extension with Resistance Loop  - 1 x daily - 7 x weekly - 3 sets - 10 reps  - Standing Hip Flexion with Resistance Loop  - 1 x daily - 7 x weekly - 3 sets - 10 reps    Charges: TherEx: 3 units         Total Timed Treatment: 50 min  Total Treatment Time: 50 min   6

## 2024-04-24 DIAGNOSIS — E66.9 OBESITY (BMI 35.0-39.9 WITHOUT COMORBIDITY): ICD-10-CM

## 2024-04-25 RX ORDER — PHENTERMINE HYDROCHLORIDE 37.5 MG/1
37.5 TABLET ORAL
Qty: 30 TABLET | Refills: 0 | Status: SHIPPED | OUTPATIENT
Start: 2024-04-25

## 2024-04-25 NOTE — TELEPHONE ENCOUNTER
A refill request was received for:  Requested Prescriptions     Pending Prescriptions Disp Refills    PHENTERMINE HCL 37.5 MG Oral Tab [Pharmacy Med Name: PHENTERMINE 37.5MG TABLETS] 30 tablet 0     Sig: TAKE 1 TABLET(37.5 MG) BY MOUTH EVERY MORNING BEFORE BREAKFAST     Last refill date:  3/18/24    Last office visit: 1/22/24      Future Appointments   Date Time Provider Department Center   4/30/2024  8:15 AM Batsheva Beckwith, PT [facility] [place]   5/6/2024  3:00 PM [facility]DR MRI RM1 (1.5T WIDE) [facility] [place]   5/7/2024  8:15 AM Batsheva Beckwith, PT [facility] [place]   5/8/2024  8:10 AM Beverly Womack MD [id number]   5/13/2024  9:50 AM Beverly Womack MD [facility] [id number]   5/14/2024  8:15 AM Batsheva Beckwith, KORINA [facility] [place]   5/20/2024  8:45 AM Batsheva Beckwith PT [facility] [place]   6/4/2024  8:15 AM Batsheva Beckwith, KORINA [facility] Phys [facility] [place]

## 2024-04-29 ENCOUNTER — HOSPITAL ENCOUNTER (OUTPATIENT)
Dept: MRI IMAGING | Facility: HOSPITAL | Age: 44
Discharge: HOME | End: 2024-04-29
Attending: ORTHOPAEDIC SURGERY
Payer: MEDICAID

## 2024-04-29 DIAGNOSIS — S46.002A INJURY OF LEFT ROTATOR CUFF, INITIAL ENCOUNTER: ICD-10-CM

## 2024-04-29 PROCEDURE — 73221 MRI JOINT UPR EXTREM W/O DYE: CPT | Performed by: ORTHOPAEDIC SURGERY

## 2024-04-30 ENCOUNTER — OFFICE VISIT (OUTPATIENT)
Dept: PHYSICAL THERAPY | Age: 44
End: 2024-04-30
Attending: ORTHOPAEDIC SURGERY
Payer: MEDICAID

## 2024-04-30 PROCEDURE — 97110 THERAPEUTIC EXERCISES: CPT | Performed by: PHYSICAL THERAPIST

## 2024-04-30 PROCEDURE — 97140 MANUAL THERAPY 1/> REGIONS: CPT | Performed by: PHYSICAL THERAPIST

## 2024-04-30 NOTE — PROGRESS NOTES
Diagnosis:   RIGHT KNEE ARTHROSCOPY WITH ANTERIOR CRUCIATE LIGAMENT RECONSTRUCTION, HAMSTRING AUTOGRAFT, LATERAL MENISCUS REPAIR, AND SYNOVECTOMY AND MCL REPAIR          Added 3/18/24:  Injury of left rotator cuff, initial encounter (S46.002A)    Referring Provider: Vu  Date of Evaluation:    2/7/24  Last PN: 3/18/24    Precautions:      Toe-touch weightbearing x 2 weeks, no range of motion restrictions. Crutches to be weaned by Physical Therapy.   Next MD visit:   none scheduled  Date of Surgery: 2/6/24  (ACL post op 6 weeks on 3/19/24)   Insurance Primary/Secondary: BLUE CROSS MEDICAID / N/A     # Auth Visits: 15 visits 2/26-4/26/24, auth #[id number] + 7 visits 4/23/24-6/28/24    Subjective: Pt states she had the MRI of the shoulder and states \"no wonder it hurts\".   Her knee is feeling OK but the shoulder no changes.    Pain: 0/10 currently in the knee and 2/10 with stair climbing,  (L) shoulder pain 2/10 at rest and 5/10 with ADL's      Objective:     MRI results on 4/29/24 of (L) shoulder: \"CONCLUSION:   1. Small complete or near complete tear anterior leading edge of supraspinatus tendon at greater tuberosity insertion.   2. Subacromial/subdeltoid bursitis.   3. Lateral downsloping type 1 acromion narrows supraspinatus outlet. \"      AROM: (* denotes performed with pain)  Shoulder    Flexion: R 180; L 125*  Abduction: R 180; L 125*  ER: R 65; L 57*  IR each behind the back: R T7; L T10*        Strength/MMT: (* denotes performed with pain)  Shoulder   Flexion: R 4/5; L 4/5*  Abduction: R 4+/5; L 4-/5*  ER: R 4/5; L 3+/5*  IR: R 5/5; L 4-/5*          Assessment: Pt continued to focus tx on promoting (R) LE strength and stability. She reports medial knee discomfort post weight bearing exercises. She presented with STR's of distal adductors, medial HS and VMO which was addressed with STM. Pt was encouraged to perform HEP at home at least 2x/week as she have transitioned to PT once a week.       Goals:    (to  be met in 6 weeks post op ~12 visits)   The patient will regain A/PROM knee flexion >135 degrees and 0 degrees of knee extension-MET  The patinet will present without pain or limiting swelling after 8 visits- -MET  The patient will demonstrate a full quadricep contraction and SLR without extension lag -MET  The patient will ambulate without visual deficiencies and ability to ambulate w/o hinged brace >3,000 steps/day -MET  The patient will demonstrate ability to ascend stairs with good alignment and control -Progressing        Goals: (to be met before but no later then 4 months post op)   Pt will improve quad strength to 5/5 to perform 10 full SL squats with good control and alignment. -Progressing  Pt will increase hip and knee strength to grossly 5/5 to be able to perform agility training with good control and allignment -Progressing  Pt will demonstrate increased hip ER/ABD strength to 5/5 to perform stepping, lunging and squatting and jumping activities without excessive femoral IR/ADD -Progressing  Pt will demonstrate good control and mechanics with squatting and lunging activities in a full tx session with no cuing from therapist to facilitate return to lifting and hiking safely -Progressing  Pt will be independent and compliant with comprehensive HEP to maintain progress achieved in PT- on going     New (L) Shoulder goals added 3/18/24  Pt will report improved ability to sleep without waking due to shoulder pain-No progress made   Pt will improve shoulder flexion AROM to >160 degrees to be able to reach into overhead cabinets without pain or restriction -No progress made   Pt will improve shoulder abduction AROM to >160 degrees to improve ability to don deodorant, don/doff shirts, and wash hair -No progress made   Pt will increase shoulder AROM ER to >65 to be able to reach and fasten seatbelt -No progress made   Pt will increase shoulder AROM IR to T7 to be able to reach in back pocket, tuck in shirt, and  turn steering wheel without pain-No progress made   Pt will improve shoulder strength throughout to 4/5 to improve function with lifting >20 lbs over head for house chores -No progress made   Pt will demonstrate increased mid/low trap strength to 4/5 to promote improved shoulder mechanics and stabilization with lifting and reaching -Not tested  Pt will be independent and compliant with comprehensive HEP to maintain progress achieved in PT        Plan: Continue skilled Physical Therapy 1-2 x/week or a total of 12 visits over a 90 day period. Treatment will include: TherEx, neuro re-ed, manual tech       Date:4/8/24                 TX#: 11/15 approved   (Post op visit #17) Date:4/9/24                 TX#: 12/15 approved   (Post op visit #18) Date:4/16/24                 TX#: 13/15 approved   (Post op visit #19) Date:4/19/24                 TX#: 14/15 approved   (Post op visit #20) Date:4/23/24                 TX#: 15/15 approved   (Post op visit #21) Date:4/30/24                 TX#: 16/22 approved   (Post op visit #22)   TherEx (R) knee: 40 min  -bike/UBE: 5 min  -PROM knee flex/ext to tolerance  -prone hang: x5 min, 4# weight  -step ups to march: 6\" box, 3x10  -4\" step downs: 3x10  -mini lunge at bar: 2x10  -SL press:  75#, 2x20  -lateral walk: RTB, 2x50 feet  -Hip 3 way: RTB, 2x10 each leg  -sit to stand staggered: 2x10  -SLB w/ 3 way taps: 2x to fatigue  -SLB w/ hip abd iso: 10 sec x5 reps  -RTB kicks: 2x to fatigue  -SAQ to end range: x20 TherEx (R) knee: 45 min  -bike/UBE: 5 min  -PROM knee flex/ext to tolerance  -prone hang: x3 min, 4# weight  -SAQ to end range: x30  -step ups to march: 6\" box, x10  -Lateral step ups 6\" box: x10  -Step up 8\" box: 2x10  -4\" step downs: 3x10  -mini lunge at bar: 2x10    -SL press:  75#, 2x20  -lateral walk: RTB, 2x50 feet  -Hip 3 way: RTB, 2x10 each leg  -sit to stand staggered: 2x10  -SLB w/ 3 way taps: 2x to fatigue  -SLB w/ hip abd iso: 10 sec x5 reps  -RTB kicks: 2x to  fatigue   TherEx (R) knee: 45 min  -bike/UBE: 5 min  -PROM knee flex/ext to tolerance  -prone hang: x2 min, 4# weight  -SAQ to end range:2# x30  -step ups to march: 6\" box, x10  -Step up 8\" box: 2x10  -2\" step downs: 3x10  -SL press:  81#, 2x20  -lateral walk: RTB, 2x50 feet  -sit to stand staggered: 2x10  -SLB w/ 3 way taps RTB: 2x to fatigue  -SLB w/ hip abd iso: 10 sec x5 reps  -SLB on airex: 3x30 sec  -RTB kicks: 2x to fatigue  -[place] squat: 2x10  -Mini SL squat at TRX x10      Hold  Sled push  Lunge sliders  TherEx (R) knee: 30 min  -bike/UBE: 5 min  -PROM knee flex/ext to tolerance  -prone hang: x2 min, 4# weight  -SAQ to end range:2# x30  -Step up 8\" box: 3x10  -2\" step downs: 3x10    -SL press:  81#, 2x20  -lateral walk: RTB, 2x50 feet  -sit to stand staggered: 2x10  -SLB w/ 3 way taps RTB: 2x to fatigue  -SLB w/ hip abd iso: 10 sec x5 reps  -SLB on airex: 3x30 sec  -RTB kicks: 2x to fatigue  -[place] squat: 2x10  -Mini SL squat at TRX x10      Hold  Sled push  Lunge sliders  TherEx (R) knee: 45 min  -reassessment   -bike/UBE: 5 min  -PROM knee flex/ext to tolerance  -prone hang: x2 min, 4# weight  -LAQ to end range:4# x30  -Step up 8\" box: 3x10  -4\" step downs: 3x10  -Bosu lunges: 2x10    -SL press:  81#, 2x20  -lateral walk: GTB, 2x50 feet  -sit to stand staggered: 2x10  -SLB w/ 3 way taps GTB: 2x to fatigue  -SLB w/ hip abd iso: 10 sec x5 reps  -SLB on airex: 3x30 sec  -GTB kicks: 2x to fatigue  -[place] squat: 2x10  -Mini SL squat at TRX x10  -DL squats for depth: 13# KB, 2x10      Hold  Sled push  Lunge sliders TherEx (R) knee: 45 min  -bike/UBE: 5 min  -PROM knee flex/ext to tolerance  -prone hang: x2 min, 4# weight  -4 way leg raises: 2#, 3x10  -prone HS curls: RTB, 2x10  -LAQ to end range:4# x30  -Step up 8\" box: 3x10  -4\" step downs: 3x10  -Bosu lunges: 2x10-hold    -SL press:  81#, 2x20  -lateral walk: GTB, 2x50 feet  -sit to stand staggered: 2x10  -SLB w/ 3 way taps GTB: 2x to  fatigue  -SLB on airex: 3x30 sec  -GTB kicks: 2x to fatigue  -[place] squat: 2x10  -Mini SL squat at TRX x10  -DL squats for depth: 13# KB, 2x10      Hold  Sled push  Lunge sliders   TherEx (L) shoulder: 15 min   -shoulder ER at side: 2x10, BTB  -shoulder ext: BTB, 2x10  -Shoulder row: RTB thick, 2x10  -wall wash: 2x10    -ER stretch at wall w/ stick: 3x30 sec-hold         TherEx (L) shoulder: 15 min  -Inferior capsule stretch: 3 min   -ER stretch at wall w/ stick and inf capsular stretch: 3x30 sec  -shoulder ER at side: 2x10, BTB  -shoulder ext: BTB, 2x10  -Shoulder row: RTB thick, 2x10  -wall wash: 2x10  -wall clock taps 12-10 o'clock: 2x5 reps     TherEx (L) shoulder: 15 min  -ER stretch at wall w/ stick and inf capsular stretch: 3x30 sec  -behind the back stretch: 3x30 sec  -s/l shoulder flexion:2x10  S/l shoulder horiz abduction: 2x10  -shoulder ER at side: 2x10, BTB  -shoulder ext: BTB, 2x10  -wall wash:flex/abd 2x10  -[place] raises: 1#, 2x10      Holdn  Serratus wall slide  Wall wash circles TherEx (L) shoulder: 15 min  -ER stretch at wall w/ stick and inf capsular stretch: 3x30 sec  -behind the back stretch: 3x30 sec  -s/l shoulder flexion:2x10  S/l shoulder horiz abduction: 2x10  -shoulder ER at side: 2x10, BTB  -shoulder ext: BTB, 3x10  -[place] Row: GTB, 3x10  -Shoulder ER walkouts: BTB, 2x10  -wall wash:flex/abd 2x10  -Scaption raises + abduction: 0#, 2x10      Holdn  Serratus wall slide  Wall wash circles TherEx (L) shoulder: 5 min  -reassessment Manual tech: 10 min  STM to (R) distal and medial musculature of the knee   HEP: Access Code: [email or web address]  URL: [email or web address]  Date: 04/03/2024  Prepared by: Batsheva Beckwith    Exercises  - Seated Knee Extension Stretch with Chair  - 1 x daily - 7 x weekly - 3 sets - 10 reps  - Seated Long Arc Quad  - 1 x daily - 7 x weekly - 3 sets - 10 reps  - Staggered Sit-to-Stand  - 1 x daily - 7 x weekly - 3 sets - 10 reps  - Lunge with Counter Support  - 1  x daily - 7 x weekly - 3 sets - 10 reps  - Step Up  - 1 x daily - 7 x weekly - 3 sets - 10 reps  - Forward Step Down Touch with Heel  - 1 x daily - 7 x weekly - 3 sets - 10 reps  - Side Stepping with Resistance at Ankles  - 1 x daily - 7 x weekly - 3 sets - 10 reps  - Hip Abduction with Resistance Loop  - 1 x daily - 7 x weekly - 3 sets - 10 reps  - Hip Extension with Resistance Loop  - 1 x daily - 7 x weekly - 3 sets - 10 reps  - Standing Hip Flexion with Resistance Loop  - 1 x daily - 7 x weekly - 3 sets - 10 reps    Charges: TherEx: 3 units, manual tech: 1 unit         Total Timed Treatment: 55 min  Total Treatment Time: 55 min

## 2024-05-06 NOTE — PROGRESS NOTES
Diagnosis:   RIGHT KNEE ARTHROSCOPY WITH ANTERIOR CRUCIATE LIGAMENT RECONSTRUCTION, HAMSTRING AUTOGRAFT, LATERAL MENISCUS REPAIR, AND SYNOVECTOMY AND MCL REPAIR          Added 3/18/24:  Injury of left rotator cuff, initial encounter (S46.002A)    Referring Provider: Vu  Date of Evaluation:    2/7/24  Last PN: 3/18/24    Precautions:      Toe-touch weightbearing x 2 weeks, no range of motion restrictions. Crutches to be weaned by Physical Therapy.   Next MD visit:   none scheduled  Date of Surgery: 2/6/24  (~12 weeks s/p surgery)   Insurance Primary/Secondary: BLUE CROSS MEDICAID / N/A     # Auth Visits: 15 visits 2/26-4/26/24, auth #[id number] + 7 visits 4/23/24-6/28/24    Subjective: Pt states she is able to go up and down the stairs the normal way but it does cause \"stabbing pain\". When she was holding onto the steering wheel and was reaching back for something in her car she felt sharp pain.     Pain: 0/10 currently in the knee and 2/10 with stair climbing,  (L) shoulder pain 2-3/10 at rest and 5/10 with ADL's      Objective:     MRI results on 4/29/24 of (L) shoulder: \"CONCLUSION:   1. Small complete or near complete tear anterior leading edge of supraspinatus tendon at greater tuberosity insertion.   2. Subacromial/subdeltoid bursitis.   3. Lateral downsloping type 1 acromion narrows supraspinatus outlet. \"      AROM: (* denotes performed with pain)  Shoulder    Flexion: R 180; L 125*  Abduction: R 180; L 125*  ER: R 65; L 57*  IR each behind the back: R T7; L T10*        Strength/MMT: (* denotes performed with pain)  Shoulder   Flexion: R 4/5; L 4/5*  Abduction: R 4+/5; L 4-/5*  ER: R 4/5; L 3+/5*  IR: R 5/5; L 4-/5*      (R) knee ext PROM: at start of tx 0 deg, post manual tech +5 deg of hyper extension achieved     Assessment: Pt progressed with resistance on KB squats. She continues to report anterior knee discomfort with stairs and SL squatting activities despite corrected valgus and limiting anterior  translation of knee over the ankle. She is introduced to SLB w/ ball toss and is challenged with this. Continued to emphasize importance of performing daily knee extensions stretches to regain full mobility from day to day. Pt consistently presents to PT with lacking TKE but able to regain full knee extension with manual tech.       Goals:    (to be met in 6 weeks post op ~12 visits)   The patient will regain A/PROM knee flexion >135 degrees and 0 degrees of knee extension-MET  The patinet will present without pain or limiting swelling after 8 visits- -MET  The patient will demonstrate a full quadricep contraction and SLR without extension lag -MET  The patient will ambulate without visual deficiencies and ability to ambulate w/o hinged brace >3,000 steps/day -MET  The patient will demonstrate ability to ascend stairs with good alignment and control -Progressing        Goals: (to be met before but no later then 4 months post op)   Pt will improve quad strength to 5/5 to perform 10 full SL squats with good control and alignment. -Progressing  Pt will increase hip and knee strength to grossly 5/5 to be able to perform agility training with good control and allignment -Progressing  Pt will demonstrate increased hip ER/ABD strength to 5/5 to perform stepping, lunging and squatting and jumping activities without excessive femoral IR/ADD -Progressing  Pt will demonstrate good control and mechanics with squatting and lunging activities in a full tx session with no cuing from therapist to facilitate return to lifting and hiking safely -Progressing  Pt will be independent and compliant with comprehensive HEP to maintain progress achieved in PT- on going     New (L) Shoulder goals added 3/18/24  Pt will report improved ability to sleep without waking due to shoulder pain-No progress made   Pt will improve shoulder flexion AROM to >160 degrees to be able to reach into overhead cabinets without pain or restriction -No progress  made   Pt will improve shoulder abduction AROM to >160 degrees to improve ability to don deodorant, don/doff shirts, and wash hair -No progress made   Pt will increase shoulder AROM ER to >65 to be able to reach and fasten seatbelt -No progress made   Pt will increase shoulder AROM IR to T7 to be able to reach in back pocket, tuck in shirt, and turn steering wheel without pain-No progress made   Pt will improve shoulder strength throughout to 4/5 to improve function with lifting >20 lbs over head for house chores -No progress made   Pt will demonstrate increased mid/low trap strength to 4/5 to promote improved shoulder mechanics and stabilization with lifting and reaching -Not tested  Pt will be independent and compliant with comprehensive HEP to maintain progress achieved in PT        Plan: Continue skilled Physical Therapy 1-2 x/week or a total of 12 visits over a 90 day period. Treatment will include: TherEx, neuro re-ed, manual tech       Date:4/19/24                 TX#: 14/15 approved   (Post op visit #20) Date:4/23/24                 TX#: 15/15 approved   (Post op visit #21) Date:4/30/24                 TX#: 16/22 approved   (Post op visit #22) Date:5/7/24                 TX#: 17/22 approved   (Post op visit #23)   TherEx (R) knee: 30 min  -bike/UBE: 5 min  -PROM knee flex/ext to tolerance  -prone hang: x2 min, 4# weight  -SAQ to end range:2# x30  -Step up 8\" box: 3x10  -2\" step downs: 3x10    -SL press:  81#, 2x20  -lateral walk: RTB, 2x50 feet  -sit to stand staggered: 2x10  -SLB w/ 3 way taps RTB: 2x to fatigue  -SLB w/ hip abd iso: 10 sec x5 reps  -SLB on airex: 3x30 sec  -RTB kicks: 2x to fatigue  -[place] squat: 2x10  -Mini SL squat at TRX x10      Hold  Sled push  Lunge sliders  TherEx (R) knee: 45 min  -reassessment   -bike/UBE: 5 min  -PROM knee flex/ext to tolerance  -prone hang: x2 min, 4# weight  -LAQ to end range:4# x30  -Step up 8\" box: 3x10  -4\" step downs: 3x10  -Bosu lunges: 2x10    -SL  press:  81#, 2x20  -lateral walk: GTB, 2x50 feet  -sit to stand staggered: 2x10  -SLB w/ 3 way taps GTB: 2x to fatigue  -SLB w/ hip abd iso: 10 sec x5 reps  -SLB on airex: 3x30 sec  -GTB kicks: 2x to fatigue  -[place] squat: 2x10  -Mini SL squat at TRX x10  -DL squats for depth: 13# KB, 2x10      Hold  Sled push  Lunge sliders TherEx (R) knee: 45 min  -bike/UBE: 5 min  -PROM knee flex/ext to tolerance  -prone hang: x2 min, 4# weight  -4 way leg raises: 2#, 3x10  -prone HS curls: RTB, 2x10  -LAQ to end range:4# x30  -Step up 8\" box: 3x10  -4\" step downs: 3x10  -Bosu lunges: 2x10-hold    -SL press:  81#, 2x20  -lateral walk: GTB, 2x50 feet  -sit to stand staggered: 2x10  -SLB w/ 3 way taps GTB: 2x to fatigue  -SLB on airex: 3x30 sec  -GTB kicks: 2x to fatigue  -[place] squat: 2x10  -Mini SL squat at TRX x10  -DL squats for depth: 13# KB, 2x10      Hold  Sled push  Lunge sliders TherEx (R) knee: 45 min  -bike/UBE: 5 min  -PROM knee flex/ext to tolerance  -bent over table HS curls: 3#, 2x20  -bent over table hip ext and donkey kicks: 3#, 2x20  -SLR, SL hi abd: 3#, 3x10  -LAQ to end range:4# 2x20  -Step up 8\" box: 3x10  -4\" step downs: 3x10  -Bosu lunges: 2x10-hold    -SL press:  81#, 3x20  -SL press heel raises:#50, 3x10  -lateral walk: GTB, 2x50 feet  -GTB kicks, hip abd + ext: 2x to fatigue  -SLB on airex: 3x30 sec  -[place] squat: 2x10  -Mini SL squat at TRX [id number]-[id number]  -DL squats for depth: 18# KB, 2x10  -Lunge sliders: 2 x10 each leg  -SLB ball toss: 2x20           Hold   Sled push: 70#, 2x100 feet  bosu squats  -RSB bridge with HS curl:    TherEx (L) shoulder: 15 min  -ER stretch at wall w/ stick and inf capsular stretch: 3x30 sec  -behind the back stretch: 3x30 sec  -s/l shoulder flexion:2x10  S/l shoulder horiz abduction: 2x10  -shoulder ER at side: 2x10, BTB  -shoulder ext: BTB, 3x10  -[place] Row: GTB, 3x10  -Shoulder ER walkouts: BTB, 2x10  -wall wash:flex/abd 2x10  -[place]aption raises + abduction: 0#,  2x10      Holdn  Serratus wall slide  Wall wash circles TherEx (L) shoulder: 5 min  -reassessment Manual tech: 10 min  STM to (R) distal and medial musculature of the knee    HEP: Access Code: [email or web address]  URL: [email or web address]  Date: 04/03/2024  Prepared by: Batsheva Beckwith    Exercises  - Seated Knee Extension Stretch with Chair  - 1 x daily - 7 x weekly - 3 sets - 10 reps  - Seated Long Arc Quad  - 1 x daily - 7 x weekly - 3 sets - 10 reps  - Staggered Sit-to-Stand  - 1 x daily - 7 x weekly - 3 sets - 10 reps  - Lunge with Counter Support  - 1 x daily - 7 x weekly - 3 sets - 10 reps  - Step Up  - 1 x daily - 7 x weekly - 3 sets - 10 reps  - Forward Step Down Touch with Heel  - 1 x daily - 7 x weekly - 3 sets - 10 reps  - Side Stepping with Resistance at Ankles  - 1 x daily - 7 x weekly - 3 sets - 10 reps  - Hip Abduction with Resistance Loop  - 1 x daily - 7 x weekly - 3 sets - 10 reps  - Hip Extension with Resistance Loop  - 1 x daily - 7 x weekly - 3 sets - 10 reps  - Standing Hip Flexion with Resistance Loop  - 1 x daily - 7 x weekly - 3 sets - 10 reps    Charges: TherEx: 3 units      Total Timed Treatment: 45 min  Total Treatment Time: 45 min

## 2024-05-07 ENCOUNTER — OFFICE VISIT (OUTPATIENT)
Dept: PHYSICAL THERAPY | Age: 44
End: 2024-05-07
Attending: ORTHOPAEDIC SURGERY
Payer: MEDICAID

## 2024-05-07 PROCEDURE — 97110 THERAPEUTIC EXERCISES: CPT | Performed by: PHYSICAL THERAPIST

## 2024-05-08 ENCOUNTER — OFFICE VISIT (OUTPATIENT)
Dept: ORTHOPEDICS CLINIC | Facility: CLINIC | Age: 44
End: 2024-05-08
Payer: MEDICAID

## 2024-05-08 DIAGNOSIS — M75.42 SUBACROMIAL IMPINGEMENT OF LEFT SHOULDER: ICD-10-CM

## 2024-05-08 DIAGNOSIS — Z98.890 S/P ACL RECONSTRUCTION: Primary | ICD-10-CM

## 2024-05-08 DIAGNOSIS — S46.012A TRAUMATIC COMPLETE TEAR OF LEFT ROTATOR CUFF, INITIAL ENCOUNTER: ICD-10-CM

## 2024-05-08 PROCEDURE — 99214 OFFICE O/P EST MOD 30 MIN: CPT | Performed by: ORTHOPAEDIC SURGERY

## 2024-05-08 NOTE — PROGRESS NOTES
Orthopaedic Surgery  [address] [place] [id number]  [phone]       Name: Sandee Rodriguez   MRN: [id number]  Date: 5/8/2024     REASON FOR VISIT:   MRI review of left shoulder pain and weakness in the setting of rotator cuff pathology.   Second Post-Surgical Visit: Right knee ACL reconstruction with hamstring autograft, major synovectomy, lateral meniscus repair, MCL repair on 02/06/2024.     INTERVAL HISTORY:  Sandee Eddy is a 43 year old right-hand dominant female who presents today for for evaluation of MRI scan of the shoulder and discussion regarding definitive management plan.      To summarize: left shoulder pain onset December 30, 2023 after skiing accident. She reports difficulty lifting the arm and sleeping. Subsequently, she got an MRI done showing a rotator cuff tear. Pain is currently 8/10.    Patient is also 3 months s/p Right knee ACL reconstruction with hamstring autograft, major synovectomy, lateral meniscus repair, MCL repair on 02/06/2024. This is currently doing very well.  She continues to require physical therapy due to strength deficits.    PE:   There were no vitals filed for this visit.  Estimated body mass index is 38.44 kg/m² as calculated from the following:    Height as of 2/6/24: 5' 5\" (1.651 m).    Weight as of 2/6/24: 231 lb.    Physical Exam  Constitutional:       Appearance: Normal appearance.   HENT:      Head: Normocephalic and atraumatic.   Eyes:      Extraocular Movements: Extraocular movements intact.   Neck:      Musculoskeletal: Normal range of motion and neck supple.   Cardiovascular:      Pulses: Normal pulses.   Pulmonary:      Effort: Pulmonary effort is normal. No respiratory distress.   Abdominal:      General: There is no distension.   Skin:     General: Skin is warm.      Capillary Refill: Capillary refill takes less than 2 seconds.      Findings: No bruising.   Neurological:      General: No focal deficit present.      Mental Status: She  is alert.   Psychiatric:         Mood and Affect: Mood normal.     Examination of the left shoulder demonstrates:     Demonstrates 4 of 5 strength in supraspinatus and positive [place]'s test.  Forward elevation 130 degrees.      Examination of the right knee demonstrates:      Physical examination the patient is alert and oriented x3, well-developed, well-nourished, no acute distress.      1A Lachman, full ROM.      Incisional sites are clean dry intact without signs of active pathology.  Calf is soft and nontender to palpation.     The contralateral knee is without limitation in range of motion or strength, no positive provocative maneuvers.     Radiographic Examination/Diagnostics:    MRI of the shoulder personally viewed, independently interpreted and radiology report was reviewed.    MRI SHOULDER, LEFT (CPT=73221)    Result Date: 4/29/2024  PROCEDURE: MRI SHOULDER, LEFT (CPT=73221)  COMPARISON: None.  INDICATIONS: S46.002A Injury of left rotator cuff, initial encounter  TECHNIQUE: A variety of imaging planes and parameters were utilized for visualization of suspected pathology.  Images were performed without contrast.   FINDINGS: Evaluation is slightly degraded by patient-related motion artifact.   ROTATOR CUFF:  * Small complete or near complete tear involving the anterior leading edge of the supraspinatus tendon at the greater tuberosity insertion.  Supraspinatus tendinosis. * Infraspinatus tendinosis without tear.  * Subscapularis and teres minor are intact. * Rotator cuff muscle bulk normal. * Small subacromial/subdeltoid bursal effusion.  A.C. JOINT:  * Type I acromion with lateral downsloping narrowing supraspinatus outlet. * Minor degenerative changes of AC joint.  GLENOHUMERAL JOINT, LABRUM, BICEPS * Glenohumeral joint intact.  Small shoulder joint effusion * Glenoid labrum intact.  A sublabral foramen at 2 o'clock position of labrum. * Long head biceps tendon intact.  OTHER: Resorptive cystic changes  and minimal subcortical edema posterolateral humeral head related to impingement.        CONCLUSION:       1. Small complete or near complete tear anterior leading edge of supraspinatus tendon at greater tuberosity insertion.   2. Subacromial/subdeltoid bursitis.   3. Lateral downsloping type 1 acromion narrows supraspinatus outlet.      Dictated by (CST): Rodriguez Dupont MD on 4/29/2024 at 4:55 PM     Finalized by (CST): Rodriguez Dupont MD on 4/29/2024 at 5:01 PM            IMPRESSION: Sandee Eddy is a 43 year old female with Right shoulder full thickness supraspinatus tear and subacromial impingement sustained December 30, 2023 after skiing accident.     The patient has failed an appropriate course of nonsurgical conservative management and is a candidate for arthroscopic rotator cuff repair, subacromial decompression.    Patient is also 3 months s/p Right knee ACL reconstruction with hamstring autograft, major synovectomy, lateral meniscus repair, MCL repair on 02/06/2024.      PLAN:   We had a detailed discussion outlining the etiology, anatomy, pathophysiology, and natural history of rotator cuff pathology of the shoulder. Imaging was reviewed in detail and correlated to a 3-dimensional model of the shoulder.      I had a lengthy discussion with Sandee about the diagnosis and options, both surgical and nonsurgical. I have recommended that we proceed with arthroscopic rotator cuff repair and likely biceps tenodesis as we agree surgical intervention would likely offer the best opportunity for symptomatic relief and functional recovery. I used imaging studies and a 3-dimensional model to outline her pathology, as well as general surgical principles. We reviewed the risks associated with shoulder arthroscopy.   In particular we discussed risks that include, but are not limited to infection, blood loss, potential transient or permanent injury to nerves or blood vessels, joint stiffness, persistent pain, need for  future operation, failure of healing, wound complications, failure of therapeutic intervention, risk of re-injury, fixation failure, deep vein thrombosis and pulmonary embolism. We discussed the proposed rehabilitation timeline as well as expected postoperative restrictions.   Most post-surgical patients after rotator cuff repair utilize a shoulder immobilizer/sling for approximately ~4 weeks.  Physical therapy is initiated immediately postsurgically with initial passive range of motion, followed by active assisted range of motion, and ultimately active range of motion after the 6 to 8-week stu.  After the 3-month stu postsurgically the restrictions are lifted and continued strengthening is recommended.    Sandee voiced a good understanding of treatment options, risks and benefits, postoperative instructions, rehabilitation timeline, and restrictions. She was given the opportunity to ask questions, which were all answered to the best of my ability and to her satisfaction. Sandee will work with my office to arrange a time for surgery and obtain any medical clearance information necessary. My pre-operative information packet, which details the process and answers many FAQ's will be provided. She was encouraged to call the office with any further questions or concerns.     Regarding the knee, we had a lengthy discussion with the patient regarding the patient's findings consistent with the expected postoperative course. We recommend continuation of physical therapy with rehabilitation efforts focused on strengthening, range of motion, functional ability, and return to baseline activity. The patient can continue to progress per protocol.     I spent 30 minutes in preparation to see the patient, counseling/education of relevant pathology, discussing imaging results, care coordination, and documentation into the electronic medical record.      FOLLOW-UP:   Return to clinic on an as needed basis.       Beverly Womack MD   Knee, Shoulder, & Elbow Surgery / Sports Medicine Specialist  Orthopaedic Surgery  [address] [place] [id number]   [email or web address]  [email or web address]  t: [phone]  o: [phone]  f: [phone]    This note was dictated using Dragon software.  While it was briefly proofread prior to completion, some grammatical, spelling, and word choice errors due to dictation may still occur.

## 2024-05-08 NOTE — H&P
Orthopaedic Surgery  [address] [place] [id number]  [phone]     PRE SURGICAL - HISTORY AND PHYSICAL EXAMINATION     Name: Sandee Rodriguez   MRN: [id number]  Date: 5/8/2024     CC: Left shoulder pain and weakness in the setting of rotator cuff pathology.     HPI:   Sandee Eddy is a very pleasant 43 year old right-hand dominant female who presents today for evaluation of MRI scan of the shoulder and discussion regarding definitive management plan.     To summarize: left shoulder pain onset December 30, 2023 after skiing accident. She reports difficulty lifting the arm and sleeping. Subsequently, she got an MRI done showing a rotator cuff tear. Pain is currently 8/10.    She is well known to our team for Right knee ACL reconstruction with hamstring autograft, major synovectomy, lateral meniscus repair, MCL repair on 02/06/2024. This is currently doing well.    PMH:   Past Medical History:    Hx of motion sickness    Obesity (BMI 35.0-39.9 without comorbidity)    HERRERA (obstructive sleep apnea)    PONV (postoperative nausea and vomiting)    Sleep apnea       PAST SURGICAL HX:  Past Surgical History:   Procedure Laterality Date    Other surgical history      RT LEG SURGERY- PINS PLACED AND REMOVED 2-3 times       FAMILY HX:  Family History   Problem Relation Age of Onset    Heart Disorder Father        ALLERGIES:  Ampicillin and Penicillins    MEDICATIONS:   Current Outpatient Medications   Medication Sig Dispense Refill    vitamin E 200 UNITS Oral Cap Take 1 capsule (200 Units total) by mouth daily.      Cholecalciferol (VITAMIN D3) 25 MCG (1000 UT) Oral Cap Take 1 tablet by mouth daily.      multivitamin Oral Chew Tab Chew 1 tablet by mouth daily.      ibuprofen 600 MG Oral Tab Take 1 tablet (600 mg total) by mouth every 6 hours with food 20 tablet 0       ROS: A comprehensive 14 point review of systems was performed and was negative aside from the aforementioned per history of present  illness.    SOCIAL HX:  Social History     Tobacco Use    Smoking status: Never    Smokeless tobacco: Never   Substance Use Topics    Alcohol use: Not Currently     Comment: OCC       PE:   There were no vitals filed for this visit.  Estimated body mass index is 38.44 kg/m² as calculated from the following:    Height as of 2/6/24: 5' 5\" (1.651 m).    Weight as of 2/6/24: 231 lb.    Physical Exam  Constitutional:       Appearance: Normal appearance.   HENT:      Head: Normocephalic and atraumatic.   Eyes:      Extraocular Movements: Extraocular movements intact.   Neck:      Musculoskeletal: Normal range of motion and neck supple.   Cardiovascular:      Pulses: Normal pulses.   Pulmonary:      Effort: Pulmonary effort is normal. No respiratory distress.   Abdominal:      General: There is no distension.   Skin:     General: Skin is warm.      Capillary Refill: Capillary refill takes less than 2 seconds.      Findings: No bruising.   Neurological:      General: No focal deficit present.      Mental Status: Alert.   Psychiatric:         Mood and Affect: Mood normal.     Examination of the left shoulder demonstrates:     Skin is intact, warm and dry.   Cervical:  Full ROM  Spurling's  Negative    Deformity:   none  Atrophy:   none    Scapular winging: Negative    Palpation:     AC Joint  Negative  Biceps Tendon  Negative  Greater Tuberosity Negative    ROM:   Forward Flexion:  full and symmetric  Abduction:   full and symmetric  External Rotation:  full and symmetric  Internal Rotation:  full and symmetric    Rotator Cuff Strength:   Supraspinatus:   5/5  Subscapularis:   5/5  Infraspinatus/Teres: 5/5    Provocative Tests:   Sood:   Negative  Speed's:   Negative  [place]'s:   Negative  Lift-off:   Negative  Apprehension:  Negative  Sulcus Sign:   Negative    Neurovascular Upper Extremity (Bilateral)  Motor:    5/5 EPL, Finger Abduction, , Pinch, Deltoid  Sensation:   intact to light touch median, ulnar, radial  and axillary nerve  Circulation:   Normal, 2+ radial pulse    The contralateral upper extremity is without limitation in range of motion or strength, no positive provocative maneuvers.     Radiographic Examination/Diagnostics:    MRI of the shoulder personally viewed, independently interpreted and radiology report was reviewed.    MRI SHOULDER, LEFT (CPT=73221)    Result Date: 4/29/2024  PROCEDURE: MRI SHOULDER, LEFT (CPT=73221)  COMPARISON: None.  INDICATIONS: S46.002A Injury of left rotator cuff, initial encounter  TECHNIQUE: A variety of imaging planes and parameters were utilized for visualization of suspected pathology.  Images were performed without contrast.   FINDINGS: Evaluation is slightly degraded by patient-related motion artifact.   ROTATOR CUFF:  * Small complete or near complete tear involving the anterior leading edge of the supraspinatus tendon at the greater tuberosity insertion.  Supraspinatus tendinosis. * Infraspinatus tendinosis without tear.  * Subscapularis and teres minor are intact. * Rotator cuff muscle bulk normal. * Small subacromial/subdeltoid bursal effusion.  A.C. JOINT:  * Type I acromion with lateral downsloping narrowing supraspinatus outlet. * Minor degenerative changes of AC joint.  GLENOHUMERAL JOINT, LABRUM, BICEPS * Glenohumeral joint intact.  Small shoulder joint effusion * Glenoid labrum intact.  A sublabral foramen at 2 o'clock position of labrum. * Long head biceps tendon intact.  OTHER: Resorptive cystic changes and minimal subcortical edema posterolateral humeral head related to impingement.        CONCLUSION:    1. Small complete or near complete tear anterior leading edge of supraspinatus tendon at greater tuberosity insertion.   2. Subacromial/subdeltoid bursitis.   3. Lateral downsloping type 1 acromion narrows supraspinatus outlet.      Dictated by (CST): Rodriguez Dupont MD on 4/29/2024 at 4:55 PM     Finalized by (CST): Rodriguez Dupont MD on 4/29/2024 at 5:01 PM             IMPRESSION: Sandee Eddy is a 43 year old female with Right shoulder full thickness supraspinatus tear and subacromial impingement sustained December 30, 2023 after skiing accident. Patient is also 3 months s/p Right knee ACL reconstruction with hamstring autograft, major synovectomy, lateral meniscus repair, MCL repair on 02/06/2024.    The patient has failed an appropriate course of nonsurgical conservative management and is a candidate for arthroscopic rotator cuff repair, subacromial decompression, and biceps tenodesis.    PLAN:   We had a detailed discussion outlining the etiology, anatomy, pathophysiology, and natural history of rotator cuff pathology of the shoulder. Imaging was reviewed in detail and correlated to a 3-dimensional model of the shoulder.     I had a lengthy discussion with Sandee about the diagnosis and options, both surgical and nonsurgical. I have recommended that we proceed with arthroscopic rotator cuff repair and likely biceps tenodesis as we agree surgical intervention would likely offer the best opportunity for symptomatic relief and functional recovery. I used imaging studies and a 3-dimensional model to outline her pathology, as well as general surgical principles. We reviewed the risks associated with shoulder arthroscopy.   In particular we discussed risks that include, but are not limited to infection, blood loss, potential transient or permanent injury to nerves or blood vessels, joint stiffness, persistent pain, need for future operation, failure of healing, wound complications, failure of therapeutic intervention, risk of re-injury, fixation failure, deep vein thrombosis and pulmonary embolism. We discussed the proposed rehabilitation timeline as well as expected postoperative restrictions.     Most post-surgical patients after rotator cuff repair utilize a shoulder immobilizer/sling for approximately ~4 weeks.  Physical therapy is initiated immediately postsurgically  with initial passive range of motion, followed by active assisted range of motion, and ultimately active range of motion after the 6 to 8-week stu.  After the 3-month stu postsurgically the restrictions are lifted and continued strengthening is recommended.    Sandee voiced a good understanding of treatment options, risks and benefits, postoperative instructions, rehabilitation timeline, and restrictions. She was given the opportunity to ask questions, which were all answered to the best of my ability and to her satisfaction. Sandee will work with my office to arrange a time for surgery and obtain any medical clearance information necessary. My pre-operative information packet, which details the process and answers many FAQ's will be provided. She was encouraged to call the office with any further questions or concerns.    I spent 60 minutes in preparation to see the patient, counseling/education of relevant pathology, discussing imaging results, surgical counseling, DME fitting, and care coordination.      FOLLOW-UP:   Post-Operative Visit, POD 6 with YULIA Sanford MD  Knee, Shoulder, & Elbow Surgery / Sports Medicine Specialist  Orthopaedic Surgery  [address] [place] [address].[email or web address]  [email or web address]  t: [phone]  o: [phone]  f: [phone]    This note was dictated using Dragon software.  While it was briefly proofread prior to completion, some grammatical, spelling, and word choice errors due to dictation may still occur.

## 2024-05-13 NOTE — PROGRESS NOTES
Diagnosis:   RIGHT KNEE ARTHROSCOPY WITH ANTERIOR CRUCIATE LIGAMENT RECONSTRUCTION, HAMSTRING AUTOGRAFT, LATERAL MENISCUS REPAIR, AND SYNOVECTOMY AND MCL REPAIR          Added 3/18/24:  Injury of left rotator cuff, initial encounter (S46.002A)    Referring Provider: Vu  Date of Evaluation:    2/7/24  Last PN: 3/18/24    Precautions:      Toe-touch weightbearing x 2 weeks, no range of motion restrictions. Crutches to be weaned by Physical Therapy.   Next MD visit:   none scheduled  Date of Surgery: 2/6/24  (~12 weeks s/p surgery)   Insurance Primary/Secondary: BLUE CROSS MEDICAID / N/A     # Auth Visits: 15 visits 2/26-4/26/24, auth #[id number] + 7 visits 4/23/24-6/28/24    Subjective: Pt states her knee is starting to feel better and it just needed more time. Doctor is recommending surgery for my (L) shoulder pain.     Pain: 0/10 currently in the knee and 2/10 with stair climbing,    Objective:     MRI results on 4/29/24 of (L) shoulder: \"CONCLUSION:   1. Small complete or near complete tear anterior leading edge of supraspinatus tendon at greater tuberosity insertion.   2. Subacromial/subdeltoid bursitis.   3. Lateral downsloping type 1 acromion narrows supraspinatus outlet. \"      AROM: (* denotes performed with pain)  Shoulder    Flexion: R 180; L 125*  Abduction: R 180; L 125*  ER: R 65; L 57*  IR each behind the back: R T7; L T10*        Strength/MMT: (* denotes performed with pain)  Shoulder   Flexion: R 4/5; L 4/5*  Abduction: R 4+/5; L 4-/5*  ER: R 4/5; L 3+/5*  IR: R 5/5; L 4-/5*      (R) knee ext PROM: at start of tx 0 deg, post manual tech +5 deg of hyper extension achieved     Assessment: Pt progressed with split squats at bar and bosu lunges to promote LE strength and stability. She also increases weight on SL press. However, she continues to overcompensate with contralateral gastroc when stepping up a step.       Goals:    (to be met in 6 weeks post op ~12 visits)   The patient will regain A/PROM  knee flexion >135 degrees and 0 degrees of knee extension-MET  The patinet will present without pain or limiting swelling after 8 visits- -MET  The patient will demonstrate a full quadricep contraction and SLR without extension lag -MET  The patient will ambulate without visual deficiencies and ability to ambulate w/o hinged brace >3,000 steps/day -MET  The patient will demonstrate ability to ascend stairs with good alignment and control -Progressing        Goals: (to be met before but no later then 4 months post op)   Pt will improve quad strength to 5/5 to perform 10 full SL squats with good control and alignment. -Progressing  Pt will increase hip and knee strength to grossly 5/5 to be able to perform agility training with good control and allignment -Progressing  Pt will demonstrate increased hip ER/ABD strength to 5/5 to perform stepping, lunging and squatting and jumping activities without excessive femoral IR/ADD -Progressing  Pt will demonstrate good control and mechanics with squatting and lunging activities in a full tx session with no cuing from therapist to facilitate return to lifting and hiking safely -Progressing  Pt will be independent and compliant with comprehensive HEP to maintain progress achieved in PT- on going     New (L) Shoulder goals added 3/18/24  Pt will report improved ability to sleep without waking due to shoulder pain-No progress made   Pt will improve shoulder flexion AROM to >160 degrees to be able to reach into overhead cabinets without pain or restriction -No progress made   Pt will improve shoulder abduction AROM to >160 degrees to improve ability to don deodorant, don/doff shirts, and wash hair -No progress made   Pt will increase shoulder AROM ER to >65 to be able to reach and fasten seatbelt -No progress made   Pt will increase shoulder AROM IR to T7 to be able to reach in back pocket, tuck in shirt, and turn steering wheel without pain-No progress made   Pt will improve  shoulder strength throughout to 4/5 to improve function with lifting >20 lbs over head for house chores -No progress made   Pt will demonstrate increased mid/low trap strength to 4/5 to promote improved shoulder mechanics and stabilization with lifting and reaching -Not tested  Pt will be independent and compliant with comprehensive HEP to maintain progress achieved in PT        Plan: Continue skilled Physical Therapy 1-2 x/week or a total of 12 visits over a 90 day period. Treatment will include: TherEx, neuro re-ed, manual tech       Date:4/19/24                 TX#: 14/15 approved   (Post op visit #20) Date:4/23/24                 TX#: 15/15 approved   (Post op visit #21) Date:4/30/24                 TX#: 16/22 approved   (Post op visit #22) Date:5/7/24                 TX#: 17/22 approved   (Post op visit #23) Date:5/14/24                 TX#: 18/22 approved   (Post op visit #24)   TherEx (R) knee: 30 min  -bike/UBE: 5 min  -PROM knee flex/ext to tolerance  -prone hang: x2 min, 4# weight  -SAQ to end range:2# x30  -Step up 8\" box: 3x10  -2\" step downs: 3x10    -SL press:  81#, 2x20  -lateral walk: RTB, 2x50 feet  -sit to stand staggered: 2x10  -SLB w/ 3 way taps RTB: 2x to fatigue  -SLB w/ hip abd iso: 10 sec x5 reps  -SLB on airex: 3x30 sec  -RTB kicks: 2x to fatigue  -[place] squat: 2x10  -Mini SL squat at TRX x10      Hold  Sled push  Lunge sliders  TherEx (R) knee: 45 min  -reassessment   -bike/UBE: 5 min  -PROM knee flex/ext to tolerance  -prone hang: x2 min, 4# weight  -LAQ to end range:4# x30  -Step up 8\" box: 3x10  -4\" step downs: 3x10  -Bosu lunges: 2x10    -SL press:  81#, 2x20  -lateral walk: GTB, 2x50 feet  -sit to stand staggered: 2x10  -SLB w/ 3 way taps GTB: 2x to fatigue  -SLB w/ hip abd iso: 10 sec x5 reps  -SLB on airex: 3x30 sec  -GTB kicks: 2x to fatigue  -[place] squat: 2x10  -Mini SL squat at TRX x10  -DL squats for depth: 13# KB, 2x10      Hold  Sled push  Lunge sliders TherEx (R) knee:  45 min  -bike/UBE: 5 min  -PROM knee flex/ext to tolerance  -prone hang: x2 min, 4# weight  -4 way leg raises: 2#, 3x10  -prone HS curls: RTB, 2x10  -LAQ to end range:4# x30  -Step up 8\" box: 3x10  -4\" step downs: 3x10  -Bosu lunges: 2x10-hold    -SL press:  81#, 2x20  -lateral walk: GTB, 2x50 feet  -sit to stand staggered: 2x10  -SLB w/ 3 way taps GTB: 2x to fatigue  -SLB on airex: 3x30 sec  -GTB kicks: 2x to fatigue  -[place] squat: 2x10  -Mini SL squat at TRX x10  -DL squats for depth: 13# KB, 2x10      Hold  Sled push  Lunge sliders TherEx (R) knee: 45 min  -bike/UBE: 5 min  -PROM knee flex/ext to tolerance  -bent over table HS curls: 3#, 2x20  -bent over table hip ext and donkey kicks: 3#, 2x20  -SLR, SL hi abd: 3#, 3x10  -LAQ to end range:4# 2x20  -Step up 8\" box: 3x10  -4\" step downs: 3x10  -Bosu lunges: 2x10-hold    -SL press:  81#, 3x20  -SL press heel raises:#50, 3x10  -lateral walk: GTB, 2x50 feet  -GTB kicks, hip abd + ext: 2x to fatigue  -SLB on airex: 3x30 sec  -[place] squat: 2x10  -Mini SL squat at TRX [id number]  -DL squats for depth: 18# KB, 2x10  -Lunge sliders: 2 x10 each leg  -SLB ball toss: 2x20           Hold   Sled push: 70#, 2x100 feet  bosu squats  -RSB bridge with HS curl:  TherEx (R) knee: 50 min  -bike/UBE: 5 min  -PROM knee flex/ext to tolerance  -bent over table HS curls: 3#, 2x20  -bent over table hip ext and donkey kicks: 3#, 2x20  -SLR, SL hip abd: 3#, 3x10  -SL hip add: 0#, 3x10  -RSB bridge with HS curl: x10  -clams: GTB, 2x10  -LAQ to end range:4# 2x20  -Step up 8\" box: 2x10  -6\" step downs: 3x10  -Bosu lunges: 2x10    -SL press:  87#, 3x10  -lateral walk: GTB, 2x50 feet  -GTB kicks, hip abd + ext: 2x to fatigue  -SLB on airex: 3x30 sec  -[place] squat: x10  -lunge at bar: 2x10  -DL squats for depth: 18# KB, 2x10  -Lunge sliders: 2 x10 each leg-hold  -SLB ball toss: 2x20           Hold   Sled push: 70#, 2x100 feet  bosu squats     TherEx (L) shoulder: 15 min  -ER stretch at  wall w/ stick and inf capsular stretch: 3x30 sec  -behind the back stretch: 3x30 sec  -s/l shoulder flexion:2x10  S/l shoulder horiz abduction: 2x10  -shoulder ER at side: 2x10, BTB  -shoulder ext: BTB, 3x10  -[place] Row: GTB, 3x10  -Shoulder ER walkouts: BTB, 2x10  -wall wash:flex/abd 2x10  -[place] raises + abduction: 0#, 2x10      Holdn  Serratus wall slide  Wall wash circles TherEx (L) shoulder: 5 min  -reassessment Manual tech: 10 min  STM to (R) distal and medial musculature of the knee     HEP: Access Code: [email or web address]  URL: [email or web address]  Date: 04/03/2024  Prepared by: Batsheva Beckwith    Exercises  - Seated Knee Extension Stretch with Chair  - 1 x daily - 7 x weekly - 3 sets - 10 reps  - Seated Long Arc Quad  - 1 x daily - 7 x weekly - 3 sets - 10 reps  - Staggered Sit-to-Stand  - 1 x daily - 7 x weekly - 3 sets - 10 reps  - Lunge with Counter Support  - 1 x daily - 7 x weekly - 3 sets - 10 reps  - Step Up  - 1 x daily - 7 x weekly - 3 sets - 10 reps  - Forward Step Down Touch with Heel  - 1 x daily - 7 x weekly - 3 sets - 10 reps  - Side Stepping with Resistance at Ankles  - 1 x daily - 7 x weekly - 3 sets - 10 reps  - Hip Abduction with Resistance Loop  - 1 x daily - 7 x weekly - 3 sets - 10 reps  - Hip Extension with Resistance Loop  - 1 x daily - 7 x weekly - 3 sets - 10 reps  - Standing Hip Flexion with Resistance Loop  - 1 x daily - 7 x weekly - 3 sets - 10 reps    Charges: TherEx: 3 units      Total Timed Treatment: 50 min  Total Treatment Time: 50 min

## 2024-05-14 ENCOUNTER — OFFICE VISIT (OUTPATIENT)
Dept: PHYSICAL THERAPY | Age: 44
End: 2024-05-14
Attending: ORTHOPAEDIC SURGERY
Payer: MEDICAID

## 2024-05-14 PROCEDURE — 97110 THERAPEUTIC EXERCISES: CPT | Performed by: PHYSICAL THERAPIST

## 2024-05-20 ENCOUNTER — OFFICE VISIT (OUTPATIENT)
Dept: PHYSICAL THERAPY | Age: 44
End: 2024-05-20
Attending: ORTHOPAEDIC SURGERY
Payer: MEDICAID

## 2024-05-20 PROCEDURE — 97112 NEUROMUSCULAR REEDUCATION: CPT | Performed by: PHYSICAL THERAPIST

## 2024-05-20 PROCEDURE — 97110 THERAPEUTIC EXERCISES: CPT | Performed by: PHYSICAL THERAPIST

## 2024-05-20 NOTE — PROGRESS NOTES
Diagnosis:   RIGHT KNEE ARTHROSCOPY WITH ANTERIOR CRUCIATE LIGAMENT RECONSTRUCTION, HAMSTRING AUTOGRAFT, LATERAL MENISCUS REPAIR, AND SYNOVECTOMY AND MCL REPAIR          Added 3/18/24:  Injury of left rotator cuff, initial encounter (S46.002A)    Referring Provider: Vu  Date of Evaluation:    2/7/24  Last PN: 3/18/24    Precautions:      Toe-touch weightbearing x 2 weeks, no range of motion restrictions. Crutches to be weaned by Physical Therapy.   Next MD visit:   none scheduled  Date of Surgery: 2/6/24  (~12 weeks s/p surgery)   Insurance Primary/Secondary: BLUE CROSS MEDICAID / N/A     # Auth Visits: 15 visits 2/26-4/26/24, auth #[id number] + 7 visits 4/23/24-6/28/24    Subjective: Pt reports she was limping and her family member showed her a stretch which seemed to help. She is going and and down the stairs now with less pain.     Pain: 0/10 currently     Objective:       Body mechanics: increased valgus and foot pronation in SL squatting/lunging activities.     Assessment: Pt continues to present with lacking full knee extension PROM. She is progressed with lateral toss to rebounder on SLS to promote lateral knee stability. Continued to give her cuing to reduce valgus during lunging. Added sled push to promote return to walking hills and pushing a . She tolerates tx with reports or fatigue.       Goals:    (to be met in 6 weeks post op ~12 visits)   The patient will regain A/PROM knee flexion >135 degrees and 0 degrees of knee extension-MET  The patinet will present without pain or limiting swelling after 8 visits- -MET  The patient will demonstrate a full quadricep contraction and SLR without extension lag -MET  The patient will ambulate without visual deficiencies and ability to ambulate w/o hinged brace >3,000 steps/day -MET  The patient will demonstrate ability to ascend stairs with good alignment and control -Progressing        Goals: (to be met before but no later then 4 months post op)    Pt will improve quad strength to 5/5 to perform 10 full SL squats with good control and alignment. -Progressing  Pt will increase hip and knee strength to grossly 5/5 to be able to perform agility training with good control and allignment -Progressing  Pt will demonstrate increased hip ER/ABD strength to 5/5 to perform stepping, lunging and squatting and jumping activities without excessive femoral IR/ADD -Progressing  Pt will demonstrate good control and mechanics with squatting and lunging activities in a full tx session with no cuing from therapist to facilitate return to lifting and hiking safely -Progressing  Pt will be independent and compliant with comprehensive HEP to maintain progress achieved in PT- on going     New (L) Shoulder goals added 3/18/24  Pt will report improved ability to sleep without waking due to shoulder pain-No progress made   Pt will improve shoulder flexion AROM to >160 degrees to be able to reach into overhead cabinets without pain or restriction -No progress made   Pt will improve shoulder abduction AROM to >160 degrees to improve ability to don deodorant, don/doff shirts, and wash hair -No progress made   Pt will increase shoulder AROM ER to >65 to be able to reach and fasten seatbelt -No progress made   Pt will increase shoulder AROM IR to T7 to be able to reach in back pocket, tuck in shirt, and turn steering wheel without pain-No progress made   Pt will improve shoulder strength throughout to 4/5 to improve function with lifting >20 lbs over head for house chores -No progress made   Pt will demonstrate increased mid/low trap strength to 4/5 to promote improved shoulder mechanics and stabilization with lifting and reaching -Not tested  Pt will be independent and compliant with comprehensive HEP to maintain progress achieved in PT        Plan: Progress strength and balance as tolerated with focus on proper mechanics.     Date:4/30/24                 TX#: 16/22 approved   (Post op  visit #22) Date:5/7/24                 TX#: 17/22 approved   (Post op visit #23) Date:5/14/24                 TX#: 18/22 approved   (Post op visit #24) Date:5/20/24                 TX#: 19/22 approved   (Post op visit #25)   TherEx (R) knee: 45 min  -bike/UBE: 5 min  -PROM knee flex/ext to tolerance  -prone hang: x2 min, 4# weight  -4 way leg raises: 2#, 3x10  -prone HS curls: RTB, 2x10  -LAQ to end range:4# x30  -Step up 8\" box: 3x10  -4\" step downs: 3x10  -Bosu lunges: 2x10-hold    -SL press:  81#, 2x20  -lateral walk: GTB, 2x50 feet  -sit to stand staggered: 2x10  -SLB w/ 3 way taps GTB: 2x to fatigue  -SLB on airex: 3x30 sec  -GTB kicks: 2x to fatigue  -[place] squat: 2x10  -Mini SL squat at TRX x10  -DL squats for depth: 13# KB, 2x10      Hold  Sled push  Lunge sliders TherEx (R) knee: 45 min  -bike/UBE: 5 min  -PROM knee flex/ext to tolerance  -bent over table HS curls: 3#, 2x20  -bent over table hip ext and donkey kicks: 3#, 2x20  -SLR, SL hi abd: 3#, 3x10  -LAQ to end range:4# 2x20  -Step up 8\" box: 3x10  -4\" step downs: 3x10  -Bosu lunges: 2x10-hold    -SL press:  81#, 3x20  -SL press heel raises:#50, 3x10  -lateral walk: GTB, 2x50 feet  -GTB kicks, hip abd + ext: 2x to fatigue  -SLB on airex: 3x30 sec  -[place] squat: 2x10  -Mini SL squat at TRX [id number]  -DL squats for depth: 18# KB, 2x10  -Lunge sliders: 2 x10 each leg  -SLB ball toss: 2x20           Hold   Sled push: 70#, 2x100 feet  bosu squats  -RSB bridge with HS curl:  TherEx (R) knee: 50 min  -bike/UBE: 5 min  -PROM knee flex/ext to tolerance  -bent over table HS curls: 3#, 2x20  -bent over table hip ext and donkey kicks: 3#, 2x20  -SLR, SL hip abd: 3#, 3x10  -SL hip add: 0#, 3x10  -RSB bridge with HS curl: x10  -clams: GTB, 2x10  -LAQ to end range:4# 2x20  -Step up 8\" box: 2x10  -6\" step downs: 3x10  -Bosu lunges: 2x10    -SL press:  87#, 3x10  -lateral walk: GTB, 2x50 feet  -GTB kicks, hip abd + ext: 2x to fatigue  -SLB on airex: 3x30  sec  -[place] squat: x10  -lunge at bar: 2x10  -DL squats for depth: 18# KB, 2x10  -Lunge sliders: 2 x10 each leg-hold  -SLB ball toss: 2x20           Hold   Sled push: 70#, 2x100 feet  bosu squats   TherEx (R) knee: 30 min  -bike/UBE: 5 min  -PROM knee flex/ext to tolerance  -bent over table HS curls: 3#, 2x20  -bent over table hip ext and donkey kicks: 3#, 2x20  -SLR, SL hip abd: 3#, 3x10  -SL hip add: 0#, 3x10  -LAQ to end range:4# 2x20  -Step up 8\" box: 2x10  -6\" step downs: 3x10    -SL press:  87#, 3x10  -lateral walk: GTB, 2x50 feet  -GTB kicks, hip abd + ext: 2x to fatigue  -[place] squat: x10  -lunge at bar: 2x10-hold  -DL squats for depth: 25 # KB, 2x10  -Lunge sliders: 2 x10 each leg-hold  -Sled push: 65#, 2x100 feet          Hold   -SL RDL's    bosu squats   Manual tech: 10 min  STM to (R) distal and medial musculature of the knee   Neuro re-ed: 10min  -SLB ball toss: 2x20  -ball toss lateral: SLS w/ toe touch x20 each way  -SLB on airex: 3x30 sec  -RSB bridge with HS curl: x10   HEP: Access Code: [email or web address]  URL: [email or web address]  Date: 04/03/2024  Prepared by: Batsheva Beckwith    Exercises  - Seated Knee Extension Stretch with Chair  - 1 x daily - 7 x weekly - 3 sets - 10 reps  - Seated Long Arc Quad  - 1 x daily - 7 x weekly - 3 sets - 10 reps  - Staggered Sit-to-Stand  - 1 x daily - 7 x weekly - 3 sets - 10 reps  - Lunge with Counter Support  - 1 x daily - 7 x weekly - 3 sets - 10 reps  - Step Up  - 1 x daily - 7 x weekly - 3 sets - 10 reps  - Forward Step Down Touch with Heel  - 1 x daily - 7 x weekly - 3 sets - 10 reps  - Side Stepping with Resistance at Ankles  - 1 x daily - 7 x weekly - 3 sets - 10 reps  - Hip Abduction with Resistance Loop  - 1 x daily - 7 x weekly - 3 sets - 10 reps  - Hip Extension with Resistance Loop  - 1 x daily - 7 x weekly - 3 sets - 10 reps  - Standing Hip Flexion with Resistance Loop  - 1 x daily - 7 x weekly - 3 sets - 10 reps    Charges: TherEx: 2 units,  neuro re-ed: 1 unit      Total Timed Treatment: 40 min  Total Treatment Time: 40 min

## 2024-05-29 ENCOUNTER — APPOINTMENT (OUTPATIENT)
Dept: PHYSICAL THERAPY | Age: 44
End: 2024-05-29
Payer: MEDICAID

## 2024-06-03 NOTE — PROGRESS NOTES
Diagnosis:   RIGHT KNEE ARTHROSCOPY WITH ANTERIOR CRUCIATE LIGAMENT RECONSTRUCTION, HAMSTRING AUTOGRAFT, LATERAL MENISCUS REPAIR, AND SYNOVECTOMY AND MCL REPAIR          Added 3/18/24:  Injury of left rotator cuff, initial encounter (S46.002A)    Referring Provider: Vu  Date of Evaluation:    2/7/24  Last PN: 3/18/24    Precautions:      Toe-touch weightbearing x 2 weeks, no range of motion restrictions. Crutches to be weaned by Physical Therapy.   Next MD visit:   none scheduled  Date of Surgery: 2/6/24  (~12 weeks s/p surgery)   Insurance Primary/Secondary: BLUE CROSS MEDICAID / N/A     # Auth Visits: 15 visits 2/26-4/26/24, auth #[id number] + 7 visits 4/23/24-6/28/24    Subjective: Pt reports she was doing a  lot of stair climbing on vacation and the knee feels stronger.    Pain: 0/10 currently     Objective:       Body mechanics: increased valgus and foot pronation in SL squatting/lunging activities.     Assessment: Progressed pt to Bosu squats to improve stability and strength of the lE. She is tolerating exercises well w/o reports of pain. She continues to be challenged with Syriac squats and lunges due to continued LE weakness. Increased resistance on sled push to promote strength for return to yard work duties and walking hills.       Goals:    (to be met in 6 weeks post op ~12 visits)   The patient will regain A/PROM knee flexion >135 degrees and 0 degrees of knee extension-MET  The patinet will present without pain or limiting swelling after 8 visits- -MET  The patient will demonstrate a full quadricep contraction and SLR without extension lag -MET  The patient will ambulate without visual deficiencies and ability to ambulate w/o hinged brace >3,000 steps/day -MET  The patient will demonstrate ability to ascend stairs with good alignment and control -Progressing        Goals: (to be met before but no later then 4 months post op)   Pt will improve quad strength to 5/5 to perform 10 full SL squats  with good control and alignment. -Progressing  Pt will increase hip and knee strength to grossly 5/5 to be able to perform agility training with good control and allignment -Progressing  Pt will demonstrate increased hip ER/ABD strength to 5/5 to perform stepping, lunging and squatting and jumping activities without excessive femoral IR/ADD -Progressing  Pt will demonstrate good control and mechanics with squatting and lunging activities in a full tx session with no cuing from therapist to facilitate return to lifting and hiking safely -Progressing  Pt will be independent and compliant with comprehensive HEP to maintain progress achieved in PT- on going     New (L) Shoulder goals added 3/18/24  Pt will report improved ability to sleep without waking due to shoulder pain-No progress made   Pt will improve shoulder flexion AROM to >160 degrees to be able to reach into overhead cabinets without pain or restriction -No progress made   Pt will improve shoulder abduction AROM to >160 degrees to improve ability to don deodorant, don/doff shirts, and wash hair -No progress made   Pt will increase shoulder AROM ER to >65 to be able to reach and fasten seatbelt -No progress made   Pt will increase shoulder AROM IR to T7 to be able to reach in back pocket, tuck in shirt, and turn steering wheel without pain-No progress made   Pt will improve shoulder strength throughout to 4/5 to improve function with lifting >20 lbs over head for house chores -No progress made   Pt will demonstrate increased mid/low trap strength to 4/5 to promote improved shoulder mechanics and stabilization with lifting and reaching -Not tested  Pt will be independent and compliant with comprehensive HEP to maintain progress achieved in PT        Plan: Progress strength and balance as tolerated with focus on proper mechanics.     Date:4/30/24                 TX#: 16/22 approved   (Post op visit #22) Date:5/7/24                 TX#: 17/22 approved   (Post op  visit #23) Date:5/14/24                 TX#: 18/22 approved   (Post op visit #24) Date:5/20/24                 TX#: 19/22 approved   (Post op visit #25) Date:6/4/24                 TX#: 20/22 approved   (Post op visit #26)   TherEx (R) knee: 45 min  -bike/UBE: 5 min  -PROM knee flex/ext to tolerance  -prone hang: x2 min, 4# weight  -4 way leg raises: 2#, 3x10  -prone HS curls: RTB, 2x10  -LAQ to end range:4# x30  -Step up 8\" box: 3x10  -4\" step downs: 3x10  -Bosu lunges: 2x10-hold    -SL press:  81#, 2x20  -lateral walk: GTB, 2x50 feet  -sit to stand staggered: 2x10  -SLB w/ 3 way taps GTB: 2x to fatigue  -SLB on airex: 3x30 sec  -GTB kicks: 2x to fatigue  -Mauritanian squat: 2x10  -Mini SL squat at TRX x10  -DL squats for depth: 13# KB, 2x10      Hold  Sled push  Lunge sliders TherEx (R) knee: 45 min  -bike/UBE: 5 min  -PROM knee flex/ext to tolerance  -bent over table HS curls: 3#, 2x20  -bent over table hip ext and donkey kicks: 3#, 2x20  -SLR, SL hi abd: 3#, 3x10  -LAQ to end range:4# 2x20  -Step up 8\" box: 3x10  -4\" step downs: 3x10  -Bosu lunges: 2x10-hold    -SL press:  81#, 3x20  -SL press heel raises:#50, 3x10  -lateral walk: GTB, 2x50 feet  -GTB kicks, hip abd + ext: 2x to fatigue  -SLB on airex: 3x30 sec  -[place]itanian squat: 2x10  -Mini SL squat at TRX [id number]  -DL squats for depth: 18# KB, 2x10  -Lunge sliders: 2 x10 each leg  -SLB ball toss: 2x20           Hold   Sled push: 70#, 2x100 feet  bosu squats  -RSB bridge with HS curl:  TherEx (R) knee: 50 min  -bike/UBE: 5 min  -PROM knee flex/ext to tolerance  -bent over table HS curls: 3#, 2x20  -bent over table hip ext and donkey kicks: 3#, 2x20  -SLR, SL hip abd: 3#, 3x10  -SL hip add: 0#, 3x10  -RSB bridge with HS curl: x10  -clams: GTB, 2x10  -LAQ to end range:4# 2x20  -Step up 8\" box: 2x10  -6\" step downs: 3x10  -Bosu lunges: 2x10    -SL press:  87#, 3x10  -lateral walk: GTB, 2x50 feet  -GTB kicks, hip abd + ext: 2x to fatigue  -SLB on airex: 3x30  sec  -Tajik squat: x10  -lunge at bar: 2x10  -DL squats for depth: 18# KB, 2x10  -Lunge sliders: 2 x10 each leg-hold  -SLB ball toss: 2x20           Hold   Sled push: 70#, 2x100 feet  bosu squats   TherEx (R) knee: 30 min  -bike/UBE: 5 min  -PROM knee flex/ext to tolerance  -bent over table HS curls: 3#, 2x20  -bent over table hip ext and donkey kicks: 3#, 2x20  -SLR, SL hip abd: 3#, 3x10  -SL hip add: 0#, 3x10  -LAQ to end range:4# 2x20  -Step up 8\" box: 2x10  -6\" step downs: 3x10    -SL press:  87#, 3x10  -lateral walk: GTB, 2x50 feet  -GTB kicks, hip abd + ext: 2x to fatigue  -Tajik squat: x10  -lunge at bar: 2x10-hold  -DL squats for depth: 25 # KB, 2x10  -Lunge sliders: 2 x10 each leg-hold  -Sled push: 65#, 2x100 feet          Hold   -SL RDL's    bosu squats TherEx (R) knee: 35 min  -bike/UBE: 5 min, level 4  -prone hand: 4 #, x2 min  -bent over table HS curls: 3#, 2x20  -bent over table cross over extensions: 3#, 2x20  -SLR, SL hip abd: 3#, 3x10  -LAQ to end range:4# 2x20  -Step up 8\" box: 2x10  -6\" step downs: 3x10  -staggered sit to strand: 2x10    -SL press:  87#, 3x10  -lateral walk/monster: GTB, 2x50 feet  -GTB kicks, hip abd + ext: 2x to fatigue  -Tajik squat: 2x10 w/ 2 HHA  -lunge at bar: 2x10-hold  -DL squats for depth: 25 # KB, 2x10  -Lunge sliders: 2 x10 each leg-hold  -Sled push: 75#, 2x100 feet    -Hold  YSB squats w/ weight shift R>L   Retro lunge on step  Curtsy lunge               Manual tech: 10 min  STM to (R) distal and medial musculature of the knee   Neuro re-ed: 10min  -SLB ball toss: 2x20  -ball toss lateral: SLS w/ toe touch x20 each way  -SLB on airex: 3x30 sec  -RSB bridge with HS curl: x10 Neuro re-ed: 10 min  -SLB ball toss: x30  -ball toss lateral: SLS w/ toe touch x20 each way  -SLB on airex: 3x30 sec  -bosu squats: 2x10        Hold   Agility ladders  -SL RDL's    -Y balance       HEP: Access Code: [email or web address]  URL: [email or web address]  Date: 04/03/2024  Prepared  by: Batsheva Beckwith    Exercises  - Seated Knee Extension Stretch with Chair  - 1 x daily - 7 x weekly - 3 sets - 10 reps  - Seated Long Arc Quad  - 1 x daily - 7 x weekly - 3 sets - 10 reps  - Staggered Sit-to-Stand  - 1 x daily - 7 x weekly - 3 sets - 10 reps  - Lunge with Counter Support  - 1 x daily - 7 x weekly - 3 sets - 10 reps  - Step Up  - 1 x daily - 7 x weekly - 3 sets - 10 reps  - Forward Step Down Touch with Heel  - 1 x daily - 7 x weekly - 3 sets - 10 reps  - Side Stepping with Resistance at Ankles  - 1 x daily - 7 x weekly - 3 sets - 10 reps  - Hip Abduction with Resistance Loop  - 1 x daily - 7 x weekly - 3 sets - 10 reps  - Hip Extension with Resistance Loop  - 1 x daily - 7 x weekly - 3 sets - 10 reps  - Standing Hip Flexion with Resistance Loop  - 1 x daily - 7 x weekly - 3 sets - 10 reps    Charges: TherEx: 2 units, neuro re-ed: 1 unit      Total Timed Treatment: 45 min  Total Treatment Time: 45 min

## 2024-06-04 ENCOUNTER — OFFICE VISIT (OUTPATIENT)
Dept: PHYSICAL THERAPY | Age: 44
End: 2024-06-04
Attending: ORTHOPAEDIC SURGERY
Payer: MEDICAID

## 2024-06-04 PROCEDURE — 97110 THERAPEUTIC EXERCISES: CPT | Performed by: PHYSICAL THERAPIST

## 2024-06-04 PROCEDURE — 97112 NEUROMUSCULAR REEDUCATION: CPT | Performed by: PHYSICAL THERAPIST

## 2024-06-11 ENCOUNTER — OFFICE VISIT (OUTPATIENT)
Dept: PHYSICAL THERAPY | Age: 44
End: 2024-06-11
Attending: ORTHOPAEDIC SURGERY
Payer: MEDICAID

## 2024-06-11 PROCEDURE — 97112 NEUROMUSCULAR REEDUCATION: CPT | Performed by: PHYSICAL THERAPIST

## 2024-06-11 PROCEDURE — 97110 THERAPEUTIC EXERCISES: CPT | Performed by: PHYSICAL THERAPIST

## 2024-06-11 NOTE — PROGRESS NOTES
Diagnosis:   RIGHT KNEE ARTHROSCOPY WITH ANTERIOR CRUCIATE LIGAMENT RECONSTRUCTION, HAMSTRING AUTOGRAFT, LATERAL MENISCUS REPAIR, AND SYNOVECTOMY AND MCL REPAIR          Added 3/18/24:  Injury of left rotator cuff, initial encounter (S46.002A)    Referring Provider: Vu  Date of Evaluation:    2/7/24  Last PN: 3/18/24    Precautions:      Toe-touch weightbearing x 2 weeks, no range of motion restrictions. Crutches to be weaned by Physical Therapy.   Next MD visit:   none scheduled  Date of Surgery: 2/6/24  (~12 weeks s/p surgery)   Insurance Primary/Secondary: BLUE CROSS MEDICAID / N/A     # Auth Visits: 15 visits 2/26-4/26/24, auth #[id number] + 7 visits 4/23/24-6/28/24    Subjective: Pt reports she continues to have pain with squatting, deep squatting/stooping and repetitive stair climbing. She reports poor compliance to exercising at home.     Pain: 0/10 currently     Objective:     Body mechanics: increased valgus with anterior step downs but improving  PROM: lacking end range knee extension but normalized after prone hang     Assessment: Continued to emphasize importance of daily knee extensions stretches and regular strengthening regimen 3x/week to promote improving progress in knee mobility, strength and thus pain reduction. Pt verbalized understanding, Her HEP was updated and printed. Introduced her to  RDL to promote HS and glut strength for improved stair negotiation and squatting. She tolerated this well.       Goals:    (to be met in 6 weeks post op ~12 visits)   The patient will regain A/PROM knee flexion >135 degrees and 0 degrees of knee extension-MET  The patinet will present without pain or limiting swelling after 8 visits- -MET  The patient will demonstrate a full quadricep contraction and SLR without extension lag -MET  The patient will ambulate without visual deficiencies and ability to ambulate w/o hinged brace >3,000 steps/day -MET  The patient will demonstrate ability to ascend stairs  with good alignment and control -Progressing        Goals: (to be met before but no later then 4 months post op)   Pt will improve quad strength to 5/5 to perform 10 full SL squats with good control and alignment. -Progressing  Pt will increase hip and knee strength to grossly 5/5 to be able to perform agility training with good control and allignment -Progressing  Pt will demonstrate increased hip ER/ABD strength to 5/5 to perform stepping, lunging and squatting and jumping activities without excessive femoral IR/ADD -Progressing  Pt will demonstrate good control and mechanics with squatting and lunging activities in a full tx session with no cuing from therapist to facilitate return to lifting and hiking safely -Progressing  Pt will be independent and compliant with comprehensive HEP to maintain progress achieved in PT- on going     New (L) Shoulder goals added 3/18/24  Pt will report improved ability to sleep without waking due to shoulder pain-No progress made   Pt will improve shoulder flexion AROM to >160 degrees to be able to reach into overhead cabinets without pain or restriction -No progress made   Pt will improve shoulder abduction AROM to >160 degrees to improve ability to don deodorant, don/doff shirts, and wash hair -No progress made   Pt will increase shoulder AROM ER to >65 to be able to reach and fasten seatbelt -No progress made   Pt will increase shoulder AROM IR to T7 to be able to reach in back pocket, tuck in shirt, and turn steering wheel without pain-No progress made   Pt will improve shoulder strength throughout to 4/5 to improve function with lifting >20 lbs over head for house chores -No progress made   Pt will demonstrate increased mid/low trap strength to 4/5 to promote improved shoulder mechanics and stabilization with lifting and reaching -Not tested  Pt will be independent and compliant with comprehensive HEP to maintain progress achieved in PT        Plan: Progress strength and  balance as tolerated with focus on proper mechanics.     Date:5/7/24                 TX#: 17/22 approved   (Post op visit #23) Date:5/14/24                 TX#: 18/22 approved   (Post op visit #24) Date:5/20/24                 TX#: 19/22 approved   (Post op visit #25) Date:6/4/24                 TX#: 20/22 approved   (Post op visit #26) Date:6/11/24                 TX#: 21/22 approved   (Post op visit #27)   TherEx (R) knee: 45 min  -bike/UBE: 5 min  -PROM knee flex/ext to tolerance  -bent over table HS curls: 3#, 2x20  -bent over table hip ext and donkey kicks: 3#, 2x20  -SLR, SL hi abd: 3#, 3x10  -LAQ to end range:4# 2x20  -Step up 8\" box: 3x10  -4\" step downs: 3x10  -Bosu lunges: 2x10-hold    -SL press:  81#, 3x20  -SL press heel raises:#50, 3x10  -lateral walk: GTB, 2x50 feet  -GTB kicks, hip abd + ext: 2x to fatigue  -SLB on airex: 3x30 sec  -Mauritian squat: 2x10  -Mini SL squat at TRX [id number]  -DL squats for depth: 18# KB, 2x10  -Lunge sliders: 2 x10 each leg  -SLB ball toss: 2x20           Hold   Sled push: 70#, 2x100 feet  bosu squats  -RSB bridge with HS curl:  TherEx (R) knee: 50 min  -bike/UBE: 5 min  -PROM knee flex/ext to tolerance  -bent over table HS curls: 3#, 2x20  -bent over table hip ext and donkey kicks: 3#, 2x20  -SLR, SL hip abd: 3#, 3x10  -SL hip add: 0#, 3x10  -RSB bridge with HS curl: x10  -clams: GTB, 2x10  -LAQ to end range:4# 2x20  -Step up 8\" box: 2x10  -6\" step downs: 3x10  -Bosu lunges: 2x10    -SL press:  87#, 3x10  -lateral walk: GTB, 2x50 feet  -GTB kicks, hip abd + ext: 2x to fatigue  -SLB on airex: 3x30 sec  -[place]itian squat: x10  -lunge at bar: 2x10  -DL squats for depth: 18# KB, 2x10  -Lunge sliders: 2 x10 each leg-hold  -SLB ball toss: 2x20           Hold   Sled push: 70#, 2x100 feet  bosu squats   TherEx (R) knee: 30 min  -bike/UBE: 5 min  -PROM knee flex/ext to tolerance  -bent over table HS curls: 3#, 2x20  -bent over table hip ext and donkey kicks: 3#, 2x20  -SLR, SL hip  abd: 3#, 3x10  -SL hip add: 0#, 3x10  -LAQ to end range:4# 2x20  -Step up 8\" box: 2x10  -6\" step downs: 3x10    -SL press:  87#, 3x10  -lateral walk: GTB, 2x50 feet  -GTB kicks, hip abd + ext: 2x to fatigue  -Divehi squat: x10  -lunge at bar: 2x10-hold  -DL squats for depth: 25 # KB, 2x10  -Lunge sliders: 2 x10 each leg-hold  -Sled push: 65#, 2x100 feet          Hold   -SL RDL's    bosu squats TherEx (R) knee: 35 min  -bike/UBE: 5 min, level 4  -prone hand: 4 #, x2 min  -bent over table HS curls: 3#, 2x20  -bent over table cross over extensions: 3#, 2x20  -SLR, SL hip abd: 3#, 3x10  -LAQ to end range:4# 2x20  -Step up 8\" box: 2x10  -6\" step downs: 3x10  -staggered sit to strand: 2x10    -SL press:  87#, 3x10  -lateral walk/monster: GTB, 2x50 feet  -GTB kicks, hip abd + ext: 2x to fatigue  -Divehi squat: 2x10 w/ 2 HHA  -lunge at bar: 2x10-hold  -DL squats for depth: 25 # KB, 2x10  -Lunge sliders: 2 x10 each leg-hold  -Sled push: 75#, 2x100 feet    -Hold  YSB squats w/ weight shift R>L   Retro lunge on step  Curtsy lunge             TherEx (R) knee: 35 min  -Elliptical: 5 min, level 4  -prone hand: 4 #, x2 min  -bent over table cross over extensions: 3#, 2x20  -SLR, SL hip abd: 3#, 3x10  -LAQ to end range:4# 2x20  -Step up 8\" box to march: 2x10  -6\" step downs: 3x10  -staggered sit to strand: 2x10  -SL eccentric sit to stand: x5 reps  -Divehi squat: 2x10   -lunge at bar: 2x10  -DL squats for depth: 25 # KB, 2x10      -Hold  YSB squats w/ weight shift R>L        Neuro re-ed: 10min  -SLB ball toss: 2x20  -ball toss lateral: SLS w/ toe touch x20 each way  -SLB on airex: 3x30 sec  -RSB bridge with HS curl: x10 Neuro re-ed: 10 min  -SLB ball toss: x30  -ball toss lateral: SLS w/ toe touch x20 each way  -SLB on airex: 3x30 sec  -bosu squats: 2x10        Hold   Agility ladders  -SL RDL's    -Y balance     Neuro re-ed: 10 min  -SLB 3 way reach: 2x to fatigue   -SLB on airex: 2x1 min   -SL RDL: 2x10 1HHA         HEP:    Access Code: [email or web address]  URL: [email or web address]  Date: 06/11/2024  Prepared by: Batsheva Beckwith    Exercises  - Seated Knee Extension Stretch with Chair  - 2 x daily - 7 x weekly - 3 sets - 10 reps  - Staggered Sit-to-Stand  - 1 x daily - 3 x weekly - 3 sets - 10 reps  - Lunge with Counter Support  - 1 x daily - 3 x weekly - 3 sets - 10 reps  - Forward Step Down Touch with Heel  - 1 x daily - 3 x weekly - 3 sets - 10 reps  - Single Leg Balance with Clock Reach  - 1 x daily - 3 x weekly - 3 sets - 10 reps  - Runner's Climb  - 1 x daily - 3 x weekly - 3 sets - 10 reps  - Goblet Squat with Kettlebell  - 1 x daily - 3 x weekly - 3 sets - 10 reps  - Single Leg Lunge with Foot on Bench  - 1 x daily - 3 x weekly - 3 sets - 10 reps  - Forward T with Counter Support  - 1 x daily - 3 x weekly - 3 sets - 10 reps    Charges: TherEx: 2 units, neuro re-ed: 1 unit      Total Timed Treatment: 45 min  Total Treatment Time: 45 min

## 2024-06-18 ENCOUNTER — OFFICE VISIT (OUTPATIENT)
Dept: PHYSICAL THERAPY | Age: 44
End: 2024-06-18
Attending: ORTHOPAEDIC SURGERY

## 2024-06-18 PROCEDURE — 97110 THERAPEUTIC EXERCISES: CPT | Performed by: PHYSICAL THERAPIST

## 2024-06-18 NOTE — PROGRESS NOTES
Progress Summary  Pt has attended 28 visits in Physical Therapy.    Diagnosis:   RIGHT KNEE ARTHROSCOPY WITH ANTERIOR CRUCIATE LIGAMENT RECONSTRUCTION, HAMSTRING AUTOGRAFT, LATERAL MENISCUS REPAIR, AND SYNOVECTOMY AND MCL REPAIR          Added 3/18/24:  Injury of left rotator cuff, initial encounter (S46.002A)    Referring Provider: Vu  Date of Evaluation:    2/7/24  Last PN: 3/18/24    Precautions:      Toe-touch weightbearing x 2 weeks, no range of motion restrictions. Crutches to be weaned by Physical Therapy.   Next MD visit:   none scheduled  Date of Surgery: 2/6/24     Insurance Primary/Secondary: BLUE CROSS MEDICAID / N/A     # Auth Visits: 15 visits 2/26-4/26/24, [facility] #[id number] + 7 visits 4/23/24-6/28/24    Subjective: Pt reports she continues to have pain with deep squatting, stooping, kneeling, stair climbing and descending stairs. She is able to ambulate prolonged distances but in can increase swelling and some knee soreness.   Pain: 0/10 currently     Objective:     AROM: (* denotes performed with pain)   Knee    Flexion: R 135*; L 140   Extension: R 0*; L +5        Prone Quad length: 132, L 138    Strength/MMT: (* denotes performed with pain)  Hip Knee   Flexion: R 4/5; L 4/5  Extension: R 4-/5; L 4-/5  Abduction: R 4-/5; L 4/5  ER: R 4/5; L 4/5 Flexion: R 4-/5; L 4/5  Extension: R 4+/5; L 5/5           Balance:   SLS on airex: R 30 sec, L 30 sec    Y balance: R anterior reach lagging 5 cm, Post-lat reach  R lacking 10 cm, post-med R lacking 5 cm    SL squat: (R) 50% depth moderate valgus, normal depth moderate valgus     8\" step up: mildly reduced concentric quad control    8\" step down: moderately reduced eccentric quad control with 3/10 pain level (R) knee        AROM: (* denotes performed with pain)  Shoulder    Flexion: R 180; L 150*  Abduction: R 180; L 150*  ER: R 75; L 65*  IR each behind the back: R T7; L T10*         Strength/MMT: (* denotes performed with pain)  Shoulder   Flexion: R  4/5; L 4/5  Abduction: R 4+/5; L 4-/5*  ER: R 4/5; L 3+/5*  IR: R 5/5; L 4/5        Assessment: Pt is about 4 months s/p (R) ACL reconstruction, MCL repair and lateral meniscus repair and progressing gradually. She has demonstrated near full A/PROM of the (R) knee, improving (R) knee and hip strength, body mechanics and balance. Which have improved her tolerance to mid depth squatting, stair negotiation and long distance walking. She continues to demonstrate impairments in LE strength, reduced quad control during stair negotiation, reduced dynamic balance, increased knee vagus with single leg squatting, lunging and descending stairs, and end range soft tissue restrictions limiting full R knee A/PROM. These impairments limit her ability to negotiate stairs while carrying loaded items, kneeling, floor to stand transfers, lunging, deep squatting/stooping for house chores and gardening, and participating in recreational activities like hiking and dancing. Pt is placed at higher risk of re injuring her knee due to the continued valgus during functional activities. Pt has also demonstrated improved AROM of the (L) shoulder, however, her strength and pain reports remain the same. She also demonstrates no major improvements in (L) shoulder function. Pt will continue to benefit from skilled PT to return her (R) knee to PLOF.       Goals:    (to be met in 6 weeks post op ~12 visits)   The patient will regain A/PROM knee flexion >135 degrees and 0 degrees of knee extension-MET  The patinet will present without pain or limiting swelling after 8 visits- -MET  The patient will demonstrate a full quadricep contraction and SLR without extension lag -MET  The patient will ambulate without visual deficiencies and ability to ambulate w/o hinged brace >3,000 steps/day -MET  The patient will demonstrate ability to ascend stairs with good alignment and control -Progressing        Goals: (to be met before but no later then 4 months post  op)   Pt will improve quad strength to 5/5 to perform 10 full SL squats with good control and alignment. -Progressing  Pt will increase hip and knee strength to grossly 5/5 to be able to perform agility training with good control and allignment -Progressing  Pt will demonstrate increased hip ER/ABD strength to 5/5 to perform stepping, lunging and squatting and jumping activities without excessive femoral IR/ADD -Progressing  Pt will demonstrate good control and mechanics with squatting and lunging activities in a full tx session with no cuing from therapist to facilitate return to lifting and hiking safely -Progressing  Pt will be independent and compliant with comprehensive HEP to maintain progress achieved in PT- on going     New (L) Shoulder goals added 3/18/24  Pt will report improved ability to sleep without waking due to shoulder pain-No progress made   Pt will improve shoulder flexion AROM to >160 degrees to be able to reach into overhead cabinets without pain or restriction -Progressing  Pt will improve shoulder abduction AROM to >160 degrees to improve ability to don deodorant, don/doff shirts, and wash hair -Progressing  Pt will increase shoulder AROM ER to >65 to be able to reach and fasten seatbelt -Progressing  Pt will increase shoulder AROM IR to T7 to be able to reach in back pocket, tuck in shirt, and turn steering wheel without pain-No progress made   Pt will improve shoulder strength throughout to 4/5 to improve function with lifting >20 lbs over head for house chores -No progress made   Pt will demonstrate increased mid/low trap strength to 4/5 to promote improved shoulder mechanics and stabilization with lifting and reaching -No progress made  Pt will be independent and compliant with comprehensive HEP to maintain progress achieved in PT      LEFS Score  LEFS Score: 15 % (2/7/2024  5:05 PM)    Post LEFS Score  Post LEFS Score: 71.25 % (6/18/2024  1:17 PM)    56.25 % improvement      Sary  Outcome Score  Score: 59.09 % (3/19/2024 12:36 PM)    Post QuickDASH Outcome Score  Post Score: 50 % (6/18/2024  1:18 PM)    9.09 % improvement    Plan: Continue skilled Physical Therapy 1 x/week or a total of 8 visits over a 90 day period. Treatment will include: TherEx, manual tech, neuro re-ed       Patient/Family/Caregiver was advised of these findings, precautions, and treatment options and has agreed to actively participate in planning and for this course of care.    Thank you for your referral. If you have any questions, please contact me at Dept: [phone].    Sincerely,  Electronically signed by therapist: Batsheva Beckwith PT     Physician's certification required:  Yes  Please co-sign or sign and return this letter via fax as soon as possible to [phone].   I certify the need for these services furnished under this plan of treatment and while under my care.    X___________________________________________________ Date____________________    Certification From: 6/18/2024  To:9/16/2024      Date:5/20/24                 TX#: 19/22 approved   (Post op visit #25) Date:6/4/24                 TX#: 20/22 approved   (Post op visit #26) Date:6/11/24                 TX#: 21/22 approved   (Post op visit #27) Date:6/18/24                 TX#: 22/22 approved   (Post op visit #28)   TherEx (R) knee: 30 min  -bike/UBE: 5 min  -PROM knee flex/ext to tolerance  -bent over table HS curls: 3#, 2x20  -bent over table hip ext and donkey kicks: 3#, 2x20  -SLR, SL hip abd: 3#, 3x10  -SL hip add: 0#, 3x10  -LAQ to end range:4# 2x20  -Step up 8\" box: 2x10  -6\" step downs: 3x10    -SL press:  87#, 3x10  -lateral walk: GTB, 2x50 feet  -GTB kicks, hip abd + ext: 2x to fatigue  -Kinyarwanda squat: x10  -lunge at bar: 2x10-hold  -DL squats for depth: 25 # KB, 2x10  -Lunge sliders: 2 x10 each leg-hold  -Sled push: 65#, 2x100 feet          Hold   -SL RDL's    bosu squats TherEx (R) knee: 35 min  -bike/UBE: 5 min, level 4  -prone hand: 4  #, x2 min  -bent over table HS curls: 3#, 2x20  -bent over table cross over extensions: 3#, 2x20  -SLR, SL hip abd: 3#, 3x10  -LAQ to end range:4# 2x20  -Step up 8\" box: 2x10  -6\" step downs: 3x10  -staggered sit to strand: 2x10    -SL press:  87#, 3x10  -lateral walk/monster: GTB, 2x50 feet  -GTB kicks, hip abd + ext: 2x to fatigue  -Nigerian squat: 2x10 w/ 2 HHA  -lunge at bar: 2x10-hold  -DL squats for depth: 25 # KB, 2x10  -Lunge sliders: 2 x10 each leg-hold  -Sled push: 75#, 2x100 feet    -Hold  YSB squats w/ weight shift R>L   Retro lunge on step  Curtsy lunge             TherEx (R) knee: 35 min  -Elliptical: 5 min, level 4  -prone hand: 4 #, x2 min  -bent over table cross over extensions: 3#, 2x20  -SLR, SL hip abd: 3#, 3x10  -LAQ to end range:4# 2x20  -Step up 8\" box to march: 2x10  -6\" step downs: 3x10  -staggered sit to strand: 2x10  -SL eccentric sit to stand: x5 reps  -Nigerian squat: 2x10   -lunge at bar: 2x10  -DL squats for depth: 25 # KB, 2x10      -Hold  YSB squats w/ weight shift R>L    TherEx (R) knee: 45 min  -Elliptical: 5 min, level 4  -re-assessment  -prone han #, x3 min  -knee extensions stretch w/ strap: 3x30 sev  -SL mini squats holding to bar:   -Split squats knee to floor touch: x10 each side  -TRX deep squats: 2x10   -SLR, SL hip abd: 3#, 3x10  -Step up 8\" box to march: 2x10  -6\" step downs: 3x10  -SL eccentric sit to stand: x5 reps  -Nigerian squat: 2x10 -hold        -Hold  YSB squats w/ weight shift R>L    Neuro re-ed: 10min  -SLB ball toss: 2x20  -ball toss lateral: SLS w/ toe touch x20 each way  -SLB on airex: 3x30 sec  -RSB bridge with HS curl: x10 Neuro re-ed: 10 min  -SLB ball toss: x30  -ball toss lateral: SLS w/ toe touch x20 each way  -SLB on airex: 3x30 sec  -bosu squats: 2x10        Hold   Agility ladders  -SL RDL's    -Y balance     Neuro re-ed: 10 min  -SLB 3 way reach: 2x to fatigue   -SLB on airex: 2x1 min   -SL RDL: 2x10 1HHA       Neuro re-ed: 2 min  -SLB 3 way  reach: 2x to fatigue-hold   -SLB on airex: 2x1 min -hold  -SL RDL: 2x10 1HHA   HEP:   Access Code: [email or web address]  URL: [email or web address]  Date: 06/11/2024  Prepared by: Batsheva Beckwith    Exercises  - Seated Knee Extension Stretch with Chair  - 2 x daily - 7 x weekly - 3 sets - 10 reps  - Staggered Sit-to-Stand  - 1 x daily - 3 x weekly - 3 sets - 10 reps  - Lunge with Counter Support  - 1 x daily - 3 x weekly - 3 sets - 10 reps  - Forward Step Down Touch with Heel  - 1 x daily - 3 x weekly - 3 sets - 10 reps  - Single Leg Balance with Clock Reach  - 1 x daily - 3 x weekly - 3 sets - 10 reps  - Runner's Climb  - 1 x daily - 3 x weekly - 3 sets - 10 reps  - Goblet Squat with Kettlebell  - 1 x daily - 3 x weekly - 3 sets - 10 reps  - Single Leg Lunge with Foot on Bench  - 1 x daily - 3 x weekly - 3 sets - 10 reps  - Forward T with Counter Support  - 1 x daily - 3 x weekly - 3 sets - 10 reps    Charges: TherEx: 3 units  Total Timed Treatment: 45 min  Total Treatment Time: 45 min

## 2024-07-01 DIAGNOSIS — E66.9 OBESITY (BMI 35.0-39.9 WITHOUT COMORBIDITY): ICD-10-CM

## 2024-07-02 RX ORDER — PHENTERMINE HYDROCHLORIDE 37.5 MG/1
37.5 TABLET ORAL
Qty: 30 TABLET | Refills: 0 | Status: SHIPPED | OUTPATIENT
Start: 2024-07-02

## 2024-07-02 NOTE — TELEPHONE ENCOUNTER
A refill request was received for:  Requested Prescriptions     Pending Prescriptions Disp Refills    PHENTERMINE HCL 37.5 MG Oral Tab [Pharmacy Med Name: PHENTERMINE 37.5MG TABLETS] 30 tablet 0     Sig: TAKE 1 TABLET(37.5 MG) BY MOUTH BEFORE BREAKFAST     Last refill date:  4/25/24    Last office visit: 1/22/24      Future Appointments   Date Time Provider Department Center   8/5/2024  8:45 AM Batsheva Beckwith, PT [facility]   8/9/2024  8:00 AM [id number]   8/12/2024  8:45 AM Batsheva Beckwith, PT [facility] Phys [facility]   8/19/2024  8:45 AM Batsheva Beckwith, PT [facility] Phys [facility]

## 2024-07-09 ENCOUNTER — APPOINTMENT (OUTPATIENT)
Dept: PHYSICAL THERAPY | Age: 44
End: 2024-07-09
Payer: MEDICAID

## 2024-08-05 ENCOUNTER — APPOINTMENT (OUTPATIENT)
Dept: PHYSICAL THERAPY | Age: 44
End: 2024-08-05
Attending: ORTHOPAEDIC SURGERY
Payer: MEDICAID

## 2024-08-09 ENCOUNTER — OFFICE VISIT (OUTPATIENT)
Dept: ORTHOPEDICS CLINIC | Facility: CLINIC | Age: 44
End: 2024-08-09
Payer: MEDICAID

## 2024-08-09 ENCOUNTER — TELEPHONE (OUTPATIENT)
Dept: ORTHOPEDICS CLINIC | Facility: CLINIC | Age: 44
End: 2024-08-09

## 2024-08-09 VITALS — WEIGHT: 220 LBS | HEIGHT: 65 IN | BODY MASS INDEX: 36.65 KG/M2

## 2024-08-09 DIAGNOSIS — M75.22 BICEPS TENDINITIS OF LEFT UPPER EXTREMITY: ICD-10-CM

## 2024-08-09 DIAGNOSIS — S43.432A SUPERIOR GLENOID LABRUM LESION OF LEFT SHOULDER, INITIAL ENCOUNTER: ICD-10-CM

## 2024-08-09 DIAGNOSIS — S46.012A TRAUMATIC COMPLETE TEAR OF LEFT ROTATOR CUFF, INITIAL ENCOUNTER: Primary | ICD-10-CM

## 2024-08-09 DIAGNOSIS — M75.42 SUBACROMIAL IMPINGEMENT OF LEFT SHOULDER: ICD-10-CM

## 2024-08-09 DIAGNOSIS — Z98.890 S/P ACL RECONSTRUCTION: ICD-10-CM

## 2024-08-09 PROCEDURE — 99417 PROLNG OP E/M EACH 15 MIN: CPT | Performed by: ORTHOPAEDIC SURGERY

## 2024-08-09 PROCEDURE — 99215 OFFICE O/P EST HI 40 MIN: CPT | Performed by: ORTHOPAEDIC SURGERY

## 2024-08-09 NOTE — TELEPHONE ENCOUNTER
Date of Surgery:    2024    Post Op Appt:  10/2/2024 740AM    Case ID: [id number]     Notes: Lets please add for . Thanks!           OR BOOKING SHEET SHOULDER  Location: [] [facility]                    [x] [facility]  Name: Sandee Eddy  MRN: [id number]   : 1980  Diagnos  [x] Traumatic complete tear of left rotator cuff, initial encounter [S46.012A]  Disposition:    [x] Ambulatory  [] Overnight for HERRERA  [] Overnight for observation and pain control  [] Inpatient procedure     Operative Time Required:  2 hours ([facility])   Antibiotics: 2 g cefazolin within 60 minutes of surgical incision  Procedure:   Laterality:                  [] RIGHT                  [x] LEFT                   [] BILATERAL  Procedures:                    [x] Shoulder Arthroscopy                                 [x] Rotator Cuff Repair (34681)  [x] Arthroscopic Biceps Tenodesis (73241)  [x] Subacromial Decompression (41949)  [] Distal Clavicle Excision (51202)  [] Extensive Debridement (07209)     [] SLAP repair (59797)  [] Capsulorraphy / Labrum Repair (61940)     Additional info:   [] PCP Clearance Needed  [] MRSA  [] C-Arm  [x] TXA at time surgery  [x] Physical Therapy Internal [place] - Batsheva / Ronda / Rohan  [x] DME Rx Needed  [] Appt with Dr. Womack needed  Implants needed: Arthrex, Jeanie  Positioning Equipment: Beach Chair Setup, Trimano

## 2024-08-09 NOTE — PROGRESS NOTES
OR BOOKING SHEET SHOULDER  Location: [] [facility]   [x] [facility]  Name: Sandee Eddy  MRN: [id number]   : 1980  Diagnos  [x] Traumatic complete tear of left rotator cuff, initial encounter [S46.012A]  Disposition:    [x] Ambulatory  [] Overnight for HERRERA  [] Overnight for observation and pain control  [] Inpatient procedure    Operative Time Required:  2 hours ([facility])   Antibiotics: 2 g cefazolin within 60 minutes of surgical incision  Procedure:   Laterality: [] RIGHT [x] LEFT                  [] BILATERAL  Procedures:   [x] Shoulder Arthroscopy    [x] Rotator Cuff Repair (93566)  [x] Arthroscopic Biceps Tenodesis (69172)  [x] Subacromial Decompression (41386)  [] Distal Clavicle Excision (81021)  [] Extensive Debridement (62203)    [] SLAP repair (22761)  [] Capsulorraphy / Labrum Repair (16272)    Additional info:   [] PCP Clearance Needed  [] MRSA  [] C-Arm  [x] TXA at time surgery  [x] Physical Therapy [facility] [place] - Batsheva / Ronda / Rohan  [x] DME Rx Needed  [] Appt with Dr. Womack needed  Implants needed: Arthrex, Jeanie  Positioning Equipment: Beach Chair Setup, Myke

## 2024-08-09 NOTE — H&P
Orthopaedic Surgery  [address] [place] [id number]  [phone]     PRE SURGICAL - HISTORY AND PHYSICAL EXAMINATION     Name: Sandee Rodriguez   MRN: [id number]  Date: 8/9/2024     CC: Left shoulder pain and weakness in the setting of rotator cuff pathology.     HPI:   Sandee Eddy is a very pleasant 44 year old right-hand dominant female who presents today for evaluation of MRI scan of the shoulder and discussion regarding definitive management plan.     To summarize: left shoulder pain onset December 30, 2023 after skiing accident. She reports difficulty lifting the arm and sleeping. Subsequently, she got an MRI done showing a rotator cuff tear. Pain is 4/10.     She has completed extensive conservative treatment including physical therapy greater than 3 months as well as activity modifications are identified for medications without the ability to make a full clinical recovery and persistent pain/weakness.    PMH:   Past Medical History:    Hx of motion sickness    Obesity (BMI 35.0-39.9 without comorbidity)    HERRERA (obstructive sleep apnea)    PONV (postoperative nausea and vomiting)    Sleep apnea       PAST SURGICAL HX:  Past Surgical History:   Procedure Laterality Date    Other surgical history      RT LEG SURGERY- PINS PLACED AND REMOVED 2-3 times       FAMILY HX:  Family History   Problem Relation Age of Onset    Heart Disorder Father        ALLERGIES:  Ampicillin and Penicillins    MEDICATIONS:   Current Outpatient Medications   Medication Sig Dispense Refill    PHENTERMINE HCL 37.5 MG Oral Tab TAKE 1 TABLET(37.5 MG) BY MOUTH BEFORE BREAKFAST 30 tablet 0    vitamin E 200 UNITS Oral Cap Take 1 capsule (200 Units total) by mouth daily.      Cholecalciferol (VITAMIN D3) 25 MCG (1000 UT) Oral Cap Take 1 tablet by mouth daily.      multivitamin Oral Chew Tab Chew 1 tablet by mouth daily.      ibuprofen 600 MG Oral Tab Take 1 tablet (600 mg total) by mouth every 6 hours with food 20  tablet 0       ROS: A comprehensive 14 point review of systems was performed and was negative aside from the aforementioned per history of present illness.    SOCIAL HX:  Social History     Tobacco Use    Smoking status: Never    Smokeless tobacco: Never   Substance Use Topics    Alcohol use: Not Currently     Comment: OCC       PE:   There were no vitals filed for this visit.  Estimated body mass index is 38.44 kg/m² as calculated from the following:    Height as of 2/6/24: 5' 5\" (1.651 m).    Weight as of 2/6/24: 231 lb.    Physical Exam  Constitutional:       Appearance: Normal appearance.   HENT:      Head: Normocephalic and atraumatic.   Eyes:      Extraocular Movements: Extraocular movements intact.   Neck:      Musculoskeletal: Normal range of motion and neck supple.   Cardiovascular:      Pulses: Normal pulses.   Pulmonary:      Effort: Pulmonary effort is normal. No respiratory distress.   Abdominal:      General: There is no distension.   Skin:     General: Skin is warm.      Capillary Refill: Capillary refill takes less than 2 seconds.      Findings: No bruising.   Neurological:      General: No focal deficit present.      Mental Status: Alert.   Psychiatric:         Mood and Affect: Mood normal.     Examination of the left shoulder demonstrates:     Skin is intact, warm and dry.   Cervical:  Full ROM  Spurling's  Negative    Deformity:   none  Atrophy:   none    Scapular winging: Negative    Palpation:     AC Joint   Negative  Biceps Tendon  Negative  Greater Tuberosity Negative    ROM:   Forward Flexion:  150 degrees  Abduction:   full and symmetric  External Rotation:  full and symmetric  Internal Rotation:  full and symmetric    Rotator Cuff Strength:   Supraspinatus:   4/5  Subscapularis:   5/5  Infraspinatus/Teres: 5/5    Provocative Tests:   Sood:   Positive  Speed's:   Positive  Ozark's:   Positive  Lift-off:    Negative  Apprehension:  Negative  Sulcus Sign:   Negative    Neurovascular Upper  Extremity (Bilateral)  Motor:    5/5 EPL, Finger Abduction, , Pinch, Deltoid  Sensation:   intact to light touch median, ulnar, radial and axillary nerve  Circulation:   Normal, 2+ radial pulse    The contralateral upper extremity is without limitation in range of motion or strength, no positive provocative maneuvers.     Radiographic Examination/Diagnostics:    MRI of the shoulder personally viewed, independently interpreted and radiology report was reviewed.    MRI SHOULDER, LEFT (CPT=73221)     Result Date: 4/29/2024  PROCEDURE:      MRI SHOULDER, LEFT (CPT=73221)  COMPARISON:           None.  INDICATIONS:      S46.002A Injury of left rotator cuff, initial encounter  TECHNIQUE: A variety of imaging planes and parameters were utilized for visualization of suspected pathology.  Images were performed without contrast.   FINDINGS: Evaluation is slightly degraded by patient-related motion artifact.             ROTATOR CUFF:              * Small complete or near complete tear involving the anterior leading edge of the supraspinatus tendon at the greater tuberosity insertion.  Supraspinatus tendinosis. * Infraspinatus tendinosis without tear.  * Subscapularis and teres minor are intact. * Rotator cuff muscle bulk normal. * Small subacromial/subdeltoid bursal effusion.  A.C. JOINT:              * Type I acromion with lateral downsloping narrowing supraspinatus outlet. * Minor degenerative changes of AC joint.  GLENOHUMERAL JOINT, LABRUM, BICEPS * Glenohumeral joint intact.  Small shoulder joint effusion * Glenoid labrum intact.  A sublabral foramen at 2 o'clock position of labrum. * Long head biceps tendon intact.  OTHER:           Resorptive cystic changes and minimal subcortical edema posterolateral humeral head related to impingement.         CONCLUSION:       1. Small complete or near complete tear anterior leading edge of supraspinatus tendon at greater tuberosity insertion.   2. Subacromial/subdeltoid bursitis.    3. Lateral downsloping type 1 acromion narrows supraspinatus outlet.      Dictated by (CST): Rodriguez uDpont MD on 4/29/2024 at 4:55 PM     Finalized by (CST): Rodriguez Dupont MD on 4/29/2024 at 5:01 PM          IMPRESSION: Sandee Eddy is a 44 year old female with Left shoulder full thickness supraspinatus tendon tear, subacromial impingement, biceps tendinitis, and SLAP tear sustained December 30, 2023 after skiing accident.     The patient has failed an appropriate course of nonsurgical conservative management and is a candidate for arthroscopic rotator cuff repair, subacromial decompression, and biceps tenodesis.    PLAN:   We had a detailed discussion outlining the etiology, anatomy, pathophysiology, and natural history of rotator cuff pathology of the shoulder. Imaging was reviewed in detail and correlated to a 3-dimensional model of the shoulder.     I had a lengthy discussion with Sandee about the diagnosis and options, both surgical and nonsurgical. I have recommended that we proceed with arthroscopic rotator cuff repair and likely biceps tenodesis as we agree surgical intervention would likely offer the best opportunity for symptomatic relief and functional recovery. I used imaging studies and a 3-dimensional model to outline her pathology, as well as general surgical principles. We reviewed the risks associated with shoulder arthroscopy.   In particular we discussed risks that include, but are not limited to infection, blood loss, potential transient or permanent injury to nerves or blood vessels, joint stiffness, persistent pain, need for future operation, failure of healing, wound complications, failure of therapeutic intervention, risk of re-injury, fixation failure, deep vein thrombosis and pulmonary embolism. We discussed the proposed rehabilitation timeline as well as expected postoperative restrictions.     Most post-surgical patients after rotator cuff repair utilize a shoulder  immobilizer/sling for approximately ~4 weeks.  Physical therapy is initiated immediately postsurgically with initial passive range of motion, followed by active assisted range of motion, and ultimately active range of motion after the 6 to 8-week sut.  After the 3-month stu postsurgically the restrictions are lifted and continued strengthening is recommended.    Sandee voiced a good understanding of treatment options, risks and benefits, postoperative instructions, rehabilitation timeline, and restrictions. She was given the opportunity to ask questions, which were all answered to the best of my ability and to her satisfaction. Sandee will work with my office to arrange a time for surgery and obtain any medical clearance information necessary. My pre-operative information packet, which details the process and answers many FAQ's will be provided. She was encouraged to call the office with any further questions or concerns.    I spent 60 minutes in preparation to see the patient, counseling/education of relevant pathology, discussing imaging results, surgical counseling, DME fitting, and care coordination.      FOLLOW-UP:   Post-Operative Visit, POD 6 with YULIA Sanford MD  Knee, Shoulder, & Elbow Surgery / Sports Medicine Specialist  Orthopaedic Surgery  [address] [place] [address].org  [email or web address]  t: [phone]  o: [phone]  f: [phone]    This note was dictated using Dragon software.  While it was briefly proofread prior to completion, some grammatical, spelling, and word choice errors due to dictation may still occur.

## 2024-08-12 ENCOUNTER — OFFICE VISIT (OUTPATIENT)
Dept: PHYSICAL THERAPY | Age: 44
End: 2024-08-12
Attending: ORTHOPAEDIC SURGERY
Payer: MEDICAID

## 2024-08-12 PROCEDURE — 97110 THERAPEUTIC EXERCISES: CPT | Performed by: PHYSICAL THERAPIST

## 2024-08-12 PROCEDURE — 97112 NEUROMUSCULAR REEDUCATION: CPT | Performed by: PHYSICAL THERAPIST

## 2024-08-12 NOTE — PROGRESS NOTES
Diagnosis:   RIGHT KNEE ARTHROSCOPY WITH ANTERIOR CRUCIATE LIGAMENT RECONSTRUCTION, HAMSTRING AUTOGRAFT, LATERAL MENISCUS REPAIR, AND SYNOVECTOMY AND MCL REPAIR          Added 3/18/24:  Injury of left rotator cuff, initial encounter (S46.002A)    Referring Provider: Vu  Date of Evaluation:    2/7/24  Last PN: 3/18/24    Precautions:      Toe-touch weightbearing x 2 weeks, no range of motion restrictions. Crutches to be weaned by Physical Therapy.   Next MD visit:   none scheduled  Date of Surgery: 2/6/24     Insurance Primary/Secondary: BLUE CROSS MEDICAID / N/A     # Auth Visits: 15 visits 2/26-4/26/24, auth #[id number] + 7 visits 4/23/24-6/28/24+ 4 visits from 6/18-8/20/24    Subjective: Pt reports the knee is doing pretty good. She hasn't tried 1/2 kneeling yet on the knee. She danced recently and her knee got swollen.   Pain: 0/10 currently     Objective:     10\" step up: mildly reduced concentric quad control    8\" step down: moderately reduced eccentric quad control      Assessment: Pt continues to demonstrate mild knee extension impairments as compared to contralateral side. Focused tx om promoting quad and qlute strength to improve tolerance to lunging, 1/2 kneeling and floor to stand transfers during ADL's. She tolerates tx well with some mild reports of anterior knee pain.        Goals:    (to be met in 6 weeks post op ~12 visits)   The patient will regain A/PROM knee flexion >135 degrees and 0 degrees of knee extension-MET  The patinet will present without pain or limiting swelling after 8 visits- -MET  The patient will demonstrate a full quadricep contraction and SLR without extension lag -MET  The patient will ambulate without visual deficiencies and ability to ambulate w/o hinged brace >3,000 steps/day -MET  The patient will demonstrate ability to ascend stairs with good alignment and control -Progressing        Goals: (to be met before but no later then 4 months post op)   Pt will improve quad  strength to 5/5 to perform 10 full SL squats with good control and alignment. -Progressing  Pt will increase hip and knee strength to grossly 5/5 to be able to perform agility training with good control and allignment -Progressing  Pt will demonstrate increased hip ER/ABD strength to 5/5 to perform stepping, lunging and squatting and jumping activities without excessive femoral IR/ADD -Progressing  Pt will demonstrate good control and mechanics with squatting and lunging activities in a full tx session with no cuing from therapist to facilitate return to lifting and hiking safely -Progressing  Pt will be independent and compliant with comprehensive HEP to maintain progress achieved in PT- on going     New (L) Shoulder goals added 3/18/24  Pt will report improved ability to sleep without waking due to shoulder pain-No progress made   Pt will improve shoulder flexion AROM to >160 degrees to be able to reach into overhead cabinets without pain or restriction -Progressing  Pt will improve shoulder abduction AROM to >160 degrees to improve ability to don deodorant, don/doff shirts, and wash hair -Progressing  Pt will increase shoulder AROM ER to >65 to be able to reach and fasten seatbelt -Progressing  Pt will increase shoulder AROM IR to T7 to be able to reach in back pocket, tuck in shirt, and turn steering wheel without pain-No progress made   Pt will improve shoulder strength throughout to 4/5 to improve function with lifting >20 lbs over head for house chores -No progress made   Pt will demonstrate increased mid/low trap strength to 4/5 to promote improved shoulder mechanics and stabilization with lifting and reaching -No progress made  Pt will be independent and compliant with comprehensive HEP to maintain progress achieved in PT          Plan: Continue skilled Physical Therapy 1 x/week or a total of 8 visits over a 90 day period. Treatment will include: TherEx, manual tech, neuro re-ed         Certification From:  6/18/2024  To:9/16/2024      Date:5/20/24                 TX#: 19/22 approved   (Post op visit #25) Date:6/4/24                 TX#: 20/22 approved   (Post op visit #26) Date:6/11/24                 TX#: 21/22 approved   (Post op visit #27) Date:6/18/24                 TX#: 22/22 approved   (Post op visit #28) Date:8/12/24                 TX#: 23/26 approved   (Post op visit #29)    TherEx (R) knee: 30 min  -bike/UBE: 5 min  -PROM knee flex/ext to tolerance  -bent over table HS curls: 3#, 2x20  -bent over table hip ext and donkey kicks: 3#, 2x20  -SLR, SL hip abd: 3#, 3x10  -SL hip add: 0#, 3x10  -LAQ to end range:4# 2x20  -Step up 8\" box: 2x10  -6\" step downs: 3x10    -SL press:  87#, 3x10  -lateral walk: GTB, 2x50 feet  -GTB kicks, hip abd + ext: 2x to fatigue  -[place] squat: x10  -lunge at bar: 2x10-hold  -DL squats for depth: 25 # KB, 2x10  -Lunge sliders: 2 x10 each leg-hold  -Sled push: 65#, 2x100 feet          Hold   -SL RDL's    bosu squats TherEx (R) knee: 35 min  -bike/UBE: 5 min, level 4  -prone hand: 4 #, x2 min  -bent over table HS curls: 3#, 2x20  -bent over table cross over extensions: 3#, 2x20  -SLR, SL hip abd: 3#, 3x10  -LAQ to end range:4# 2x20  -Step up 8\" box: 2x10  -6\" step downs: 3x10  -staggered sit to strand: 2x10    -SL press:  87#, 3x10  -lateral walk/monster: GTB, 2x50 feet  -GTB kicks, hip abd + ext: 2x to fatigue  -[place] squat: 2x10 w/ 2 HHA  -lunge at bar: 2x10-hold  -DL squats for depth: 25 # KB, 2x10  -Lunge sliders: 2 x10 each leg-hold  -Sled push: 75#, 2x100 feet    -Hold  YSB squats w/ weight shift R>L   Retro lunge on step  Curtsy lunge             TherEx (R) knee: 35 min  -Elliptical: 5 min, level 4  -prone hand: 4 #, x2 min  -bent over table cross over extensions: 3#, 2x20  -SLR, SL hip abd: 3#, 3x10  -LAQ to end range:4# 2x20  -Step up 8\" box to march: 2x10  -6\" step downs: 3x10  -staggered sit to strand: 2x10  -SL eccentric sit to stand: x5 reps  -[place] squat: 2x10    -lunge at bar: 2x10  -DL squats for depth: 25 # KB, 2x10      -Hold  YSB squats w/ weight shift R>L    TherEx (R) knee: 45 min  -Elliptical: 5 min, level 4  -re-assessment  -prone han #, x3 min  -knee extensions stretch w/ strap: 3x30 sev  -SL mini squats holding to bar:   -Split squats knee to floor touch: x10 each side  -TRX deep squats: 2x10   -SLR, SL hip abd: 3#, 3x10  -Step up 8\" box to march: 2x10  -6\" step downs: 3x10  -SL eccentric sit to stand: x5 reps  -[place] squat: 2x10 -hold        -Hold  YSB squats w/ weight shift R>L  TherEx (R) knee: 30 min  -Elliptical: 5 min, level 4  -TRX mini squats: 2x10  -Split squats knee to floor touch: x10 each side  -Step up 10\" box to march: 2x10  -6\" step downs: 3x10  -[place] squat: 2x10  -Tandem sit to stand: x20  -SL press: 100#, 3x10  -SL calf press: 50#, 3x10  -Lunge sliders: x10 each leg                Neuro re-ed: 10min  -SLB ball toss: 2x20  -ball toss lateral: SLS w/ toe touch x20 each way  -SLB on airex: 3x30 sec  -RSB bridge with HS curl: x10 Neuro re-ed: 10 min  -SLB ball toss: x30  -ball toss lateral: SLS w/ toe touch x20 each way  -SLB on airex: 3x30 sec  -bosu squats: 2x10        Hold   Agility ladders  -SL RDL's    -Y balance     Neuro re-ed: 10 min  -SLB 3 way reach: 2x to fatigue   -SLB on airex: 2x1 min   -SL RDL: 2x10 1HHA       Neuro re-ed: 2 min  -SLB 3 way reach: 2x to fatigue-hold   -SLB on airex: 2x1 min -hold  -SL RDL: 2x10 1HHA Neuro re-ed: 10 min  -SLB on airex 3 way reach: 2x to fatigue  -SL RDL: 2x10 1HHA  -SL ball toss to rebounder: x30    HEP:   Access Code: [email or web address]  URL: [email or web address]  Date: 2024  Prepared by: Batsheva Beckwith    Exercises  - Seated Knee Extension Stretch with Chair  - 2 x daily - 7 x weekly - 3 sets - 10 reps  - Staggered Sit-to-Stand  - 1 x daily - 3 x weekly - 3 sets - 10 reps  - Lunge with Counter Support  - 1 x daily - 3 x weekly - 3 sets - 10 reps  - Forward Step Down Touch  with Heel  - 1 x daily - 3 x weekly - 3 sets - 10 reps  - Single Leg Balance with Clock Reach  - 1 x daily - 3 x weekly - 3 sets - 10 reps  - Runner's Climb  - 1 x daily - 3 x weekly - 3 sets - 10 reps  - Goblet Squat with Kettlebell  - 1 x daily - 3 x weekly - 3 sets - 10 reps  - Single Leg Lunge with Foot on Bench  - 1 x daily - 3 x weekly - 3 sets - 10 reps  - Forward T with Counter Support  - 1 x daily - 3 x weekly - 3 sets - 10 reps    Charges: TherEx: 2 units, Neuro re-ed:1 unit  Total Timed Treatment: 40 min  Total Treatment Time: 40 min

## 2024-08-16 NOTE — PROGRESS NOTES
Diagnosis:   RIGHT KNEE ARTHROSCOPY WITH ANTERIOR CRUCIATE LIGAMENT RECONSTRUCTION, HAMSTRING AUTOGRAFT, LATERAL MENISCUS REPAIR, AND SYNOVECTOMY AND MCL REPAIR          Added 3/18/24:  Injury of left rotator cuff, initial encounter (S46.002A)    Referring Provider: Vu  Date of Evaluation:    2/7/24  Last PN: 3/18/24    Precautions:      Toe-touch weightbearing x 2 weeks, no range of motion restrictions. Crutches to be weaned by Physical Therapy.   Next MD visit:   none scheduled  Date of Surgery: 2/6/24     Insurance Primary/Secondary: BLUE CROSS MEDICAID / N/A     # Auth Visits: 15 visits 2/26-4/26/24, auth #[id number] + 7 visits 4/23/24-6/28/24+ 4 visits from 6/18-8/20/24    Subjective: Pt reports the knee is doing good. It was not painful  after last PT session.   Pain: 0/10 currently     Objective:     10\" step up: mildly reduced concentric quad control    8\" step down: moderately reduced eccentric quad control      Assessment: Pt progressed with lunge holds for quad endurance and and decline squats to promote quad strengthening. She occasionally reports mild anterior knee pain by end of tx but reports it on both knees. Overall, she is gradually demonstrating improving mechanics and tolerance to squatting/lunging.       Goals:    (to be met in 6 weeks post op ~12 visits)   The patient will regain A/PROM knee flexion >135 degrees and 0 degrees of knee extension-MET  The patinet will present without pain or limiting swelling after 8 visits- -MET  The patient will demonstrate a full quadricep contraction and SLR without extension lag -MET  The patient will ambulate without visual deficiencies and ability to ambulate w/o hinged brace >3,000 steps/day -MET  The patient will demonstrate ability to ascend stairs with good alignment and control -Progressing        Goals: (to be met before but no later then 4 months post op)   Pt will improve quad strength to 5/5 to perform 10 full SL squats with good control and  alignment. -Progressing  Pt will increase hip and knee strength to grossly 5/5 to be able to perform agility training with good control and allignment -Progressing  Pt will demonstrate increased hip ER/ABD strength to 5/5 to perform stepping, lunging and squatting and jumping activities without excessive femoral IR/ADD -Progressing  Pt will demonstrate good control and mechanics with squatting and lunging activities in a full tx session with no cuing from therapist to facilitate return to lifting and hiking safely -Progressing  Pt will be independent and compliant with comprehensive HEP to maintain progress achieved in PT- on going     New (L) Shoulder goals added 3/18/24  Pt will report improved ability to sleep without waking due to shoulder pain-No progress made   Pt will improve shoulder flexion AROM to >160 degrees to be able to reach into overhead cabinets without pain or restriction -Progressing  Pt will improve shoulder abduction AROM to >160 degrees to improve ability to don deodorant, don/doff shirts, and wash hair -Progressing  Pt will increase shoulder AROM ER to >65 to be able to reach and fasten seatbelt -Progressing  Pt will increase shoulder AROM IR to T7 to be able to reach in back pocket, tuck in shirt, and turn steering wheel without pain-No progress made   Pt will improve shoulder strength throughout to 4/5 to improve function with lifting >20 lbs over head for house chores -No progress made   Pt will demonstrate increased mid/low trap strength to 4/5 to promote improved shoulder mechanics and stabilization with lifting and reaching -No progress made  Pt will be independent and compliant with comprehensive HEP to maintain progress achieved in PT          Plan: Continue skilled Physical Therapy 1 x/week or a total of 8 visits over a 90 day period. Treatment will include: [place], manual tech, neuro re-ed         Certification From: 6/18/2024  To:9/16/2024      Date:5/20/24                 TX#:  19/22 approved   (Post op visit #25) Date:6/4/24                 TX#: 20/22 approved   (Post op visit #26) Date:6/11/24                 TX#: 21/22 approved   (Post op visit #27) Date:6/18/24                 TX#: 22/22 approved   (Post op visit #28) Date:8/12/24                 TX#: 23/26 approved   (Post op visit #29) Date:8/19/24                 TX#: 24/26 approved   (Post op visit #30)   TherEx (R) knee: 30 min  -bike/UBE: 5 min  -PROM knee flex/ext to tolerance  -bent over table HS curls: 3#, 2x20  -bent over table hip ext and donkey kicks: 3#, 2x20  -SLR, SL hip abd: 3#, 3x10  -SL hip add: 0#, 3x10  -LAQ to end range:4# 2x20  -Step up 8\" box: 2x10  -6\" step downs: 3x10    -SL press:  87#, 3x10  -lateral walk: GTB, 2x50 feet  -GTB kicks, hip abd + ext: 2x to fatigue  -Thai squat: x10  -lunge at bar: 2x10-hold  -DL squats for depth: 25 # KB, 2x10  -Lunge sliders: 2 x10 each leg-hold  -Sled push: 65#, 2x100 feet          Hold   -SL RDL's    bosu squats TherEx (R) knee: 35 min  -bike/UBE: 5 min, level 4  -prone hand: 4 #, x2 min  -bent over table HS curls: 3#, 2x20  -bent over table cross over extensions: 3#, 2x20  -SLR, SL hip abd: 3#, 3x10  -LAQ to end range:4# 2x20  -Step up 8\" box: 2x10  -6\" step downs: 3x10  -staggered sit to strand: 2x10    -SL press:  87#, 3x10  -lateral walk/monster: GTB, 2x50 feet  -GTB kicks, hip abd + ext: 2x to fatigue  -Thai squat: 2x10 w/ 2 HHA  -lunge at bar: 2x10-hold  -DL squats for depth: 25 # KB, 2x10  -Lunge sliders: 2 x10 each leg-hold  -Sled push: 75#, 2x100 feet    -Hold  YSB squats w/ weight shift R>L   Retro lunge on step  Curtsy lunge             TherEx (R) knee: 35 min  -Elliptical: 5 min, level 4  -prone hand: 4 #, x2 min  -bent over table cross over extensions: 3#, 2x20  -SLR, SL hip abd: 3#, 3x10  -LAQ to end range:4# 2x20  -Step up 8\" box to march: 2x10  -6\" step downs: 3x10  -staggered sit to strand: 2x10  -SL eccentric sit to stand: x5 reps  -Thai  squat: 2x10   -lunge at bar: 2x10  -DL squats for depth: 25 # KB, 2x10      -Hold  YSB squats w/ weight shift R>L    TherEx (R) knee: 45 min  -Elliptical: 5 min, level 4  -re-assessment  -prone han #, x3 min  -knee extensions stretch w/ strap: 3x30 sev  -SL mini squats holding to bar:   -Split squats knee to floor touch: x10 each side  -TRX deep squats: 2x10   -SLR, SL hip abd: 3#, 3x10  -Step up 8\" box to march: 2x10  -6\" step downs: 3x10  -SL eccentric sit to stand: x5 reps  -[place] squat: 2x10 -hold        -Hold  YSB squats w/ weight shift R>L  TherEx (R) knee: 30 min  -Elliptical: 5 min, level 4  -TRX mini squats: 2x10  -Split squats knee to floor touch: x10 each side  -Step up 10\" box to march: 2x10  -6\" step downs: 3x10  -[place] squat: 2x10  -Tandem sit to stand: x20  -SL press: 100#, 3x10  -SL calf press: 50#, 3x10  -Lunge sliders: x10 each leg     TherEx (R) knee: 30 min  -Elliptical: 5 min, level 4  -PROM (R) knee extension: x5 min  -TRX mini squats: 2x10  -TRX full lunges: 2x10  -Step up 10\" box to march: 2x10  -6\" step downs: 3x10  -[place] squat: 2x10  -DL squats declined surface: 26# KB, 2x10  -Tandem sit to stand: x20  -SL press: 100#, 3x10  -SL calf press: 50#, 3x10  -Lunge sliders: x10 each leg  -lunge hold: 2x20 seconds      Neuro re-ed: 10min  -SLB ball toss: 2x20  -ball toss lateral: SLS w/ toe touch x20 each way  -SLB on airex: 3x30 sec  -RSB bridge with HS curl: x10 Neuro re-ed: 10 min  -SLB ball toss: x30  -ball toss lateral: SLS w/ toe touch x20 each way  -SLB on airex: 3x30 sec  -bosu squats: 2x10        Hold   Agility ladders  -SL RDL's    -Y balance     Neuro re-ed: 10 min  -SLB 3 way reach: 2x to fatigue   -SLB on airex: 2x1 min   -SL RDL: 2x10 1HHA       Neuro re-ed: 2 min  -SLB 3 way reach: 2x to fatigue-hold   -SLB on airex: 2x1 min -hold  -SL RDL: 2x10 1HHA Neuro re-ed: 10 min  -SLB on airex 3 way reach: 2x to fatigue  -SL RDL: 2x10 1HHA  -SL ball toss to rebounder: x30  Neuro re-ed: 10 min  -SLB on airex 3 way reach: 2x to fatigue  -SL RDL: 2x10 1HHA  -SL ball toss to rebounder: x30   HEP:   Access Code: [email or web address]  URL: [email or web address]  Date: 06/11/2024  Prepared by: Batsheva Beckwith    Exercises  - Seated Knee Extension Stretch with Chair  - 2 x daily - 7 x weekly - 3 sets - 10 reps  - Staggered Sit-to-Stand  - 1 x daily - 3 x weekly - 3 sets - 10 reps  - Lunge with Counter Support  - 1 x daily - 3 x weekly - 3 sets - 10 reps  - Forward Step Down Touch with Heel  - 1 x daily - 3 x weekly - 3 sets - 10 reps  - Single Leg Balance with Clock Reach  - 1 x daily - 3 x weekly - 3 sets - 10 reps  - Runner's Climb  - 1 x daily - 3 x weekly - 3 sets - 10 reps  - Goblet Squat with Kettlebell  - 1 x daily - 3 x weekly - 3 sets - 10 reps  - Single Leg Lunge with Foot on Bench  - 1 x daily - 3 x weekly - 3 sets - 10 reps  - Forward T with Counter Support  - 1 x daily - 3 x weekly - 3 sets - 10 reps    Charges: TherEx: 2 units, Neuro re-ed:1 unit  Total Timed Treatment: 40 min  Total Treatment Time: 40 min

## 2024-08-19 ENCOUNTER — OFFICE VISIT (OUTPATIENT)
Dept: PHYSICAL THERAPY | Age: 44
End: 2024-08-19
Attending: ORTHOPAEDIC SURGERY
Payer: MEDICAID

## 2024-08-19 PROCEDURE — 97112 NEUROMUSCULAR REEDUCATION: CPT | Performed by: PHYSICAL THERAPIST

## 2024-08-19 PROCEDURE — 97110 THERAPEUTIC EXERCISES: CPT | Performed by: PHYSICAL THERAPIST

## 2024-08-20 ENCOUNTER — OFFICE VISIT (OUTPATIENT)
Dept: INTERNAL MEDICINE CLINIC | Facility: CLINIC | Age: 44
End: 2024-08-20
Payer: MEDICAID

## 2024-08-20 VITALS
OXYGEN SATURATION: 98 % | HEART RATE: 65 BPM | HEIGHT: 65 IN | DIASTOLIC BLOOD PRESSURE: 80 MMHG | SYSTOLIC BLOOD PRESSURE: 120 MMHG | BODY MASS INDEX: 36.49 KG/M2 | WEIGHT: 219 LBS

## 2024-08-20 DIAGNOSIS — N84.1 CERVICAL POLYP: ICD-10-CM

## 2024-08-20 DIAGNOSIS — E66.9 OBESITY (BMI 35.0-39.9 WITHOUT COMORBIDITY): ICD-10-CM

## 2024-08-20 DIAGNOSIS — Z12.31 SCREENING MAMMOGRAM, ENCOUNTER FOR: ICD-10-CM

## 2024-08-20 DIAGNOSIS — Z00.00 WELLNESS EXAMINATION: Primary | ICD-10-CM

## 2024-08-20 PROCEDURE — 88175 CYTOPATH C/V AUTO FLUID REDO: CPT | Performed by: INTERNAL MEDICINE

## 2024-08-20 PROCEDURE — 99396 PREV VISIT EST AGE 40-64: CPT | Performed by: INTERNAL MEDICINE

## 2024-08-20 RX ORDER — PHENTERMINE HYDROCHLORIDE 37.5 MG/1
37.5 TABLET ORAL
Qty: 30 TABLET | Refills: 2 | Status: SHIPPED | OUTPATIENT
Start: 2024-08-20

## 2024-08-20 NOTE — PROGRESS NOTES
Subjective:   Sandee Eddy is a 44 year old female who presents for Physical     Patient here for a wellness exam and PAP smear.  Has continued bilateral pelvic pains.  Has normal menses. Is losing weight with Phentermine rx.  History/Other:    Chief Complaint Reviewed and Verified  No Further Nursing Notes to   Review  Medications Reviewed  Problem List Reviewed         Tobacco:  She has never smoked tobacco.    Current Outpatient Medications   Medication Sig Dispense Refill    PHENTERMINE HCL 37.5 MG Oral Tab TAKE 1 TABLET(37.5 MG) BY MOUTH BEFORE BREAKFAST 30 tablet 0    vitamin E 200 UNITS Oral Cap Take 1 capsule (200 Units total) by mouth daily.      Cholecalciferol (VITAMIN D3) 25 MCG (1000 UT) Oral Cap Take 1 tablet by mouth daily.      multivitamin Oral Chew Tab Chew 1 tablet by mouth daily.           Review of Systems:  Review of Systems   Genitourinary:  Positive for pelvic pain.         Objective:   /80   Pulse 65   Ht 5' 5\" (1.651 m)   Wt 219 lb (99.3 kg)   LMP 01/14/2024 (Approximate)   SpO2 98%   BMI 36.44 kg/m²  Estimated body mass index is 36.44 kg/m² as calculated from the following:    Height as of this encounter: 5' 5\" (1.651 m).    Weight as of this encounter: 219 lb (99.3 kg).  Physical Exam  Constitutional:       Appearance: Normal appearance. She is obese.   HENT:      Head: Normocephalic and atraumatic.      Right Ear: Ear canal normal.      Left Ear: Ear canal normal.   Eyes:      General: No scleral icterus.     Pupils: Pupils are equal, round, and reactive to light.   Cardiovascular:      Rate and Rhythm: Normal rate and regular rhythm.   Pulmonary:      Effort: Pulmonary effort is normal.      Breath sounds: Normal breath sounds.   Abdominal:      Palpations: Abdomen is soft.      Tenderness: There is no abdominal tenderness.   Genitourinary:     General: Normal vulva.      Vagina: No vaginal discharge.      Comments: Vaginal exam - moderate sized endocervical polyp  seen bimanual no masses palpated  Musculoskeletal:      Cervical back: Neck supple.      Right lower leg: No edema.      Left lower leg: No edema.   Lymphadenopathy:      Cervical: No cervical adenopathy.   Skin:     Findings: No lesion.   Neurological:      Mental Status: She is alert. Mental status is at baseline.   Psychiatric:         Thought Content: Thought content normal.           Assessment & Plan:   1. Wellness examination (Primary) check fasting labs  -     Comp Metabolic Panel (14); Future; Expected date: 08/20/2024  -     Lipid Panel; Future; Expected date: 08/20/2024  -     CBC With Differential With Platelet; Future; Expected date: 08/20/2024  -     TSH and Free T4; Future; Expected date: 08/20/2024  2. Cervical polyp - gyne referral  -     OBG Referral - In Network  3. Screening mammogram, encounter for referred  -     [facility] DC 2D+3D SCREENING BILAT (CPT=77067/17696); Future; Expected date: 08/20/2024        No follow-ups on file.    Kasey Calderon MD, 8/20/2024, 11:22 AM

## 2024-08-21 LAB
LAST PAP RESULT: NORMAL
PAP HISTORY (OTHER THAN LAST PAP): NORMAL

## 2024-08-23 ENCOUNTER — LAB ENCOUNTER (OUTPATIENT)
Dept: LAB | Age: 44
End: 2024-08-23
Attending: INTERNAL MEDICINE
Payer: MEDICAID

## 2024-08-23 DIAGNOSIS — Z00.00 WELLNESS EXAMINATION: ICD-10-CM

## 2024-08-23 LAB
ALBUMIN SERPL-MCNC: 4.3 G/DL (ref 3.2–4.8)
ALBUMIN/GLOB SERPL: 1.5 {RATIO} (ref 1–2)
ALP LIVER SERPL-CCNC: 52 U/L
ALT SERPL-CCNC: 12 U/L
ANION GAP SERPL CALC-SCNC: 5 MMOL/L (ref 0–18)
AST SERPL-CCNC: 15 U/L (ref ?–34)
BASOPHILS # BLD AUTO: 0.04 X10(3) UL (ref 0–0.2)
BASOPHILS NFR BLD AUTO: 0.5 %
BILIRUB SERPL-MCNC: 0.6 MG/DL (ref 0.3–1.2)
BUN BLD-MCNC: 9 MG/DL (ref 9–23)
BUN/CREAT SERPL: 12 (ref 10–20)
CALCIUM BLD-MCNC: 9.1 MG/DL (ref 8.7–10.4)
CHLORIDE SERPL-SCNC: 106 MMOL/L (ref 98–112)
CHOLEST SERPL-MCNC: 203 MG/DL (ref ?–200)
CO2 SERPL-SCNC: 26 MMOL/L (ref 21–32)
CREAT BLD-MCNC: 0.75 MG/DL
DEPRECATED RDW RBC AUTO: 44.4 FL (ref 35.1–46.3)
EGFRCR SERPLBLD CKD-EPI 2021: 101 ML/MIN/1.73M2 (ref 60–?)
EOSINOPHIL # BLD AUTO: 0.15 X10(3) UL (ref 0–0.7)
EOSINOPHIL NFR BLD AUTO: 1.9 %
ERYTHROCYTE [DISTWIDTH] IN BLOOD BY AUTOMATED COUNT: 13.3 % (ref 11–15)
FASTING PATIENT LIPID ANSWER: YES
FASTING STATUS PATIENT QL REPORTED: YES
GLOBULIN PLAS-MCNC: 2.8 G/DL (ref 2–3.5)
GLUCOSE BLD-MCNC: 93 MG/DL (ref 70–99)
HCT VFR BLD AUTO: 39.9 %
HDLC SERPL-MCNC: 72 MG/DL (ref 40–59)
HGB BLD-MCNC: 13 G/DL
IMM GRANULOCYTES # BLD AUTO: 0.03 X10(3) UL (ref 0–1)
IMM GRANULOCYTES NFR BLD: 0.4 %
LDLC SERPL CALC-MCNC: 111 MG/DL (ref ?–100)
LYMPHOCYTES # BLD AUTO: 1.97 X10(3) UL (ref 1–4)
LYMPHOCYTES NFR BLD AUTO: 25.2 %
MCH RBC QN AUTO: 29.3 PG (ref 26–34)
MCHC RBC AUTO-ENTMCNC: 32.6 G/DL (ref 31–37)
MCV RBC AUTO: 90.1 FL
MONOCYTES # BLD AUTO: 0.63 X10(3) UL (ref 0.1–1)
MONOCYTES NFR BLD AUTO: 8.1 %
NEUTROPHILS # BLD AUTO: 5 X10 (3) UL (ref 1.5–7.7)
NEUTROPHILS # BLD AUTO: 5 X10(3) UL (ref 1.5–7.7)
NEUTROPHILS NFR BLD AUTO: 63.9 %
NONHDLC SERPL-MCNC: 131 MG/DL (ref ?–130)
OSMOLALITY SERPL CALC.SUM OF ELEC: 282 MOSM/KG (ref 275–295)
PLATELET # BLD AUTO: 207 10(3)UL (ref 150–450)
POTASSIUM SERPL-SCNC: 4.2 MMOL/L (ref 3.5–5.1)
PROT SERPL-MCNC: 7.1 G/DL (ref 5.7–8.2)
RBC # BLD AUTO: 4.43 X10(6)UL
SODIUM SERPL-SCNC: 137 MMOL/L (ref 136–145)
T4 FREE SERPL-MCNC: 1.4 NG/DL (ref 0.8–1.7)
TRIGL SERPL-MCNC: 113 MG/DL (ref 30–149)
TSI SER-ACNC: 2.5 MIU/ML (ref 0.55–4.78)
VLDLC SERPL CALC-MCNC: 19 MG/DL (ref 0–30)
WBC # BLD AUTO: 7.8 X10(3) UL (ref 4–11)

## 2024-08-23 PROCEDURE — 84443 ASSAY THYROID STIM HORMONE: CPT

## 2024-08-23 PROCEDURE — 84439 ASSAY OF FREE THYROXINE: CPT

## 2024-08-23 PROCEDURE — 80053 COMPREHEN METABOLIC PANEL: CPT

## 2024-08-23 PROCEDURE — 80061 LIPID PANEL: CPT

## 2024-08-23 PROCEDURE — 36415 COLL VENOUS BLD VENIPUNCTURE: CPT

## 2024-08-23 PROCEDURE — 85025 COMPLETE CBC W/AUTO DIFF WBC: CPT

## 2024-09-06 RX ORDER — TRANEXAMIC ACID 10 MG/ML
1000 INJECTION, SOLUTION INTRAVENOUS ONCE
Status: CANCELLED | OUTPATIENT
Start: 2024-09-06 | End: 2024-09-06

## 2024-09-10 ENCOUNTER — OFFICE VISIT (OUTPATIENT)
Dept: PHYSICAL THERAPY | Age: 44
End: 2024-09-10
Attending: ORTHOPAEDIC SURGERY
Payer: MEDICAID

## 2024-09-10 ENCOUNTER — ORDER TRANSCRIPTION (OUTPATIENT)
Dept: PHYSICAL THERAPY | Facility: HOSPITAL | Age: 44
End: 2024-09-10

## 2024-09-10 DIAGNOSIS — Z98.890 S/P ARTHROSCOPY OF LEFT SHOULDER: Primary | ICD-10-CM

## 2024-09-10 PROCEDURE — 97112 NEUROMUSCULAR REEDUCATION: CPT | Performed by: PHYSICAL THERAPIST

## 2024-09-10 PROCEDURE — 97110 THERAPEUTIC EXERCISES: CPT | Performed by: PHYSICAL THERAPIST

## 2024-09-10 NOTE — PROGRESS NOTES
Diagnosis:   RIGHT KNEE ARTHROSCOPY WITH ANTERIOR CRUCIATE LIGAMENT RECONSTRUCTION, HAMSTRING AUTOGRAFT, LATERAL MENISCUS REPAIR, AND SYNOVECTOMY AND MCL REPAIR          Added 3/18/24:  Injury of left rotator cuff, initial encounter (S46.002A)    Referring Provider: Vu  Date of Evaluation:    2/7/24  Last PN: 3/18/24    Precautions:      Toe-touch weightbearing x 2 weeks, no range of motion restrictions. Crutches to be weaned by Physical Therapy.   Next MD visit:   none scheduled  Date of Surgery: 2/6/24     Insurance Primary/Secondary: BLUE CROSS MEDICAID / N/A     # Auth Visits: 15 visits 2/26-4/26/24, auth #[id number] + 7 visits 4/23/24-6/28/24+ 4 visits from 6/18-8/20/24    Subjective: Pt reports the knee is feeling fine now. She felt crunching noises in it when cleaning and squatting in the kitchen.   Pain: 0/10 currently     Objective:     Walking lunges: mid depth with good control       Assessment: Pt progressed with walking lunges and curtsy lunges to promote strengthening for improved ability to perform floor to stand transfers during house cleaning. She is also progressed with bosu squats and SL balance with hip and iso to improve balance during functional activities. She tolerates tx well.       Goals:    (to be met in 6 weeks post op ~12 visits)   The patient will regain A/PROM knee flexion >135 degrees and 0 degrees of knee extension-MET  The patinet will present without pain or limiting swelling after 8 visits- -MET  The patient will demonstrate a full quadricep contraction and SLR without extension lag -MET  The patient will ambulate without visual deficiencies and ability to ambulate w/o hinged brace >3,000 steps/day -MET  The patient will demonstrate ability to ascend stairs with good alignment and control -Progressing        Goals: (to be met before but no later then 4 months post op)   Pt will improve quad strength to 5/5 to perform 10 full SL squats with good control and alignment.  -Progressing  Pt will increase hip and knee strength to grossly 5/5 to be able to perform agility training with good control and allignment -Progressing  Pt will demonstrate increased hip ER/ABD strength to 5/5 to perform stepping, lunging and squatting and jumping activities without excessive femoral IR/ADD -Progressing  Pt will demonstrate good control and mechanics with squatting and lunging activities in a full tx session with no cuing from therapist to facilitate return to lifting and hiking safely -Progressing  Pt will be independent and compliant with comprehensive HEP to maintain progress achieved in PT- on going     New (L) Shoulder goals added 3/18/24  Pt will report improved ability to sleep without waking due to shoulder pain-No progress made   Pt will improve shoulder flexion AROM to >160 degrees to be able to reach into overhead cabinets without pain or restriction -Progressing  Pt will improve shoulder abduction AROM to >160 degrees to improve ability to don deodorant, don/doff shirts, and wash hair -Progressing  Pt will increase shoulder AROM ER to >65 to be able to reach and fasten seatbelt -Progressing  Pt will increase shoulder AROM IR to T7 to be able to reach in back pocket, tuck in shirt, and turn steering wheel without pain-No progress made   Pt will improve shoulder strength throughout to 4/5 to improve function with lifting >20 lbs over head for house chores -No progress made   Pt will demonstrate increased mid/low trap strength to 4/5 to promote improved shoulder mechanics and stabilization with lifting and reaching -No progress made  Pt will be independent and compliant with comprehensive HEP to maintain progress achieved in PT          Plan: Discharge to HEP next tx    Date:6/11/24                 TX#: 21/22 approved   (Post op visit #27) Date:6/18/24                 TX#: 22/22 approved   (Post op visit #28) Date:8/12/24                 TX#: 23/26 approved   (Post op visit #29)  Date:24                 TX#:  approved   (Post op visit #30) Date:9/10/24                 TX#:  approved   (Post op visit #31)   TherEx (R) knee: 35 min  -Elliptical: 5 min, level 4  -prone hand: 4 #, x2 min  -bent over table cross over extensions: 3#, 2x20  -SLR, SL hip abd: 3#, 3x10  -LAQ to end range:4# 2x20  -Step up 8\" box to march: 2x10  -6\" step downs: 3x10  -staggered sit to strand: 2x10  -SL eccentric sit to stand: x5 reps  -Tajik squat: 2x10   -lunge at bar: 2x10  -DL squats for depth: 25 # KB, 2x10      -Hold  YSB squats w/ weight shift R>L    TherEx (R) knee: 45 min  -Elliptical: 5 min, level 4  -re-assessment  -prone han #, x3 min  -knee extensions stretch w/ strap: 3x30 sev  -SL mini squats holding to bar:   -Split squats knee to floor touch: x10 each side  -TRX deep squats: 2x10   -SLR, SL hip abd: 3#, 3x10  -Step up 8\" box to march: 2x10  -6\" step downs: 3x10  -SL eccentric sit to stand: x5 reps  -Tajik squat: 2x10 -hold        -Hold  YSB squats w/ weight shift R>L  TherEx (R) knee: 30 min  -Elliptical: 5 min, level 4  -TRX mini squats: 2x10  -Split squats knee to floor touch: x10 each side  -Step up 10\" box to march: 2x10  -6\" step downs: 3x10  -Tajik squat: 2x10  -Tandem sit to stand: x20  -SL press: 100#, 3x10  -SL calf press: 50#, 3x10  -Lunge sliders: x10 each leg     TherEx (R) knee: 30 min  -Elliptical: 5 min, level 4  -PROM (R) knee extension: x5 min  -TRX mini squats: 2x10  -TRX full lunges: 2x10  -Step up 10\" box to march: 2x10  -6\" step downs: 3x10  -Tajik squat: 2x10  -DL squats declined surface: 26# KB, 2x10  -Tandem sit to stand: x20  -SL press: 100#, 3x10  -SL calf press: 50#, 3x10  -Lunge sliders: x10 each leg  -lunge hold: 2x20 seconds    TherEx (R) knee: 25 min  -Elliptical: 5 min, level 4  -PROM (R) knee extension: x5 min  -TRX curtsy lunges: 2x10  -Step up 10\" box to march: 3x10  -6\" step downs: 3x10  -Tajik squat: 2x10  -SL press: 125#,  3x10  -walking lunges: 2x50 feet   Neuro re-ed: 10 min  -SLB 3 way reach: 2x to fatigue   -SLB on airex: 2x1 min   -SL RDL: 2x10 1HHA       Neuro re-ed: 2 min  -SLB 3 way reach: 2x to fatigue-hold   -SLB on airex: 2x1 min -hold  -SL RDL: 2x10 1HHA Neuro re-ed: 10 min  -SLB on airex 3 way reach: 2x to fatigue  -SL RDL: 2x10 1HHA  -SL ball toss to rebounder: x30 Neuro re-ed: 10 min  -SLB on airex 3 way reach: 2x to fatigue  -SL RDL: 2x10 1HHA  -SL ball toss to rebounder: x30 Neuro re-ed: 15 min  -SLB on airex 3 way reach: 2x to fatigue  -SL RDL: 2x10 1HHA  -SL ball toss to rebounder: x30  -bosu squats: 3x10  -SL hip abd iso into wall: 10 sec x5 reps  -Y balance reaches: x10 each   HEP:   Access Code: [email or web address]  URL: [email or web address]  Date: 06/11/2024  Prepared by: Batsheva Beckwith    Exercises  - Seated Knee Extension Stretch with Chair  - 2 x daily - 7 x weekly - 3 sets - 10 reps  - Staggered Sit-to-Stand  - 1 x daily - 3 x weekly - 3 sets - 10 reps  - Lunge with Counter Support  - 1 x daily - 3 x weekly - 3 sets - 10 reps  - Forward Step Down Touch with Heel  - 1 x daily - 3 x weekly - 3 sets - 10 reps  - Single Leg Balance with Clock Reach  - 1 x daily - 3 x weekly - 3 sets - 10 reps  - Runner's Climb  - 1 x daily - 3 x weekly - 3 sets - 10 reps  - Goblet Squat with Kettlebell  - 1 x daily - 3 x weekly - 3 sets - 10 reps  - Single Leg Lunge with Foot on Bench  - 1 x daily - 3 x weekly - 3 sets - 10 reps  - Forward T with Counter Support  - 1 x daily - 3 x weekly - 3 sets - 10 reps    Charges: TherEx: 2 units, Neuro re-ed:1 unit  Total Timed Treatment: 40 min  Total Treatment Time: 40 min

## 2024-09-11 ENCOUNTER — NURSE ONLY (OUTPATIENT)
Dept: LAB | Age: 44
End: 2024-09-11
Attending: ORTHOPAEDIC SURGERY
Payer: MEDICAID

## 2024-09-11 DIAGNOSIS — Z01.818 PRE-OP TESTING: ICD-10-CM

## 2024-09-11 LAB
CHLORIDE SERPL-SCNC: 107 MMOL/L (ref 98–112)
CO2 SERPL-SCNC: 26 MMOL/L (ref 21–32)
POTASSIUM SERPL-SCNC: 4.3 MMOL/L (ref 3.5–5.1)
SODIUM SERPL-SCNC: 140 MMOL/L (ref 136–145)

## 2024-09-11 PROCEDURE — 36415 COLL VENOUS BLD VENIPUNCTURE: CPT

## 2024-09-11 PROCEDURE — 80051 ELECTROLYTE PANEL: CPT

## 2024-09-11 NOTE — PROGRESS NOTES
Discharge Summary  Pt has attended 32 visits in Physical Therapy.    Diagnosis:   RIGHT KNEE ARTHROSCOPY WITH ANTERIOR CRUCIATE LIGAMENT RECONSTRUCTION, HAMSTRING AUTOGRAFT, LATERAL MENISCUS REPAIR, AND SYNOVECTOMY AND MCL REPAIR          Added 3/18/24:  Injury of left rotator cuff, initial encounter (S46.002A)    Referring Provider: Vu  Date of Evaluation:    2/7/24  Last PN: 3/18/24    Precautions:      Toe-touch weightbearing x 2 weeks, no range of motion restrictions. Crutches to be weaned by Physical Therapy.   Next MD visit:   none scheduled  Date of Surgery: 2/6/24     Insurance Primary/Secondary: BLUE CROSS MEDICAID / N/A     # Auth Visits: 15 visits 2/26-4/26/24, [facility] #[id number] + 7 visits 4/23/24-6/28/24+ 4 visits from 6/18-8/20/24    Subjective: Pt reports no knee pain currently. She still has some soreness and difficulty with repetitive descending stairs. She is overall doing everything like kneeling, dancing and house chores with no limitations.   Pain: 0/10 currently     Objective:     AROM: (* denotes performed with pain)   Knee    Flexion: R 140; L 140   Extension: R 3+; L +5        Strength/MMT: (* denotes performed with pain)  Hip Knee   Flexion: R 4/5; L 4/5  Extension: R 4/5; L 4/5  Abduction: R 4/5; L 4/5  ER: R 4/5; L 4/5 Flexion: R 4/5; L 4/5  Extension: R 4+/5; L 5/5            Balance:   SLS on airex: R 30 sec, L 30 sec     Y balance: WNL     SL squat: (R) 50% depth , normal depth     8\" step up: WNL     8\" step down: min reduced eccentric quad control with 3/10 pain level (R) knee         Assessment: Pt is 7 months s/p (R) ACL reconstruction, MCL repair and lateral meniscus repair and has been progressing well. She demonstrates improved knee ROM, LE strength, balance and body mechanics.  These objective measures have improved her ability to negotiate stairs repetitively, squatting, floor to stand transfers, kneeling, dancing, and house chores. She continues to demonstrate mild (R) quad  weakness and mildly restricted knee extension as compared to contralateral side. Her major complaint is pain when performing repetitive stair descending. She reports no functional limitations at this time and is discharged to a [facility].     Goals:    (to be met in 6 weeks post op ~12 visits)   The patient will regain A/PROM knee flexion >135 degrees and 0 degrees of knee extension-MET  The patinet will present without pain or limiting swelling after 8 visits- -MET  The patient will demonstrate a full quadricep contraction and SLR without extension lag -MET  The patient will ambulate without visual deficiencies and ability to ambulate w/o hinged brace >3,000 steps/day -MET  The patient will demonstrate ability to ascend stairs with good alignment and control -Progressing        Goals: (to be met before but no later then 4 months post op)   Pt will improve quad strength to 5/5 to perform 10 full SL squats with good control and alignment. -Progressing  Pt will increase hip and knee strength to grossly 5/5 to be able to perform agility training with good control and allignment -Met  Pt will demonstrate increased hip ER/ABD strength to 5/5 to perform stepping, lunging and squatting and jumping activities without excessive femoral IR/ADD -Met  Pt will demonstrate good control and mechanics with squatting and lunging activities in a full tx session with no cuing from therapist to facilitate return to lifting and hiking safely -Met  Pt will be independent and compliant with comprehensive HEP to maintain progress achieved in PT- on going         LEFS Score  LEFS Score: 15 % (2/7/2024  5:05 PM)    Post LEFS Score  Post LEFS Score: 91.25 % (9/13/2024  8:11 AM)    76.25 % improvement    Plan: Discharged to HEP    Thank you for your referral. If you have any questions, please contact me at Dept: [phone].    Sincerely,  Electronically signed by therapist: Batsheva Beckwith, PT     Physician's certification required:  No  Please  co-sign or sign and return this letter via fax as soon as possible to [phone].   I certify the need for these services furnished under this plan of treatment and while under my care.    X___________________________________________________ Date____________________    Certification From: 2024  To:12/10/2024      Date:24                 TX#:  approved   (Post op visit #27) Date:24                 TX#:  approved   (Post op visit #28) Date:24                 TX#:  approved   (Post op visit #29) Date:24                 TX#:  approved   (Post op visit #30) Date:9/10/24                 TX#:  approved   (Post op visit #31) Date:24                 TX#:  approved   (Post op visit #32)   TherEx (R) knee: 35 min  -Elliptical: 5 min, level 4  -prone hand: 4 #, x2 min  -bent over table cross over extensions: 3#, 2x20  -SLR, SL hip abd: 3#, 3x10  -LAQ to end range:4# 2x20  -Step up 8\" box to march: 2x10  -6\" step downs: 3x10  -staggered sit to strand: 2x10  -SL eccentric sit to stand: x5 reps  -[place]anese squat: 2x10   -lunge at bar: 2x10  -DL squats for depth: 25 # KB, 2x10      -Hold  YSB squats w/ weight shift R>L    TherEx (R) knee: 45 min  -Elliptical: 5 min, level 4  -re-assessment  -prone han #, x3 min  -knee extensions stretch w/ strap: 3x30 sev  -SL mini squats holding to bar:   -Split squats knee to floor touch: x10 each side  -TRX deep squats: 2x10   -SLR, SL hip abd: 3#, 3x10  -Step up 8\" box to march: 2x10  -6\" step downs: 3x10  -SL eccentric sit to stand: x5 reps  -[place]anese squat: 2x10 -hold        -Hold  YSB squats w/ weight shift R>L  TherEx (R) knee: 30 min  -Elliptical: 5 min, level 4  -TRX mini squats: 2x10  -Split squats knee to floor touch: x10 each side  -Step up 10\" box to march: 2x10  -6\" step downs: 3x10  -Sudanese squat: 2x10  -Tandem sit to stand: x20  -SL press: 100#, 3x10  -SL calf press: 50#, 3x10  -Lunge sliders: x10 each leg      TherEx (R) knee: 30 min  -Elliptical: 5 min, level 4  -PROM (R) knee extension: x5 min  -TRX mini squats: 2x10  -TRX full lunges: 2x10  -Step up 10\" box to march: 2x10  -6\" step downs: 3x10  -[place] squat: 2x10  -DL squats declined surface: 26# KB, 2x10  -Tandem sit to stand: x20  -SL press: 100#, 3x10  -SL calf press: 50#, 3x10  -Lunge sliders: x10 each leg  -lunge hold: 2x20 seconds    TherEx (R) knee: 25 min  -Elliptical: 5 min, level 4  -PROM (R) knee extension: x5 min  -TRX curtsy lunges: 2x10  -Step up 10\" box to march: 3x10  -6\" step downs: 3x10  -[place] squat: 2x10  -SL press: 125#, 3x10  -walking lunges: 2x50 feet TherEx (R) knee: 40 min  -Elliptical: 5 min, level 4  -reassessment  -PROM (R) knee extension: x5 min  -sit to stand staggered: BlkTB, 2x10  -stationary lunge at bar: 2x10  -6\" step downs: 3x10  -KB squats: 2x10, 13#  -[place] squat: 2x10  -SL press: 125#, 3x10   Neuro re-ed: 10 min  -SLB 3 way reach: 2x to fatigue   -SLB on airex: 2x1 min   -SL RDL: 2x10 1HHA       Neuro re-ed: 2 min  -SLB 3 way reach: 2x to fatigue-hold   -SLB on airex: 2x1 min -hold  -SL RDL: 2x10 1HHA Neuro re-ed: 10 min  -SLB on airex 3 way reach: 2x to fatigue  -SL RDL: 2x10 1HHA  -SL ball toss to rebounder: x30 Neuro re-ed: 10 min  -SLB on airex 3 way reach: 2x to fatigue  -SL RDL: 2x10 1HHA  -SL ball toss to rebounder: x30 Neuro re-ed: 15 min  -SLB on airex 3 way reach: 2x to fatigue  -SL RDL: 2x10 1HHA  -SL ball toss to rebounder: x30  -bosu squats: 3x10  -SL hip abd iso into wall: 10 sec x5 reps  -Y balance reaches: x10 each    HEP:   Access Code: [email or web address]  URL: [email or web address]  Date: 09/13/2024  Prepared by: Batsheva Beckwith    Exercises  - Sitting Knee Extension with Resistance  - 1 x daily - 3 x weekly - 3 sets - 10 reps  - Staggered Sit-to-Stand  - 1 x daily - 3 x weekly - 3 sets - 10 reps  - Lunge with Counter Support  - 1 x daily - 3 x weekly - 3 sets - 10 reps  - Forward Step Down  Touch with Heel  - 1 x daily - 3 x weekly - 3 sets - 10 reps  - Goblet Squat with Kettlebell  - 1 x daily - 3 x weekly - 3 sets - 10 reps  - Single Leg Lunge with Foot on Bench  - 1 x daily - 3 x weekly - 3 sets - 10 reps      Charges: TherEx: 3 units  Total Timed Treatment: 40 min  Total Treatment Time: 40 min

## 2024-09-13 ENCOUNTER — OFFICE VISIT (OUTPATIENT)
Dept: PHYSICAL THERAPY | Age: 44
End: 2024-09-13
Attending: ORTHOPAEDIC SURGERY
Payer: MEDICAID

## 2024-09-13 PROCEDURE — 97110 THERAPEUTIC EXERCISES: CPT | Performed by: PHYSICAL THERAPIST

## 2024-09-17 ENCOUNTER — HOSPITAL ENCOUNTER (OUTPATIENT)
Dept: MAMMOGRAPHY | Age: 44
Discharge: HOME | End: 2024-09-17
Attending: INTERNAL MEDICINE
Payer: MEDICAID

## 2024-09-17 DIAGNOSIS — Z12.31 SCREENING MAMMOGRAM, ENCOUNTER FOR: ICD-10-CM

## 2024-09-17 PROCEDURE — 77063 BREAST TOMOSYNTHESIS BI: CPT | Performed by: INTERNAL MEDICINE

## 2024-09-17 PROCEDURE — 77067 SCR MAMMO BI INCL CAD: CPT | Performed by: INTERNAL MEDICINE

## 2024-09-23 RX ORDER — TRAMADOL HYDROCHLORIDE 50 MG/1
TABLET ORAL
Qty: 20 TABLET | Refills: 1 | Status: SHIPPED | OUTPATIENT
Start: 2024-09-23

## 2024-09-23 RX ORDER — ONDANSETRON 4 MG/1
TABLET, FILM COATED ORAL
Qty: 8 TABLET | Refills: 0 | Status: SHIPPED | OUTPATIENT
Start: 2024-09-23

## 2024-09-23 NOTE — DISCHARGE INSTRUCTIONS
Shoulder Arthroscopy - Rotator Cuff Repair/ SCR  Post-Operative Guidelines    This document will help you plan for your post-operative recovery course following surgery. Please read and retain this information for future reference. Many of the questions you may have later can be answered by referring to this information.    DIET   Begin with clear liquids and light foods (jellos, soups, etc.).   Progress to your normal diet if you are not nauseated.     SHOULDER IMMOBILIZER  Your shoulder immobilizer with metal support should be worn at all times (except for hygiene) for the first 3-4 weeks.   Do not lift anything heavier than 2 lbs for the first 4 weeks.   We recommend you wear the shoulder immobilizer for sleeping. It is okay to remove it from time to time in a protected (indoor) environment.   Keep your elbow fully resting in the sling and in front of your body at all times to minimize stress on   the repair.   It is okay to adjust the immobilizer to your comfort.   A pillow behind the elbow may help when lying down to prevent the elbow from sliding backwards.     WOUND CARE   Keep the surgery site clean and dry as it heals.  Waterproof bandages will be covering the incisions.  You can keep these bandages on until your first post-op appointment, but they do need to stay clean and dry. If they become wet, dirty, or start to peel off, then replace with Nexcare waterproof band-aids.    Under the bandages is Dermabond, this is a surgical glue and tape that is used in conjunction with absorbable sutures to close the incision. Do not touch the Dermabond/steri strips, they will fall off on their own.  To shower, remove the shoulder immobilizer  Do not actively move the shoulder, just let it hang by your side or use the extra sling provided.  Helpful Hints for comfort:  Apply ice to your shoulder but keep the bandages dry (additionally can use the Game Ready Machine if available)  Typically, patients find it most  difficult to sleep lying in their bed immediately after shoulder surgery, and commonly choose to sleep in a recliner for a time or get a wedge for their bed to prop them up.  Use pillows to stay comfortable.        NORMAL SENSATIONS AND FINDINGS AFTER SURGERY  Pain  Swelling and warmth up to 2 weeks  Small amounts of bloody drainage for first few days  Numbness around the incision area  Bruising  Low grade temperature less than 101.0 for up to a week after surgery  Small amount of redness to the area where the sutures insert in the skin      PAIN MANAGEMENT  A nerve block is used at the time of surgery. This will wear off within 12-24 hours and it is not uncommon for patients to encounter more pain on the first or second day after surgery when swelling peaks.   Most patients will require some narcotic pain medication for a short period of time - this can be taken as per directions on the bottle.   If you are having problems with nausea and vomiting, contact the office to possibly have your medications changed.   Do not drive a car or operate machinery while taking the narcotic medication.   Please avoid alcohol use while taking narcotic pain medication.   Please avoid NSAIDs for the first 4 weeks (Advil, Aleve, Mobic, etc.) as they may slow down the healing process      NON-NARCOTIC PAIN MEDICATIONS   Extra-Strength Tylenol (Acetaminophen) 500mg Tablets (available over the counter)  Indication: pain, non-narcotic pain reliever  Use: Take 1-2 tablets every 6 hours.  Do not exceed 8 tablets in a 24-hour period.        NARCOTIC PAIN MEDICATIONS  OPIOIDS/NARCOTICS are prescription pain medications.  One of the following medications will be prescribed and should only be used if adequate pain control is not achieved with combination of ice, Aleve, and extra-strength Tylenol outlined above.   Side effects include constipation, nausea, drowsiness, dry mouth, itchiness.  Use a 0-10 pain scale to decide how much medication to  take:                                   Pain 0-4/10: No narcotics necessary                                  Pain 5-7/10: Take one tablet                                    Pain 8-10/10: Take two tablets   Oxycodone (Roxicodone) 5mg tablet  Indication: pain 5/10 or greater.  Narcotic pain medication.  Use: 1-2 tabs every 4-6 hours as needed for pain.  Do not exceed more than 10 tabs in any 24-hour time period unless otherwise directed.  Tramadol (Ultram) 50mg tablet  Indication: pain 5/10 or greater.  Non-opioid narcotic like medication  Use: 1-2 tabs every 6 hours as needed for pain.  Do not take more than 8 tablets in any 24-hour time period.      MEDICATIONS TO MANAGE SIDE EFFECTS  Narcotics may cause nausea and constipation. Bowel regimen is recommended.  Colace (Docusate) 100 mg - available OTC  Indication:  constipation, stool softener.  Take consistently while on narcotics.  Use:  Take 1 pill three times per day while you are taking narcotics.    Senokot (Senna) 8.6 mg - available OTC  Indication: constipation, stool laxative.  Take consistently while on narcotics.   Use: Take 2 pills at bedtime.  Increase to 2 pills twice daily if no bowel movement by post-surgery day 2.    Miralax (Polyethylene Glycol) 17.6 g - available OTC  Indication: constipation, stool laxative.  Take if above medications are not working.   Use: Take one dose at bedtime.  In addition to above with no bowel movement by post-surgery day 2.   Zofran (Ondansetron ODT) 4 mg- Prescription sent to pharmacy  Indication: nausea.  Take as needed.  Use: Take 1 pill AS NEEDED every 8 hours, do not exceed 3 pills in 24 hours      ACTIVITY   Keep sling on at all times, including sleeping.   NO driving until instructed otherwise by physician.   May return to sedentary work or school 2-3 days after surgery, if pain is tolerable.   No lifting more than 2 lbs for the first 4 weeks after surgery.       ICE THERAPY   Icing is very important in the initial  post-operative period and should begin immediately after surgery.   Use icing machine continuously or ice packs (if machine not obtained) for 30-45 minutes every 2 hours daily until your first post-operative visit. Care should be taken with icing to avoid frostbite to the skin.   You do not need to wake up in the middle of the night to change over the ice machine or icepacks unless you are uncomfortable.       EXERCISES   Begin exercises 24 hours after surgery (hand, wrist, and elbow movement) - images included in handout) unless otherwise instructed.   Formal physical therapy (PT) typically begins 1 to 2 days after surgery.       EMERGENCIES**   Contact Dr. Womack’s Team via [employer] or [phone] if any of the following are present:   Painful swelling or numbness (note that some swelling and numbness is normal).   Unrelenting pain.  Fever (over 101° - it is normal to have a low-grade fever for the first day or two following surgery)   Redness around incisions.   Color change in foot or ankle.   Continuous drainage or bleeding from incision (a small amount of drainage is expected).   Difficulty breathing.   Excessive nausea/vomiting   Calf pain.   If you have an emergency after office hours or on the weekend, contact the office at [phone] and you will be connected to our pager service. This will connect you with the Physician on call.   If you have an emergency that requires immediate attention proceed to the nearest emergency room.       FOLLOW-UP CARE/QUESTIONS   Your first post-operative appointment will be 7-14 days following surgery for wound evaluation and to answer any questions you have regarding the procedure.   Typically, the first physical therapy appointment is as soon as possible after surgery.  If you have any further questions please contact Dr. Womack’s team directly.        Frequently Asked Questions: Arthroscopic Shoulder Surgery    What is the rotator cuff?   The rotator cuff is a tendon that  connects the four rotator cuff muscles to the ball of the shoulder (humeral head). The cuff is a fibrous structure that covers the entire head of the humerus in normal circumstances. The supraspinatus, infraspinatus, teres minor and subscapularis are the muscle of the rotator cuff, and listed in typical order of injury.      Why does the rotator cuff tear? What is impingement?   Tears in the rotator cuff occur as a result of trauma or as a part of the aging process. Bones around the shoulder tend to thicken as we all get older. This bone thickening phenomena (acromial spurring) results in there being less room for the rotator cuff to function, especially with overhead type activities. Pain associated with thickening of the bone without a tear of the rotator cuff is called impingement.    Tears of the of rotator cuff result in there being a gap in the attachment of the rotator cuff tendon to the ball of the shoulder. This results in focal weakness of the shoulder and pain.      Will impingement and rotator cuff injuries heal themselves over time?   Unfortunately, these types of injuries do NOT heal. While rehabilitation and exercise may make your shoulder feel better, rotator cuff tears persist indefinitely without surgical intervention. In fact, these tears and spurs usually will increase in size over time.       What does rehabilitation do for this shoulder condition?   Rehabilitation to strengthen the intact rotator cuff and other muscles around the shoulder is often prescribed. Strengthening these muscles is a good way to help decrease pain and increase function by compensation. However, shoulder muscle strengthening does NOT fully return normal functions. This varies from person to person.       What is done to my shoulder during a rotator cuff or impingement surgery?   Impingement procedures are called subacromial decompressions. In this arthroscopic procedure, Dr. Womack removes the thickened bone and bursa  tissue that is interfering with your shoulder movement. In effect, he is creating space for your rotator cuff to move and function. A subacromial decompression is a routine party of most rotator cuff procedures. Alone, a subacromial decompression takes about 20-25 minutes or actual operative time.     During rotator cuff repair procedures, the rotator cuff is reattached to the ball of the shoulder using sutures. Small devices called anchors are inserted into the ball of the shoulder where the tear or detachment of the cuff has occurred. These devices are typically NOT metallic, and are very small (less than 4.5 mm in diameter). Once inserted into the humerus, the sutures are used to sew the rotator cuff back to its attachment site. The body then heals the injury, making the suture unnecessary over time. Rotator cuff repairs are typically arthroscopic (minimally invasive procedures) that take about an hour of actual operative time. In some cases, Dr. Womack may need to make a small incision to further enhance the repair and increase the likelihood of clinical success following your procedure. He will discuss these issues with your during your surgical consultation.      What type of anesthesia is administered?   Typically, a regional anesthetic is administered that numbs the operative limb. These blocks are done using ultrasound visualization for precision. These regional blocks are supplemented with sedation to make you comfortable during your surgery. You and the anesthesiologist will discuss these issues in detail immediately prior to your surgery.    How long do I wear a sling after surgery?   Patients undergoing typical rotator cuff surgery should wear the sling for 3-4 weeks.      How long is the recovery?   The typical recovery from rotator cuff repair surgery approximately is four months.   Patients will usually wear a sling for 3-4 weeks. Physical therapy begins around 1 to 2 days days after surgery. We will  let you know which time point is best for your individual recovery. Initially, we will limit your activities to allow for healing of your labrum and capsule. After six weeks, Dr. Womack encourages you to get back to your normal activity and exercise schedule.     Sample schedule of activities following shoulder surgery:  Weeks 1-2: No excessive sweating. Walking OK. Take it easy  Weeks 2-6: Exercise bike, walking a treadmill OK.  Week 6+: Running, elliptical, light weight work OK. Get moving.     Note: Operative limb exercises are based on PT limitations at any given time.        What are the risks of shoulder surgery?   While very uncommon, infections do occur and are typically associated with poor wound healing. As such, we recommend keeping these wounds dry for at least 7-10 days after surgery. Please do not use ointments or other compounds on these wounds until instructed to do so by the staff.  Again, smoking interferes with wound healing, so discontinuing smoking 2 weeks prior and following surgery is recommended.   Blood clots (DVT, deep vein thrombosis) occur rarely following all types of surgery. Your best bet in decreasing likelihood of a clot is to GET UP and MOVING following surgery. Moving your feet and ankles, ambulating, ranging your knee, doing leg lifts etc. all contribute to keeping the blood in your legs circulating. This in turn helps to prevent clotting. If you feel pain in your calf area, or note swelling there - immediately notify the office staff. A quick and painless test (ultrasound) can be arranged to see if you have a DVT. Again, these issues are rare, but if you do experience a clot, you will need to take a blood thinner (Warfarin, Coumadin) until the clot disappears.   There are many nerves around the shoulder. Fortunately, the majority of these nerves do NOT exist in the surgical field during a typical rotator cuff procedure. Nevertheless, though very uncommon, temporary nerve  dysfunction (muscle weakness, tingling, numbness) can occur following these procedures. These injuries are typically transient.      Is there anything else that I need to do following surgery?  Rotator cuff repair patients should plan to return to the office at 2 weeks, 6 weeks and 3 months following surgery. These are quick visits designed to go over your progress and address issues germane to your recovery. The first postoperative appointment should be made when a date for surgery is confirmed.    Please note that Dr. Womack expects that you will have full range of motion following these procedures. Working diligently with your therapist will help ensure that you derive maximum clinical benefit from your shoulder procedure.

## 2024-09-24 ENCOUNTER — HOSPITAL ENCOUNTER (OUTPATIENT)
Facility: HOSPITAL | Age: 44
Setting detail: HOSPITAL OUTPATIENT SURGERY
Discharge: HOME | End: 2024-09-24
Attending: ORTHOPAEDIC SURGERY | Admitting: ORTHOPAEDIC SURGERY
Payer: MEDICAID

## 2024-09-24 ENCOUNTER — ANESTHESIA EVENT (OUTPATIENT)
Dept: SURGERY | Facility: HOSPITAL | Age: 44
End: 2024-09-24
Payer: MEDICAID

## 2024-09-24 ENCOUNTER — ANESTHESIA (OUTPATIENT)
Dept: SURGERY | Facility: HOSPITAL | Age: 44
End: 2024-09-24
Payer: MEDICAID

## 2024-09-24 VITALS
RESPIRATION RATE: 20 BRPM | SYSTOLIC BLOOD PRESSURE: 121 MMHG | HEIGHT: 65 IN | DIASTOLIC BLOOD PRESSURE: 78 MMHG | WEIGHT: 216 LBS | HEART RATE: 95 BPM | OXYGEN SATURATION: 100 % | BODY MASS INDEX: 35.99 KG/M2 | TEMPERATURE: 98 F

## 2024-09-24 DIAGNOSIS — Z48.89 AFTERCARE FOLLOWING SURGERY: ICD-10-CM

## 2024-09-24 DIAGNOSIS — Z01.818 PRE-OP TESTING: Primary | ICD-10-CM

## 2024-09-24 LAB — B-HCG UR QL: NEGATIVE

## 2024-09-24 PROCEDURE — 0LM24ZZ REATTACHMENT OF LEFT SHOULDER TENDON, PERCUTANEOUS ENDOSCOPIC APPROACH: ICD-10-PCS | Performed by: ORTHOPAEDIC SURGERY

## 2024-09-24 PROCEDURE — 0RNK4ZZ RELEASE LEFT SHOULDER JOINT, PERCUTANEOUS ENDOSCOPIC APPROACH: ICD-10-PCS | Performed by: ORTHOPAEDIC SURGERY

## 2024-09-24 PROCEDURE — 0RHK44Z INSERTION OF INTERNAL FIXATION DEVICE INTO LEFT SHOULDER JOINT, PERCUTANEOUS ENDOSCOPIC APPROACH: ICD-10-PCS | Performed by: ORTHOPAEDIC SURGERY

## 2024-09-24 PROCEDURE — 81025 URINE PREGNANCY TEST: CPT

## 2024-09-24 PROCEDURE — 76942 ECHO GUIDE FOR BIOPSY: CPT | Performed by: ANESTHESIOLOGY

## 2024-09-24 PROCEDURE — 0LS44ZZ REPOSITION LEFT UPPER ARM TENDON, PERCUTANEOUS ENDOSCOPIC APPROACH: ICD-10-PCS | Performed by: ORTHOPAEDIC SURGERY

## 2024-09-24 DEVICE — PEEK SWIVELOCK C,4.75X19.1MM
Type: IMPLANTABLE DEVICE | Site: SHOULDER | Status: FUNCTIONAL
Brand: ARTHREX®

## 2024-09-24 DEVICE — ALPHAVENT SUTURE ANCHOR 4.75MM PEEK, SUTURE ANCHOR WITH 2 STRANDS 1.4MM XBRAID TT SUTURE TAPE
Type: IMPLANTABLE DEVICE | Status: FUNCTIONAL
Brand: ALPHAVENT

## 2024-09-24 RX ORDER — HYDROMORPHONE HYDROCHLORIDE 1 MG/ML
0.4 INJECTION, SOLUTION INTRAMUSCULAR; INTRAVENOUS; SUBCUTANEOUS EVERY 5 MIN PRN
Status: DISCONTINUED | OUTPATIENT
Start: 2024-09-24 | End: 2024-09-24

## 2024-09-24 RX ORDER — PHENYLEPHRINE HCL 10 MG/ML
VIAL (ML) INJECTION AS NEEDED
Status: DISCONTINUED | OUTPATIENT
Start: 2024-09-24 | End: 2024-09-24 | Stop reason: SURG

## 2024-09-24 RX ORDER — HYDROMORPHONE HYDROCHLORIDE 1 MG/ML
0.6 INJECTION, SOLUTION INTRAMUSCULAR; INTRAVENOUS; SUBCUTANEOUS EVERY 5 MIN PRN
Status: DISCONTINUED | OUTPATIENT
Start: 2024-09-24 | End: 2024-09-24

## 2024-09-24 RX ORDER — PROCHLORPERAZINE EDISYLATE 5 MG/ML
5 INJECTION INTRAMUSCULAR; INTRAVENOUS EVERY 8 HOURS PRN
Status: DISCONTINUED | OUTPATIENT
Start: 2024-09-24 | End: 2024-09-24

## 2024-09-24 RX ORDER — ROCURONIUM BROMIDE 10 MG/ML
INJECTION, SOLUTION INTRAVENOUS AS NEEDED
Status: DISCONTINUED | OUTPATIENT
Start: 2024-09-24 | End: 2024-09-24 | Stop reason: SURG

## 2024-09-24 RX ORDER — ACETAMINOPHEN 500 MG
1000 TABLET ORAL ONCE AS NEEDED
Status: DISCONTINUED | OUTPATIENT
Start: 2024-09-24 | End: 2024-09-24

## 2024-09-24 RX ORDER — ONDANSETRON 2 MG/ML
INJECTION INTRAMUSCULAR; INTRAVENOUS AS NEEDED
Status: DISCONTINUED | OUTPATIENT
Start: 2024-09-24 | End: 2024-09-24 | Stop reason: SURG

## 2024-09-24 RX ORDER — SODIUM CHLORIDE, SODIUM LACTATE, POTASSIUM CHLORIDE, CALCIUM CHLORIDE 600; 310; 30; 20 MG/100ML; MG/100ML; MG/100ML; MG/100ML
INJECTION, SOLUTION INTRAVENOUS CONTINUOUS
Status: DISCONTINUED | OUTPATIENT
Start: 2024-09-24 | End: 2024-09-24

## 2024-09-24 RX ORDER — HYDROMORPHONE HYDROCHLORIDE 1 MG/ML
0.2 INJECTION, SOLUTION INTRAMUSCULAR; INTRAVENOUS; SUBCUTANEOUS EVERY 5 MIN PRN
Status: DISCONTINUED | OUTPATIENT
Start: 2024-09-24 | End: 2024-09-24

## 2024-09-24 RX ORDER — SCOLOPAMINE TRANSDERMAL SYSTEM 1 MG/1
1 PATCH, EXTENDED RELEASE TRANSDERMAL ONCE
Status: DISCONTINUED | OUTPATIENT
Start: 2024-09-24 | End: 2024-09-24

## 2024-09-24 RX ORDER — NALOXONE HYDROCHLORIDE 0.4 MG/ML
0.08 INJECTION, SOLUTION INTRAMUSCULAR; INTRAVENOUS; SUBCUTANEOUS AS NEEDED
Status: DISCONTINUED | OUTPATIENT
Start: 2024-09-24 | End: 2024-09-24

## 2024-09-24 RX ORDER — HYDROCODONE BITARTRATE AND ACETAMINOPHEN 10; 325 MG/1; MG/1
2 TABLET ORAL ONCE AS NEEDED
Status: DISCONTINUED | OUTPATIENT
Start: 2024-09-24 | End: 2024-09-24

## 2024-09-24 RX ORDER — HYDROCODONE BITARTRATE AND ACETAMINOPHEN 10; 325 MG/1; MG/1
1 TABLET ORAL ONCE AS NEEDED
Status: DISCONTINUED | OUTPATIENT
Start: 2024-09-24 | End: 2024-09-24

## 2024-09-24 RX ORDER — DEXAMETHASONE SODIUM PHOSPHATE 10 MG/ML
INJECTION, SOLUTION INTRAMUSCULAR; INTRAVENOUS AS NEEDED
Status: DISCONTINUED | OUTPATIENT
Start: 2024-09-24 | End: 2024-09-24 | Stop reason: SURG

## 2024-09-24 RX ORDER — ROPIVACAINE HYDROCHLORIDE 5 MG/ML
INJECTION, SOLUTION EPIDURAL; INFILTRATION; PERINEURAL AS NEEDED
Status: DISCONTINUED | OUTPATIENT
Start: 2024-09-24 | End: 2024-09-24 | Stop reason: SURG

## 2024-09-24 RX ORDER — ACETAMINOPHEN 500 MG
1000 TABLET ORAL ONCE
Status: DISCONTINUED | OUTPATIENT
Start: 2024-09-24 | End: 2024-09-24 | Stop reason: HOSPADM

## 2024-09-24 RX ORDER — GLYCOPYRROLATE 0.2 MG/ML
INJECTION, SOLUTION INTRAMUSCULAR; INTRAVENOUS AS NEEDED
Status: DISCONTINUED | OUTPATIENT
Start: 2024-09-24 | End: 2024-09-24 | Stop reason: SURG

## 2024-09-24 RX ORDER — LIDOCAINE HYDROCHLORIDE 10 MG/ML
INJECTION, SOLUTION EPIDURAL; INFILTRATION; INTRACAUDAL; PERINEURAL AS NEEDED
Status: DISCONTINUED | OUTPATIENT
Start: 2024-09-24 | End: 2024-09-24 | Stop reason: SURG

## 2024-09-24 RX ORDER — DEXAMETHASONE SODIUM PHOSPHATE 4 MG/ML
VIAL (ML) INJECTION AS NEEDED
Status: DISCONTINUED | OUTPATIENT
Start: 2024-09-24 | End: 2024-09-24 | Stop reason: SURG

## 2024-09-24 RX ORDER — ONDANSETRON 2 MG/ML
4 INJECTION INTRAMUSCULAR; INTRAVENOUS EVERY 6 HOURS PRN
Status: DISCONTINUED | OUTPATIENT
Start: 2024-09-24 | End: 2024-09-24

## 2024-09-24 RX ORDER — NEOSTIGMINE METHYLSULFATE 1 MG/ML
INJECTION INTRAVENOUS AS NEEDED
Status: DISCONTINUED | OUTPATIENT
Start: 2024-09-24 | End: 2024-09-24 | Stop reason: SURG

## 2024-09-24 RX ORDER — MIDAZOLAM HYDROCHLORIDE 1 MG/ML
INJECTION INTRAMUSCULAR; INTRAVENOUS AS NEEDED
Status: DISCONTINUED | OUTPATIENT
Start: 2024-09-24 | End: 2024-09-24 | Stop reason: SURG

## 2024-09-24 RX ORDER — TRANEXAMIC ACID 10 MG/ML
1000 INJECTION, SOLUTION INTRAVENOUS ONCE
Status: COMPLETED | OUTPATIENT
Start: 2024-09-24 | End: 2024-09-24

## 2024-09-24 RX ADMIN — PHENYLEPHRINE HCL 100 MCG: 10 MG/ML VIAL (ML) INJECTION at 13:24:00

## 2024-09-24 RX ADMIN — SODIUM CHLORIDE, SODIUM LACTATE, POTASSIUM CHLORIDE, CALCIUM CHLORIDE: 600; 310; 30; 20 INJECTION, SOLUTION INTRAVENOUS at 12:19:00

## 2024-09-24 RX ADMIN — ROCURONIUM BROMIDE 50 MG: 10 INJECTION, SOLUTION INTRAVENOUS at 12:44:00

## 2024-09-24 RX ADMIN — MIDAZOLAM HYDROCHLORIDE 2 MG: 1 INJECTION INTRAMUSCULAR; INTRAVENOUS at 12:21:00

## 2024-09-24 RX ADMIN — NEOSTIGMINE METHYLSULFATE 5 MG: 1 INJECTION INTRAVENOUS at 13:51:00

## 2024-09-24 RX ADMIN — LIDOCAINE HYDROCHLORIDE 50 MG: 10 INJECTION, SOLUTION EPIDURAL; INFILTRATION; INTRACAUDAL; PERINEURAL at 12:31:00

## 2024-09-24 RX ADMIN — DEXAMETHASONE SODIUM PHOSPHATE 2 MG: 10 INJECTION, SOLUTION INTRAMUSCULAR; INTRAVENOUS at 12:29:00

## 2024-09-24 RX ADMIN — ROCURONIUM BROMIDE 50 MG: 10 INJECTION, SOLUTION INTRAVENOUS at 12:31:00

## 2024-09-24 RX ADMIN — DEXAMETHASONE SODIUM PHOSPHATE 8 MG: 4 MG/ML VIAL (ML) INJECTION at 12:44:00

## 2024-09-24 RX ADMIN — GLYCOPYRROLATE 0.6 MG: 0.2 INJECTION, SOLUTION INTRAMUSCULAR; INTRAVENOUS at 13:51:00

## 2024-09-24 RX ADMIN — ROPIVACAINE HYDROCHLORIDE 15 ML: 5 INJECTION, SOLUTION EPIDURAL; INFILTRATION; PERINEURAL at 12:29:00

## 2024-09-24 RX ADMIN — SODIUM CHLORIDE, SODIUM LACTATE, POTASSIUM CHLORIDE, CALCIUM CHLORIDE: 600; 310; 30; 20 INJECTION, SOLUTION INTRAVENOUS at 13:54:00

## 2024-09-24 RX ADMIN — TRANEXAMIC ACID 1000 MG: 10 INJECTION, SOLUTION INTRAVENOUS at 12:40:00

## 2024-09-24 RX ADMIN — ONDANSETRON 4 MG: 2 INJECTION INTRAMUSCULAR; INTRAVENOUS at 13:51:00

## 2024-09-24 NOTE — H&P
The below referenced H&P was reviewed by Beverly Womack MD, the patient was examined and no significant changes have occurred in the patient's condition since the H&P was performed.  I discussed with the patient and/or legal representative the potential benefits, risks and side effects of this procedure; the likelihood of the patient achieving goals; and potential problems that might occur during recuperation.  I discussed reasonable alternatives to the procedure, including risks, benefits and side effects related to the alternatives and risks related to not receiving this procedure.  We will proceed with procedure as planned.           Orthopaedic Surgery  [address] [place] [id number]  [phone]     PRE SURGICAL - HISTORY AND PHYSICAL EXAMINATION     Name: Sandee Rodriguez   MRN: [id number]     CC: Left shoulder pain and weakness in the setting of rotator cuff pathology.     HPI:   Sandee Eddy is a very pleasant 44 year old right-hand dominant female who presents today for evaluation of MRI scan of the shoulder and discussion regarding definitive management plan.     To summarize: left shoulder pain onset December 30, 2023 after skiing accident. She reports difficulty lifting the arm and sleeping. Subsequently, she got an MRI done showing a rotator cuff tear. Pain is 4/10.     She has completed extensive conservative treatment including physical therapy greater than 3 months as well as activity modifications are identified for medications without the ability to make a full clinical recovery and persistent pain/weakness.    PMH:   Past Medical History:    Hx of motion sickness    Obesity (BMI 35.0-39.9 without comorbidity)    HERRERA (obstructive sleep apnea)    PONV (postoperative nausea and vomiting)    Sleep apnea       PAST SURGICAL HX:  Past Surgical History:   Procedure Laterality Date    Arthroscopy of joint unlisted Right     KNEE    Other surgical history      RT LEG SURGERY- PINS  PLACED AND REMOVED 2-3 times       FAMILY HX:  Family History   Problem Relation Age of Onset    Heart Disorder Father        ALLERGIES:  Ampicillin and Penicillins    MEDICATIONS:   Current Outpatient Medications   Medication Sig Dispense Refill    Acetaminophen 500 MG Oral Cap Take 2 capsules (1,000 mg total) by mouth every 6 (six) hours for 14 days. 50 capsule 1    traMADol 50 MG Oral Tab Please take 1 tablet every 6 hours as needed for pain. Max of 4 tablets per day. Collaborating - Dr. Beverly Womack. 20 tablet 1    Sennosides-Docusate Sodium 8.6-50 MG Oral Cap Take 1 capsule by mouth daily as needed. 30 capsule 1    ondansetron (ZOFRAN) 4 mg tablet Take 1 tablet by mouth every 8 hours as needed for nausea. 8 tablet 0       ROS: A comprehensive 14 point review of systems was performed and was negative aside from the aforementioned per history of present illness.    SOCIAL HX:  Social History     Tobacco Use    Smoking status: Never    Smokeless tobacco: Never   Substance Use Topics    Alcohol use: Not Currently     Comment: OCC       PE:   Vitals:    09/06/24 1644 09/24/24 1105   BP:  139/83   BP Location:  Left arm   Pulse:  69   Resp:  18   Temp:  98.1 °F (36.7 °C)   TempSrc:  Temporal   SpO2:  100%   Weight: 216 lb (98 kg) 216 lb (98 kg)   Height: 5' 5\" (1.651 m)      Estimated body mass index is 35.94 kg/m² as calculated from the following:    Height as of this encounter: 5' 5\" (1.651 m).    Weight as of this encounter: 216 lb (98 kg).    Physical Exam  Constitutional:       Appearance: Normal appearance.   HENT:      Head: Normocephalic and atraumatic.   Eyes:      Extraocular Movements: Extraocular movements intact.   Neck:      Musculoskeletal: Normal range of motion and neck supple.   Cardiovascular:      Pulses: Normal pulses.   Pulmonary:      Effort: Pulmonary effort is normal. No respiratory distress.   Abdominal:      General: There is no distension.   Skin:     General: Skin is warm.      Capillary  Refill: Capillary refill takes less than 2 seconds.      Findings: No bruising.   Neurological:      General: No focal deficit present.      Mental Status: Alert.   Psychiatric:         Mood and Affect: Mood normal.     Examination of the left shoulder demonstrates:     Skin is intact, warm and dry.   Cervical:  Full ROM  Spurling's  Negative    Deformity:   none  Atrophy:   none    Scapular winging: Negative    Palpation:     AC Joint   Negative  Biceps Tendon  Negative  Greater Tuberosity Negative    ROM:   Forward Flexion:  150 degrees  Abduction:   full and symmetric  External Rotation:  full and symmetric  Internal Rotation:  full and symmetric    Rotator Cuff Strength:   Supraspinatus:   4/5  Subscapularis:   5/5  Infraspinatus/Teres: 5/5    Provocative Tests:   Sood:   Positive  Speed's:   Positive  [place]'s:   Positive  Lift-off:    Negative  Apprehension:  Negative  Sulcus Sign:   Negative    Neurovascular Upper Extremity (Bilateral)  Motor:    5/5 EPL, Finger Abduction, , Pinch, Deltoid  Sensation:   intact to light touch median, ulnar, radial and axillary nerve  Circulation:   Normal, 2+ radial pulse    The contralateral upper extremity is without limitation in range of motion or strength, no positive provocative maneuvers.     Radiographic Examination/Diagnostics:    MRI of the shoulder personally viewed, independently interpreted and radiology report was reviewed.    MRI SHOULDER, LEFT (CPT=73221)     Result Date: 4/29/2024  PROCEDURE:      MRI SHOULDER, LEFT (CPT=73221)  COMPARISON:           None.  INDICATIONS:      S46.002A Injury of left rotator cuff, initial encounter  TECHNIQUE: A variety of imaging planes and parameters were utilized for visualization of suspected pathology.  Images were performed without contrast.   FINDINGS: Evaluation is slightly degraded by patient-related motion artifact.             ROTATOR CUFF:              * Small complete or near complete tear involving the  anterior leading edge of the supraspinatus tendon at the greater tuberosity insertion.  Supraspinatus tendinosis. * Infraspinatus tendinosis without tear.  * Subscapularis and teres minor are intact. * Rotator cuff muscle bulk normal. * Small subacromial/subdeltoid bursal effusion.  A.C. JOINT:              * Type I acromion with lateral downsloping narrowing supraspinatus outlet. * Minor degenerative changes of AC joint.  GLENOHUMERAL JOINT, LABRUM, BICEPS * Glenohumeral joint intact.  Small shoulder joint effusion * Glenoid labrum intact.  A sublabral foramen at 2 o'clock position of labrum. * Long head biceps tendon intact.  OTHER:           Resorptive cystic changes and minimal subcortical edema posterolateral humeral head related to impingement.         CONCLUSION:       1. Small complete or near complete tear anterior leading edge of supraspinatus tendon at greater tuberosity insertion.   2. Subacromial/subdeltoid bursitis.   3. Lateral downsloping type 1 acromion narrows supraspinatus outlet.      Dictated by (CST): Rodriguez Dupont MD on 4/29/2024 at 4:55 PM     Finalized by (CST): Rodriguez uDpont MD on 4/29/2024 at 5:01 PM          IMPRESSION: Sandee Eddy is a 44 year old female with Left shoulder full thickness supraspinatus tendon tear, subacromial impingement, biceps tendinitis, and SLAP tear sustained December 30, 2023 after skiing accident.     The patient has failed an appropriate course of nonsurgical conservative management and is a candidate for arthroscopic rotator cuff repair, subacromial decompression, and biceps tenodesis.    PLAN:   We had a detailed discussion outlining the etiology, anatomy, pathophysiology, and natural history of rotator cuff pathology of the shoulder. Imaging was reviewed in detail and correlated to a 3-dimensional model of the shoulder.     I had a lengthy discussion with Sandee about the diagnosis and options, both surgical and nonsurgical. I have recommended that we  proceed with arthroscopic rotator cuff repair and likely biceps tenodesis as we agree surgical intervention would likely offer the best opportunity for symptomatic relief and functional recovery. I used imaging studies and a 3-dimensional model to outline her pathology, as well as general surgical principles. We reviewed the risks associated with shoulder arthroscopy.   In particular we discussed risks that include, but are not limited to infection, blood loss, potential transient or permanent injury to nerves or blood vessels, joint stiffness, persistent pain, need for future operation, failure of healing, wound complications, failure of therapeutic intervention, risk of re-injury, fixation failure, deep vein thrombosis and pulmonary embolism. We discussed the proposed rehabilitation timeline as well as expected postoperative restrictions.     Most post-surgical patients after rotator cuff repair utilize a shoulder immobilizer/sling for approximately ~4 weeks.  Physical therapy is initiated immediately postsurgically with initial passive range of motion, followed by active assisted range of motion, and ultimately active range of motion after the 6 to 8-week stu.  After the 3-month stu postsurgically the restrictions are lifted and continued strengthening is recommended.    Sandee voiced a good understanding of treatment options, risks and benefits, postoperative instructions, rehabilitation timeline, and restrictions. She was given the opportunity to ask questions, which were all answered to the best of my ability and to her satisfaction. Sandee will work with my office to arrange a time for surgery and obtain any medical clearance information necessary. My pre-operative information packet, which details the process and answers many FAQ's will be provided. She was encouraged to call the office with any further questions or concerns.    I spent 60 minutes in preparation to see the patient, counseling/education of  relevant pathology, discussing imaging results, surgical counseling, DME fitting, and care coordination.      FOLLOW-UP:   Post-Operative Visit, POD 6 with Sincer YULIA Rajan MD  Knee, Shoulder, & Elbow Surgery / Sports Medicine Specialist  Orthopaedic Surgery  [address] [place] [address] [id number] [place] [address].[email or web address]  [email or web address]  t: [phone]  o: [phone]  f: [phone]    This note was dictated using Dragon software.  While it was briefly proofread prior to completion, some grammatical, spelling, and word choice errors due to dictation may still occur.

## 2024-09-24 NOTE — ANESTHESIA POSTPROCEDURE EVALUATION
[facility] Patient Status:  Hospital Outpatient Surgery   Age/Gender 44 year old female MRN [id number]   Location [facility] POST ANESTHESIA CARE UNIT Attending Beverly Womack MD   Hosp Day # 0 PCP Kasey Calderon MD       Anesthesia Post-op Note    Left Shoulder Arthroscopy Rotator Cuff Repair Arthroscopic Biceps Tenodesis Subacromial Decompression    Procedure Summary       Date: 09/24/24 Room / Location:  MAIN OR 12 / [facility] MAIN OR    Anesthesia Start: 1219 Anesthesia Stop: 1411    Procedure: Left Shoulder Arthroscopy Rotator Cuff Repair Arthroscopic Biceps Tenodesis Subacromial Decompression (Left: Shoulder) Diagnosis:       Traumatic complete tear of left rotator cuff, initial encounter      (Traumatic complete tear of left rotator cuff, initial encounter [S46.012A])    Surgeons: Beverly Womack MD Anesthesiologist: Sg Doll MD    Anesthesia Type: general ASA Status: 2            Anesthesia Type: general    Vitals Value Taken Time   /74 09/24/24 1440   Temp 97.6 °F (36.4 °C) 09/24/24 1440   Pulse 62 09/24/24 1447   Resp 20 09/24/24 1447   SpO2 100 % 09/24/24 1447   Vitals shown include unfiled device data.    Patient Location: PACU    Anesthesia Type: general    Airway Patency: patent and extubated    Postop Pain Control: adequate    Mental Status: mildly sedated but able to meaningfully participate in the post-anesthesia evaluation    Nausea/Vomiting: none    Cardiopulmonary/Hydration status: stable euvolemic    Complications: no apparent anesthesia related complications    Postop vital signs: stable    Comments: Report given to RN    Dental Exam: Unchanged from Preop    Patient to be discharged from PACU when criteria met.

## 2024-09-24 NOTE — ANESTHESIA PROCEDURE NOTES
Airway  Date/Time: 9/24/2024 12:32 PM  Urgency: elective    Airway not difficult    General Information and Staff    Patient location during procedure: OR  Anesthesiologist: Sg Doll MD  Performed: anesthesiologist   Performed by: Sg Doll MD  Authorized by: Sg Doll MD      Indications and Patient Condition  Indications for airway management: anesthesia and airway protection  Spontaneous Ventilation: absent  Sedation level: deep  Preoxygenated: yes  Patient position: sniffing  Mask difficulty assessment: 0 - not attempted    Final Airway Details  Final airway type: endotracheal airway      Successful airway: ETT  Cuffed: yes   Successful intubation technique: direct laryngoscopy  Facilitating devices/methods: intubating stylet  Endotracheal tube insertion site: oral  Blade: Wiley  Blade size: #3  ETT size (mm): 7.0    Cormack-Lehane Classification: grade IIB - view of arytenoids or posterior of glottis only  Placement verified by: capnometry   Cuff volume (mL): 9  Measured from: lips  ETT to lips (cm): 22  Number of attempts at approach: 1

## 2024-09-24 NOTE — ANESTHESIA PREPROCEDURE EVALUATION
PRE-OP EVALUATION    Patient Name: Sandee Eddy    Admit Diagnosis: Traumatic complete tear of left rotator cuff, initial encounter [S46.012A]    Pre-op Diagnosis: Traumatic complete tear of left rotator cuff, initial encounter [S46.012A]    Left Shoulder Arthroscopy Rotator Cuff Repair Arthroscopic Biceps Tenodesis Subacromial Decompression    Anesthesia Procedure: Left Shoulder Arthroscopy Rotator Cuff Repair Arthroscopic Biceps Tenodesis Subacromial Decompression (Left)    Surgeons and Role:     * Beverly Womack MD - Primary    Pre-op vitals reviewed.  Temp: 98.1 °F (36.7 °C)  Pulse: 69  Resp: 18  BP: 139/83  SpO2: 100 %  Body mass index is 35.94 kg/m².    Current medications reviewed.  Hospital Medications:   acetaminophen (Tylenol Extra Strength) tab 1,000 mg  1,000 mg Oral Once    scopolamine (Transderm-Scop) 1 MG/3DAYS patch 1 patch  1 patch Transdermal Once    lactated ringers infusion   Intravenous Continuous    tranexamic acid in sodium chloride 0.7% (Cyklokapron) 1000 mg/100mL infusion premix 1,000 mg  1,000 mg Intravenous Once    ceFAZolin (Ancef) 2g in 10mL IV syringe premix  2 g Intravenous Once       Outpatient Medications:     Medications Prior to Admission   Medication Sig Dispense Refill Last Dose    Phentermine HCl 37.5 MG Oral Tab Take 1 tablet (37.5 mg total) by mouth before breakfast. (Patient taking differently: Take 1 tablet (37.5 mg total) by mouth before breakfast. WILL HOLD STARTING 9/14) 30 tablet 2 Past Month    vitamin E 200 UNITS Oral Cap Take 1 capsule (200 Units total) by mouth daily.   Past Month    Cholecalciferol (VITAMIN D3) 25 MCG (1000 UT) Oral Cap Take 1 tablet by mouth once a week. 50,000   Past Month    multivitamin Oral Chew Tab Chew 1 tablet by mouth daily.   Past Month       Allergies: Ampicillin and Penicillins      Anesthesia Evaluation    Patient summary reviewed.    Anesthetic Complications  (+) history of anesthetic complications  History of: PONV        GI/Hepatic/Renal    Negative GI/hepatic/renal ROS.                             Cardiovascular      ECG reviewed.  Exercise tolerance: good     MET: >4    (+) obesity         (-) past MI           (-) dysrhythmias   (-) CHF  (-) angina              Endo/Other               (-) anemia                   Pulmonary      (-) asthma  (-) COPD            (+) sleep apnea       Neuro/Psych                 (+) neuromuscular disease                     Past Surgical History:   Procedure Laterality Date    Arthroscopy of joint unlisted Right     KNEE    Other surgical history      RT LEG SURGERY- PINS PLACED AND REMOVED 2-3 times     Social History     Socioeconomic History    Marital status:    Tobacco Use    Smoking status: Never    Smokeless tobacco: Never   Vaping Use    Vaping status: Never Used   Substance and Sexual Activity    Alcohol use: Not Currently     Comment: OCC    Drug use: No     History   Drug Use No     Available pre-op labs reviewed.  Lab Results   Component Value Date    WBC 7.8 08/23/2024    RBC 4.43 08/23/2024    HGB 13.0 08/23/2024    HCT 39.9 08/23/2024    MCV 90.1 08/23/2024    MCH 29.3 08/23/2024    MCHC 32.6 08/23/2024    RDW 13.3 08/23/2024    .0 08/23/2024     Lab Results   Component Value Date     09/11/2024    K 4.3 09/11/2024     09/11/2024    CO2 26.0 09/11/2024    BUN 9 08/23/2024    CREATSERUM 0.75 08/23/2024    GLU 93 08/23/2024    CA 9.1 08/23/2024            Airway      Mallampati: II  Mouth opening: >3 FB    Neck ROM: full Cardiovascular    Cardiovascular exam normal.         Dental    Dentition appears grossly intact         Pulmonary    Pulmonary exam normal.                 Other findings              ASA: 2   Plan: general  NPO status verified and     Post-procedure pain management plan discussed with surgeon and patient.  Surgeon requests: regional block    Plan/risks discussed with: patient  Use of blood product(s) discussed with: patient    Consented to  blood products.          Present on Admission:  **None**

## 2024-09-24 NOTE — ANESTHESIA PROCEDURE NOTES
Regional Block    Date/Time: 9/24/2024 12:25 PM    Performed by: Sg Doll MD  Authorized by: Sg Doll MD      General Information and Staff    Start Time:  9/24/2024 12:25 PM  End Time:  9/24/2024 12:29 PM  Anesthesiologist:  Sg Doll MD  Performed by:  Anesthesiologist  Patient Location:  OR    Block Placement: Pre Induction  Site Identification: real time ultrasound guided and image stored and retrievable    Block site/laterality marked before start: site marked  Reason for Block: at surgeon's request and post-op pain management    Preanesthetic Checklist: 2 patient identifers, IV checked, site marked, risks and benefits discussed, monitors and equipment checked, pre-op evaluation, timeout performed, anesthesia consent, sterile technique used, no prohibitive neurological deficits and no local skin infection at insertion site      Procedure Details    Patient Position:  Sitting  Prep: ChloraPrep    Monitoring:  Cardiac monitor, continuous pulse ox, blood pressure cuff and heart rate  Block Type:  Interscalene  Laterality:  Left  Injection Technique:  Single-shot    Needle    Needle Type:  Short-bevel and echogenic  Needle Gauge:  22 G  Needle Length:  110 mm  Needle Localization:  Ultrasound guidance  Reason for Ultrasound Use: appropriate spread of the medication was noted in real time and no ultrasound evidence of intravascular and/or intraneural injection            Assessment    Injection Assessment:  Good spread noted, negative resistance, negative aspiration for heme, incremental injection and low pressure  Heart Rate Change: No    - Patient tolerated block procedure well without evidence of immediate block related complications.     Medications  9/24/2024 12:25 PM      Additional Comments    Medication:  Ropivacaine 0.5% 15mL + 2 mg Decadron PF

## 2024-09-24 NOTE — OPERATIVE REPORT
OPERATIVE RECORD  ATTENDING SURGEON: JOHN CARDOSO MD  ASSISTANT(S): Esteban Ríos (AJ)  STATEMENT OF MEDICAL NECESSITY  The aid of assistant Esteban Ríos (AJ) was needed for patient positioning, preparation, drape, retraction,  wound closure, dressing application and critical portions of the procedure.   PRELIMINARY DIAGNOSIS:  1.  Left Shoulder Full Thickness Rotator Cuff Tear  2.  Left Shoulder Biceps Tendinitis  3.  Left Shoulder Subacromial Impingement  4.  Left Shoulder Labral Tear    POSTOPERATIVE DIAGNOSIS:  1.  Left Shoulder Full Thickness Rotator Cuff Tear  2.  Left Shoulder Biceps Tendinitis  3.  Left Shoulder Subacromial Impingement  4.  Left Shoulder Labral Tear    THE OPERATION:  1.  Left Shoulder Arthroscopic Rotator Cuff Repair (70869)  2.  Left Shoulder Arthroscopic Biceps Tenodesis (34899)   3.  Left Shoulder Arthroscopic Subacromial Decompression (28627)  4.  Left Shoulder Arthroscopic Extensive Debridement (Glenoid Labrum, Humeral Bone, Subacromial Bursa, Articular Capsule) (38531)    ANESTHESIA: General endotracheal + Regional nerve block (this was requested from the Anesthesia team for optimal pain control and post operative recovery)   ANESTHESIOLOGIST:  MD Lavon  ANTIBIOTICS: Cefazolin 2g within 60 minutes of surgical incision.    IMPLANTS:   Implant Name Type Inv. Item Serial No.  Lot No. LRB No. Used Action   ANCHOR SUT L19.1MM DIA4.75MM PEEK FT KNOTLESS - SNA  ANCHOR SUT L19.1MM DIA4.75MM PEEK FT KNOTLESS NA [employer]  [id number] Left 1 Implanted   ALPHAVENT SUTURE 4.75MM - SNA  ALPHAVENT SUTURE 4.75MM NA [employer]  [id number] Left 1 Implanted   ANCHOR SUT L19.1MM DIA4.75MM PEEK FT KNOTLESS - SNA  ANCHOR SUT L19.1MM DIA4.75MM PEEK FT KNOTLESS NA [employer]  [id number] Left 1 Implanted     PATHOLOGY/CULTURES: None  ESTIMATED BLOOD LOSS: Blood Output: 5 mL (9/24/2024  1:47 PM)    COMPLICATIONS: No intraoperative nor immediate postoperative complications  noted.  COUNTS: All sponge and needle counts were correct at the conclusion of the procedure.    OPERATIVE FINDINGS:  Evidence of full-thickness supraspinatus tendon tear with minimal retraction and mild extension to the anterior margin of the infraspinatus.  Interstitial long head of biceps tendon fraying as well as superior labral pathology ultimately managed with arthroscopic biceps tenodesis and double row rotator cuff repair.  Excellent quality of repair and fixation noted with excellent bone quality.  Adequate subacromial decompression.    Indications:  Sandee is a 44 year old female with history, physical examination, and imaging findings consistent with the aforementioned diagnoses.  They had failed an appropriate course of conservative, nonoperative management and thus elected to proceed with the above procedure.   Operative and nonoperative options were discussed with her in my office and we ultimately agreed that surgery would provide the highest likelihood of symptomatic relief and functional improvement.  The risks and benefits of surgery as well as the postoperative rehabilitation plan were reviewed. She voiced a good understanding of treatment options, risks and benefits, and alternatives to surgery. She was given the opportunity to ask questions, which were all answered to the best of my ability and to her satisfaction. Sandee wished to proceed.   On the day of surgery, the Sandee was seen in the preoperative area.  I confirmed her identity by name and birthday. We reviewed and confirmed the surgical consent including laterality.  The surgical site was marked with my initials.  We re-reviewed the risks and benefits of the procedure and I answered all additional questions to her satisfaction.      OPERATIVE REPORT:   Sandee was taken to the operating room in stable condition.    The patient was positioned in the modified beach chair position utilizing the special attachment for the Berchtold bed allowing for  the ipsilateral arm breakaway. Trimano arm cesar was utilized and the head was secured appropriately. The patient underwent sterile pre-prep with chlorhexidine scrub brushes and alcohol followed by chlorhexidine preparation.   A surgical time out was performed where I confirmed laterality as marked by my initials, reaffirmed the consent, noted appropriate imaging studies were in the room, and that preoperative antibiotics had been given within recommended timeframe prior to skin incision.   Examination under anesthesia demonstrated full shoulder range of motion, stable to anterior, posterior and inferior stresses.   A 15 blade was used to make the skin incision standard posterior portal, the 4 mm arthroscope was introduced into the glenohumeral joint. A standard anterior portal was established within the rotator internal and a probe was introduced into the glenohumeral joint.   Diagnostic arthroscopy revealed:    Biceps [place] Abnormal with type 1 SLAP tearing   Superior labrum Abnormal with type 1 SLAP tearing   Anterior labrum Intact without pathology   Inferior labrum Intact   Posterior labrum Intact   Glenoid cartilage Intact   Humeral cartilage Intact   Rotator Interval Within normal limits   Subscapularis Intact   Biceps tendon Mild interstitial erythema.   Supraspinatus Full-thickness tear without retraction managed with double row repair   Infraspinatus Partial-thickness tear of the anterior margin.   Hill-Sachs Lesion None     Upon completion of the diagnostic arthroscopy, the abnormal fraying of the labrum and articular capsule at the insertion of the anterior portal was debrided with a 4.0 mm Tomcat shaver via the anterior portal.    Using spinal needle localization, and anterolateral portal within the rotator interval was established.  This portal was dilated with an obturator and a large Kocher clamp was introduced and used to grasp the biceps tendon.  Arthroscopic scissors were introduced from the  anterior portal and used to detach the biceps tendon from the superior labrum.  The biceps tendon was then extracted from the anterolateral portal with the aid of the Kocher clamp.  The Biceps tendon was then prepared with an Arthrex fiberloop. Coblation was used to remove any excess tissue.  Humeral bone was debrided with combination of coablation device and 4.0 mm Tomcat mechanical shaver. This allows for removal of pathologic abnormality and exposure of healthy bleeding bone.   At this time, the Biceps tendon loaded swivel lock anchor was placed at the margin of the articular surface and facilitating arthroscopic biceps tenodesis.  Excellent bone quality was noted and adequate arthroscopic biceps tenodesis was achieved.  With reintroduction of the arthroscopic obturator, the subacromial space was entered.  Using a sweeping motion adequate visualization was obtained.  With the arthroscope in the posterior viewing portal, coablation was introduced from the anterolateral portal. The CA ligament was noted to be frayed on the undersurface and was carefully debrided identifying a type II/III acromion.    Using a 5.5 mm resector shaver, the subacromial spur was flattened by approximately 6 mm and a type I acromion was restored.  Using a 4.0 mm Tomcat shaver the remaining subacromial bursa was debrided, initially viewing from posterior portal, ensuring a complete bursectomy and adequate subacromial decompression.  This process was completed by transitioning the arthroscope into the anterolateral portal and placing the shaver in the posterior portal for a thorough decompression.  This adequate visualization of the full-thickness rotator cuff tear was performed and a posterolateral portal was established for viewing.  An 8.25 mm purple cannula was placed in the anterolateral portal as the arthroscope was transitioned into the posterolateral portal for viewing. Along the articular margin, 1 double loaded Jeanie AlphaVent  4.75 mm anchor loaded with 1.4 mm suture tape was placed in addition to anterior single loaded Arthrex SwiveLock anchor.     Using a Mitek espresso suture passer the sutures were sequentially passed through the rotator cuff from anterior to posterior.  Additional care was taken to ensure optimal spacing.  subsequently knots were begun to be tied from posterior to anterior.  This denoted an excellent repair and 1 lateral row swivel lock anchor(s) completed a double row construct rotator cuff repair.  At conclusion of this the rotator cuff moved with excellent stability and an intact construct.  Final photographs were obtained.      A towel was used to extract fluid from the arthroscopic incisions and closure was performed using buried interrupted 2-0 monocryl, 3-0 Monocryl, Dermabond, Steri-Strips, gauze, and Tegaderm dressing.  The patient was placed into a shoulder immobilizer.   Sandee was taken to the recovery room in a stable condition with plan for ambulatory discharge to home. She was provided my postoperative discharge booklet, appropriate home exercises, script for outpatient physical therapy, and a copy of my rehabilitation guidelines.      POST OPERATIVE PLAN:  Activity Precautions: Passive ROMAT, 1 lb WB x 4 weeks. Begin PT POD #1.   DVT Prophylaxis: Not indicated  Follow-up Visit: 1-2 weeks.       Beverly Womack MD  Knee, Shoulder, & Elbow Surgery / Sports Medicine Specialist  Orthopaedic Surgery  [address] [place] [address].org  [email or web address]  t: [phone]  o: [phone]  f: [phone]    This note was dictated using Dragon software.  While it was briefly proofread prior to completion, some grammatical, spelling, and word choice errors due to dictation may still occur.

## 2024-09-25 ENCOUNTER — OFFICE VISIT (OUTPATIENT)
Dept: PHYSICAL THERAPY | Age: 44
End: 2024-09-25
Attending: INTERNAL MEDICINE
Payer: MEDICAID

## 2024-09-25 DIAGNOSIS — Z98.890 S/P ARTHROSCOPY OF LEFT SHOULDER: Primary | ICD-10-CM

## 2024-09-25 PROCEDURE — 97161 PT EVAL LOW COMPLEX 20 MIN: CPT | Performed by: PHYSICAL THERAPIST

## 2024-09-25 PROCEDURE — 97110 THERAPEUTIC EXERCISES: CPT | Performed by: PHYSICAL THERAPIST

## 2024-09-25 NOTE — PROGRESS NOTES
Diagnosis:   S/P arthroscopy of left shoulder (Z98.890)   (full supraspinatus tear and partial infraspinatus tear/repair)     Post op dx:  Left Shoulder Arthroscopy Rotator Cuff Repair Arthroscopic Biceps Tenodesis Subacromial Decompression       Referring Provider: Beverly Womack Date of Evaluation:    9/25/24    Precautions:   Activity Precautions: Passive ROMAT, 1 lb WB x 4 weeks. Begin PT POD #1.     Next MD visit:   none scheduled  Date of Surgery: 9/24/25   Insurance Primary/Secondary: [employer]OSS MEDICAID / N/A     # Auth Visits: 15 visits till 11/24            Subjective: Pt states her pain started after last PT session. It was very bad and she was taking pain meds every 4 hours.     Pain: 0/10  at rest, 4/10 with exercises      Objective:     PROM: (* denotes performed with pain)  Shoulder    Flexion: R WNL; L 125  Abduction: R WNL; L 100  ER: R WNL; L 52  IR: R WNL; L 74           Assessment: Pt presenting with increased pain as this is typical post operatively. She tolerates passive shoulder mobility well with noted improvements since IE and minimal guarding.       Goals:   To be met in 6-8 weeks post op   Pt will report improved ability to sleep without waking due to shoulder pain  Pt will improve R shoulder flexion PROM to >150 degrees to be able to reach into shoulder height cabinets when cleared for AROM  Pt will improve R shoulder abduction PROM to >130 degrees to improve ability to don deodorant, don/doff shirts, and wash hair when cleared for AROM  Pt will increase shoulder PROM ER to >80 degrees at 90 deg abduction to reach and fasten seatbelt when cleared for AROM  Pt will be independent and compliant with comprehensive HEP to maintain progress achieved in PT      To be met in 8-10 weeks post op   Pt will improve shoulder flexion AROM to >150 degrees to be able to reach into overhead cabinets without pain or restriction   Pt will improve shoulder abduction AROM to >130 degrees to improve ability to  don deodorant, don/doff shirts, and wash hair   Pt will increase shoulder AROM ER to 80 degrees to be able to reach and fasten seatbelt   Pt will increase shoulder AROM IR to 70 degrees to be able to reach in back pocket, tuck in shirt, and turn steering wheel without pain  Pt will be independent and compliant with comprehensive HEP to maintain progress achieved in PT      To be met 4-6 months post op (total visits of PT: ~35)  Pt will improve shoulder strength throughout to 4+/5 to improve function with carrying groceries and wash floors and vacuum    Pt will demonstrate increased mid/low trap strength to 4/5 to promote improved shoulder mechanics and stabilization with lifting and reaching   Pt will be independent and compliant with comprehensive HEP to maintain progress achieved in PT        Plan: Progress PROM and AAROM as tolerated  Date: 9/27/2024  TX#: 2/15 Date:                 TX#: 3/ Date:                 TX#: 4/ Date:                 TX#: 5/ Date:   Tx#: 6/   TherEx: 30 min  -scap retractions x20  -AAROM elbow flexion: x20  -pendulums every direction x5 min  -table slides shoulder flexion: x20  -PROM to tolerance      Hold  Self ER stretch at wall w/ cane  Deltoid and RTC isometrics         Modalities: 15 min  Game ready x15 min (L) shoulder post tx                      HEP:     Access Code: [email or web address]  URL: [email or web address]  Date: 09/25/2024  Prepared by: Batsheva Beckwith     Exercises 25 min with education  - Horizontal Shoulder Pendulum with Table Support  - 3 x daily - 7 x weekly - 1 miute hold  - Flexion-Extension Shoulder Pendulum with Table Support  - 3 x daily - 7 x weekly - 3 sets - 1 minute hold  - Circular Shoulder Pendulum with Table Support  - 3 x daily - 7 x weekly - 3 sets - 1 minute hold  - Seated Bilateral Shoulder Flexion Towel Slide at Table Top  - 3 x daily - 7 x weekly - 2 sets - 10 reps  - Seated Scapular Retraction  - 3 x daily - 7 x weekly - 3 sets - 10 reps  -  Neck Retraction  - 3 x daily - 7 x weekly - 3 sets - 10 reps  - Seated Cervical Rotation AROM  - 3 x daily - 7 x weekly - 3 sets - 10 reps  - Seated Cervical Sidebending AROM  - 3 x daily - 7 x weekly - 3 sets - 10 reps  - Supported Elbow Flexion Extension PROM  - 3 x daily - 7 x weekly - 3 sets - 10 reps  - Seated Gripping Towel  - 3 x daily - 7 x weekly - 3 sets - 10 reps    Charges: TherEx: 2 units, Vasopneumatic device: x1 unit         Total Timed Treatment: 45 min  Total Treatment Time: 45 min

## 2024-09-25 NOTE — PROGRESS NOTES
POST-OP SHOULDER EVALUATION:     Diagnosis:     S/P arthroscopy of left shoulder (Z98.890)   (full supraspinatus tear and partial infraspinatus tear/repair)    Post op dx:  Left Shoulder Arthroscopy Rotator Cuff Repair Arthroscopic Biceps Tenodesis Subacromial Decompression    Referring Provider: Beverly Womack  Date of Evaluation:    9/25/2024    Precautions:  Activity Precautions: Passive ROMAT, 1 lb WB x 4 weeks. Begin PT POD #1.        Next MD visit:   10/2/24  Date of Surgery: 9/24/24     PATIENT SUMMARY   Sandee Eddy is a 44 year old female who presents to therapy today s/p Left Shoulder Arthroscopy Rotator Cuff Repair Arthroscopic Biceps Tenodesis Subacromial Decompression on 9/24/24 with complaints of no major pain and continued hand numbness due to nerve block. Complains of throat pain and spitting up blood due to intubation. She initially hurt her shoulder in a skiing accident last winter when she slipped on an icy patch. She Hurt her (R) knee and had ACL repair earlier in the year as that was her primary complaint. She had (L) shoulder pain leading up to this surgery. She did PT on the (L) shoulder with no improvements.     Pt describes pain level current 1/10, at best 0/10, at worst 0/10.   Current functional limitations include overhead reaching, behind the back reaching, self grooming, don/doffing shirts and undergarments, driving, sleeping, cooking, cleaning, and lifting >20 lbs.     Sandee describes prior level of function sedentary. Pt goals include be able to use arm for ADLs with no restrictions.  Past medical history was reviewed with Sandee. Significant findings include  has a past medical history of motion sickness, Obesity (BMI 35.0-39.9 without comorbidity) (06/29/2021), HERRERA (obstructive sleep apnea) (02/02/2023), PONV (postoperative nausea and vomiting), and Sleep apnea.        ASSESSMENT  Sandee presents to physical therapy evaluation with primary c/o post perative UE numbness and  throat pain. The results of the objective tests and measures show impairments in soft tissue restrictions, A/PROM, strength, and posture.  Functional deficits include but are not limited to overhead reaching, behind the back reaching, self grooming, don/doffing shirts and undergarments, driving, sleeping, cooking, cleaning, and lifting >20 lbs. .  Signs and symptoms are consistent with diagnosis of s/p Left Shoulder Arthroscopy Rotator Cuff Repair Arthroscopic Biceps Tenodesis Subacromial Decompression. Pt and PT discussed evaluation findings, pathology, POC and HEP.  Pt voiced understanding and performs HEP correctly without reported pain. Skilled Physical Therapy is medically necessary to address the above impairments and reach functional goals.     OBJECTIVE:   Observation/Posture: Tegaderm donned over incision sites, mildly impaired posture  Palpation: no major tenderness due to nerve block still working   Sensation: WNL      PROM: (* denotes performed with pain)  Shoulder  Elbow   Flexion: R WNL; L 125  Abduction: R WNL; L 100  ER: R WNL; L 45  IR: R WNL; L 65 Flexion: R WNL; L WNL  Extension: R WNL; L WNL       Strength/MMT: Deferred    Today’s Treatment and Response:   Pt education was provided on exam findings, treatment diagnosis, treatment plan, expectations, and prognosis. Pt was also provided recommendations for activity modifications, possible soreness after evaluation, modalities as needed [ice/heat], and postural corrections.sling fitting/adjustments, sleep positioning and showering recommendations, proper sling and shirts don/doffing     Patient was instructed in and issued a HEP for:     Access Code: [email or web address]  URL: [email or web address]  Date: 09/25/2024  Prepared by: Batsheva Beckwith    Exercises 25 min with education  - Horizontal Shoulder Pendulum with Table Support  - 3 x daily - 7 x weekly - 1 miute hold  - Flexion-Extension Shoulder Pendulum with Table Support  - 3 x daily - 7  x weekly - 3 sets - 1 minute hold  - Circular Shoulder Pendulum with Table Support  - 3 x daily - 7 x weekly - 3 sets - 1 minute hold  - Seated Bilateral Shoulder Flexion Towel Slide at Table Top  - 3 x daily - 7 x weekly - 2 sets - 10 reps  - Seated Scapular Retraction  - 3 x daily - 7 x weekly - 3 sets - 10 reps  - Neck Retraction  - 3 x daily - 7 x weekly - 3 sets - 10 reps  - Seated Cervical Rotation AROM  - 3 x daily - 7 x weekly - 3 sets - 10 reps  - Seated Cervical Sidebending AROM  - 3 x daily - 7 x weekly - 3 sets - 10 reps  - Supported Elbow Flexion Extension PROM  - 3 x daily - 7 x weekly - 3 sets - 10 reps  - Seated Gripping Towel  - 3 x daily - 7 x weekly - 3 sets - 10 reps    Charges: PT Eval Low Complexity, TherEx: 2 units      Total Timed Treatment: 25 min     Total Treatment Time: 35 min     Based on clinical rationale and outcome measures, this evaluation involved Low Complexity   PLAN OF CARE:    Goals:     To be met in 6-8 weeks post op   Pt will report improved ability to sleep without waking due to shoulder pain  Pt will improve R shoulder flexion PROM to >150 degrees to be able to reach into shoulder height cabinets when cleared for AROM  Pt will improve R shoulder abduction PROM to >130 degrees to improve ability to don deodorant, don/doff shirts, and wash hair when cleared for AROM  Pt will increase shoulder PROM ER to >80 degrees at 90 deg abduction to reach and fasten seatbelt when cleared for AROM  Pt will be independent and compliant with comprehensive HEP to maintain progress achieved in PT     To be met in 8-10 weeks post op   Pt will improve shoulder flexion AROM to >150 degrees to be able to reach into overhead cabinets without pain or restriction   Pt will improve shoulder abduction AROM to >130 degrees to improve ability to don deodorant, don/doff shirts, and wash hair   Pt will increase shoulder AROM ER to 80 degrees to be able to reach and fasten seatbelt   Pt will increase  shoulder AROM IR to 70 degrees to be able to reach in back pocket, tuck in shirt, and turn steering wheel without pain  Pt will be independent and compliant with comprehensive HEP to maintain progress achieved in PT     To be met 4-6 months post op (total visits of PT: ~35)  Pt will improve shoulder strength throughout to 4+/5 to improve function with carrying groceries and wash floors and vacuum    Pt will demonstrate increased mid/low trap strength to 4/5 to promote improved shoulder mechanics and stabilization with lifting and reaching   Pt will be independent and compliant with comprehensive HEP to maintain progress achieved in PT       Frequency / Duration: Patient will be seen for 2 x/week or a total of 24 visits over a 90 day period.  Treatment will include: Manual Therapy, Neuromuscular Re-education, Therapeutic Activities, Therapeutic Exercise, Home Exercise Program instruction, and Modalities to include: Electrical stimulation (unattended) and vasopneumatic device    Education or treatment limitation: None  Rehab Potential:good    QuickDASH Outcome Score  Score: 65.91 % (9/25/2024  7:49 AM)      Patient/Family/Caregiver was advised of these findings, precautions, and treatment options and has agreed to actively participate in planning and for this course of care.    Thank you for your referral. Please co-sign or sign and return this letter via fax as soon as possible to [phone]. If you have any questions, please contact me at Dept: [phone]    Sincerely,  Electronically signed by therapist: Batsheva Beckwith, PT  Physician's certification required: Yes  I certify the need for these services furnished under this plan of treatment and while under my care.    X___________________________________________________ Date____________________    Certification From: 9/25/2024  To:12/24/2024

## 2024-09-27 ENCOUNTER — OFFICE VISIT (OUTPATIENT)
Dept: PHYSICAL THERAPY | Age: 44
End: 2024-09-27
Attending: ORTHOPAEDIC SURGERY
Payer: MEDICAID

## 2024-09-27 PROCEDURE — 97110 THERAPEUTIC EXERCISES: CPT | Performed by: PHYSICAL THERAPIST

## 2024-09-27 PROCEDURE — 97016 VASOPNEUMATIC DEVICE THERAPY: CPT | Performed by: PHYSICAL THERAPIST

## 2024-09-30 NOTE — PROGRESS NOTES
Diagnosis:   S/P arthroscopy of left shoulder (Z98.890)   (full supraspinatus tear and partial infraspinatus tear/repair)     Post op dx:  Left Shoulder Arthroscopy Rotator Cuff Repair Arthroscopic Biceps Tenodesis Subacromial Decompression       Referring Provider: Beverly Womack Date of Evaluation:    9/25/24    Precautions:   Activity Precautions: Passive ROMAT, 1 lb WB x 4 weeks. Begin PT POD #1.     Next MD visit:   none scheduled  Date of Surgery: 9/24/25   Insurance Primary/Secondary: BLUE CROSS MEDICAID / N/A     # Auth Visits: 15 visits till 11/24            Subjective: Pt states her pain is not too bad if she is taking pain medicine.    Pain: 0/10  at rest currently with pain medicine, 2/10 this morning      Objective:     PROM: (* denotes performed with pain)  Shoulder    Flexion: R WNL; L 145  Abduction: R WNL; L 112  ER: R WNL; L 55  At 0 50  IR: R WNL; L 75           Assessment: Pt presenting with gradually improving shoulder passive mobility most restricted in ER and abduction. She is introduced to self ER stretches for home and shoulder isometrics per protocol. She tolerates tx fairly well and ends tx on game ready for pain management.       Goals:   To be met in 6-8 weeks post op   Pt will report improved ability to sleep without waking due to shoulder pain  Pt will improve R shoulder flexion PROM to >150 degrees to be able to reach into shoulder height cabinets when cleared for AROM  Pt will improve R shoulder abduction PROM to >130 degrees to improve ability to don deodorant, don/doff shirts, and wash hair when cleared for AROM  Pt will increase shoulder PROM ER to >80 degrees at 90 deg abduction to reach and fasten seatbelt when cleared for AROM  Pt will be independent and compliant with comprehensive HEP to maintain progress achieved in PT      To be met in 8-10 weeks post op   Pt will improve shoulder flexion AROM to >150 degrees to be able to reach into overhead cabinets without pain or  restriction   Pt will improve shoulder abduction AROM to >130 degrees to improve ability to don deodorant, don/doff shirts, and wash hair   Pt will increase shoulder AROM ER to 80 degrees to be able to reach and fasten seatbelt   Pt will increase shoulder AROM IR to 70 degrees to be able to reach in back pocket, tuck in shirt, and turn steering wheel without pain  Pt will be independent and compliant with comprehensive HEP to maintain progress achieved in PT      To be met 4-6 months post op (total visits of PT: ~35)  Pt will improve shoulder strength throughout to 4+/5 to improve function with carrying groceries and wash floors and vacuum    Pt will demonstrate increased mid/low trap strength to 4/5 to promote improved shoulder mechanics and stabilization with lifting and reaching   Pt will be independent and compliant with comprehensive HEP to maintain progress achieved in PT        Plan: Progress PROM and AAROM as tolerated  Date: 9/27/2024  TX#: 2/15 Date: 10/1/24               TX#: 3/15 Date:                 TX#: 4/ Date:                 TX#: 5/ Date:   Tx#: 6/   TherEx: 30 min  -scap retractions x20  -AAROM elbow flexion: x20  -pendulums every direction x5 min  -table slides shoulder flexion: x20  -PROM to tolerance      Hold  Self ER stretch at wall w/ cane  Deltoid and RTC isometrics   TherEx: 45 min  -FR table slides shoulder flexion and abduction: x20 each  -Self ER stretch at wall w/ cane: 10 sec x10 reps  -sub-max Deltoid and RTC isometrics: 5 sec, x10 each  -supine IR/ER in scap plane w/ cane: 10 sec x10 reps  -PROM to tolerance      Modalities: 15 min  Game ready x15 min (L) shoulder post tx  Modalities: 15 min  Game ready x15 min (L) shoulder post tx                     HEP:     Access Code: [email or web address]  URL: [email or web address]  Date: 10/01/2024  Prepared by: Batsheva Beckwith    Exercises  - Horizontal Shoulder Pendulum with Table Support  - 3 x daily - 7 x weekly - 1 miute hold  -  Flexion-Extension Shoulder Pendulum with Table Support  - 3 x daily - 7 x weekly - 3 sets - 1 minute hold  - Circular Shoulder Pendulum with Table Support  - 3 x daily - 7 x weekly - 3 sets - 1 minute hold  - Seated Bilateral Shoulder Flexion Towel Slide at Table Top  - 3 x daily - 7 x weekly - 2 sets - 10 reps  - Seated Shoulder Abduction Towel Slide at Table Top  - 3 x daily - 7 x weekly - 2 sets - 10 reps  - Supine Shoulder External Rotation in 45 Degrees Abduction AAROM with Dowel  - 3 x daily - 7 x weekly - 3 sets - 10 reps - 10-20 seconds hold  - Standing Shoulder External Rotation AAROM with Dowel  - 3 x daily - 7 x weekly - 3 sets - 10-20 seconds hold  - Standing Isometric Shoulder Flexion with Doorway - Arm Bent  - 1 x daily - 7 x weekly - 10 reps - 5 seconds hold  - Standing Isometric Shoulder Abduction with Doorway - Arm Bent  - 1 x daily - 7 x weekly - 10 reps - 5 seconds hold  - Isometric Shoulder Extension at Wall  - 1 x daily - 7 x weekly - 10 reps - 5 seconds hold  - Standing Isometric Shoulder Internal Rotation at Doorway  - 1 x daily - 7 x weekly - 10 reps - 5 seconds hold  - Standing Isometric Shoulder External Rotation with Doorway  - 1 x daily - 7 x weekly - 10 reps - 5 seconds hold    Charges: TherEx: 3 units, Vasopneumatic device: x1 unit         Total Timed Treatment: 45 min  Total Treatment Time: 60 min

## 2024-10-01 ENCOUNTER — OFFICE VISIT (OUTPATIENT)
Dept: PHYSICAL THERAPY | Age: 44
End: 2024-10-01
Attending: ORTHOPAEDIC SURGERY
Payer: MEDICAID

## 2024-10-01 PROCEDURE — 97110 THERAPEUTIC EXERCISES: CPT | Performed by: PHYSICAL THERAPIST

## 2024-10-01 PROCEDURE — 97016 VASOPNEUMATIC DEVICE THERAPY: CPT | Performed by: PHYSICAL THERAPIST

## 2024-10-02 ENCOUNTER — OFFICE VISIT (OUTPATIENT)
Dept: ORTHOPEDICS CLINIC | Facility: CLINIC | Age: 44
End: 2024-10-02
Payer: MEDICAID

## 2024-10-02 DIAGNOSIS — Z98.890 S/P ARTHROSCOPY OF SHOULDER: Primary | ICD-10-CM

## 2024-10-02 PROCEDURE — 99024 POSTOP FOLLOW-UP VISIT: CPT | Performed by: PHYSICIAN ASSISTANT

## 2024-10-02 RX ORDER — TRAMADOL HYDROCHLORIDE 50 MG/1
TABLET ORAL
Qty: 20 TABLET | Refills: 1 | Status: SHIPPED | OUTPATIENT
Start: 2024-10-02

## 2024-10-02 NOTE — PROGRESS NOTES
[facility]THOP[facility]  [address] [place] [id number]  [phone]       Name: Sandee Rodriguez   MRN: [id number]  Date: 10/2/2024     REASON FOR VISIT: First Post-Surgical Visit   Surgery: Left shoulder arthroscopic rotator cuff repair, biceps tenodesis, subacromial decompression, extensive debridement on September 24, 2024.    INTERVAL HISTORY:  Sandee Eddy is a 44 year old female who returns after the aforementioned procedure.  The post-operative course has been unremarkable with pain well controlled and overall progress noted.     Physical therapy was started and is progressing well.  The patient denies any calf pain or tenderness, fevers, chills, sweats or signs of active infection. The patient has been compliant with the postoperative protocol, and admits to normal bowel and bladder function. No acute issues.     ROS: ROS    PE:   There were no vitals filed for this visit.  Estimated body mass index is 35.94 kg/m² as calculated from the following:    Height as of 9/6/24: 5' 5\" (1.651 m).    Weight as of 9/24/24: 216 lb (98 kg).    Physical Exam  Constitutional:       Appearance: Normal appearance.   HENT:      Head: Normocephalic and atraumatic.   Eyes:      Extraocular Movements: Extraocular movements intact.   Neck:      Musculoskeletal: Normal range of motion and neck supple.   Cardiovascular:      Pulses: Normal pulses.   Pulmonary:      Effort: Pulmonary effort is normal. No respiratory distress.   Abdominal:      General: There is no distension.   Skin:     General: Skin is warm.      Capillary Refill: Capillary refill takes less than 2 seconds.      Findings: No bruising.   Neurological:      General: No focal deficit present.      Mental Status: She is alert.   Psychiatric:         Mood and Affect: Mood normal.     Examination of the left shoulder demonstrates:     Physical examination the patient is alert and oriented x3, well-developed, well-nourished,  no acute distress.     Tegaderm dressings were removed, and Steri-Strips were maintained and kept in place.    Incisional sites are clean dry intact without signs of active pathology.      The contralateral shoulder is without limitation in range of motion or strength, no positive provocative maneuvers.     IMPRESSION: Sandee Eddy is a 44 year old female who presents 8 days s/p  Left shoulder arthroscopic rotator cuff repair, biceps tenodesis, subacromial decompression, extensive debridement on September 24, 2024.    PLAN:   We had a lengthy discussion with the patient regarding the patient's findings consistent with the expected postoperative course. We recommend continuation of physical therapy with rehabilitation efforts focused on strengthening, range of motion, functional ability, and return to baseline activity. The patient can continue to progress per protocol.    All questions were answered appropriately and the patient was in agreement with the treatment plan.       FOLLOW-UP:  Return to clinic in four weeks. No imaging required at next visit.             Sonja Donnelly, [facility], PA-C Orthopedic Surgery / Sports Medicine Specialist  [facility]  [address] [place] [address].org  [email or web address]  t: [phone]  o: [phone]  f: [phone]    This note was dictated using Dragon software.  While it was briefly proofread prior to completion, some grammatical, spelling, and word choice errors due to dictation may still occur.

## 2024-10-04 ENCOUNTER — OFFICE VISIT (OUTPATIENT)
Dept: OBGYN CLINIC | Facility: CLINIC | Age: 44
End: 2024-10-04
Payer: MEDICAID

## 2024-10-04 ENCOUNTER — OFFICE VISIT (OUTPATIENT)
Dept: PHYSICAL THERAPY | Age: 44
End: 2024-10-04
Attending: ORTHOPAEDIC SURGERY
Payer: MEDICAID

## 2024-10-04 VITALS
SYSTOLIC BLOOD PRESSURE: 120 MMHG | BODY MASS INDEX: 36.15 KG/M2 | HEIGHT: 65 IN | WEIGHT: 217 LBS | DIASTOLIC BLOOD PRESSURE: 74 MMHG

## 2024-10-04 DIAGNOSIS — R10.2 PELVIC PAIN IN FEMALE: Primary | ICD-10-CM

## 2024-10-04 DIAGNOSIS — N84.1 CERVICAL POLYP: ICD-10-CM

## 2024-10-04 PROCEDURE — 57500 BIOPSY OF CERVIX: CPT | Performed by: OBSTETRICS & GYNECOLOGY

## 2024-10-04 PROCEDURE — 88305 TISSUE EXAM BY PATHOLOGIST: CPT | Performed by: OBSTETRICS & GYNECOLOGY

## 2024-10-04 PROCEDURE — 97110 THERAPEUTIC EXERCISES: CPT | Performed by: PHYSICAL THERAPIST

## 2024-10-04 PROCEDURE — 99204 OFFICE O/P NEW MOD 45 MIN: CPT | Performed by: OBSTETRICS & GYNECOLOGY

## 2024-10-04 PROCEDURE — 97016 VASOPNEUMATIC DEVICE THERAPY: CPT | Performed by: PHYSICAL THERAPIST

## 2024-10-04 NOTE — PROGRESS NOTES
GYN H&P     10/4/2024  1:45 PM    CC: Patient is here polyp found by PCP on pap    HPI: Patient is a 44 year old  for above. She is also c/o bilateral LAP which occurs 2 x per week, stabbing sensation, comes and goes, lasting seconds for 1 year .     Menses: once a month. + 3 days of heavy bleeding 4 x per day    Review of Systems:    Pertinent positive and negatives reviewed in HPI    CONSTITUTIONAL:  No weight loss, no fever, chills, no weakness, no fatigue.  SKIN:  No rash, no  itching. No new facial or body hair, new new acne  GASTROINTESTINAL:  No anorexia, no nausea, no vomiting, no diarrhea, no abdominal pain, no blood in stool or blackened stools, no constipation, no bloating  GENITOURINARY:  No burning on urination. No urinary frequency.  No blood in urine. No urinary incontinence  MUSCULOSKELETAL:  No muscle pain, no back pain, no joint pain, no stiffness.  HEMATOLOGIC:  No history of anemia, no excessive bleeding, no excessive bruising.  LYMPHATICS:  No enlarged nodes. No history of splenectomy.  PSYCHIATRIC:  No history of depression, no history of anxiety.  ENDOCRINOLOGIC:  No reports of sweating, no cold intolerance, no heat intolerance, no excessive urination, no excessive thirst  ALLERGIES:  No history of asthma, no hives, no eczema, no rhinitis.      Patient's last menstrual period was 2024 (exact date).    OB History    Para Term  AB Living   2 2           SAB IAB Ectopic Multiple Live Births                  # Outcome Date GA Lbr Espinoza/2nd Weight Sex Type Anes PTL Lv   2 Para            1 Para                GYN hx:    Hx Prior Abnormal Pap: No  Pap Date: 24  Pap Result Notes: neg      Past Medical History:    Hx of motion sickness    Obesity (BMI 35.0-39.9 without comorbidity)    HERRERA (obstructive sleep apnea)    PONV (postoperative nausea and vomiting)    Sleep apnea     Past Surgical History:   Procedure Laterality Date    Anesth,surgery of shoulder Left 2024     arthroscopy of L shoulder    Arthroscopy of joint unlisted Right 02/06/2024    KNEE    Other surgical history      RT LEG SURGERY- PINS PLACED AND REMOVED 2-3 times     Allergies   Allergen Reactions    Ampicillin RASH    Penicillins RASH     Family History   Problem Relation Age of Onset    Heart Disorder Father     Breast Cancer Neg     Ovarian Cancer Neg     Colon Cancer Neg      Social History     Socioeconomic History    Marital status:    Occupational History    Occupation:    Tobacco Use    Smoking status: Never    Smokeless tobacco: Never   Vaping Use    Vaping status: Never Used   Substance and Sexual Activity    Alcohol use: Not Currently     Comment: OCC    Drug use: No    Sexual activity: Yes     Partners: Male     Social History     Social History Narrative    Lives with  and kids    Feels safe    No hx of abuse       Medications reviewed. See active list.     /74   Ht 65\"   Wt 217 lb (98.4 kg)   LMP 09/09/2024 (Exact Date)   BMI 36.11 kg/m²       Exam:   GENERAL: well developed, well nourished, in no apparent distress  SKIN: no rashes, no suspicious lesions  HEENT: normal  NECK: supple; no thyroidmegaly, no adenopathy  BREASTS: symmetrical, nontender, no palpable masses or nodes, no nipple discharge, no skin changes, no dippling, no palpable axillary adenopathy  ABDOMEN: Soft, non distended; non tender, no masses.  Liver and spleen non-tender, no enlargement. No palpable hernias  GYNE/:  External Genitalia: Normal appearing, no lesions, normal hair distribution   Urethral meatus appear wnl, no abnormal discharge or lesions noted.   Bladder: well supported, urethra wnl, no palpable tenderness or masses, no discharge  Vagina: normal pink mucosa, no lesions, normal clear discharge.   Uterus: midline, mobile, non-tender, firm and smooth  Cervix: pink, no lesions grossly visible, no discharge  Adnexa: non tender, no palpable masses, normal size, + 3 cm polyp at os.  Grasped with right forceps and removed. Time out performed prior to the procedure and consent obtained.   Anus:  No lesions or visible hemorrhoids        A/P: Patient is 44 year old female     1. Pelvic pain in female  - US PELVIS (TRANSABDOMINAL AND TRANSVAGINAL) (CPT=76856/01707); Future    2. Cervical polyp    Check path of cervical polyp.   Sylvia Garcia MD

## 2024-10-04 NOTE — PROGRESS NOTES
Diagnosis:   S/P arthroscopy of left shoulder (Z98.890)   (full supraspinatus tear and partial infraspinatus tear/repair)     Post op dx:  Left Shoulder Arthroscopy Rotator Cuff Repair Arthroscopic Biceps Tenodesis Subacromial Decompression       Referring Provider: Beverly Womack Date of Evaluation:    9/25/24    Precautions:   Activity Precautions: Passive ROMAT, 1 lb WB x 4 weeks. Begin PT POD #1.     Next MD visit:   none scheduled  Date of Surgery: 9/24/25   Insurance Primary/Secondary: BLUE CROSS MEDICAID / N/A     # Auth Visits: 15 visits till 11/24            Subjective: Pt states she had to go back to taking Tramadol during the night.     Pain: 2/10 at rest      Objective:     PROM: (* denotes performed with pain)  Shoulder    Flexion: L 155*  Abduction: L 135*  ER in scap plane: L 65*  ER at 0 deg abd: 57*  ER at at 90 abd:  73*  IR in scap plane: L 75*       Assessment: Pt presents with reports of increased pain and also increased guarding during PROM shoulder stretches. Also noted increased end range resistance from soft tissues but able to gain a few degrees in each motion as compared to previous visit.     Goals:   To be met in 6-8 weeks post op   Pt will report improved ability to sleep without waking due to shoulder pain  Pt will improve R shoulder flexion PROM to >150 degrees to be able to reach into shoulder height cabinets when cleared for AROM  Pt will improve R shoulder abduction PROM to >130 degrees to improve ability to don deodorant, don/doff shirts, and wash hair when cleared for AROM  Pt will increase shoulder PROM ER to >80 degrees at 90 deg abduction to reach and fasten seatbelt when cleared for AROM  Pt will be independent and compliant with comprehensive HEP to maintain progress achieved in PT      To be met in 8-10 weeks post op   Pt will improve shoulder flexion AROM to >150 degrees to be able to reach into overhead cabinets without pain or restriction   Pt will improve shoulder  abduction AROM to >130 degrees to improve ability to don deodorant, don/doff shirts, and wash hair   Pt will increase shoulder AROM ER to 80 degrees to be able to reach and fasten seatbelt   Pt will increase shoulder AROM IR to 70 degrees to be able to reach in back pocket, tuck in shirt, and turn steering wheel without pain  Pt will be independent and compliant with comprehensive HEP to maintain progress achieved in PT      To be met 4-6 months post op (total visits of PT: ~35)  Pt will improve shoulder strength throughout to 4+/5 to improve function with carrying groceries and wash floors and vacuum    Pt will demonstrate increased mid/low trap strength to 4/5 to promote improved shoulder mechanics and stabilization with lifting and reaching   Pt will be independent and compliant with comprehensive HEP to maintain progress achieved in PT        Plan: Progress PROM and AAROM as tolerated  Date: 9/27/2024  TX#: 2/15 Date: 10/1/24               TX#: 3/15 Date:10/4/24                 TX#: 4/15 Date: 10/8/24                TX#: 5/15 Date:   Tx#: 6/   TherEx: 30 min  -scap retractions x20  -AAROM elbow flexion: x20  -pendulums every direction x5 min  -table slides shoulder flexion: x20  -PROM to tolerance      Hold  Self ER stretch at wall w/ cane  Deltoid and RTC isometrics   TherEx: 45 min  -FR table slides shoulder flexion and abduction: x20 each  -Self ER stretch at wall w/ cane: 10 sec x10 reps  -sub-max Deltoid and RTC isometrics: 5 sec, x10 each  -supine IR/ER in scap plane w/ cane: 10 sec x10 reps  -PROM to tolerance TherEx: 45 min  -FR table slides shoulder flexion and abduction: x20 each  -standing AAROM flexion and abduction: w/ stick vertical x20 each  -Pulleys's: 2'/2'  -Self ER stretch at wall w/ cane: 10 sec x5 reps  -sub-max Deltoid and RTC isometrics: 5 sec, x15 each  -Supine AAROM flexion hands together  -supine IR/ER in scap plane w/ cane: 10 sec x10 reps  -PROM to tolerance     TherEx: 45 min  -FR  table slides shoulder flexion and abduction: x20 each  -standing AAROM flexion and abduction: w/ stick vertical x20 each  -Pulleys's: 2'/2'  -Self ER stretch at wall w/ cane: 10 sec x5 reps  -sub-max Deltoid and RTC isometrics: 5 sec, x15 each  -Supine AAROM flexion hands together: x20  -supine IR/ER in scap plane w/ cane: 10 sec x10 reps  -PROM to tolerance  -RS IR/ER in scap plane and 90 deg 2x30 sec-hold out of time  -RS flexion at 100 and 125 flexion x30 sec each-hold out of time        Hold till 3 weeks  -s/l shoulder AAROM abd/flexion/ER with therapist assist  Hold till 4 weeks  -ER/IR reactive iso  -Bent over rows and shoulder ext    Modalities: 15 min  Game ready x15 min (L) shoulder post tx  Modalities: 15 min  Game ready x15 min (L) shoulder post tx  Modalities: 10 min  Game ready x15 min (L) shoulder post tx  Modalities: 10 min  Game ready x15 min (L) shoulder post tx                   HEP:     Access Code: [email or web address]  URL: [email or web address]  Date: 10/04/2024  Prepared by: Batsheva Beckwith    Exercises  - Seated Bilateral Shoulder Flexion Towel Slide at Table Top  - 3 x daily - 7 x weekly - 2 sets - 10 reps  - Seated Shoulder Abduction Towel Slide at Table Top  - 3 x daily - 7 x weekly - 2 sets - 10 reps  - Shoulder Flexion Overhead with Dowel  - 3 x daily - 7 x weekly - 3 sets - 10 reps  - Standing Shoulder Abduction AAROM with Dowel  - 3 x daily - 7 x weekly - 3 sets - 10 reps  - Standing Shoulder External Rotation AAROM with Dowel  - 3 x daily - 7 x weekly - 3 sets - 10-20 seconds hold  - Supine Shoulder Flexion AAROM with Hands Clasped  - 3 x daily - 7 x weekly - 3 sets - 10 reps  - Supine Shoulder External Rotation in 45 Degrees Abduction AAROM with Dowel  - 3 x daily - 7 x weekly - 3 sets - 10 reps - 10-20 seconds hold  - Standing Isometric Shoulder Flexion with Doorway - Arm Bent  - 1 x daily - 7 x weekly - 10 reps - 5 seconds hold  - Standing Isometric Shoulder Abduction with  Doorway - Arm Bent  - 1 x daily - 7 x weekly - 10 reps - 5 seconds hold  - Isometric Shoulder Extension at Wall  - 1 x daily - 7 x weekly - 10 reps - 5 seconds hold  - Standing Isometric Shoulder Internal Rotation at Doorway  - 1 x daily - 7 x weekly - 10 reps - 5 seconds hold  - Standing Isometric Shoulder External Rotation with Doorway  - 1 x daily - 7 x weekly - 10 reps - 5 seconds hold    Charges: TherEx: 3 units, Vasopneumatic device: x1 unit         Total Timed Treatment: 45 min  Total Treatment Time: 55 min

## 2024-10-08 ENCOUNTER — OFFICE VISIT (OUTPATIENT)
Dept: PHYSICAL THERAPY | Age: 44
End: 2024-10-08
Attending: ORTHOPAEDIC SURGERY
Payer: MEDICAID

## 2024-10-08 PROCEDURE — 97016 VASOPNEUMATIC DEVICE THERAPY: CPT | Performed by: PHYSICAL THERAPIST

## 2024-10-08 PROCEDURE — 97110 THERAPEUTIC EXERCISES: CPT | Performed by: PHYSICAL THERAPIST

## 2024-10-11 ENCOUNTER — OFFICE VISIT (OUTPATIENT)
Dept: PHYSICAL THERAPY | Age: 44
End: 2024-10-11
Attending: ORTHOPAEDIC SURGERY
Payer: MEDICAID

## 2024-10-11 PROCEDURE — 97110 THERAPEUTIC EXERCISES: CPT | Performed by: PHYSICAL THERAPIST

## 2024-10-11 NOTE — PROGRESS NOTES
Diagnosis:   S/P arthroscopy of left shoulder (Z98.890)   (full supraspinatus tear and partial infraspinatus tear/repair)     Post op dx:  Left Shoulder Arthroscopy Rotator Cuff Repair Arthroscopic Biceps Tenodesis Subacromial Decompression       Referring Provider: Beverly Womack Date of Evaluation:    9/25/24    Precautions:   Activity Precautions: Passive ROMAT, 1 lb WB x 4 weeks. Begin PT POD #1.     Next MD visit:   none scheduled  Date of Surgery: 9/24/25    3 weeks post op on 10/15/24   Insurance Primary/Secondary: BLUE CROSS MEDICAID / N/A     # Auth Visits: 15 visits till 11/24            Subjective: Pt states she continues to take the Tramadol for pain as Tylenol has not effects.     Pain: 2/10 at rest      Objective:     PROM: (* denotes performed with pain)  Shoulder    Flexion: L 160*  Abduction: L 149*  ER in scap plane: L 70*  ER at 0 deg abd: L 55*  ER at at 90 abd: L 75*  IR in scap plane: L 75*       Assessment: Pt continues to make gradual improvements in shoulder PROM. Greatest end range restriction noted in ER at side which causes most pain. Introduced rhythmic stabilization to promote shoulder stability and light muscular activation. Reccommended pt to increase dosage in shoulder ER stretch at side using a cane.     Goals:   To be met in 6-8 weeks post op   Pt will report improved ability to sleep without waking due to shoulder pain  Pt will improve R shoulder flexion PROM to >150 degrees to be able to reach into shoulder height cabinets when cleared for AROM  Pt will improve R shoulder abduction PROM to >130 degrees to improve ability to don deodorant, don/doff shirts, and wash hair when cleared for AROM  Pt will increase shoulder PROM ER to >80 degrees at 90 deg abduction to reach and fasten seatbelt when cleared for AROM  Pt will be independent and compliant with comprehensive HEP to maintain progress achieved in PT      To be met in 8-10 weeks post op   Pt will improve shoulder flexion AROM  to >150 degrees to be able to reach into overhead cabinets without pain or restriction   Pt will improve shoulder abduction AROM to >130 degrees to improve ability to don deodorant, don/doff shirts, and wash hair   Pt will increase shoulder AROM ER to 80 degrees to be able to reach and fasten seatbelt   Pt will increase shoulder AROM IR to 70 degrees to be able to reach in back pocket, tuck in shirt, and turn steering wheel without pain  Pt will be independent and compliant with comprehensive HEP to maintain progress achieved in PT      To be met 4-6 months post op (total visits of PT: ~35)  Pt will improve shoulder strength throughout to 4+/5 to improve function with carrying groceries and wash floors and vacuum    Pt will demonstrate increased mid/low trap strength to 4/5 to promote improved shoulder mechanics and stabilization with lifting and reaching   Pt will be independent and compliant with comprehensive HEP to maintain progress achieved in PT        Plan: Progress PROM and AAROM as tolerated  Date: 9/27/2024  TX#: 2/15 Date: 10/1/24               TX#: 3/15 Date:10/4/24                 TX#: 4/15 Date: 10/8/24                TX#: 5/15 Date:10/11/24  Tx#: 6/15   TherEx: 30 min  -scap retractions x20  -AAROM elbow flexion: x20  -pendulums every direction x5 min  -table slides shoulder flexion: x20  -PROM to tolerance      Hold  Self ER stretch at wall w/ cane  Deltoid and RTC isometrics   TherEx: 45 min  -FR table slides shoulder flexion and abduction: x20 each  -Self ER stretch at wall w/ cane: 10 sec x10 reps  -sub-max Deltoid and RTC isometrics: 5 sec, x10 each  -supine IR/ER in scap plane w/ cane: 10 sec x10 reps  -PROM to tolerance TherEx: 45 min  -FR table slides shoulder flexion and abduction: x20 each  -standing AAROM flexion and abduction: w/ stick vertical x20 each  -Pulleys's: 2'/2'  -Self ER stretch at wall w/ cane: 10 sec x5 reps  -sub-max Deltoid and RTC isometrics: 5 sec, x15 each  -Supine AAROM  flexion hands together  -supine IR/ER in scap plane w/ cane: 10 sec x10 reps  -PROM to tolerance     TherEx: 45 min  -FR table slides shoulder flexion and abduction: x20 each  -standing AAROM flexion and abduction: w/ stick vertical x20 each  -Pulleys's: 2'/2'  -Self ER stretch at wall w/ cane: 10 sec x5 reps  -sub-max Deltoid and RTC isometrics: 5 sec, x15 each  -Supine AAROM flexion hands together: x20  -supine IR/ER in scap plane w/ cane: 10 sec x10 reps  -PROM to tolerance  -RS IR/ER in scap plane and 90 deg 2x30 sec-hold out of time  -RS flexion at 100 and 125 flexion x30 sec each-hold out of time        Hold till 3 weeks  -s/l shoulder AAROM abd/flexion/ER with therapist assist  Hold till 4 weeks  -ER/IR reactive iso  -Bent over rows and shoulder ext TherEx: 45 min  -FR table slides shoulder flexion and abduction: x20 each  -standing AAROM flexion and abduction: w/ stick vertical x20 each  -Pulleys's: 2'/2'  -Self ER stretch at wall w/ cane: 10 sec x5 reps  -sub-max Deltoid and RTC isometrics: 5 sec, x15 each  -Supine AAROM flexion  w/ stick: x20  -supine IR/ER in scap plane w/ cane: 10 sec x10 reps  -PROM to tolerance  -RS IR/ER in scap plane and 90 deg 2x30 sec  -RS flexion at 100 and 125 flexion x30 sec each        Hold till 3 weeks  -s/l shoulder AAROM abd/flexion/ER with therapist assist      Hold till 4 weeks  -ER/IR reactive iso  -Bent over rows and shoulder ext   Modalities: 15 min  Game ready x15 min (L) shoulder post tx  Modalities: 15 min  Game ready x15 min (L) shoulder post tx  Modalities: 10 min  Game ready x15 min (L) shoulder post tx  Modalities: 10 min  Game ready x15 min (L) shoulder post tx                   HEP:     Access Code: [email or web address]  URL: [email or web address]  Date: 10/04/2024  Prepared by: Batsheva Beckwith    Exercises  - Seated Bilateral Shoulder Flexion Towel Slide at Table Top  - 3 x daily - 7 x weekly - 2 sets - 10 reps  - Seated Shoulder Abduction Towel Slide at  Table Top  - 3 x daily - 7 x weekly - 2 sets - 10 reps  - Shoulder Flexion Overhead with Dowel  - 3 x daily - 7 x weekly - 3 sets - 10 reps  - Standing Shoulder Abduction AAROM with Dowel  - 3 x daily - 7 x weekly - 3 sets - 10 reps  - Standing Shoulder External Rotation AAROM with Dowel  - 3 x daily - 7 x weekly - 3 sets - 10-20 seconds hold  - Supine Shoulder Flexion AAROM with Hands Clasped  - 3 x daily - 7 x weekly - 3 sets - 10 reps  - Supine Shoulder External Rotation in 45 Degrees Abduction AAROM with Dowel  - 3 x daily - 7 x weekly - 3 sets - 10 reps - 10-20 seconds hold  - Standing Isometric Shoulder Flexion with Doorway - Arm Bent  - 1 x daily - 7 x weekly - 10 reps - 5 seconds hold  - Standing Isometric Shoulder Abduction with Doorway - Arm Bent  - 1 x daily - 7 x weekly - 10 reps - 5 seconds hold  - Isometric Shoulder Extension at Wall  - 1 x daily - 7 x weekly - 10 reps - 5 seconds hold  - Standing Isometric Shoulder Internal Rotation at Doorway  - 1 x daily - 7 x weekly - 10 reps - 5 seconds hold  - Standing Isometric Shoulder External Rotation with Doorway  - 1 x daily - 7 x weekly - 10 reps - 5 seconds hold    Charges: TherEx: 3 units      Total Timed Treatment: 45 min  Total Treatment Time: 45 min

## 2024-10-14 NOTE — PROGRESS NOTES
Diagnosis:   S/P arthroscopy of left shoulder (Z98.890)   (full supraspinatus tear and partial infraspinatus tear/repair)     Post op dx:  Left Shoulder Arthroscopy Rotator Cuff Repair Arthroscopic Biceps Tenodesis Subacromial Decompression       Referring Provider: Beverly Womack Date of Evaluation:    9/25/24    Precautions:   Activity Precautions: Passive ROMAT, 1 lb WB x 4 weeks. Begin PT POD #1.     Next MD visit:   none scheduled  Date of Surgery: 9/24/25    3 weeks post op on 10/15/24   Insurance Primary/Secondary: BLUE CROSS MEDICAID / N/A     # Auth Visits: 15 visits till 11/24            Subjective: Pt states she had to take Tramadol.     Pain: 2/10 at rest      Objective:     PROM: (* denotes performed with pain)  Shoulder    Flexion: L 165*  Abduction: L 150*  ER in scap plane: L 65*  ER at 0 deg abd: L 55*  ER at at 90 abd: L 70*  IR in scap plane: L 75*-NT       Assessment: Pt presenting with increased shoulder ER stiffness and pain with increased guarding. Continued to educate on importance of consistent stretching at home and to increase intensity and duration of stretch holds. She is progressed with s/l AAROM of the shoulder and table slides at a 45 deg angle with good tolerance.     Goals:   To be met in 6-8 weeks post op   Pt will report improved ability to sleep without waking due to shoulder pain  Pt will improve R shoulder flexion PROM to >150 degrees to be able to reach into shoulder height cabinets when cleared for AROM  Pt will improve R shoulder abduction PROM to >130 degrees to improve ability to don deodorant, don/doff shirts, and wash hair when cleared for AROM  Pt will increase shoulder PROM ER to >80 degrees at 90 deg abduction to reach and fasten seatbelt when cleared for AROM  Pt will be independent and compliant with comprehensive HEP to maintain progress achieved in PT      To be met in 8-10 weeks post op   Pt will improve shoulder flexion AROM to >150 degrees to be able to reach  into overhead cabinets without pain or restriction   Pt will improve shoulder abduction AROM to >130 degrees to improve ability to don deodorant, don/doff shirts, and wash hair   Pt will increase shoulder AROM ER to 80 degrees to be able to reach and fasten seatbelt   Pt will increase shoulder AROM IR to 70 degrees to be able to reach in back pocket, tuck in shirt, and turn steering wheel without pain  Pt will be independent and compliant with comprehensive HEP to maintain progress achieved in PT      To be met 4-6 months post op (total visits of PT: ~35)  Pt will improve shoulder strength throughout to 4+/5 to improve function with carrying groceries and wash floors and vacuum    Pt will demonstrate increased mid/low trap strength to 4/5 to promote improved shoulder mechanics and stabilization with lifting and reaching   Pt will be independent and compliant with comprehensive HEP to maintain progress achieved in PT        Plan: Progress PROM and AAROM as tolerated  Date: 9/27/2024  TX#: 2/15 Date: 10/1/24               TX#: 3/15 Date:10/4/24                 TX#: 4/15 Date: 10/8/24                TX#: 5/15 Date:10/11/24  Tx#: 6/15 Date:10/15/24  Tx#: 7/15   TherEx: 30 min  -scap retractions x20  -AAROM elbow flexion: x20  -pendulums every direction x5 min  -table slides shoulder flexion: x20  -PROM to tolerance      Hold  Self ER stretch at wall w/ cane  Deltoid and RTC isometrics   TherEx: 45 min  -FR table slides shoulder flexion and abduction: x20 each  -Self ER stretch at wall w/ cane: 10 sec x10 reps  -sub-max Deltoid and RTC isometrics: 5 sec, x10 each  -supine IR/ER in scap plane w/ cane: 10 sec x10 reps  -PROM to tolerance TherEx: 45 min  -FR table slides shoulder flexion and abduction: x20 each  -standing AAROM flexion and abduction: w/ stick vertical x20 each  -Pulleys's: 2'/2'  -Self ER stretch at wall w/ cane: 10 sec x5 reps  -sub-max Deltoid and RTC isometrics: 5 sec, x15 each  -Supine AAROM flexion  hands together  -supine IR/ER in scap plane w/ cane: 10 sec x10 reps  -PROM to tolerance     TherEx: 45 min  -FR table slides shoulder flexion and abduction: x20 each  -standing AAROM flexion and abduction: w/ stick vertical x20 each  -Pulleys's: 2'/2'  -Self ER stretch at wall w/ cane: 10 sec x5 reps  -sub-max Deltoid and RTC isometrics: 5 sec, x15 each  -Supine AAROM flexion hands together: x20  -supine IR/ER in scap plane w/ cane: 10 sec x10 reps  -PROM to tolerance  -RS IR/ER in scap plane and 90 deg 2x30 sec-hold out of time  -RS flexion at 100 and 125 flexion x30 sec each-hold out of time        Hold till 3 weeks  -s/l shoulder AAROM abd/flexion/ER with therapist assist  Hold till 4 weeks  -ER/IR reactive iso  -Bent over rows and shoulder ext TherEx: 45 min  -FR table slides shoulder flexion and abduction: x20 each  -standing AAROM flexion and abduction: w/ stick vertical x20 each  -Pulleys's: 2'/2'  -Self ER stretch at wall w/ cane: 10 sec x5 reps  -sub-max Deltoid and RTC isometrics: 5 sec, x15 each  -Supine AAROM flexion  w/ stick: x20  -supine IR/ER in scap plane w/ cane: 10 sec x10 reps  -PROM to tolerance  -RS IR/ER in scap plane and 90 deg 2x30 sec  -RS flexion at 100 and 125 flexion x30 sec each        Hold till 3 weeks  -s/l shoulder AAROM abd/flexion/ER with therapist assist      Hold till 4 weeks  -ER/IR reactive iso  -Bent over rows and shoulder ext TherEx: 45 min  -FR table slides shoulder flexion and abduction: x20 each  -standing AAROM flexion and abduction: w/ stick vertical x20 each  -Pulleys's: 2'/2'  -Self ER stretch at wall w/ cane: 10 sec x5 reps  -sub-max Deltoid and RTC isometrics: 5 sec, x15 each  -Supine AAROM flexion  w/ stick: x20  -supine IR/ER in scap plane w/ cane: 10 sec x10 reps  -s/l shoulder AAROM abd/flexion with therapist assist: x10 each  -s/l ER AROM: x20  -table slide flexion  + scaption 45 deg angle: x10   -PROM to tolerance  -RS IR/ER in scap plane and 90 deg 2x30  sec  -RS flexion at 100 and 125 flexion x30 sec each        Hold till 4 weeks  -ER/IR reactive iso  -Bent over rows and shoulder ext   Modalities: 15 min  Game ready x15 min (L) shoulder post tx  Modalities: 15 min  Game ready x15 min (L) shoulder post tx  Modalities: 10 min  Game ready x15 min (L) shoulder post tx  Modalities: 10 min  Game ready x15 min (L) shoulder post tx   Modalities: 10 min  Game ready x15 min (L) shoulder post tx                    HEP:     Access Code: [email or web address]  URL: [email or web address]  Date: 10/04/2024  Prepared by: Batsheva Beckwith    Exercises  - Seated Bilateral Shoulder Flexion Towel Slide at Table Top  - 3 x daily - 7 x weekly - 2 sets - 10 reps  - Seated Shoulder Abduction Towel Slide at Table Top  - 3 x daily - 7 x weekly - 2 sets - 10 reps  - Shoulder Flexion Overhead with Dowel  - 3 x daily - 7 x weekly - 3 sets - 10 reps  - Standing Shoulder Abduction AAROM with Dowel  - 3 x daily - 7 x weekly - 3 sets - 10 reps  - Standing Shoulder External Rotation AAROM with Dowel  - 3 x daily - 7 x weekly - 3 sets - 10-20 seconds hold  - Supine Shoulder Flexion AAROM with Hands Clasped  - 3 x daily - 7 x weekly - 3 sets - 10 reps  - Supine Shoulder External Rotation in 45 Degrees Abduction AAROM with Dowel  - 3 x daily - 7 x weekly - 3 sets - 10 reps - 10-20 seconds hold  - Standing Isometric Shoulder Flexion with Doorway - Arm Bent  - 1 x daily - 7 x weekly - 10 reps - 5 seconds hold  - Standing Isometric Shoulder Abduction with Doorway - Arm Bent  - 1 x daily - 7 x weekly - 10 reps - 5 seconds hold  - Isometric Shoulder Extension at Wall  - 1 x daily - 7 x weekly - 10 reps - 5 seconds hold  - Standing Isometric Shoulder Internal Rotation at Doorway  - 1 x daily - 7 x weekly - 10 reps - 5 seconds hold  - Standing Isometric Shoulder External Rotation with Doorway  - 1 x daily - 7 x weekly - 10 reps - 5 seconds hold    Charges: TherEx: 3 units, vasopneumatic device: x1 unit       Total Timed Treatment: 45 min  Total Treatment Time: 55 min

## 2024-10-15 ENCOUNTER — OFFICE VISIT (OUTPATIENT)
Dept: PHYSICAL THERAPY | Age: 44
End: 2024-10-15
Attending: ORTHOPAEDIC SURGERY
Payer: MEDICAID

## 2024-10-15 PROCEDURE — 97110 THERAPEUTIC EXERCISES: CPT | Performed by: PHYSICAL THERAPIST

## 2024-10-15 PROCEDURE — 97016 VASOPNEUMATIC DEVICE THERAPY: CPT | Performed by: PHYSICAL THERAPIST

## 2024-10-15 RX ORDER — MELOXICAM 15 MG/1
15 TABLET ORAL DAILY
Qty: 14 TABLET | Refills: 1 | Status: SHIPPED | OUTPATIENT
Start: 2024-10-15

## 2024-10-16 NOTE — PROGRESS NOTES
Diagnosis:   S/P arthroscopy of left shoulder (Z98.890)   (full supraspinatus tear and partial infraspinatus tear/repair)     Post op dx:  Left Shoulder Arthroscopy Rotator Cuff Repair Arthroscopic Biceps Tenodesis Subacromial Decompression       Referring Provider: Beverly Womack Date of Evaluation:    9/25/24    Precautions:   Activity Precautions: Passive ROMAT, 1 lb WB x 4 weeks. Begin PT POD #1.     Next MD visit:   none scheduled  Date of Surgery: 9/24/25    4 weeks post op on 10/22/24   Insurance Primary/Secondary: BLUE CROSS MEDICAID / N/A     # Auth Visits: 15 visits till 11/24            Subjective: Pt states no pain at rest. She has been taking Meloxicam the last 3 days. She chavarria snot have time to do the shoulder exercises 5x/day.     Pain: 0/10 at rest      Objective:     PROM: (* denotes performed with pain)  Shoulder    Flexion: L 165*  Abduction: L 155*  ER in scap plane: L 65*  ER at 0 deg abd: L 57*  ER at at 90 abd: L 65*  IR in scap plane: L 75*       Assessment: Pt presenting with reduced guarding during PROM stretching but no major changes in range noted today. She is introduced to various shoulder ER stretches as this is the most restricted movement> She presents with a mild post operative frozen shoulder as second most restricted movement is shoulder abduction. She reports pain with stretches but with in tolerance. Her HEP is updated to focus on ER mobility.     Goals:   To be met in 6-8 weeks post op   Pt will report improved ability to sleep without waking due to shoulder pain  Pt will improve R shoulder flexion PROM to >150 degrees to be able to reach into shoulder height cabinets when cleared for AROM  Pt will improve R shoulder abduction PROM to >130 degrees to improve ability to don deodorant, don/doff shirts, and wash hair when cleared for AROM  Pt will increase shoulder PROM ER to >80 degrees at 90 deg abduction to reach and fasten seatbelt when cleared for AROM  Pt will be independent  and compliant with comprehensive HEP to maintain progress achieved in PT      To be met in 8-10 weeks post op   Pt will improve shoulder flexion AROM to >150 degrees to be able to reach into overhead cabinets without pain or restriction   Pt will improve shoulder abduction AROM to >130 degrees to improve ability to don deodorant, don/doff shirts, and wash hair   Pt will increase shoulder AROM ER to 80 degrees to be able to reach and fasten seatbelt   Pt will increase shoulder AROM IR to 70 degrees to be able to reach in back pocket, tuck in shirt, and turn steering wheel without pain  Pt will be independent and compliant with comprehensive HEP to maintain progress achieved in PT      To be met 4-6 months post op (total visits of PT: ~35)  Pt will improve shoulder strength throughout to 4+/5 to improve function with carrying groceries and wash floors and vacuum    Pt will demonstrate increased mid/low trap strength to 4/5 to promote improved shoulder mechanics and stabilization with lifting and reaching   Pt will be independent and compliant with comprehensive HEP to maintain progress achieved in PT        Plan: Progress PROM and AAROM as tolerated, focus on improving ER and abduction ROM   Date:10/4/24                 TX#: 4/15 Date: 10/8/24                TX#: 5/15 Date:10/11/24  Tx#: 6/15 Date:10/15/24  Tx#: 7/15 Date:10/18/24  Tx#: 8/15   TherEx: 45 min  -FR table slides shoulder flexion and abduction: x20 each  -standing AAROM flexion and abduction: w/ stick vertical x20 each  -Pulleys's: 2'/2'  -Self ER stretch at wall w/ cane: 10 sec x5 reps  -sub-max Deltoid and RTC isometrics: 5 sec, x15 each  -Supine AAROM flexion hands together  -supine IR/ER in scap plane w/ cane: 10 sec x10 reps  -PROM to tolerance     TherEx: 45 min  -FR table slides shoulder flexion and abduction: x20 each  -standing AAROM flexion and abduction: w/ stick vertical x20 each  -Pulleys's: 2'/2'  -Self ER stretch at wall w/ cane: 10 sec x5  reps  -sub-max Deltoid and RTC isometrics: 5 sec, x15 each  -Supine AAROM flexion hands together: x20  -supine IR/ER in scap plane w/ cane: 10 sec x10 reps  -PROM to tolerance  -RS IR/ER in scap plane and 90 deg 2x30 sec-hold out of time  -RS flexion at 100 and 125 flexion x30 sec each-hold out of time        Hold till 3 weeks  -s/l shoulder AAROM abd/flexion/ER with therapist assist  Hold till 4 weeks  -ER/IR reactive iso  -Bent over rows and shoulder ext TherEx: 45 min  -FR table slides shoulder flexion and abduction: x20 each  -standing AAROM flexion and abduction: w/ stick vertical x20 each  -Pulleys's: 2'/2'  -Self ER stretch at wall w/ cane: 10 sec x5 reps  -sub-max Deltoid and RTC isometrics: 5 sec, x15 each  -Supine AAROM flexion  w/ stick: x20  -supine IR/ER in scap plane w/ cane: 10 sec x10 reps  -PROM to tolerance  -RS IR/ER in scap plane and 90 deg 2x30 sec  -RS flexion at 100 and 125 flexion x30 sec each        Hold till 3 weeks  -s/l shoulder AAROM abd/flexion/ER with therapist assist      Hold till 4 weeks  -ER/IR reactive iso  -Bent over rows and shoulder ext TherEx: 45 min  -FR table slides shoulder flexion and abduction: x20 each  -standing AAROM flexion and abduction: w/ stick vertical x20 each  -Pulleys's: 2'/2'  -Self ER stretch at wall w/ cane: 10 sec x5 reps  -sub-max Deltoid and RTC isometrics: 5 sec, x15 each  -Supine AAROM flexion  w/ stick: x20  -supine IR/ER in scap plane w/ cane: 10 sec x10 reps  -s/l shoulder AAROM abd/flexion with therapist assist: x10 each  -s/l ER AROM: x20  -table slide flexion  + scaption 45 deg angle: x10   -PROM to tolerance  -RS IR/ER in scap plane and 90 deg 2x30 sec  -RS flexion at 100 and 125 flexion x30 sec each        Hold till 4 weeks  -ER/IR reactive iso  -Bent over rows and shoulder ext TherEx: 45 min  -FR table slides shoulder flexion and abduction: x20 each  -standing AAROM flexion and abduction: w/ stick vertical x20 each  -Pulleys's: 2'/2'  -Self ER  stretch at wall w/ cane: 10 sec x5 reps  -ER stretch at wall with trunk rotations turn out: 10 sec x8 reps  -pec stretch in door for ER (W position): 30 sec x3   -sub-max Deltoid and RTC isometrics: 5 sec, x15 each  -Supine AAROM flexion  w/ stick: x20  -supine IR/ER in scap plane w/ cane: 10 sec x10 reps  -s/l shoulder AAROM abd/flexion with therapist assist: x10 each  -s/l ER AROM: x20  -table slide flexion  + scaption 45 deg angle: [id number] out of time   -PROM to tolerance  -RS IR/ER in scap plane and 90 deg 2x30 sec  -RS flexion at 100 and 125 flexion x30 sec each        Hold till 4 weeks  -ER/IR reactive iso  -Bent over rows and shoulder ext   Modalities: 10 min  Game ready x15 min (L) shoulder post tx  Modalities: 10 min  Game ready x15 min (L) shoulder post tx   Modalities: 10 min  Game ready x15 min (L) shoulder post tx  Modalities: 10 min  Game ready x15 min (L) shoulder post tx                 HEP:   Access Code: [email or web address]  URL: [email or web address]  Date: 10/18/2024  Prepared by: Batsheva Beckwith    Exercises  - Standing Shoulder External Rotation AAROM with Dowel  - 3 x daily - 7 x weekly - 3 sets - 10-20 seconds hold  - Standing Shoulder External Rotation Stretch at Wall  - 3 x daily - 7 x weekly - 3 sets - 20 sec hold  - Doorway Pec Stretch at 60 Elevation  - 3 x daily - 7 x weekly - 3 sets - 20 sec hold  - Seated Shoulder External Rotation PROM on Table  - 3 x daily - 7 x weekly - 3 sets - 20 sec hold  - Supine Chest Stretch with Elbows Bent  - 3 x daily - 7 x weekly - 3 sets - 20 sec hold  - Supine Shoulder External Rotation in 45 Degrees Abduction AAROM with Dowel  - 3 x daily - 7 x weekly - 3 sets - 20 seconds hold  - Supine Shoulder Flexion with Dowel  - 3 x daily - 7 x weekly - 2 sets - 10 reps  - Seated Bilateral Shoulder Flexion Towel Slide at Table Top  - 3 x daily - 7 x weekly - 2 sets - 10 reps  - Seated Shoulder Abduction Towel Slide at Table Top  - 3 x daily - 7 x weekly -  2 sets - 10 reps  - Shoulder Flexion Overhead with Dowel  - 3 x daily - 7 x weekly - 3 sets - 10 reps  - Standing Shoulder Abduction AAROM with Dowel  - 3 x daily - 7 x weekly - 3 sets - 10 reps    Charges: TherEx: 3 units, vasopneumatic device: x1 unit      Total Timed Treatment: 45 min  Total Treatment Time: 55 min

## 2024-10-18 ENCOUNTER — OFFICE VISIT (OUTPATIENT)
Dept: PHYSICAL THERAPY | Age: 44
End: 2024-10-18
Attending: ORTHOPAEDIC SURGERY
Payer: MEDICAID

## 2024-10-18 PROCEDURE — 97016 VASOPNEUMATIC DEVICE THERAPY: CPT | Performed by: PHYSICAL THERAPIST

## 2024-10-18 PROCEDURE — 97110 THERAPEUTIC EXERCISES: CPT | Performed by: PHYSICAL THERAPIST

## 2024-10-21 NOTE — PROGRESS NOTES
Diagnosis:   S/P arthroscopy of left shoulder (Z98.890)   (full supraspinatus tear and partial infraspinatus tear/repair)     Post op dx:  Left Shoulder Arthroscopy Rotator Cuff Repair Arthroscopic Biceps Tenodesis Subacromial Decompression       Referring Provider: Beverly Womack Date of Evaluation:    9/25/24    Precautions:   Activity Precautions: Passive ROMAT, 1 lb WB x 4 weeks. Begin PT POD #1.     Next MD visit:   none scheduled  Date of Surgery: 9/24/25    4 weeks post op on 10/22/24   Insurance Primary/Secondary: BLUE CROSS MEDICAID / N/A     # Auth Visits: 15 visits till 11/24            Subjective: Pt states pain only occurs when she is exercising. She stretched less this weekend due to preparations hosting a party. Overall, pain has improved.   Pain: 0/10 at rest      Objective:     PROM: (* denotes performed with pain)  Shoulder    Flexion: L 165*  Abduction: L 150*  ER in scap plane: L 70*  ER at 0 deg abd: L 63*  ER at at 90 abd: L 78*  IR in scap plane: L 75*       Assessment: Pt improved ER PROM but reduced shoulder abduction ROM. She is progressed with prone parascapular strengthening and wall wash on wall to promote strength and improved ability to reach once she comes out of the sling.     Goals:   To be met in 6-8 weeks post op   Pt will report improved ability to sleep without waking due to shoulder pain  Pt will improve R shoulder flexion PROM to >150 degrees to be able to reach into shoulder height cabinets when cleared for AROM  Pt will improve R shoulder abduction PROM to >130 degrees to improve ability to don deodorant, don/doff shirts, and wash hair when cleared for AROM  Pt will increase shoulder PROM ER to >80 degrees at 90 deg abduction to reach and fasten seatbelt when cleared for AROM  Pt will be independent and compliant with comprehensive HEP to maintain progress achieved in PT      To be met in 8-10 weeks post op   Pt will improve shoulder flexion AROM to >150 degrees to be able to  reach into overhead cabinets without pain or restriction   Pt will improve shoulder abduction AROM to >130 degrees to improve ability to don deodorant, don/doff shirts, and wash hair   Pt will increase shoulder AROM ER to 80 degrees to be able to reach and fasten seatbelt   Pt will increase shoulder AROM IR to 70 degrees to be able to reach in back pocket, tuck in shirt, and turn steering wheel without pain  Pt will be independent and compliant with comprehensive HEP to maintain progress achieved in PT      To be met 4-6 months post op (total visits of PT: ~35)  Pt will improve shoulder strength throughout to 4+/5 to improve function with carrying groceries and wash floors and vacuum    Pt will demonstrate increased mid/low trap strength to 4/5 to promote improved shoulder mechanics and stabilization with lifting and reaching   Pt will be independent and compliant with comprehensive HEP to maintain progress achieved in PT        Plan: Progress PROM and AAROM as tolerated, focus on improving ER and abduction ROM   Date:10/4/24                 TX#: 4/15 Date: 10/8/24                TX#: 5/15 Date:10/11/24  Tx#: 6/15 Date:10/15/24  Tx#: 7/15 Date:10/18/24  Tx#: 8/15 Date:10/22/24  Tx#: 9/15   TherEx: 45 min  -FR table slides shoulder flexion and abduction: x20 each  -standing AAROM flexion and abduction: w/ stick vertical x20 each  -Pulleys's: 2'/2'  -Self ER stretch at wall w/ cane: 10 sec x5 reps  -sub-max Deltoid and RTC isometrics: 5 sec, x15 each  -Supine AAROM flexion hands together  -supine IR/ER in scap plane w/ cane: 10 sec x10 reps  -PROM to tolerance     TherEx: 45 min  -FR table slides shoulder flexion and abduction: x20 each  -standing AAROM flexion and abduction: w/ stick vertical x20 each  -Pulleys's: 2'/2'  -Self ER stretch at wall w/ cane: 10 sec x5 reps  -sub-max Deltoid and RTC isometrics: 5 sec, x15 each  -Supine AAROM flexion hands together: x20  -supine IR/ER in scap plane w/ cane: 10 sec x10  reps  -PROM to tolerance  -RS IR/ER in scap plane and 90 deg 2x30 sec-hold out of time  -RS flexion at 100 and 125 flexion x30 sec each-hold out of time        Hold till 3 weeks  -s/l shoulder AAROM abd/flexion/ER with therapist assist  Hold till 4 weeks  -ER/IR reactive iso  -Bent over rows and shoulder ext TherEx: 45 min  -FR table slides shoulder flexion and abduction: x20 each  -standing AAROM flexion and abduction: w/ stick vertical x20 each  -Pulleys's: 2'/2'  -Self ER stretch at wall w/ cane: 10 sec x5 reps  -sub-max Deltoid and RTC isometrics: 5 sec, x15 each  -Supine AAROM flexion  w/ stick: x20  -supine IR/ER in scap plane w/ cane: 10 sec x10 reps  -PROM to tolerance  -RS IR/ER in scap plane and 90 deg 2x30 sec  -RS flexion at 100 and 125 flexion x30 sec each        Hold till 3 weeks  -s/l shoulder AAROM abd/flexion/ER with therapist assist      Hold till 4 weeks  -ER/IR reactive iso  -Bent over rows and shoulder ext TherEx: 45 min  -FR table slides shoulder flexion and abduction: x20 each  -standing AAROM flexion and abduction: w/ stick vertical x20 each  -Pulleys's: 2'/2'  -Self ER stretch at wall w/ cane: 10 sec x5 reps  -sub-max Deltoid and RTC isometrics: 5 sec, x15 each  -Supine AAROM flexion  w/ stick: x20  -supine IR/ER in scap plane w/ cane: 10 sec x10 reps  -s/l shoulder AAROM abd/flexion with therapist assist: x10 each  -s/l ER AROM: x20  -table slide flexion  + scaption 45 deg angle: x10   -PROM to tolerance  -RS IR/ER in scap plane and 90 deg 2x30 sec  -RS flexion at 100 and 125 flexion x30 sec each        Hold till 4 weeks  -ER/IR reactive iso  -Bent over rows and shoulder ext TherEx: 45 min  -FR table slides shoulder flexion and abduction: x20 each  -standing AAROM flexion and abduction: w/ stick vertical x20 each  -Pulleys's: 2'/2'  -Self ER stretch at wall w/ cane: 10 sec x5 reps  -ER stretch at wall with trunk rotations turn out: 10 sec x8 reps  -pec stretch in door for ER (W position):  30 sec x3   -sub-max Deltoid and RTC isometrics: 5 sec, x15 each  -Supine AAROM flexion  w/ stick: x20  -supine IR/ER in scap plane w/ cane: 10 sec x10 reps  -s/l shoulder AAROM abd/flexion with therapist assist: x10 each  -s/l ER AROM: x20  -table slide flexion  + scaption 45 deg angle: [id number] out of time   -PROM to tolerance  -RS IR/ER in scap plane and 90 deg 2x30 sec  -RS flexion at 100 and 125 flexion x30 sec each        Hold till 4 weeks  -ER/IR reactive iso  -Bent over rows and shoulder ext TherEx: 45 min  -FR table slides shoulder flexion and abduction: x20 each  -standing AAROM flexion and abduction: w/ stick vertical x20 each  -Pulleys's: 2'/2'  -Self ER stretch at wall w/ cane: 20 sec x5 reps  -ER stretch at wall with trunk rotations turn out: 10 sec x8 reps  -pec stretch in door for ER (W position): 30 sec x3   -Supine AAROM flexion  w/ stick: 3#,  x20  -supine IR/ER in scap plane + 90/90 w/ cane: 10 sec x10 reps  -s/l shoulder AROM abd/flexion with therapist assist: x10 each-hold  -s/l ER AROM: [id number]  -wall slide flexion  +abduction: x15  -ER/IR reactive iso: RTB, 2x10  -Bent over rows and shoulder ext: 0#, 2x10    -PROM to tolerance   Modalities: 10 min  Game ready x15 min (L) shoulder post tx  Modalities: 10 min  Game ready x15 min (L) shoulder post tx   Modalities: 10 min  Game ready x15 min (L) shoulder post tx  Modalities: 10 min  Game ready x15 min (L) shoulder post tx Modalities: 10 min  Game ready x10 min (L) shoulder post tx                   HEP:   Access Code: [email or web address]  URL: [email or web address]  Date: 10/18/2024  Prepared by: Batsheva Beckwith    Exercises  - Standing Shoulder External Rotation AAROM with Dowel  - 3 x daily - 7 x weekly - 3 sets - 10-20 seconds hold  - Standing Shoulder External Rotation Stretch at Wall  - 3 x daily - 7 x weekly - 3 sets - 20 sec hold  - Doorway Pec Stretch at 60 Elevation  - 3 x daily - 7 x weekly - 3 sets - 20 sec hold  - Seated  Shoulder External Rotation PROM on Table  - 3 x daily - 7 x weekly - 3 sets - 20 sec hold  - Supine Chest Stretch with Elbows Bent  - 3 x daily - 7 x weekly - 3 sets - 20 sec hold  - Supine Shoulder External Rotation in 45 Degrees Abduction AAROM with Dowel  - 3 x daily - 7 x weekly - 3 sets - 20 seconds hold  - Supine Shoulder Flexion with Dowel  - 3 x daily - 7 x weekly - 2 sets - 10 reps  - Seated Bilateral Shoulder Flexion Towel Slide at Table Top  - 3 x daily - 7 x weekly - 2 sets - 10 reps  - Seated Shoulder Abduction Towel Slide at Table Top  - 3 x daily - 7 x weekly - 2 sets - 10 reps  - Shoulder Flexion Overhead with Dowel  - 3 x daily - 7 x weekly - 3 sets - 10 reps  - Standing Shoulder Abduction AAROM with Dowel  - 3 x daily - 7 x weekly - 3 sets - 10 reps    Charges: TherEx: 3 units, vasopneumatic device: x1 unit      Total Timed Treatment: 45 min  Total Treatment Time: 55 min

## 2024-10-22 ENCOUNTER — OFFICE VISIT (OUTPATIENT)
Dept: PHYSICAL THERAPY | Age: 44
End: 2024-10-22
Attending: ORTHOPAEDIC SURGERY
Payer: MEDICAID

## 2024-10-22 PROCEDURE — 97110 THERAPEUTIC EXERCISES: CPT | Performed by: PHYSICAL THERAPIST

## 2024-10-22 PROCEDURE — 97016 VASOPNEUMATIC DEVICE THERAPY: CPT | Performed by: PHYSICAL THERAPIST

## 2024-10-23 NOTE — PROGRESS NOTES
Diagnosis:   S/P arthroscopy of left shoulder (Z98.890)   (full supraspinatus tear and partial infraspinatus tear/repair)     Post op dx:  Left Shoulder Arthroscopy Rotator Cuff Repair Arthroscopic Biceps Tenodesis Subacromial Decompression       Referring Provider: Beverly Womack Date of Evaluation:    9/25/24    Precautions:   Activity Precautions: Passive ROMAT, 1 lb WB x 4 weeks. Begin PT POD #1.     Next MD visit:   none scheduled  Date of Surgery: 9/24/25    4 weeks post op on 10/22/24   Insurance Primary/Secondary: BLUE CROSS MEDICAID / N/A     # Auth Visits: 15 visits till 11/24            Subjective: Pt states no pain at rest just with HEP stretches.   Pain: 0/10 at rest      Objective:     PROM: (* denotes performed with pain)  Shoulder    Flexion: L 167*  Abduction: L 160*  ER in scap plane: L 70*  ER at 0 deg abd: L 65*  ER at at 90 abd: L 78*  IR in scap plane: L 75*       Assessment: She makes mild improvements in shoulder ER and flexion but increased abduction by 10 degrees. Initiated behind the back stretches to improve pt's ability to pull up her pants, tuck in her shirts and don/doff undergarments. Initiated finger latter to improve AAROM which she performs to fatigue.       Goals:   To be met in 6-8 weeks post op   Pt will report improved ability to sleep without waking due to shoulder pain  Pt will improve R shoulder flexion PROM to >150 degrees to be able to reach into shoulder height cabinets when cleared for AROM  Pt will improve R shoulder abduction PROM to >130 degrees to improve ability to don deodorant, don/doff shirts, and wash hair when cleared for AROM  Pt will increase shoulder PROM ER to >80 degrees at 90 deg abduction to reach and fasten seatbelt when cleared for AROM  Pt will be independent and compliant with comprehensive HEP to maintain progress achieved in PT      To be met in 8-10 weeks post op   Pt will improve shoulder flexion AROM to >150 degrees to be able to reach into  overhead cabinets without pain or restriction   Pt will improve shoulder abduction AROM to >130 degrees to improve ability to don deodorant, don/doff shirts, and wash hair   Pt will increase shoulder AROM ER to 80 degrees to be able to reach and fasten seatbelt   Pt will increase shoulder AROM IR to 70 degrees to be able to reach in back pocket, tuck in shirt, and turn steering wheel without pain  Pt will be independent and compliant with comprehensive HEP to maintain progress achieved in PT      To be met 4-6 months post op (total visits of PT: ~35)  Pt will improve shoulder strength throughout to 4+/5 to improve function with carrying groceries and wash floors and vacuum    Pt will demonstrate increased mid/low trap strength to 4/5 to promote improved shoulder mechanics and stabilization with lifting and reaching   Pt will be independent and compliant with comprehensive HEP to maintain progress achieved in PT        Plan: Progress note next visits  Date:10/11/24  Tx#: 6/15 Date:10/15/24  Tx#: 7/15 Date:10/18/24  Tx#: 8/15 Date:10/22/24  Tx#: 9/15 Date:10/25/24  Tx#: 10/15   TherEx: 45 min  -FR table slides shoulder flexion and abduction: x20 each  -standing AAROM flexion and abduction: w/ stick vertical x20 each  -Pulleys's: 2'/2'  -Self ER stretch at wall w/ cane: 10 sec x5 reps  -sub-max Deltoid and RTC isometrics: 5 sec, x15 each  -Supine AAROM flexion  w/ stick: x20  -supine IR/ER in scap plane w/ cane: 10 sec x10 reps  -PROM to tolerance  -RS IR/ER in scap plane and 90 deg 2x30 sec  -RS flexion at 100 and 125 flexion x30 sec each        Hold till 3 weeks  -s/l shoulder AAROM abd/flexion/ER with therapist assist      Hold till 4 weeks  -ER/IR reactive iso  -Bent over rows and shoulder ext TherEx: 45 min  -FR table slides shoulder flexion and abduction: x20 each  -standing AAROM flexion and abduction: w/ stick vertical x20 each  -Pulleys's: 2'/2'  -Self ER stretch at wall w/ cane: 10 sec x5 reps  -sub-max  Deltoid and RTC isometrics: 5 sec, x15 each  -Supine AAROM flexion  w/ stick: x20  -supine IR/ER in scap plane w/ cane: 10 sec x10 reps  -s/l shoulder AAROM abd/flexion with therapist assist: x10 each  -s/l ER AROM: x20  -table slide flexion  + scaption 45 deg angle: x10   -PROM to tolerance  -RS IR/ER in scap plane and 90 deg 2x30 sec  -RS flexion at 100 and 125 flexion x30 sec each        Hold till 4 weeks  -ER/IR reactive iso  -Bent over rows and shoulder ext TherEx: 45 min  -FR table slides shoulder flexion and abduction: x20 each  -standing AAROM flexion and abduction: w/ stick vertical x20 each  -Pulleys's: 2'/2'  -Self ER stretch at wall w/ cane: 10 sec x5 reps  -ER stretch at wall with trunk rotations turn out: 10 sec x8 reps  -pec stretch in door for ER (W position): 30 sec x3   -sub-max Deltoid and RTC isometrics: 5 sec, x15 each  -Supine AAROM flexion  w/ stick: x20  -supine IR/ER in scap plane w/ cane: 10 sec x10 reps  -s/l shoulder AAROM abd/flexion with therapist assist: x10 each  -s/l ER AROM: x20  -table slide flexion  + scaption 45 deg angle: [id number] out of time   -PROM to tolerance  -RS IR/ER in scap plane and 90 deg 2x30 sec  -RS flexion at 100 and 125 flexion x30 sec each        Hold till 4 weeks  -ER/IR reactive iso  -Bent over rows and shoulder ext TherEx: 45 min  -FR table slides shoulder flexion and abduction: x20 each  -standing AAROM flexion and abduction: w/ stick vertical x20 each  -Pulleys's: 2'/2'  -Self ER stretch at wall w/ cane: 20 sec x5 reps  -ER stretch at wall with trunk rotations turn out: 10 sec x8 reps  -pec stretch in door for ER (W position): 30 sec x3   -Supine AAROM flexion  w/ stick: 3#,  x20  -supine IR/ER in scap plane + 90/90 w/ cane: 10 sec x10 reps  -s/l shoulder AROM abd/flexion with therapist assist: x10 each-hold  -s/l ER AROM: [id number]  -wall slide flexion  +abduction: x15  -ER/IR reactive iso: RTB, 2x10  -Bent over rows and shoulder ext: 0#, 2x10    -PROM to  tolerance TherEx: 50 min  -standing AAROM flexion and abduction: w/ stick vertical x20 each  -Pulleys's: 2'/2'  -Self ER stretch at wall w/ cane: 20 sec x5 reps  -Behind the back stretch w/ strap: 10 sec x10  -finger ladder: flexion/abd x5 each  -ER stretch at wall with trunk rotations turn out: 10 sec x8 reps  -pec stretch in door for ER (W position): 30 sec x3   -wall slide flexion  +abduction: x10  -Supine AAROM flexion  w/ stick: 3#,  x20-HEP  -supine IR/ER in scap plane + 90/90 w/ cane: 10 sec x10 reps-HEP  -s/l shoulder AROM abd/flexion: x10 each  -s/l ER AROM: [id number]  -ER/IR reactive iso: RTB, 2x10  -Bent over rows and shoulder ext: 0#, 2x10  -PROM to tolerance    Modalities: 10 min  Game ready x15 min (L) shoulder post tx  Modalities: 10 min  Game ready x15 min (L) shoulder post tx Modalities: 10 min  Game ready x10 min (L) shoulder post tx Modalities: 10 min  Game ready x10 min (L) shoulder post tx                 HEP:   Access Code: [email or web address]  URL: [email or web address]  Date: 10/18/2024  Prepared by: Batsheva Beckwith    Exercises  - Standing Shoulder External Rotation AAROM with Dowel  - 3 x daily - 7 x weekly - 3 sets - 10-20 seconds hold  - Standing Shoulder External Rotation Stretch at Wall  - 3 x daily - 7 x weekly - 3 sets - 20 sec hold  - Doorway Pec Stretch at 60 Elevation  - 3 x daily - 7 x weekly - 3 sets - 20 sec hold  - Seated Shoulder External Rotation PROM on Table  - 3 x daily - 7 x weekly - 3 sets - 20 sec hold  - Supine Chest Stretch with Elbows Bent  - 3 x daily - 7 x weekly - 3 sets - 20 sec hold  - Supine Shoulder External Rotation in 45 Degrees Abduction AAROM with Dowel  - 3 x daily - 7 x weekly - 3 sets - 20 seconds hold  - Supine Shoulder Flexion with Dowel  - 3 x daily - 7 x weekly - 2 sets - 10 reps  - Seated Bilateral Shoulder Flexion Towel Slide at Table Top  - 3 x daily - 7 x weekly - 2 sets - 10 reps  - Seated Shoulder Abduction Towel Slide at Table Top  - 3 x  daily - 7 x weekly - 2 sets - 10 reps  - Shoulder Flexion Overhead with Dowel  - 3 x daily - 7 x weekly - 3 sets - 10 reps  - Standing Shoulder Abduction AAROM with Dowel  - 3 x daily - 7 x weekly - 3 sets - 10 reps    Charges: TherEx: 3 units, vasopneumatic device: x1 unit      Total Timed Treatment: 50 min  Total Treatment Time: 60 min

## 2024-10-25 ENCOUNTER — OFFICE VISIT (OUTPATIENT)
Dept: PHYSICAL THERAPY | Age: 44
End: 2024-10-25
Attending: ORTHOPAEDIC SURGERY
Payer: MEDICAID

## 2024-10-25 PROCEDURE — 97016 VASOPNEUMATIC DEVICE THERAPY: CPT | Performed by: PHYSICAL THERAPIST

## 2024-10-25 PROCEDURE — 97110 THERAPEUTIC EXERCISES: CPT | Performed by: PHYSICAL THERAPIST

## 2024-10-28 NOTE — PROGRESS NOTES
Diagnosis:   S/P arthroscopy of left shoulder (Z98.890)   (full supraspinatus tear and partial infraspinatus tear/repair)     Post op dx:  Left Shoulder Arthroscopy Rotator Cuff Repair Arthroscopic Biceps Tenodesis Subacromial Decompression       Referring Provider: Beverly Womack Date of Evaluation:    9/25/24    Precautions:   Activity Precautions: Passive ROMAT, 1 lb WB x 4 weeks. Begin PT POD #1.     Next MD visit:   none scheduled  Date of Surgery: 9/24/25    4 weeks post op on 10/22/24   Insurance Primary/Secondary: BLUE CROSS MEDICAID / N/A     # Auth Visits: 15 visits till 11/24            Subjective: Pt states she did not take anything for pain before PT because she forgot. Overall shoulder is doing alright.     Pain: 0/10 at rest      Objective:     PROM: (* denotes performed with pain)  Shoulder    Flexion: L 170*  Abduction: L 170*  ER in scap plane: L 70*  ER at 0 deg abd: L 70*  ER at at 90 abd: L 78*  IR in scap plane: L 75*       AROM: (* denotes performed with pain)  Shoulder    Flexion: L 120*  Abduction: L 90*  ER at side: L 50*  IR behind the back reach: L to L4*       Assessment: She progressed strengthening exercises with resisted rows, shoulder extensions and transitioned to RTC isotonic strengthening. She demonstrates very good tolerance. Continued efforts on promoting normalized PROM with therapist stretching. Some guarding noted but demonstrating gradual improvements.       Goals:   To be met in 6-8 weeks post op   Pt will report improved ability to sleep without waking due to shoulder pain  Pt will improve R shoulder flexion PROM to >150 degrees to be able to reach into shoulder height cabinets when cleared for AROM  Pt will improve R shoulder abduction PROM to >130 degrees to improve ability to don deodorant, don/doff shirts, and wash hair when cleared for AROM  Pt will increase shoulder PROM ER to >80 degrees at 90 deg abduction to reach and fasten seatbelt when cleared for AROM  Pt will  be independent and compliant with comprehensive HEP to maintain progress achieved in PT      To be met in 8-10 weeks post op   Pt will improve shoulder flexion AROM to >150 degrees to be able to reach into overhead cabinets without pain or restriction   Pt will improve shoulder abduction AROM to >130 degrees to improve ability to don deodorant, don/doff shirts, and wash hair   Pt will increase shoulder AROM ER to 80 degrees to be able to reach and fasten seatbelt   Pt will increase shoulder AROM IR to 70 degrees to be able to reach in back pocket, tuck in shirt, and turn steering wheel without pain  Pt will be independent and compliant with comprehensive HEP to maintain progress achieved in PT      To be met 4-6 months post op (total visits of PT: ~35)  Pt will improve shoulder strength throughout to 4+/5 to improve function with carrying groceries and wash floors and vacuum    Pt will demonstrate increased mid/low trap strength to 4/5 to promote improved shoulder mechanics and stabilization with lifting and reaching   Pt will be independent and compliant with comprehensive HEP to maintain progress achieved in PT        Plan:progress AROM and strength as tolerated  Date:10/11/24  Tx#: 6/15 Date:10/15/24  Tx#: 7/15 Date:10/18/24  Tx#: 8/15 Date:10/22/24  Tx#: 9/15 Date:10/25/24  Tx#: 10/15 Date:10/29/24  Tx#: 11/15   TherEx: 45 min  -FR table slides shoulder flexion and abduction: x20 each  -standing AAROM flexion and abduction: w/ stick vertical x20 each  -Pulleys's: 2'/2'  -Self ER stretch at wall w/ cane: 10 sec x5 reps  -sub-max Deltoid and RTC isometrics: 5 sec, x15 each  -Supine AAROM flexion  w/ stick: x20  -supine IR/ER in scap plane w/ cane: 10 sec x10 reps  -PROM to tolerance  -RS IR/ER in scap plane and 90 deg 2x30 sec  -RS flexion at 100 and 125 flexion x30 sec each        Hold till 3 weeks  -s/l shoulder AAROM abd/flexion/ER with therapist assist      Hold till 4 weeks  -ER/IR reactive iso  -Bent over  rows and shoulder ext TherEx: 45 min  -FR table slides shoulder flexion and abduction: x20 each  -standing AAROM flexion and abduction: w/ stick vertical x20 each  -Pulleys's: 2'/2'  -Self ER stretch at wall w/ cane: 10 sec x5 reps  -sub-max Deltoid and RTC isometrics: 5 sec, x15 each  -Supine AAROM flexion  w/ stick: x20  -supine IR/ER in scap plane w/ cane: 10 sec x10 reps  -s/l shoulder AAROM abd/flexion with therapist assist: x10 each  -s/l ER AROM: x20  -table slide flexion  + scaption 45 deg angle: x10   -PROM to tolerance  -RS IR/ER in scap plane and 90 deg 2x30 sec  -RS flexion at 100 and 125 flexion x30 sec each        Hold till 4 weeks  -ER/IR reactive iso  -Bent over rows and shoulder ext TherEx: 45 min  -FR table slides shoulder flexion and abduction: x20 each  -standing AAROM flexion and abduction: w/ stick vertical x20 each  -Pulleys's: 2'/2'  -Self ER stretch at wall w/ cane: 10 sec x5 reps  -ER stretch at wall with trunk rotations turn out: 10 sec x8 reps  -pec stretch in door for ER (W position): 30 sec x3   -sub-max Deltoid and RTC isometrics: 5 sec, x15 each  -Supine AAROM flexion  w/ stick: x20  -supine IR/ER in scap plane w/ cane: 10 sec x10 reps  -s/l shoulder AAROM abd/flexion with therapist assist: x10 each  -s/l ER AROM: x20  -table slide flexion  + scaption 45 deg angle: [id number] out of time   -PROM to tolerance  -RS IR/ER in scap plane and 90 deg 2x30 sec  -RS flexion at 100 and 125 flexion x30 sec each        Hold till 4 weeks  -ER/IR reactive iso  -Bent over rows and shoulder ext TherEx: 45 min  -FR table slides shoulder flexion and abduction: x20 each  -standing AAROM flexion and abduction: w/ stick vertical x20 each  -Pulleys's: 2'/2'  -Self ER stretch at wall w/ cane: 20 sec x5 reps  -ER stretch at wall with trunk rotations turn out: 10 sec x8 reps  -pec stretch in door for ER (W position): 30 sec x3   -Supine AAROM flexion  w/ stick: 3#,  x20  -supine IR/ER in scap plane + 90/90 w/  cane: 10 sec x10 reps  -s/l shoulder AROM abd/flexion with therapist assist: x10 each-hold  -s/l ER AROM: [id number]  -wall slide flexion  +abduction: x15  -ER/IR reactive iso: RTB, 2x10  -Bent over rows and shoulder ext: 0#, 2x10    -PROM to tolerance TherEx: 50 min  -standing AAROM flexion and abduction: w/ stick vertical x20 each  -Pulleys's: 2'/2'  -Self ER stretch at wall w/ cane: 20 sec x5 reps  -Behind the back stretch w/ strap: 10 sec x10  -finger ladder: flexion/abd x5 each  -ER stretch at wall with trunk rotations turn out: 10 sec x8 reps  -pec stretch in door for ER (W position): 30 sec x3   -wall slide flexion  +abduction: x10  -Supine AAROM flexion  w/ stick: 3#,  x20-HEP  -supine IR/ER in scap plane + 90/90 w/ cane: 10 sec x10 reps-HEP  -s/l shoulder AROM abd/flexion: x10 each  -s/l ER AROM: [id number]  -ER/IR reactive iso: RTB, 2x10  -Bent over rows and shoulder ext: 0#, 2x10  -PROM to tolerance TherEx: 50 min  -standing AAROM flexion and abduction: w/ stick vertical x20 each  -shoulder flexion stick horiz full range: x20  -Pulleys's: 2'/2'  -Self ER stretch at wall w/ cane: 20 sec x5 reps  -Behind the back stretch w/ strap: 10 sec x10  -finger ladder: flexion/abd x5 each  -ER stretch at wall with trunk rotations turn out: 10 sec x8 reps  -pec stretch in door for ER (W position): 30 sec x3   -wall slide flexion  +abduction: x15 each  -rows: GTB, 2x10  -shoulder ext: RTB, 2x10  -ER/IR isotonic: RTB, 2x10  -s/l shoulder AROM abd/flexion: x15 each  -s/l ER AROM: x20  -PROM to tolerance    Modalities: 10 min  Game ready x15 min (L) shoulder post tx  Modalities: 10 min  Game ready x15 min (L) shoulder post tx Modalities: 10 min  Game ready x10 min (L) shoulder post tx Modalities: 10 min  Game ready x10 min (L) shoulder post tx Modalities: 15 min  Game ready x10 min (L) shoulder post tx                   HEP:   Access Code: [email or web address]  URL: [email or web address]  Date: 10/18/2024  Prepared by:  Batsheva Beckwith    Exercises  - Standing Shoulder External Rotation AAROM with Dowel  - 3 x daily - 7 x weekly - 3 sets - 10-20 seconds hold  - Standing Shoulder External Rotation Stretch at Wall  - 3 x daily - 7 x weekly - 3 sets - 20 sec hold  - Doorway Pec Stretch at 60 Elevation  - 3 x daily - 7 x weekly - 3 sets - 20 sec hold  - Seated Shoulder External Rotation PROM on Table  - 3 x daily - 7 x weekly - 3 sets - 20 sec hold  - Supine Chest Stretch with Elbows Bent  - 3 x daily - 7 x weekly - 3 sets - 20 sec hold  - Supine Shoulder External Rotation in 45 Degrees Abduction AAROM with Dowel  - 3 x daily - 7 x weekly - 3 sets - 20 seconds hold  - Supine Shoulder Flexion with Dowel  - 3 x daily - 7 x weekly - 2 sets - 10 reps  - Seated Bilateral Shoulder Flexion Towel Slide at Table Top  - 3 x daily - 7 x weekly - 2 sets - 10 reps  - Seated Shoulder Abduction Towel Slide at Table Top  - 3 x daily - 7 x weekly - 2 sets - 10 reps  - Shoulder Flexion Overhead with Dowel  - 3 x daily - 7 x weekly - 3 sets - 10 reps  - Standing Shoulder Abduction AAROM with Dowel  - 3 x daily - 7 x weekly - 3 sets - 10 reps    Charges: TherEx: 3 units, vasopneumatic device: x1 unit      Total Timed Treatment: 50 min  Total Treatment Time: 65 min

## 2024-10-29 ENCOUNTER — OFFICE VISIT (OUTPATIENT)
Dept: PHYSICAL THERAPY | Age: 44
End: 2024-10-29
Attending: ORTHOPAEDIC SURGERY
Payer: MEDICAID

## 2024-10-29 PROCEDURE — 97016 VASOPNEUMATIC DEVICE THERAPY: CPT | Performed by: PHYSICAL THERAPIST

## 2024-10-29 PROCEDURE — 97110 THERAPEUTIC EXERCISES: CPT | Performed by: PHYSICAL THERAPIST

## 2024-10-30 ENCOUNTER — OFFICE VISIT (OUTPATIENT)
Dept: ORTHOPEDICS CLINIC | Facility: CLINIC | Age: 44
End: 2024-10-30
Payer: MEDICAID

## 2024-10-30 DIAGNOSIS — Z98.890 S/P ARTHROSCOPY OF SHOULDER: Primary | ICD-10-CM

## 2024-10-30 PROCEDURE — 99024 POSTOP FOLLOW-UP VISIT: CPT | Performed by: ORTHOPAEDIC SURGERY

## 2024-10-30 NOTE — PROGRESS NOTES
[facility]  [address] [place] [address]  [phone]       Name: Sandee Rodriguez   MRN: [id number]  Date: 10/30/2024     REASON FOR VISIT: Second Post-Surgical Visit   Surgery: Left shoulder arthroscopic rotator cuff repair, biceps tenodesis, subacromial decompression, extensive debridement on September 24, 2024.    INTERVAL HISTORY:  Sandee Eddy is a 44 year old female who returns after the aforementioned procedure.  The post-operative course has been unremarkable with pain well controlled and overall progress noted.     Physical therapy was started and is progressing well, twice a week. No pain. Overall, doing very well.     PE:   There were no vitals filed for this visit.  Estimated body mass index is 36.11 kg/m² as calculated from the following:    Height as of 10/4/24: 5' 5\" (1.651 m).    Weight as of 10/4/24: 217 lb.    Physical Exam  Constitutional:       Appearance: Normal appearance.   HENT:      Head: Normocephalic and atraumatic.   Eyes:      Extraocular Movements: Extraocular movements intact.   Neck:      Musculoskeletal: Normal range of motion and neck supple.   Cardiovascular:      Pulses: Normal pulses.   Pulmonary:      Effort: Pulmonary effort is normal. No respiratory distress.   Abdominal:      General: There is no distension.   Skin:     General: Skin is warm.      Capillary Refill: Capillary refill takes less than 2 seconds.      Findings: No bruising.   Neurological:      General: No focal deficit present.      Mental Status: She is alert.   Psychiatric:         Mood and Affect: Mood normal.     Examination of the left shoulder demonstrates:     Physical examination the patient is alert and oriented x3, well-developed, well-nourished, no acute distress.     , ER 45, IR to back pocket.     Incisional sites are clean dry intact without signs of active pathology.      The contralateral shoulder is without limitation in range of  motion or strength, no positive provocative maneuvers.     IMPRESSION: Sandee Eddy is a 44 year old female who presents 5 weeks s/p  Left shoulder arthroscopic rotator cuff repair, biceps tenodesis, subacromial decompression, extensive debridement on September 24, 2024.    PLAN:   We had a lengthy discussion with the patient regarding the patient's findings consistent with the expected postoperative course. We recommend continuation of physical therapy with rehabilitation efforts focused on strengthening, range of motion, functional ability, and return to baseline activity. The patient can continue to progress per protocol.    All questions were answered appropriately and the patient was in agreement with the treatment plan.       FOLLOW-UP:  Return to clinic in January. No imaging required at next visit.       Beverly Womack MD  Knee, Shoulder, & Elbow Surgery / Sports Medicine Specialist  Orthopaedic Surgery  [address] [place] [address].org  [email or web address]  t: [phone]  o: [phone]  f: [phone]     This note was dictated using Dragon software.  While it was briefly proofread prior to completion, some grammatical, spelling, and word choice errors due to dictation may still occur.

## 2024-11-01 ENCOUNTER — OFFICE VISIT (OUTPATIENT)
Dept: PHYSICAL THERAPY | Age: 44
End: 2024-11-01
Attending: INTERNAL MEDICINE
Payer: MEDICAID

## 2024-11-01 PROCEDURE — 97110 THERAPEUTIC EXERCISES: CPT | Performed by: PHYSICAL THERAPIST

## 2024-11-01 NOTE — PROGRESS NOTES
Diagnosis:   S/P arthroscopy of left shoulder (Z98.890)   (full supraspinatus tear and partial infraspinatus tear/repair)     Post op dx:  Left Shoulder Arthroscopy Rotator Cuff Repair Arthroscopic Biceps Tenodesis Subacromial Decompression       Referring Provider: Beverly Womack Date of Evaluation:    9/25/24    Precautions:   Activity Precautions: Passive ROMAT, 1 lb WB x 4 weeks. Begin PT POD #1.     Next MD visit:   none scheduled  Date of Surgery: 9/24/25    4 weeks post op on 10/22/24   Insurance Primary/Secondary: BLUE CROSS MEDICAID / N/A     # Auth Visits: 15 visits till 11/24            Subjective: Pt states shoulder I getting better each day.     Pain: 0/10 at rest      Objective:     AROM: (* denotes performed with pain)  Shoulder    Flexion: L 136*  Abduction: L 90*-NT  ER at side: L 50*-NT  IR behind the back reach: L to L4*-NT       Assessment: Pt progressed to AROM exercises to 90 deg with good tolerance. She also demonstrates improved overhead reaching. She tolerates tx well.       Goals:   To be met in 6-8 weeks post op   Pt will report improved ability to sleep without waking due to shoulder pain  Pt will improve R shoulder flexion PROM to >150 degrees to be able to reach into shoulder height cabinets when cleared for AROM  Pt will improve R shoulder abduction PROM to >130 degrees to improve ability to don deodorant, don/doff shirts, and wash hair when cleared for AROM  Pt will increase shoulder PROM ER to >80 degrees at 90 deg abduction to reach and fasten seatbelt when cleared for AROM  Pt will be independent and compliant with comprehensive HEP to maintain progress achieved in PT      To be met in 8-10 weeks post op   Pt will improve shoulder flexion AROM to >150 degrees to be able to reach into overhead cabinets without pain or restriction   Pt will improve shoulder abduction AROM to >130 degrees to improve ability to don deodorant, don/doff shirts, and wash hair   Pt will increase shoulder  AROM ER to 80 degrees to be able to reach and fasten seatbelt   Pt will increase shoulder AROM IR to 70 degrees to be able to reach in back pocket, tuck in shirt, and turn steering wheel without pain  Pt will be independent and compliant with comprehensive HEP to maintain progress achieved in PT      To be met 4-6 months post op (total visits of PT: ~35)  Pt will improve shoulder strength throughout to 4+/5 to improve function with carrying groceries and wash floors and vacuum    Pt will demonstrate increased mid/low trap strength to 4/5 to promote improved shoulder mechanics and stabilization with lifting and reaching   Pt will be independent and compliant with comprehensive HEP to maintain progress achieved in PT        Plan:progress AROM and strength as tolerated  Date:10/18/24  Tx#: 8/15 Date:10/22/24  Tx#: 9/15 Date:10/25/24  Tx#: 10/15 Date:10/29/24  Tx#: 11/15 Date:11/1/24  Tx#: 12/15   TherEx: 45 min  -FR table slides shoulder flexion and abduction: x20 each  -standing AAROM flexion and abduction: w/ stick vertical x20 each  -Pulleys's: 2'/2'  -Self ER stretch at wall w/ cane: 10 sec x5 reps  -ER stretch at wall with trunk rotations turn out: 10 sec x8 reps  -pec stretch in door for ER (W position): 30 sec x3   -sub-max Deltoid and RTC isometrics: 5 sec, x15 each  -Supine AAROM flexion  w/ stick: x20  -supine IR/ER in scap plane w/ cane: 10 sec x10 reps  -s/l shoulder AAROM abd/flexion with therapist assist: x10 each  -s/l ER AROM: x20  -table slide flexion  + scaption 45 deg angle: [id number] out of time   -PROM to tolerance  -RS IR/ER in scap plane and 90 deg 2x30 sec  -RS flexion at 100 and 125 flexion x30 sec each        Hold till 4 weeks  -ER/IR reactive iso  -Bent over rows and shoulder ext TherEx: 45 min  -FR table slides shoulder flexion and abduction: x20 each  -standing AAROM flexion and abduction: w/ stick vertical x20 each  -Pulleys's: 2'/2'  -Self ER stretch at wall w/ cane: 20 sec x5 reps  -ER  stretch at wall with trunk rotations turn out: 10 sec x8 reps  -pec stretch in door for ER (W position): 30 sec x3   -Supine AAROM flexion  w/ stick: 3#,  x20  -supine IR/ER in scap plane + 90/90 w/ cane: 10 sec x10 reps  -s/l shoulder AROM abd/flexion with therapist assist: x10 each-hold  -s/l ER AROM: [id number]  -wall slide flexion  +abduction: x15  -ER/IR reactive iso: RTB, 2x10  -Bent over rows and shoulder ext: 0#, 2x10    -PROM to tolerance TherEx: 50 min  -standing AAROM flexion and abduction: w/ stick vertical x20 each  -Pulleys's: 2'/2'  -Self ER stretch at wall w/ cane: 20 sec x5 reps  -Behind the back stretch w/ strap: 10 sec x10  -finger ladder: flexion/abd x5 each  -ER stretch at wall with trunk rotations turn out: 10 sec x8 reps  -pec stretch in door for ER (W position): 30 sec x3   -wall slide flexion  +abduction: x10  -Supine AAROM flexion  w/ stick: 3#,  x20-HEP  -supine IR/ER in scap plane + 90/90 w/ cane: 10 sec x10 reps-HEP  -s/l shoulder AROM abd/flexion: x10 each  -s/l ER AROM: [id number]  -ER/IR reactive iso: RTB, 2x10  -Bent over rows and shoulder ext: 0#, 2x10  -PROM to tolerance TherEx: 50 min  -standing AAROM flexion and abduction: w/ stick vertical x20 each  -shoulder flexion stick horiz full range: x20  -Pulleys's: 2'/2'  -Self ER stretch at wall w/ cane: 20 sec x5 reps  -Behind the back stretch w/ strap: 10 sec x10  -finger ladder: flexion/abd x5 each  -ER stretch at wall with trunk rotations turn out: 10 sec x8 reps  -pec stretch in door for ER (W position): 30 sec x3   -wall slide flexion  +abduction: x15 each  -rows: GTB, 2x10  -shoulder ext: RTB, 2x10  -ER/IR isotonic: RTB, 2x10  -s/l shoulder AROM abd/flexion: x15 each  -s/l ER AROM: x20  -PROM to tolerance TherEx: 55 min  UBE: 2'/2'  -standing AAROM flexion and abduction: w/ stick vertical x20 each  -shoulder flexion stick horiz full range: x20  -Pulleys's: 2'/2'  -Self ER stretch at wall w/ cane: 20 sec x5 reps  -Behind the back  stretch w/ strap: 10 sec x10  -finger ladder: flexion/abd x5 each  -AROM shoulder flexion and abd to 90: x10 each  -ER stretch at wall with trunk rotations turn out: 10 sec x8 reps  -wall slide flexion  +abduction: x15 each  -rows: GTB, 2x10-hold  -shoulder ext: RTB, 2x10-hold   -ER/IR isotonic: GTB, 2x10  -s/l shoulder AROM abd/flexion: x15 each  -s/l ER AROM: x20  -PROM to tolerance   Modalities: 10 min  Game ready x15 min (L) shoulder post tx Modalities: 10 min  Game ready x10 min (L) shoulder post tx Modalities: 10 min  Game ready x10 min (L) shoulder post tx Modalities: 15 min  Game ready x10 min (L) shoulder post tx                  HEP:   Access [email or web address]: [email or web address]  URL: [email or web address]  Date: 10/18/2024  Prepared by: Batsheva Beckwith    Exercises  - Standing Shoulder External Rotation AAROM with Dowel  - 3 x daily - 7 x weekly - 3 sets - 10-20 seconds hold  - Standing Shoulder External Rotation Stretch at Wall  - 3 x daily - 7 x weekly - 3 sets - 20 sec hold  - Doorway Pec Stretch at 60 Elevation  - 3 x daily - 7 x weekly - 3 sets - 20 sec hold  - Seated Shoulder External Rotation PROM on Table  - 3 x daily - 7 x weekly - 3 sets - 20 sec hold  - Supine Chest Stretch with Elbows Bent  - 3 x daily - 7 x weekly - 3 sets - 20 sec hold  - Supine Shoulder External Rotation in 45 Degrees Abduction AAROM with Dowel  - 3 x daily - 7 x weekly - 3 sets - 20 seconds hold  - Supine Shoulder Flexion with Dowel  - 3 x daily - 7 x weekly - 2 sets - 10 reps  - Seated Bilateral Shoulder Flexion Towel Slide at Table Top  - 3 x daily - 7 x weekly - 2 sets - 10 reps  - Seated Shoulder Abduction Towel Slide at Table Top  - 3 x daily - 7 x weekly - 2 sets - 10 reps  - Shoulder Flexion Overhead with Dowel  - 3 x daily - 7 x weekly - 3 sets - 10 reps  - Standing Shoulder Abduction AAROM with Dowel  - 3 x daily - 7 x weekly - 3 sets - 10 reps    Charges: TherEx: 4 units   Total Timed Treatment: 55 min  Total  Treatment Time: 55 min

## 2024-11-04 NOTE — PROGRESS NOTES
Diagnosis:   S/P arthroscopy of left shoulder (Z98.890)   (full supraspinatus tear and partial infraspinatus tear/repair)     Post op dx:  Left Shoulder Arthroscopy Rotator Cuff Repair Arthroscopic Biceps Tenodesis Subacromial Decompression       Referring Provider: Beverly Womack Date of Evaluation:    9/25/24    Precautions:   Activity Precautions: Passive ROMAT, 1 lb WB x 4 weeks. Begin PT POD #1.     Next MD visit:   none scheduled  Date of Surgery: 9/24/25    4 weeks post op on 10/22/24   Insurance Primary/Secondary: BLUE CROSS MEDICAID / N/A     # Auth Visits: 15 visits till 11/24            Subjective: Pt states shoulder is doing OK.     Pain: 0/10 at rest      Objective:     AROM: (* denotes performed with pain)  Shoulder    Flexion: L 145*  Abduction: L 120*  ER at side: L 57*  IR behind the back reach: L to T 12*       Assessment: Pt presenting with continued improvements in shoulder AROM. She is able to perform AROM shoulder flexion and abd to 90 with minimal shoulder hiking and is able to increase reps. Introduced her to wall wash circles which she reports fatigue with also. She tolerates tx well.       Goals:   To be met in 6-8 weeks post op   Pt will report improved ability to sleep without waking due to shoulder pain  Pt will improve R shoulder flexion PROM to >150 degrees to be able to reach into shoulder height cabinets when cleared for AROM  Pt will improve R shoulder abduction PROM to >130 degrees to improve ability to don deodorant, don/doff shirts, and wash hair when cleared for AROM  Pt will increase shoulder PROM ER to >80 degrees at 90 deg abduction to reach and fasten seatbelt when cleared for AROM  Pt will be independent and compliant with comprehensive HEP to maintain progress achieved in PT      To be met in 8-10 weeks post op   Pt will improve shoulder flexion AROM to >150 degrees to be able to reach into overhead cabinets without pain or restriction   Pt will improve shoulder abduction  AROM to >130 degrees to improve ability to don deodorant, don/doff shirts, and wash hair   Pt will increase shoulder AROM ER to 80 degrees to be able to reach and fasten seatbelt   Pt will increase shoulder AROM IR to 70 degrees to be able to reach in back pocket, tuck in shirt, and turn steering wheel without pain  Pt will be independent and compliant with comprehensive HEP to maintain progress achieved in PT      To be met 4-6 months post op (total visits of PT: ~35)  Pt will improve shoulder strength throughout to 4+/5 to improve function with carrying groceries and wash floors and vacuum    Pt will demonstrate increased mid/low trap strength to 4/5 to promote improved shoulder mechanics and stabilization with lifting and reaching   Pt will be independent and compliant with comprehensive HEP to maintain progress achieved in PT        Plan:progress AROM and strength as tolerated  Date:10/18/24  Tx#: 8/15 Date:10/22/24  Tx#: 9/15 Date:10/25/24  Tx#: 10/15 Date:10/29/24  Tx#: 11/15 Date:11/1/24  Tx#: 12/15 Date:11/4/24  Tx#: 13/15   TherEx: 45 min  -FR table slides shoulder flexion and abduction: x20 each  -standing AAROM flexion and abduction: w/ stick vertical x20 each  -Pulleys's: 2'/2'  -Self ER stretch at wall w/ cane: 10 sec x5 reps  -ER stretch at wall with trunk rotations turn out: 10 sec x8 reps  -pec stretch in door for ER (W position): 30 sec x3   -sub-max Deltoid and RTC isometrics: 5 sec, x15 each  -Supine AAROM flexion  w/ stick: x20  -supine IR/ER in scap plane w/ cane: 10 sec x10 reps  -s/l shoulder AAROM abd/flexion with therapist assist: x10 each  -s/l ER AROM: x20  -table slide flexion  + scaption 45 deg angle: [id number] out of time   -PROM to tolerance  -RS IR/ER in scap plane and 90 deg 2x30 sec  -RS flexion at 100 and 125 flexion x30 sec each        Hold till 4 weeks  -ER/IR reactive iso  -Bent over rows and shoulder ext TherEx: 45 min  -FR table slides shoulder flexion and abduction: x20  each  -standing AAROM flexion and abduction: w/ stick vertical x20 each  -Pulleys's: 2'/2'  -Self ER stretch at wall w/ cane: 20 sec x5 reps  -ER stretch at wall with trunk rotations turn out: 10 sec x8 reps  -pec stretch in door for ER (W position): 30 sec x3   -Supine AAROM flexion  w/ stick: 3#,  x20  -supine IR/ER in scap plane + 90/90 w/ cane: 10 sec x10 reps  -s/l shoulder AROM abd/flexion with therapist assist: x10 each-hold  -s/l ER AROM: [id number]  -wall slide flexion  +abduction: x15  -ER/IR reactive iso: RTB, 2x10  -Bent over rows and shoulder ext: 0#, 2x10    -PROM to tolerance TherEx: 50 min  -standing AAROM flexion and abduction: w/ stick vertical x20 each  -Pulleys's: 2'/2'  -Self ER stretch at wall w/ cane: 20 sec x5 reps  -Behind the back stretch w/ strap: 10 sec x10  -finger ladder: flexion/abd x5 each  -ER stretch at wall with trunk rotations turn out: 10 sec x8 reps  -pec stretch in door for ER (W position): 30 sec x3   -wall slide flexion  +abduction: x10  -Supine AAROM flexion  w/ stick: 3#,  x20-HEP  -supine IR/ER in scap plane + 90/90 w/ cane: 10 sec x10 reps-HEP  -s/l shoulder AROM abd/flexion: x10 each  -s/l ER AROM: [id number]  -ER/IR reactive iso: RTB, 2x10  -Bent over rows and shoulder ext: 0#, 2x10  -PROM to tolerance TherEx: 50 min  -standing AAROM flexion and abduction: w/ stick vertical x20 each  -shoulder flexion stick horiz full range: x20  -Pulleys's: 2'/2'  -Self ER stretch at wall w/ cane: 20 sec x5 reps  -Behind the back stretch w/ strap: 10 sec x10  -finger ladder: flexion/abd x5 each  -ER stretch at wall with trunk rotations turn out: 10 sec x8 reps  -pec stretch in door for ER (W position): 30 sec x3   -wall slide flexion  +abduction: x15 each  -rows: GTB, 2x10  -shoulder ext: RTB, 2x10  -ER/IR isotonic: RTB, 2x10  -s/l shoulder AROM abd/flexion: x15 each  -s/l ER AROM: x20  -PROM to tolerance TherEx: 55 min  UBE: 2'/2'  -standing AAROM flexion and abduction: w/ stick vertical  x20 each  -shoulder flexion stick horiz full range: x20  -Pulleys's: 2'/2'  -Self ER stretch at wall w/ cane: 20 sec x5 reps  -Behind the back stretch w/ strap: 10 sec x10  -finger ladder: flexion/abd x5 each  -AROM shoulder flexion and abd to 90: x10 each  -ER stretch at wall with trunk rotations turn out: 10 sec x8 reps  -wall slide flexion  +abduction: x15 each  -rows: GTB, 2x10-hold  -shoulder ext: RTB, 2x10-hold   -ER/IR isotonic: GTB, 2x10  -s/l shoulder AROM abd/flexion: x15 each  -s/l ER AROM: x20  -PROM to tolerance TherEx: 50 min  UBE: 2'/2', Lvl 2  -standing AAROM abduction: w/ stick vertical x20 each  -shoulder flexion stick horiz full range: x20  -TRX I's and Y's: x10 each  -Self ER stretch at wall w/ cane: 20 sec x5 reps  -ER stretch at wall with trunk rotations turn out: 10 sec x8 reps  -pec stretch W: 3x30 sec  -Behind the back stretch w/ strap: 10 sec x10 (going up the spine)  -AROM shoulder flexion and abd to 90: 2x10 each  -wall slide flexion  +abduction: x15 each  -wall wash circles: 2x to fatigue  -rows: BTB, 2x15  -shoulder ext: GTB, 2x15  -ER/IR isotonic: GTB, 2x10  -s/l shoulder AROM abd/flexion: x15 each  -s/l ER AROM: x20  -PROM to tolerance    Hold  -OH press w/ stick: x10   Modalities: 10 min  Game ready x15 min (L) shoulder post tx Modalities: 10 min  Game ready x10 min (L) shoulder post tx Modalities: 10 min  Game ready x10 min (L) shoulder post tx Modalities: 15 min  Game ready x10 min (L) shoulder post tx                     HEP:   Access Code: [email or web address]  URL: [email or web address]  Date: 10/18/2024  Prepared by: Batsheva Beckwith    Exercises  - Standing Shoulder External Rotation AAROM with Dowel  - 3 x daily - 7 x weekly - 3 sets - 10-20 seconds hold  - Standing Shoulder External Rotation Stretch at Wall  - 3 x daily - 7 x weekly - 3 sets - 20 sec hold  - Doorway Pec Stretch at 60 Elevation  - 3 x daily - 7 x weekly - 3 sets - 20 sec hold  - Seated Shoulder External  Rotation PROM on Table  - 3 x daily - 7 x weekly - 3 sets - 20 sec hold  - Supine Chest Stretch with Elbows Bent  - 3 x daily - 7 x weekly - 3 sets - 20 sec hold  - Supine Shoulder External Rotation in 45 Degrees Abduction AAROM with Dowel  - 3 x daily - 7 x weekly - 3 sets - 20 seconds hold  - Supine Shoulder Flexion with Dowel  - 3 x daily - 7 x weekly - 2 sets - 10 reps  - Seated Bilateral Shoulder Flexion Towel Slide at Table Top  - 3 x daily - 7 x weekly - 2 sets - 10 reps  - Seated Shoulder Abduction Towel Slide at Table Top  - 3 x daily - 7 x weekly - 2 sets - 10 reps  - Shoulder Flexion Overhead with Dowel  - 3 x daily - 7 x weekly - 3 sets - 10 reps  - Standing Shoulder Abduction AAROM with Dowel  - 3 x daily - 7 x weekly - 3 sets - 10 reps    Charges: TherEx: 3 units   Total Timed Treatment: 50 min  Total Treatment Time: 50 min

## 2024-11-05 ENCOUNTER — OFFICE VISIT (OUTPATIENT)
Dept: PHYSICAL THERAPY | Age: 44
End: 2024-11-05
Attending: ORTHOPAEDIC SURGERY
Payer: MEDICAID

## 2024-11-05 PROCEDURE — 97110 THERAPEUTIC EXERCISES: CPT | Performed by: PHYSICAL THERAPIST

## 2024-11-07 NOTE — PROGRESS NOTES
Diagnosis:   S/P arthroscopy of left shoulder (Z98.890)   (full supraspinatus tear and partial infraspinatus tear/repair)     Post op dx:  Left Shoulder Arthroscopy Rotator Cuff Repair Arthroscopic Biceps Tenodesis Subacromial Decompression       Referring Provider: Beverly Womack Date of Evaluation:    9/25/24    Precautions:   Activity Precautions: Passive ROMAT, 1 lb WB x 4 weeks. Begin PT POD #1.     Next MD visit:   none scheduled  Date of Surgery: 9/24/25    6 weeks post op on 11/5/24   Insurance Primary/Secondary: BLUE CROSS MEDICAID / N/A     # Auth Visits: 15 visits till 11/24            Subjective: Pt states shoulder is doing OK.     Pain: 0/10 at rest      Objective:     AROM: (* denotes performed with pain)  Shoulder    Flexion: L 150*  Abduction: L 130*  ER at side: L 57*-NT  IR behind the back reach: L to T 12*-NT       Assessment: Pt demonstrating improving AROM with reduced shoulder hiking. She is progressed to shoulder raises >90 deg elevation and OH press with stick. Her strength is gradually progressed in resistance with good tolerance.       Goals:   To be met in 6-8 weeks post op   Pt will report improved ability to sleep without waking due to shoulder pain  Pt will improve R shoulder flexion PROM to >150 degrees to be able to reach into shoulder height cabinets when cleared for AROM  Pt will improve R shoulder abduction PROM to >130 degrees to improve ability to don deodorant, don/doff shirts, and wash hair when cleared for AROM  Pt will increase shoulder PROM ER to >80 degrees at 90 deg abduction to reach and fasten seatbelt when cleared for AROM  Pt will be independent and compliant with comprehensive HEP to maintain progress achieved in PT      To be met in 8-10 weeks post op   Pt will improve shoulder flexion AROM to >150 degrees to be able to reach into overhead cabinets without pain or restriction   Pt will improve shoulder abduction AROM to >130 degrees to improve ability to don deodorant,  don/doff shirts, and wash hair   Pt will increase shoulder AROM ER to 80 degrees to be able to reach and fasten seatbelt   Pt will increase shoulder AROM IR to 70 degrees to be able to reach in back pocket, tuck in shirt, and turn steering wheel without pain  Pt will be independent and compliant with comprehensive HEP to maintain progress achieved in PT      To be met 4-6 months post op (total visits of PT: ~35)  Pt will improve shoulder strength throughout to 4+/5 to improve function with carrying groceries and wash floors and vacuum    Pt will demonstrate increased mid/low trap strength to 4/5 to promote improved shoulder mechanics and stabilization with lifting and reaching   Pt will be independent and compliant with comprehensive HEP to maintain progress achieved in PT        Plan:progress AROM and strength as tolerated  Date:10/25/24  Tx#: 10/15 Date:10/29/24  Tx#: 11/15 Date:11/1/24  Tx#: 12/15 Date:11/4/24  Tx#: 13/15 Date:11/8/24  Tx#: 14/15   TherEx: 50 min  -standing AAROM flexion and abduction: w/ stick vertical x20 each  -Pulleys's: 2'/2'  -Self ER stretch at wall w/ cane: 20 sec x5 reps  -Behind the back stretch w/ strap: 10 sec x10  -finger ladder: flexion/abd x5 each  -ER stretch at wall with trunk rotations turn out: 10 sec x8 reps  -pec stretch in door for ER (W position): 30 sec x3   -wall slide flexion  +abduction: x10  -Supine AAROM flexion  w/ stick: 3#,  x20-HEP  -supine IR/ER in scap plane + 90/90 w/ cane: 10 sec x10 reps-HEP  -s/l shoulder AROM abd/flexion: x10 each  -s/l ER AROM: [id number]  -ER/IR reactive iso: RTB, 2x10  -Bent over rows and shoulder ext: 0#, 2x10  -PROM to tolerance TherEx: 50 min  -standing AAROM flexion and abduction: w/ stick vertical x20 each  -shoulder flexion stick horiz full range: x20  -Pulleys's: 2'/2'  -Self ER stretch at wall w/ cane: 20 sec x5 reps  -Behind the back stretch w/ strap: 10 sec x10  -finger ladder: flexion/abd x5 each  -ER stretch at wall with trunk  rotations turn out: 10 sec x8 reps  -pec stretch in door for ER (W position): 30 sec x3   -wall slide flexion  +abduction: x15 each  -rows: GTB, 2x10  -shoulder ext: RTB, 2x10  -ER/IR isotonic: RTB, 2x10  -s/l shoulder AROM abd/flexion: x15 each  -s/l ER AROM: x20  -PROM to tolerance TherEx: 55 min  UBE: 2'/2'  -standing AAROM flexion and abduction: w/ stick vertical x20 each  -shoulder flexion stick horiz full range: x20  -Pulleys's: 2'/2'  -Self ER stretch at wall w/ cane: 20 sec x5 reps  -Behind the back stretch w/ strap: 10 sec x10  -finger ladder: flexion/abd x5 each  -AROM shoulder flexion and abd to 90: x10 each  -ER stretch at wall with trunk rotations turn out: 10 sec x8 reps  -wall slide flexion  +abduction: x15 each  -rows: GTB, 2x10-hold  -shoulder ext: RTB, 2x10-hold   -ER/IR isotonic: GTB, 2x10  -s/l shoulder AROM abd/flexion: x15 each  -s/l ER AROM: x20  -PROM to tolerance TherEx: 50 min  UBE: 2'/2', Lvl 2  -standing AAROM abduction: w/ stick vertical x20 each  -shoulder flexion stick horiz full range: x20  -TRX I's and Y's: x10 each  -Self ER stretch at wall w/ cane: 20 sec x5 reps  -ER stretch at wall with trunk rotations turn out: 10 sec x8 reps  -pec stretch W: 3x30 sec  -Behind the back stretch w/ strap: 10 sec x10 (going up the spine)  -AROM shoulder flexion and abd to 90: 2x10 each  -wall slide flexion  +abduction: x15 each  -wall wash circles: 2x to fatigue  -rows: BTB, 2x15  -shoulder ext: GTB, 2x15  -ER/IR isotonic: GTB, 2x10  -s/l shoulder AROM abd/flexion: x15 each  -s/l ER AROM: x20  -PROM to tolerance    Hold  -OH press w/ stick: x10 TherEx: 45 min  UBE: 2'/2', Lvl 2  -TRX I's and Y's: x10 each  -ER at 90/90 stretch at wall: 2x10  -ER at 90 abd w/ cane at wall: x20  -Self ER stretch at wall w/ cane: 20 sec x5 reps  -ER stretch at wall with trunk rotations turn out: 10 sec x8 reps  -Behind the back stretch w/ strap: 10 sec x10 (going up the spine)  -AROM shoulder flexion and abd to >90:  2x10 each  -wall wash circles: 2x to fatigue  -OH press w/ stick: 2x10  -rows: BlkTB, 2x15  -shoulder ext: BTB, 2x15  -ER/IR isotonic: GTB, 2x10  -ER at 90 flexion against wall: 0#, 2x10  -bicep curls: 2#, 2x10  -s/l shoulder AROM abd/flexion: 2x10 each  -s/l ER: 1#, 2x10  -PROM to tolerance       Modalities: 10 min  Game ready x10 min (L) shoulder post tx Modalities: 15 min  Game ready x10 min (L) shoulder post tx                    HEP:   Access Code: [email or web address]  URL: [email or web address]  Date: 10/18/2024  Prepared by: Batsheva Beckwith    Exercises  - Standing Shoulder External Rotation AAROM with Dowel  - 3 x daily - 7 x weekly - 3 sets - 10-20 seconds hold  - Standing Shoulder External Rotation Stretch at Wall  - 3 x daily - 7 x weekly - 3 sets - 20 sec hold  - Doorway Pec Stretch at 60 Elevation  - 3 x daily - 7 x weekly - 3 sets - 20 sec hold  - Seated Shoulder External Rotation PROM on Table  - 3 x daily - 7 x weekly - 3 sets - 20 sec hold  - Supine Chest Stretch with Elbows Bent  - 3 x daily - 7 x weekly - 3 sets - 20 sec hold  - Supine Shoulder External Rotation in 45 Degrees Abduction AAROM with Dowel  - 3 x daily - 7 x weekly - 3 sets - 20 seconds hold  - Supine Shoulder Flexion with Dowel  - 3 x daily - 7 x weekly - 2 sets - 10 reps  - Seated Bilateral Shoulder Flexion Towel Slide at Table Top  - 3 x daily - 7 x weekly - 2 sets - 10 reps  - Seated Shoulder Abduction Towel Slide at Table Top  - 3 x daily - 7 x weekly - 2 sets - 10 reps  - Shoulder Flexion Overhead with Dowel  - 3 x daily - 7 x weekly - 3 sets - 10 reps  - Standing Shoulder Abduction AAROM with Dowel  - 3 x daily - 7 x weekly - 3 sets - 10 reps    Charges: TherEx: 3 units   Total Timed Treatment: 45 min  Total Treatment Time: 45 min

## 2024-11-08 ENCOUNTER — OFFICE VISIT (OUTPATIENT)
Dept: PHYSICAL THERAPY | Age: 44
End: 2024-11-08
Attending: ORTHOPAEDIC SURGERY
Payer: MEDICAID

## 2024-11-08 PROCEDURE — 97110 THERAPEUTIC EXERCISES: CPT | Performed by: PHYSICAL THERAPIST

## 2024-11-11 NOTE — PROGRESS NOTES
Progress Summary  Pt has attended 15 visits in Physical Therapy.    Diagnosis:   S/P arthroscopy of left shoulder (Z98.890)   (full supraspinatus tear and partial infraspinatus tear/repair)     Post op dx:  Left Shoulder Arthroscopy Rotator Cuff Repair Arthroscopic Biceps Tenodesis Subacromial Decompression       Referring Provider: Beverly Womack Date of Evaluation:    9/25/24    Precautions:   Activity Precautions: Passive ROMAT, 1 lb WB x 4 weeks. Begin PT POD #1.     Next MD visit:   none scheduled  Date of Surgery: 9/24/25    7 weeks post op on 11/12/24   Insurance Primary/Secondary: BLUE CROSS MEDICAID / N/A     # Auth Visits: 15 visits till 11/24          Subjective: Pt states shoulder is doing good unless she is doing her shoulder stretches. She was holding a baby and she did feel her (L) shoulder more sore. She is trying not too lift too much with the (L) shoulder as MD reccommended. She is getting dressed well but she is not able to don/doff her bra the regular way. She is able to perform hair grooming independently.     Pain: 0/10 at rest, 3/10 with PT      Objective:     AROM: (* denotes performed with pain)  Shoulder    Flexion: L 150*  Abduction: L 155*  ER at side: L 55*  IR behind the back reach: L to T 11*       Strength/MMT: (* denotes performed with pain)  Shoulder   Flexion: R 5/5; L 3+/5*  Abduction: R 5/5; L 3+/5*  ER: R 5/5; L 3+/5  IR: R 4/5; L 4/5     Assessment: Pt is 7 weeks s/p (L) RTC repair, bicep tenodesis  and SAD. She has been progressing well and consistently. She has demonstrated improved (L) GH joint mobility, soft tissue mobility, A/PROM, body mechanics and strength. These objective gains have improved her ability to don/doff clothing, perform hair grooming, reaching over her head, reaching behind her back, lifting light items <5 lbs, driving and sleeping.  She continues to demonstrate end rage shoulder ROM restrictions, body mechanics and shoulder weakness which limit her  functionally in ability to lift> 5 lbs, perform sustained and repetitive overhead activities, don/doff a bra, push/pulling tasks, gardening, and moderate to heavy house cores. She will continue to benefit from skilled PT to promote return to PLOF.        Goals:   To be met in 6-8 weeks post op   Pt will report improved ability to sleep without waking due to shoulder pain-Met  Pt will improve R shoulder flexion PROM to >150 degrees to be able to reach into shoulder height cabinets when cleared for AROM-Met  Pt will improve R shoulder abduction PROM to >130 degrees to improve ability to don deodorant, don/doff shirts, and wash hair when cleared for AROM-Met  Pt will increase shoulder PROM ER to >80 degrees at 90 deg abduction to reach and fasten seatbelt when cleared for AROM-progressing  Pt will be independent and compliant with comprehensive HEP to maintain progress achieved in PT-On going     To be met in 8-10 weeks post op   Pt will improve shoulder flexion AROM to >150 degrees to be able to reach into overhead cabinets without pain or restriction -Met  Pt will improve shoulder abduction AROM to >130 degrees to improve ability to don deodorant, don/doff shirts, and wash hair -Met  Pt will increase shoulder AROM ER to 80 degrees to be able to reach and fasten seatbelt-progressing  Pt will increase shoulder AROM IR to 70 degrees to be able to reach in back pocket, tuck in shirt, and turn steering wheel without pain-progressing  Pt will be independent and compliant with comprehensive HEP to maintain progress achieved in PT -On going     To be met 4-6 months post op (total visits of PT: ~35)  Pt will improve shoulder strength throughout to 4+/5 to improve function with carrying groceries and wash floors and vacuum    Pt will demonstrate increased mid/low trap strength to 4/5 to promote improved shoulder mechanics and stabilization with lifting and reaching   Pt will be independent and compliant with comprehensive HEP  to maintain progress achieved in PT        QuickDASH Outcome Score  Score: 65.91 % (9/25/2024  7:49 AM)    Post QuickDASH Outcome Score  Post Score: 45.45 % (11/12/2024  7:51 AM)    20.46 % improvement    Plan: Continue skilled Physical Therapy 2 x/week or a total of 24 visits over a 90 day period. Treatment will include: TherEx, neuro re-ed, manual tech       Patient/Family/Caregiver was advised of these findings, precautions, and treatment options and has agreed to actively participate in planning and for this course of care.    Thank you for your referral. If you have any questions, please contact me at Dept: [phone].    Sincerely,  Electronically signed by therapist: Batsheva Beckwith, PT     Physician's certification required:  Yes  Please co-sign or sign and return this letter via fax as soon as possible to [phone].   I certify the need for these services furnished under this plan of treatment and while under my care.    X___________________________________________________ Date____________________    Certification From: 11/11/2024  To:2/9/2025    Date:10/25/24  Tx#: 10/15 Date:10/29/24  Tx#: 11/15 Date:11/1/24  Tx#: 12/15 Date:11/4/24  Tx#: 13/15 Date:11/8/24  Tx#: 14/15 Date:11/11/24  Tx#: 15/15   TherEx: 50 min  -standing AAROM flexion and abduction: w/ stick vertical x20 each  -Pulleys's: 2'/2'  -Self ER stretch at wall w/ cane: 20 sec x5 reps  -Behind the back stretch w/ strap: 10 sec x10  -finger ladder: flexion/abd x5 each  -ER stretch at wall with trunk rotations turn out: 10 sec x8 reps  -pec stretch in door for ER (W position): 30 sec x3   -wall slide flexion  +abduction: x10  -Supine AAROM flexion  w/ stick: 3#,  x20-HEP  -supine IR/ER in scap plane + 90/90 w/ cane: 10 sec x10 reps-HEP  -s/l shoulder AROM abd/flexion: x10 each  -s/l ER AROM: x20-xawe  -ER/IR reactive iso: RTB, 2x10  -Bent over rows and shoulder ext: 0#, 2x10  -PROM to tolerance TherEx: 50 min  -standing AAROM flexion and  abduction: w/ stick vertical x20 each  -shoulder flexion stick horiz full range: x20  -Pulleys's: 2'/2'  -Self ER stretch at wall w/ cane: 20 sec x5 reps  -Behind the back stretch w/ strap: 10 sec x10  -finger ladder: flexion/abd x5 each  -ER stretch at wall with trunk rotations turn out: 10 sec x8 reps  -pec stretch in door for ER (W position): 30 sec x3   -wall slide flexion  +abduction: x15 each  -rows: GTB, 2x10  -shoulder ext: RTB, 2x10  -ER/IR isotonic: RTB, 2x10  -s/l shoulder AROM abd/flexion: x15 each  -s/l ER AROM: x20  -PROM to tolerance TherEx: 55 min  UBE: 2'/2'  -standing AAROM flexion and abduction: w/ stick vertical x20 each  -shoulder flexion stick horiz full range: x20  -Pulleys's: 2'/2'  -Self ER stretch at wall w/ cane: 20 sec x5 reps  -Behind the back stretch w/ strap: 10 sec x10  -finger ladder: flexion/abd x5 each  -AROM shoulder flexion and abd to 90: x10 each  -ER stretch at wall with trunk rotations turn out: 10 sec x8 reps  -wall slide flexion  +abduction: x15 each  -rows: GTB, 2x10-hold  -shoulder ext: RTB, 2x10-hold   -ER/IR isotonic: GTB, 2x10  -s/l shoulder AROM abd/flexion: x15 each  -s/l ER AROM: x20  -PROM to tolerance TherEx: 50 min  UBE: 2'/2', Lvl 2  -standing AAROM abduction: w/ stick vertical x20 each  -shoulder flexion stick horiz full range: x20  -TRX I's and Y's: x10 each  -Self ER stretch at wall w/ cane: 20 sec x5 reps  -ER stretch at wall with trunk rotations turn out: 10 sec x8 reps  -pec stretch W: 3x30 sec  -Behind the back stretch w/ strap: 10 sec x10 (going up the spine)  -AROM shoulder flexion and abd to 90: 2x10 each  -wall slide flexion  +abduction: x15 each  -wall wash circles: 2x to fatigue  -rows: BTB, 2x15  -shoulder ext: GTB, 2x15  -ER/IR isotonic: GTB, 2x10  -s/l shoulder AROM abd/flexion: x15 each  -s/l ER AROM: x20  -PROM to tolerance    Hold  -OH press w/ stick: x10 TherEx: 45 min  UBE: 2'/2', Lvl 2  -TRX I's and Y's: x10 each  -ER at 90/90 stretch at  wall: 2x10  -ER at 90 abd w/ cane at wall: x20  -Self ER stretch at wall w/ cane: 20 sec x5 reps  -ER stretch at wall with trunk rotations turn out: 10 sec x8 reps  -Behind the back stretch w/ strap: 10 sec x10 (going up the spine)  -AROM shoulder flexion and abd to >90: 2x10 each  -wall wash circles: 2x to fatigue  -OH press w/ stick: 2x10  -rows: BlkTB, 2x15  -shoulder ext: BTB, 2x15  -ER/IR isotonic: GTB, 2x10  -ER at 90 flexion against wall: 0#, 2x10  -bicep curls: 2#, 2x10  -s/l shoulder AROM abd/flexion: 2x10 each  -s/l ER: 1#, 2x10  -PROM to tolerance     TherEx: 40 min  UBE: 2'/2', Lvl 2  -re-assessment  -TRX I's and Y's: x10 each  -ER at 90/90 stretch at wall: 2x10  -ER at 90 abd w/ cane at wall: [id number]  -Self ER stretch at wall w/ cane: 20 sec x5 reps  -Behind the back stretch w/ strap: 10 sec x10 (going up the spine)  -AROM shoulder flexion and abd to >90: 2x10 each  -wall wash circles: 2x to fatigue  -OH press w/ stick: 2x10  -shoulder ext: BTB, 2x15  -ER at side isotonic: GTB, 2x10  -ER at side walkouts: RTB, 2x10  -ER at 90 flexion against wall: 0#, 2x10  -bicep curls: 2#, 2x10-hold  -s/l shoulder AROM abd/flexion: 2x10 each-hold  -s/l ER: 1#, 2x10  -PROM to tolerance   Modalities: 10 min  Game ready x10 min (L) shoulder post tx Modalities: 15 min  Game ready x10 min (L) shoulder post tx                       HEP:   Access Code: [email or web address]  URL: [email or web address]  Date: 10/18/2024  Prepared by: Batsheva Beckwith    Exercises  - Standing Shoulder External Rotation AAROM with Dowel  - 3 x daily - 7 x weekly - 3 sets - 10-20 seconds hold  - Standing Shoulder External Rotation Stretch at Wall  - 3 x daily - 7 x weekly - 3 sets - 20 sec hold  - Doorway Pec Stretch at 60 Elevation  - 3 x daily - 7 x weekly - 3 sets - 20 sec hold  - Seated Shoulder External Rotation PROM on Table  - 3 x daily - 7 x weekly - 3 sets - 20 sec hold  - Supine Chest Stretch with Elbows Bent  - 3 x daily - 7 x  weekly - 3 sets - 20 sec hold  - Supine Shoulder External Rotation in 45 Degrees Abduction AAROM with Dowel  - 3 x daily - 7 x weekly - 3 sets - 20 seconds hold  - Supine Shoulder Flexion with Dowel  - 3 x daily - 7 x weekly - 2 sets - 10 reps  - Seated Bilateral Shoulder Flexion Towel Slide at Table Top  - 3 x daily - 7 x weekly - 2 sets - 10 reps  - Seated Shoulder Abduction Towel Slide at Table Top  - 3 x daily - 7 x weekly - 2 sets - 10 reps  - Shoulder Flexion Overhead with Dowel  - 3 x daily - 7 x weekly - 3 sets - 10 reps  - Standing Shoulder Abduction AAROM with Dowel  - 3 x daily - 7 x weekly - 3 sets - 10 reps    Charges: TherEx: 3 units   Total Timed Treatment: 40 min  Total Treatment Time: 40 min

## 2024-11-12 ENCOUNTER — OFFICE VISIT (OUTPATIENT)
Dept: PHYSICAL THERAPY | Age: 44
End: 2024-11-12
Attending: ORTHOPAEDIC SURGERY
Payer: MEDICAID

## 2024-11-12 ENCOUNTER — HOSPITAL ENCOUNTER (OUTPATIENT)
Dept: ULTRASOUND IMAGING | Age: 44
Discharge: HOME | End: 2024-11-12
Attending: OBSTETRICS & GYNECOLOGY
Payer: MEDICAID

## 2024-11-12 DIAGNOSIS — R10.2 PELVIC PAIN IN FEMALE: ICD-10-CM

## 2024-11-12 PROCEDURE — 97110 THERAPEUTIC EXERCISES: CPT | Performed by: PHYSICAL THERAPIST

## 2024-11-12 PROCEDURE — 76830 TRANSVAGINAL US NON-OB: CPT | Performed by: OBSTETRICS & GYNECOLOGY

## 2024-11-12 PROCEDURE — 76856 US EXAM PELVIC COMPLETE: CPT | Performed by: OBSTETRICS & GYNECOLOGY

## 2024-11-13 NOTE — PROGRESS NOTES
Diagnosis:   S/P arthroscopy of left shoulder (Z98.890)   (full supraspinatus tear and partial infraspinatus tear/repair)     Post op dx:  Left Shoulder Arthroscopy Rotator Cuff Repair Arthroscopic Biceps Tenodesis Subacromial Decompression       Referring Provider: Beverly Womack Date of Evaluation:    9/25/24    PN: 11/11/24    Precautions:   Activity Precautions: Passive ROMAT, 1 lb WB x 4 weeks. Begin PT POD #1.     Next MD visit:   none scheduled  Date of Surgery: 9/24/25    7 weeks post op on 11/12/24   Insurance Primary/Secondary: BLUE CROSS MEDICAID / N/A     # Auth Visits: 15 visits till 11/24    + 6 visits till 1/14/25      Subjective: Pt states shoulder is ok. She feels a different type of pain with certain movements which is located in the elbow. It started from vacuuming at her job.     Pain: 0/10 at rest, 5/10 with activities      Objective:     AROM: (* denotes performed with pain)  Shoulder    Flexion: L 150*  Abduction: L 155*  ER at side: L 55*  IR behind the back reach: L to T 11*           Assessment: Pt presenting with increased pain in the shoulder most likely due to recent vacuuming and increase in daily activities and chores. Kept activities light today with focus on stretching, AROM and light para scap strengthening. She demonstrates reduced tolerance to performing repetitive shoulder abduction raises.       Goals:   To be met in 6-8 weeks post op   Pt will report improved ability to sleep without waking due to shoulder pain-Met  Pt will improve R shoulder flexion PROM to >150 degrees to be able to reach into shoulder height cabinets when cleared for AROM-Met  Pt will improve R shoulder abduction PROM to >130 degrees to improve ability to don deodorant, don/doff shirts, and wash hair when cleared for AROM-Met  Pt will increase shoulder PROM ER to >80 degrees at 90 deg abduction to reach and fasten seatbelt when cleared for AROM-progressing  Pt will be independent and compliant with  comprehensive HEP to maintain progress achieved in PT-On going     To be met in 8-10 weeks post op   Pt will improve shoulder flexion AROM to >150 degrees to be able to reach into overhead cabinets without pain or restriction -Met  Pt will improve shoulder abduction AROM to >130 degrees to improve ability to don deodorant, don/doff shirts, and wash hair -Met  Pt will increase shoulder AROM ER to 80 degrees to be able to reach and fasten seatbelt-progressing  Pt will increase shoulder AROM IR to 70 degrees to be able to reach in back pocket, tuck in shirt, and turn steering wheel without pain-progressing  Pt will be independent and compliant with comprehensive HEP to maintain progress achieved in PT -On going     To be met 4-6 months post op (total visits of PT: ~35)  Pt will improve shoulder strength throughout to 4+/5 to improve function with carrying groceries and wash floors and vacuum    Pt will demonstrate increased mid/low trap strength to 4/5 to promote improved shoulder mechanics and stabilization with lifting and reaching   Pt will be independent and compliant with comprehensive HEP to maintain progress achieved in PT        Plan: Continue skilled Physical Therapy 2 x/week or a total of 24 visits over a 90 day period. Treatment will include: TherEx, neuro re-ed, manual tech         Date:11/1/24  Tx#: 12/15 Date:11/4/24  Tx#: 13/15 Date:11/8/24  Tx#: 14/15 Date:11/11/24  Tx#: 15/15 Date:11/13/24  Tx#: 16/21   TherEx: 55 min  UBE: 2'/2'  -standing AAROM flexion and abduction: w/ stick vertical x20 each  -shoulder flexion stick horiz full range: x20  -Pulleys's: 2'/2'  -Self ER stretch at wall w/ cane: 20 sec x5 reps  -Behind the back stretch w/ strap: 10 sec x10  -finger ladder: flexion/abd x5 each  -AROM shoulder flexion and abd to 90: x10 each  -ER stretch at wall with trunk rotations turn out: 10 sec x8 reps  -wall slide flexion  +abduction: x15 each  -rows: GTB, 2x10-hold  -shoulder ext: RTB, 2x10-hold    -ER/IR isotonic: GTB, 2x10  -s/l shoulder AROM abd/flexion: x15 each  -s/l ER AROM: x20  -PROM to tolerance TherEx: 50 min  UBE: 2'/2', Lvl 2  -standing AAROM abduction: w/ stick vertical x20 each  -shoulder flexion stick horiz full range: x20  -TRX I's and Y's: x10 each  -Self ER stretch at wall w/ cane: 20 sec x5 reps  -ER stretch at wall with trunk rotations turn out: 10 sec x8 reps  -pec stretch W: 3x30 sec  -Behind the back stretch w/ strap: 10 sec x10 (going up the spine)  -AROM shoulder flexion and abd to 90: 2x10 each  -wall slide flexion  +abduction: x15 each  -wall wash circles: 2x to fatigue  -rows: BTB, 2x15  -shoulder ext: GTB, 2x15  -ER/IR isotonic: GTB, 2x10  -s/l shoulder AROM abd/flexion: x15 each  -s/l ER AROM: x20  -PROM to tolerance    Hold  -OH press w/ stick: x10 TherEx: 45 min  UBE: 2'/2', Lvl 2  -TRX I's and Y's: x10 each  -ER at 90/90 stretch at wall: 2x10  -ER at 90 abd w/ cane at wall: x20  -Self ER stretch at wall w/ cane: 20 sec x5 reps  -ER stretch at wall with trunk rotations turn out: 10 sec x8 reps  -Behind the back stretch w/ strap: 10 sec x10 (going up the spine)  -AROM shoulder flexion and abd to >90: 2x10 each  -wall wash circles: 2x to fatigue  -OH press w/ stick: 2x10  -rows: BlkTB, 2x15  -shoulder ext: BTB, 2x15  -ER/IR isotonic: GTB, 2x10  -ER at 90 flexion against wall: 0#, 2x10  -bicep curls: 2#, 2x10  -s/l shoulder AROM abd/flexion: 2x10 each  -s/l ER: 1#, 2x10  -PROM to tolerance     TherEx: 40 min  UBE: 2'/2', Lvl 2  -re-assessment  -TRX I's and Y's: x10 each  -ER at 90/90 stretch at wall: 2x10  -ER at 90 abd w/ cane at wall: [id number]  -Self ER stretch at wall w/ cane: 20 sec x5 reps  -Behind the back stretch w/ strap: 10 sec x10 (going up the spine)  -AROM shoulder flexion and abd to >90: 2x10 each  -wall wash circles: 2x to fatigue  -OH press w/ stick: 2x10  -shoulder ext: BTB, 2x15  -ER at side isotonic: GTB, 2x10  -ER at side walkouts: RTB, 2x10  -ER at 90 flexion  against wall: 0#, 2x10  -bicep curls: 2#, 2x10-hold  -s/l shoulder AROM abd/flexion: 2x10 each-hold  -s/l ER: 1#, 2x10  -PROM to tolerance TherEx: 40 min  UBE: 2'/2', Lvl 2  -TRX I's and Y's: x10 each  -ER at 90/90 stretch at wall: 2x10  -ER at side turn out at wall: 5 sec x20  -Self ER stretch at wall w/ cane: 20 sec x5 reps  -Behind the back stretch w/ strap: 10 sec x10 (going up the spine)  -AROM shoulder flexion and abd to >90: 2x10 each  -wall wash circles: 2x to fatigue  -OH press w/ stick: 2x10-hold  -shoulder ext: GTB, 2x15  -ER at side isotonic: RTB, 2x10  -ER at 90 flexion against wall: 0#, 2x10  -bicep curls: 2#, 2x10  -s/l shoulder AROM abd: 2x10 each  -s/l ER: 0#, 2x10  -PROM to tolerance       Modalities: 10 min  Game ready(L) shoulder post tx                 HEP:   Access Code: [email or web address]  URL: [email or web address]  Date: 10/18/2024  Prepared by: Batsheva Beckwith    Exercises  - Standing Shoulder External Rotation AAROM with Dowel  - 3 x daily - 7 x weekly - 3 sets - 10-20 seconds hold  - Standing Shoulder External Rotation Stretch at Wall  - 3 x daily - 7 x weekly - 3 sets - 20 sec hold  - Doorway Pec Stretch at 60 Elevation  - 3 x daily - 7 x weekly - 3 sets - 20 sec hold  - Seated Shoulder External Rotation PROM on Table  - 3 x daily - 7 x weekly - 3 sets - 20 sec hold  - Supine Chest Stretch with Elbows Bent  - 3 x daily - 7 x weekly - 3 sets - 20 sec hold  - Supine Shoulder External Rotation in 45 Degrees Abduction AAROM with Dowel  - 3 x daily - 7 x weekly - 3 sets - 20 seconds hold  - Supine Shoulder Flexion with Dowel  - 3 x daily - 7 x weekly - 2 sets - 10 reps  - Seated Bilateral Shoulder Flexion Towel Slide at Table Top  - 3 x daily - 7 x weekly - 2 sets - 10 reps  - Seated Shoulder Abduction Towel Slide at Table Top  - 3 x daily - 7 x weekly - 2 sets - 10 reps  - Shoulder Flexion Overhead with Dowel  - 3 x daily - 7 x weekly - 3 sets - 10 reps  - Standing Shoulder Abduction  AAROM with Dowel  - 3 x daily - 7 x weekly - 3 sets - 10 reps    Charges: TherEx: 3 units, Vasopneumatic device: 1 unit   Total Timed Treatment: 40 min  Total Treatment Time: 50 min

## 2024-11-15 ENCOUNTER — OFFICE VISIT (OUTPATIENT)
Dept: PHYSICAL THERAPY | Age: 44
End: 2024-11-15
Attending: ORTHOPAEDIC SURGERY
Payer: MEDICAID

## 2024-11-15 PROCEDURE — 97110 THERAPEUTIC EXERCISES: CPT | Performed by: PHYSICAL THERAPIST

## 2024-11-15 PROCEDURE — 97016 VASOPNEUMATIC DEVICE THERAPY: CPT | Performed by: PHYSICAL THERAPIST

## 2024-11-18 NOTE — PROGRESS NOTES
Diagnosis:   S/P arthroscopy of left shoulder (Z98.890)   (full supraspinatus tear and partial infraspinatus tear/repair)     Post op dx:  Left Shoulder Arthroscopy Rotator Cuff Repair Arthroscopic Biceps Tenodesis Subacromial Decompression       Referring Provider: Beverly Womack Date of Evaluation:    9/25/24    PN: 11/11/24    Precautions:   Activity Precautions: Passive ROMAT, 1 lb WB x 4 weeks. Begin PT POD #1.     Next MD visit:   none scheduled  Date of Surgery: 9/24/25    8 weeks post op on 11/19/24   Insurance Primary/Secondary: BLUE CROSS MEDICAID / N/A     # Auth Visits: 15 visits till 11/24  + 6 visits till 1/14/25      Subjective: Pt states shoulder is better but still having more some pain when laying on the shoulder which was not a problem before.     Pain: 0/10 at rest, 2/10 with activities      Objective:     AROM: (* denotes performed with pain)  Shoulder    Flexion: L 160*  Abduction: L 160*  ER at side: L 55*  IR behind the back reach: L to T 11*           Assessment: Pt presenting with improving symptoms due to rest this weekend. She is able to resume AROM shoulder raises and progress para scap strengthening prone on bench. She tolerates tx well and is demonstrating improving AROM (R) shoulder as compared to the last week.       Goals:   To be met in 6-8 weeks post op   Pt will report improved ability to sleep without waking due to shoulder pain-Met  Pt will improve R shoulder flexion PROM to >150 degrees to be able to reach into shoulder height cabinets when cleared for AROM-Met  Pt will improve R shoulder abduction PROM to >130 degrees to improve ability to don deodorant, don/doff shirts, and wash hair when cleared for AROM-Met  Pt will increase shoulder PROM ER to >80 degrees at 90 deg abduction to reach and fasten seatbelt when cleared for AROM-progressing  Pt will be independent and compliant with comprehensive HEP to maintain progress achieved in PT-On going     To be met in 8-10 weeks  post op   Pt will improve shoulder flexion AROM to >150 degrees to be able to reach into overhead cabinets without pain or restriction -Met  Pt will improve shoulder abduction AROM to >130 degrees to improve ability to don deodorant, don/doff shirts, and wash hair -Met  Pt will increase shoulder AROM ER to 80 degrees to be able to reach and fasten seatbelt-progressing  Pt will increase shoulder AROM IR to 70 degrees to be able to reach in back pocket, tuck in shirt, and turn steering wheel without pain-progressing  Pt will be independent and compliant with comprehensive HEP to maintain progress achieved in PT -On going     To be met 4-6 months post op (total visits of PT: ~35)  Pt will improve shoulder strength throughout to 4+/5 to improve function with carrying groceries and wash floors and vacuum    Pt will demonstrate increased mid/low trap strength to 4/5 to promote improved shoulder mechanics and stabilization with lifting and reaching   Pt will be independent and compliant with comprehensive HEP to maintain progress achieved in PT        Plan: Continue skilled Physical Therapy 2 x/week or a total of 24 visits over a 90 day period. Treatment will include: [employer], neuro re-ed, manual tech         Date:11/1/24  Tx#: 12/15 Date:11/4/24  Tx#: 13/15 Date:11/8/24  Tx#: 14/15 Date:11/11/24  Tx#: 15/15 Date:11/13/24  Tx#: 16/21 Date:11/19/24  Tx#: 17/21   TherEx: 55 min  UBE: 2'/2'  -standing AAROM flexion and abduction: w/ stick vertical x20 each  -shoulder flexion stick horiz full range: x20  -Pulleys's: 2'/2'  -Self ER stretch at wall w/ cane: 20 sec x5 reps  -Behind the back stretch w/ strap: 10 sec x10  -finger ladder: flexion/abd x5 each  -AROM shoulder flexion and abd to 90: x10 each  -ER stretch at wall with trunk rotations turn out: 10 sec x8 reps  -wall slide flexion  +abduction: x15 each  -rows: GTB, 2x10-hold  -shoulder ext: RTB, 2x10-hold   -ER/IR isotonic: GTB, 2x10  -s/l shoulder AROM abd/flexion: x15  each  -s/l ER AROM: x20  -PROM to tolerance TherEx: 50 min  UBE: 2'/2', Lvl 2  -standing AAROM abduction: w/ stick vertical x20 each  -shoulder flexion stick horiz full range: x20  -TRX I's and Y's: x10 each  -Self ER stretch at wall w/ cane: 20 sec x5 reps  -ER stretch at wall with trunk rotations turn out: 10 sec x8 reps  -pec stretch W: 3x30 sec  -Behind the back stretch w/ strap: 10 sec x10 (going up the spine)  -AROM shoulder flexion and abd to 90: 2x10 each  -wall slide flexion  +abduction: x15 each  -wall wash circles: 2x to fatigue  -rows: BTB, 2x15  -shoulder ext: GTB, 2x15  -ER/IR isotonic: GTB, 2x10  -s/l shoulder AROM abd/flexion: x15 each  -s/l ER AROM: x20  -PROM to tolerance    Hold  -OH press w/ stick: x10 TherEx: 45 min  UBE: 2'/2', Lvl 2  -TRX I's and Y's: x10 each  -ER at 90/90 stretch at wall: 2x10  -ER at 90 abd w/ cane at wall: x20  -Self ER stretch at wall w/ cane: 20 sec x5 reps  -ER stretch at wall with trunk rotations turn out: 10 sec x8 reps  -Behind the back stretch w/ strap: 10 sec x10 (going up the spine)  -AROM shoulder flexion and abd to >90: 2x10 each  -wall wash circles: 2x to fatigue  -OH press w/ stick: 2x10  -rows: BlkTB, 2x15  -shoulder ext: BTB, 2x15  -ER/IR isotonic: GTB, 2x10  -ER at 90 flexion against wall: 0#, 2x10  -bicep curls: 2#, 2x10  -s/l shoulder AROM abd/flexion: 2x10 each  -s/l ER: 1#, 2x10  -PROM to tolerance     TherEx: 40 min  UBE: 2'/2', Lvl 2  -re-assessment  -TRX I's and Y's: x10 each  -ER at 90/90 stretch at wall: 2x10  -ER at 90 abd w/ cane at wall: [id number]  -Self ER stretch at wall w/ cane: 20 sec x5 reps  -Behind the back stretch w/ strap: 10 sec x10 (going up the spine)  -AROM shoulder flexion and abd to >90: 2x10 each  -wall wash circles: 2x to fatigue  -OH press w/ stick: 2x10  -shoulder ext: BTB, 2x15  -ER at side isotonic: GTB, 2x10  -ER at side walkouts: RTB, 2x10  -ER at 90 flexion against wall: 0#, 2x10  -bicep curls: 2#, 2x10-hold  -s/l shoulder  AROM abd/flexion: 2x10 each-hold  -s/l ER: 1#, 2x10  -PROM to tolerance TherEx: 40 min  UBE: 2'/2', Lvl 2  -TRX I's and Y's: x10 each  -ER at 90/90 stretch at wall: 2x10  -ER at side turn out at wall: 5 sec x20  -Self ER stretch at wall w/ cane: 20 sec x5 reps  -Behind the back stretch w/ strap: 10 sec x10 (going up the spine)  -AROM shoulder flexion and abd to >90: 2x10 each  -wall wash circles: 2x to fatigue  -OH press w/ stick: 2x10-hold  -shoulder ext: GTB, 2x15  -ER at side isotonic: RTB, 2x10  -ER at 90 flexion against wall: 0#, 2x10  -bicep curls: 2#, 2x10  -s/l shoulder AROM abd: 2x10 each  -s/l ER: 0#, 2x10  -PROM to tolerance TherEx: 45 min  UBE: 2'/2', Lvl 3  -TRX I's and Y's: x10 each  -ER at 90/90 stretch at wall: 2x10  -Self ER stretch at wall (at side)w/ cane: 20 sec x5 reps  -Behind the back stretch w/ strap: 10 sec x10 (going up the spine)  -AROM shoulder flexion and abd to full range: 2x15 each  -wall wash circles: 1#, 2x to fatigue  -OH press w/ stick: 2#, 2x10  -shoulder ext: GTB, 2x15  -ER at side isotonic: RTB, 2x10  -ER at side walkouts: GTB, 2x to fatigue  -ER at 90 flexion against wall: 0#, 2x10-hold  -bicep curls: 3#, 2x10  -PROM to tolerance       Modalities: 10 min  Game ready(L) shoulder post tx                    HEP:   Access Code: [email or web address]  URL: [email or web address]  Date: 10/18/2024  Prepared by: Batsheva Beckwith    Exercises  - Standing Shoulder External Rotation AAROM with Dowel  - 3 x daily - 7 x weekly - 3 sets - 10-20 seconds hold  - Standing Shoulder External Rotation Stretch at Wall  - 3 x daily - 7 x weekly - 3 sets - 20 sec hold  - Doorway Pec Stretch at 60 Elevation  - 3 x daily - 7 x weekly - 3 sets - 20 sec hold  - Seated Shoulder External Rotation PROM on Table  - 3 x daily - 7 x weekly - 3 sets - 20 sec hold  - Supine Chest Stretch with Elbows Bent  - 3 x daily - 7 x weekly - 3 sets - 20 sec hold  - Supine Shoulder External Rotation in 45 Degrees  Abduction AAROM with Dowel  - 3 x daily - 7 x weekly - 3 sets - 20 seconds hold  - Supine Shoulder Flexion with Dowel  - 3 x daily - 7 x weekly - 2 sets - 10 reps  - Seated Bilateral Shoulder Flexion Towel Slide at Table Top  - 3 x daily - 7 x weekly - 2 sets - 10 reps  - Seated Shoulder Abduction Towel Slide at Table Top  - 3 x daily - 7 x weekly - 2 sets - 10 reps  - Shoulder Flexion Overhead with Dowel  - 3 x daily - 7 x weekly - 3 sets - 10 reps  - Standing Shoulder Abduction AAROM with Dowel  - 3 x daily - 7 x weekly - 3 sets - 10 reps    Charges: TherEx: 3 units  Total Timed Treatment: 45 min  Total Treatment Time: 45 min

## 2024-11-19 ENCOUNTER — OFFICE VISIT (OUTPATIENT)
Dept: PHYSICAL THERAPY | Age: 44
End: 2024-11-19
Attending: ORTHOPAEDIC SURGERY
Payer: MEDICAID

## 2024-11-19 PROCEDURE — 97110 THERAPEUTIC EXERCISES: CPT | Performed by: PHYSICAL THERAPIST

## 2024-11-20 NOTE — PROGRESS NOTES
Diagnosis:   S/P arthroscopy of left shoulder (Z98.890)   (full supraspinatus tear and partial infraspinatus tear/repair)     Post op dx:  Left Shoulder Arthroscopy Rotator Cuff Repair Arthroscopic Biceps Tenodesis Subacromial Decompression       Referring Provider: Beverly Womack Date of Evaluation:    9/25/24    PN: 11/11/24    Precautions:   Activity Precautions: Passive ROMAT, 1 lb WB x 4 weeks. Begin PT POD #1.     Next MD visit:   none scheduled  Date of Surgery: 9/24/25    8 weeks post op on 11/19/24   Insurance Primary/Secondary: BLUE CROSS MEDICAID / N/A     # Auth Visits: 15 visits till 11/24  + 6 visits till 1/14/25      Subjective: Pt reports her shoulder is OK right now but it did hurt at night time laying on it    Pain: 0/10 at rest      Objective:     AROM: (* denotes performed with pain)  Shoulder    Flexion: L 160*  Abduction: L 160*  ER at side: L 55*  IR behind the back reach: L to T 11*           Assessment: Pt is progressed to wall push ups and reverse fly's to build para scapular muscle strength and shoulder stability. She is demonstrating improving tolerance to full range shoulder raises. She does not demonstrate and significant improvements in range and she is educated on importance of stretching multiple times a day to promote ROM progress.       Goals:   To be met in 6-8 weeks post op   Pt will report improved ability to sleep without waking due to shoulder pain-Met  Pt will improve R shoulder flexion PROM to >150 degrees to be able to reach into shoulder height cabinets when cleared for AROM-Met  Pt will improve R shoulder abduction PROM to >130 degrees to improve ability to don deodorant, don/doff shirts, and wash hair when cleared for AROM-Met  Pt will increase shoulder PROM ER to >80 degrees at 90 deg abduction to reach and fasten seatbelt when cleared for AROM-progressing  Pt will be independent and compliant with comprehensive HEP to maintain progress achieved in PT-On going     To be  met in 8-10 weeks post op   Pt will improve shoulder flexion AROM to >150 degrees to be able to reach into overhead cabinets without pain or restriction -Met  Pt will improve shoulder abduction AROM to >130 degrees to improve ability to don deodorant, don/doff shirts, and wash hair -Met  Pt will increase shoulder AROM ER to 80 degrees to be able to reach and fasten seatbelt-progressing  Pt will increase shoulder AROM IR to 70 degrees to be able to reach in back pocket, tuck in shirt, and turn steering wheel without pain-progressing  Pt will be independent and compliant with comprehensive HEP to maintain progress achieved in PT -On going     To be met 4-6 months post op (total visits of PT: ~35)  Pt will improve shoulder strength throughout to 4+/5 to improve function with carrying groceries and wash floors and vacuum    Pt will demonstrate increased mid/low trap strength to 4/5 to promote improved shoulder mechanics and stabilization with lifting and reaching   Pt will be independent and compliant with comprehensive HEP to maintain progress achieved in PT        Plan: Continue skilled Physical Therapy 2 x/week or a total of 24 visits over a 90 day period. Treatment will include: TherEx, neuro re-ed, manual tech         Date:11/1/24  Tx#: 12/15 Date:11/4/24  Tx#: 13/15 Date:11/8/24  Tx#: 14/15 Date:11/11/24  Tx#: 15/15 Date:11/13/24  Tx#: 16/21 Date:11/19/24  Tx#: 17/21 Date:11/22/24  Tx#: 18/21   TherEx: 55 min  UBE: 2'/2'  -standing AAROM flexion and abduction: w/ stick vertical x20 each  -shoulder flexion stick horiz full range: x20  -Pulleys's: 2'/2'  -Self ER stretch at wall w/ cane: 20 sec x5 reps  -Behind the back stretch w/ strap: 10 sec x10  -finger ladder: flexion/abd x5 each  -AROM shoulder flexion and abd to 90: x10 each  -ER stretch at wall with trunk rotations turn out: 10 sec x8 reps  -wall slide flexion  +abduction: x15 each  -rows: GTB, 2x10-hold  -shoulder ext: RTB, 2x10-hold   -ER/IR isotonic: GTB,  2x10  -s/l shoulder AROM abd/flexion: x15 each  -s/l ER AROM: x20  -PROM to tolerance TherEx: 50 min  UBE: 2'/2', Lvl 2  -standing AAROM abduction: w/ stick vertical x20 each  -shoulder flexion stick horiz full range: x20  -TRX I's and Y's: x10 each  -Self ER stretch at wall w/ cane: 20 sec x5 reps  -ER stretch at wall with trunk rotations turn out: 10 sec x8 reps  -pec stretch W: 3x30 sec  -Behind the back stretch w/ strap: 10 sec x10 (going up the spine)  -AROM shoulder flexion and abd to 90: 2x10 each  -wall slide flexion  +abduction: x15 each  -wall wash circles: 2x to fatigue  -rows: BTB, 2x15  -shoulder ext: GTB, 2x15  -ER/IR isotonic: GTB, 2x10  -s/l shoulder AROM abd/flexion: x15 each  -s/l ER AROM: x20  -PROM to tolerance    Hold  -OH press w/ stick: x10 TherEx: 45 min  UBE: 2'/2', Lvl 2  -TRX I's and Y's: x10 each  -ER at 90/90 stretch at wall: 2x10  -ER at 90 abd w/ cane at wall: x20  -Self ER stretch at wall w/ cane: 20 sec x5 reps  -ER stretch at wall with trunk rotations turn out: 10 sec x8 reps  -Behind the back stretch w/ strap: 10 sec x10 (going up the spine)  -AROM shoulder flexion and abd to >90: 2x10 each  -wall wash circles: 2x to fatigue  -OH press w/ stick: 2x10  -rows: BlkTB, 2x15  -shoulder ext: BTB, 2x15  -ER/IR isotonic: GTB, 2x10  -ER at 90 flexion against wall: 0#, 2x10  -bicep curls: 2#, 2x10  -s/l shoulder AROM abd/flexion: 2x10 each  -s/l ER: 1#, 2x10  -PROM to tolerance     TherEx: 40 min  UBE: 2'/2', Lvl 2  -re-assessment  -TRX I's and Y's: x10 each  -ER at 90/90 stretch at wall: 2x10  -ER at 90 abd w/ cane at wall: [id number]  -Self ER stretch at wall w/ cane: 20 sec x5 reps  -Behind the back stretch w/ strap: 10 sec x10 (going up the spine)  -AROM shoulder flexion and abd to >90: 2x10 each  -wall wash circles: 2x to fatigue  -OH press w/ stick: 2x10  -shoulder ext: BTB, 2x15  -ER at side isotonic: GTB, 2x10  -ER at side walkouts: RTB, 2x10  -ER at 90 flexion against wall: 0#,  2x10  -bicep curls: 2#, 2x10-hold  -s/l shoulder AROM abd/flexion: 2x10 each-hold  -s/l ER: 1#, 2x10  -PROM to tolerance TherEx: 40 min  UBE: 2'/2', Lvl 2  -TRX I's and Y's: x10 each  -ER at 90/90 stretch at wall: 2x10  -ER at side turn out at wall: 5 sec x20  -Self ER stretch at wall w/ cane: 20 sec x5 reps  -Behind the back stretch w/ strap: 10 sec x10 (going up the spine)  -AROM shoulder flexion and abd to >90: 2x10 each  -wall wash circles: 2x to fatigue  -OH press w/ stick: 2x10-hold  -shoulder ext: GTB, 2x15  -ER at side isotonic: RTB, 2x10  -ER at 90 flexion against wall: 0#, 2x10  -bicep curls: 2#, 2x10  -s/l shoulder AROM abd: 2x10 each  -s/l ER: 0#, 2x10  -PROM to tolerance TherEx: 45 min  UBE: 2'/2', Lvl 3  -TRX I's and Y's: x10 each  -ER at 90/90 stretch at wall: 2x10  -Self ER stretch at wall (at side)w/ cane: 20 sec x5 reps  -Behind the back stretch w/ strap: 10 sec x10 (going up the spine)  -AROM shoulder flexion and abd to full range: 2x15 each  -wall wash circles: 1#, 2x to fatigue  -OH press w/ stick: 2#, 2x10  -shoulder ext: GTB, 2x15  -ER at side isotonic: RTB, 2x10  -ER at side walkouts: GTB, 2x to fatigue  -ER at 90 flexion against wall: 0#, 2x10-hold  -bicep curls: 3#, 2x10  -PROM to tolerance TherEx: 45 min  UBE: 2'/2', Lvl 3  -TRX I's and Y's: x10 each  -ER at 90/90 stretch at wall: 2x10  -Self ER stretch at wall (at side)w/ cane: 20 sec x5 reps  -Behind the back stretch w/ strap: 10 sec x10 (going up the spine)  -AROM shoulder flexion and abd to full range: 2x15 each  -wall wash circles: 1#, 2x to fatigue  -OH press w/ stick: 2#, 2x10  -shoulder reverse fly: RTB, 2x10  -ER at side isotonic: GTB, 2x10  -ER at 90 flexion against wall: 0#, 2x10  -bicep curls: 3#, 2x10  -Prone on bench: shoulder ext 2#, horiz abd 0#: 2x10  -wall push ups: 2x10  -PROM to tolerance       Modalities: 10 min  Game ready(L) shoulder post tx                       HEP:   Access Code: [email or web address]  URL:  [email or web address]  Date: 10/18/2024  Prepared by: Btasheva Beckwith    Exercises  - Standing Shoulder External Rotation AAROM with Dowel  - 3 x daily - 7 x weekly - 3 sets - 10-20 seconds hold  - Standing Shoulder External Rotation Stretch at Wall  - 3 x daily - 7 x weekly - 3 sets - 20 sec hold  - Doorway Pec Stretch at 60 Elevation  - 3 x daily - 7 x weekly - 3 sets - 20 sec hold  - Seated Shoulder External Rotation PROM on Table  - 3 x daily - 7 x weekly - 3 sets - 20 sec hold  - Supine Chest Stretch with Elbows Bent  - 3 x daily - 7 x weekly - 3 sets - 20 sec hold  - Supine Shoulder External Rotation in 45 Degrees Abduction AAROM with Dowel  - 3 x daily - 7 x weekly - 3 sets - 20 seconds hold  - Supine Shoulder Flexion with Dowel  - 3 x daily - 7 x weekly - 2 sets - 10 reps  - Seated Bilateral Shoulder Flexion Towel Slide at Table Top  - 3 x daily - 7 x weekly - 2 sets - 10 reps  - Seated Shoulder Abduction Towel Slide at Table Top  - 3 x daily - 7 x weekly - 2 sets - 10 reps  - Shoulder Flexion Overhead with Dowel  - 3 x daily - 7 x weekly - 3 sets - 10 reps  - Standing Shoulder Abduction AAROM with Dowel  - 3 x daily - 7 x weekly - 3 sets - 10 reps    Charges: TherEx: 3 units  Total Timed Treatment: 45 min  Total Treatment Time: 45 min

## 2024-11-22 ENCOUNTER — OFFICE VISIT (OUTPATIENT)
Dept: PHYSICAL THERAPY | Age: 44
End: 2024-11-22
Attending: ORTHOPAEDIC SURGERY
Payer: MEDICAID

## 2024-11-22 PROCEDURE — 97110 THERAPEUTIC EXERCISES: CPT | Performed by: PHYSICAL THERAPIST

## 2024-11-25 NOTE — PROGRESS NOTES
Diagnosis:   S/P arthroscopy of left shoulder (Z98.890)   (full supraspinatus tear and partial infraspinatus tear/repair)     Post op dx:  Left Shoulder Arthroscopy Rotator Cuff Repair Arthroscopic Biceps Tenodesis Subacromial Decompression       Referring Provider: Beverly Womack Date of Evaluation:    9/25/24    PN: 11/11/24    Precautions:   Activity Precautions: Passive ROMAT, 1 lb WB x 4 weeks. Begin PT POD #1.     Next MD visit:   none scheduled  Date of Surgery: 9/24/25    9 weeks post op on 11/26/24   Insurance Primary/Secondary: BLUE CROSS MEDICAID / N/A     # Auth Visits: 15 visits till 11/24  + 6 visits till 1/14/25      Subjective: Pt reports her shoulder does not hurt at rest but with activities it increases.     Pain: 0/10 at rest, 2/10 with activities      Objective:     AROM: (* denotes performed with pain)  Shoulder    Flexion: L 160*  Abduction: L 160*  ER at side: L 55*  IR behind the back reach: L to T 11*           Assessment: Pt continues to be educated on importance of consistency with stretching at home to promote gains in shoulder mobility as she is now transitioning to 1x/ week in PT.  She is progressed in resistance on s/l shoulder ER and abduction with fatigue and mild shoulder soreness.     Goals:   To be met in 6-8 weeks post op   Pt will report improved ability to sleep without waking due to shoulder pain-Met  Pt will improve R shoulder flexion PROM to >150 degrees to be able to reach into shoulder height cabinets when cleared for AROM-Met  Pt will improve R shoulder abduction PROM to >130 degrees to improve ability to don deodorant, don/doff shirts, and wash hair when cleared for AROM-Met  Pt will increase shoulder PROM ER to >80 degrees at 90 deg abduction to reach and fasten seatbelt when cleared for AROM-progressing  Pt will be independent and compliant with comprehensive HEP to maintain progress achieved in PT-On going     To be met in 8-10 weeks post op   Pt will improve shoulder  You were seen in the emergency department for evaluation of abdominal pain, constipation.  Your symptoms improved after receiving medications.  Please continue your outpatient follow-up regimen as you previously scheduled with gastroenterology.  Prescription for Zofran was sent to your pharmacy as well.  Please return to the emergency department immediately with any new or worsening symptoms you may develop.  Thank   flexion AROM to >150 degrees to be able to reach into overhead cabinets without pain or restriction -Met  Pt will improve shoulder abduction AROM to >130 degrees to improve ability to don deodorant, don/doff shirts, and wash hair -Met  Pt will increase shoulder AROM ER to 80 degrees to be able to reach and fasten seatbelt-progressing  Pt will increase shoulder AROM IR to 70 degrees to be able to reach in back pocket, tuck in shirt, and turn steering wheel without pain-progressing  Pt will be independent and compliant with comprehensive HEP to maintain progress achieved in PT -On going     To be met 4-6 months post op (total visits of PT: ~35)  Pt will improve shoulder strength throughout to 4+/5 to improve function with carrying groceries and wash floors and vacuum    Pt will demonstrate increased mid/low trap strength to 4/5 to promote improved shoulder mechanics and stabilization with lifting and reaching   Pt will be independent and compliant with comprehensive HEP to maintain progress achieved in PT        Plan: Continue to progress P/AROM and gradual strengthening and endurance       Date:11/8/24  Tx#: 14/15 Date:11/11/24  Tx#: 15/15 Date:11/13/24  Tx#: 16/21 Date:11/19/24  Tx#: 17/21 Date:11/22/24  Tx#: 18/21 Date:11/26/24  Tx#: 19/21   TherEx: 45 min  UBE: 2'/2', Lvl 2  -TRX I's and Y's: x10 each  -ER at 90/90 stretch at wall: 2x10  -ER at 90 abd w/ cane at wall: x20  -Self ER stretch at wall w/ cane: 20 sec x5 reps  -ER stretch at wall with trunk rotations turn out: 10 sec x8 reps  -Behind the back stretch w/ strap: 10 sec x10 (going up the spine)  -AROM shoulder flexion and abd to >90: 2x10 each  -wall wash circles: 2x to fatigue  -OH press w/ stick: 2x10  -rows: BlkTB, 2x15  -shoulder ext: BTB, 2x15  -ER/IR isotonic: GTB, 2x10  -ER at 90 flexion against wall: 0#, 2x10  -bicep curls: 2#, 2x10  -s/l shoulder AROM abd/flexion: 2x10 each  -s/l ER: 1#, 2x10  -PROM to tolerance     TherEx: 40 min  UBE: 2'/2',  Lvl 2  -re-assessment  -TRX I's and Y's: x10 each  -ER at 90/90 stretch at wall: 2x10  -ER at 90 abd w/ cane at wall: [id number]  -Self ER stretch at wall w/ cane: 20 sec x5 reps  -Behind the back stretch w/ strap: 10 sec x10 (going up the spine)  -AROM shoulder flexion and abd to >90: 2x10 each  -wall wash circles: 2x to fatigue  -OH press w/ stick: 2x10  -shoulder ext: BTB, 2x15  -ER at side isotonic: GTB, 2x10  -ER at side walkouts: RTB, 2x10  -ER at 90 flexion against wall: 0#, 2x10  -bicep curls: 2#, 2x10-hold  -s/l shoulder AROM abd/flexion: 2x10 each-hold  -s/l ER: 1#, 2x10  -PROM to tolerance TherEx: 40 min  UBE: 2'/2', Lvl 2  -TRX I's and Y's: x10 each  -ER at 90/90 stretch at wall: 2x10  -ER at side turn out at wall: 5 sec x20  -Self ER stretch at wall w/ cane: 20 sec x5 reps  -Behind the back stretch w/ strap: 10 sec x10 (going up the spine)  -AROM shoulder flexion and abd to >90: 2x10 each  -wall wash circles: 2x to fatigue  -OH press w/ stick: 2x10-hold  -shoulder ext: GTB, 2x15  -ER at side isotonic: RTB, 2x10  -ER at 90 flexion against wall: 0#, 2x10  -bicep curls: 2#, 2x10  -s/l shoulder AROM abd: 2x10 each  -s/l ER: 0#, 2x10  -PROM to tolerance TherEx: 45 min  UBE: 2'/2', Lvl 3  -TRX I's and Y's: x10 each  -ER at 90/90 stretch at wall: 2x10  -Self ER stretch at wall (at side)w/ cane: 20 sec x5 reps  -Behind the back stretch w/ strap: 10 sec x10 (going up the spine)  -AROM shoulder flexion and abd to full range: 2x15 each  -wall wash circles: 1#, 2x to fatigue  -OH press w/ stick: 2#, 2x10  -shoulder ext: GTB, 2x15  -ER at side isotonic: RTB, 2x10  -ER at side walkouts: GTB, 2x to fatigue  -ER at 90 flexion against wall: 0#, 2x10-hold  -bicep curls: 3#, 2x10  -PROM to tolerance TherEx: 45 min  UBE: 2'/2', Lvl 3  -TRX I's and Y's: x10 each  -ER at 90/90 stretch at wall: 2x10  -Self ER stretch at wall (at side)w/ cane: 20 sec x5 reps  -Behind the back stretch w/ strap: 10 sec x10 (going up the  spine)  -AROM shoulder flexion and abd to full range: 2x15 each  -wall wash circles: 1#, 2x to fatigue  -OH press w/ stick: 2#, 2x10  -shoulder reverse fly: RTB, 2x10  -ER at side isotonic: GTB, 2x10  -ER at 90 flexion against wall: 0#, 2x10  -bicep curls: 3#, 2x10  -Prone on bench: shoulder ext 2#, horiz abd 0#: 2x10  -wall push ups: 2x10  -PROM to tolerance TherEx: 50 min  UBE: 2'/2', Lvl 3  -TRX I's and Y's: x10 each  -ER at 90/90 stretch at wall: 2x10  -Self ER stretch at wall (at side)w/ cane: 20 sec x5 reps  -Behind the back stretch w/ strap: 10 sec x10 (going up the spine)  -AROM shoulder flexion and abd to full range: 2x15 each  -Shoulder flexion + abd raises to 90 de#, 2x10   -wall wash circles: 2#, 2x to fatigue  -OH press w/ stick: 2#, 2x10  -shoulder reverse fly: RTB, 2x10  -ER/IR at side isotonic +reactive iso: GTB, 2x10  -ER at 90 flexion against wall: 0#, 2x10  -bicep curls: 3#, 2x10  -Prone on bench: shoulder ext 2#, horiz abd 0#: 2x10  -wall push ups: 2x10  -s/l shoulder ER: 2#  -s/l shoulder abd: 2#  -PROM to tolerance     Modalities: 10 min  Game ready(L) shoulder post tx                      HEP:   Access Code: [email or web address]  URL: [email or web address]  Date: 10/18/2024  Prepared by: Batsheva Beckwith    Exercises  - Standing Shoulder External Rotation AAROM with Dowel  - 3 x daily - 7 x weekly - 3 sets - 10-20 seconds hold  - Standing Shoulder External Rotation Stretch at Wall  - 3 x daily - 7 x weekly - 3 sets - 20 sec hold  - Doorway Pec Stretch at 60 Elevation  - 3 x daily - 7 x weekly - 3 sets - 20 sec hold  - Seated Shoulder External Rotation PROM on Table  - 3 x daily - 7 x weekly - 3 sets - 20 sec hold  - Supine Chest Stretch with Elbows Bent  - 3 x daily - 7 x weekly - 3 sets - 20 sec hold  - Supine Shoulder External Rotation in 45 Degrees Abduction AAROM with Dowel  - 3 x daily - 7 x weekly - 3 sets - 20 seconds hold  - Supine Shoulder Flexion with Dowel  - 3 x daily - 7 x  weekly - 2 sets - 10 reps  - Seated Bilateral Shoulder Flexion Towel Slide at Table Top  - 3 x daily - 7 x weekly - 2 sets - 10 reps  - Seated Shoulder Abduction Towel Slide at Table Top  - 3 x daily - 7 x weekly - 2 sets - 10 reps  - Shoulder Flexion Overhead with Dowel  - 3 x daily - 7 x weekly - 3 sets - 10 reps  - Standing Shoulder Abduction AAROM with Dowel  - 3 x daily - 7 x weekly - 3 sets - 10 reps    Charges: TherEx: 3 units  Total Timed Treatment: 50 min  Total Treatment Time: 50 min

## 2024-11-26 ENCOUNTER — OFFICE VISIT (OUTPATIENT)
Dept: PHYSICAL THERAPY | Age: 44
End: 2024-11-26
Attending: ORTHOPAEDIC SURGERY
Payer: MEDICAID

## 2024-11-26 PROCEDURE — 97110 THERAPEUTIC EXERCISES: CPT | Performed by: PHYSICAL THERAPIST

## 2024-11-29 ENCOUNTER — APPOINTMENT (OUTPATIENT)
Dept: PHYSICAL THERAPY | Age: 44
End: 2024-11-29
Payer: MEDICAID

## 2024-12-02 NOTE — PROGRESS NOTES
Diagnosis:   S/P arthroscopy of left shoulder (Z98.890)   (full supraspinatus tear and partial infraspinatus tear/repair)     Post op dx:  Left Shoulder Arthroscopy Rotator Cuff Repair Arthroscopic Biceps Tenodesis Subacromial Decompression       Referring Provider: Beverly Womack Date of Evaluation:    9/25/24    PN: 11/11/24    Precautions:   Activity Precautions: Passive ROMAT, 1 lb WB x 4 weeks. Begin PT POD #1.     Next MD visit:   none scheduled  Date of Surgery: 9/24/25    10 weeks post op on 12/3/24   Insurance Primary/Secondary: BLUE CROSS MEDICAID / N/A     # Auth Visits: 15 visits till 11/24  + 6 visits till 1/14/25      Subjective: Pt reports she continues to workout at home.     Pain: 0/10 at rest, 2/10 with activities      Objective:     AROM: (* denotes performed with pain)  Shoulder    Flexion: L 160*  Abduction: L 167*  ER at side: L 60*  IR behind the back reach: L to T 11*         Assessment: Pt demonstrates improved AROM shoulder ABD and ER as compared to previous visit. She is able to progress in resistance on shoudler raies and overhead press. She reports fatigue during strengthening and not necessarily pain.     Goals:   To be met in 6-8 weeks post op   Pt will report improved ability to sleep without waking due to shoulder pain-Met  Pt will improve R shoulder flexion PROM to >150 degrees to be able to reach into shoulder height cabinets when cleared for AROM-Met  Pt will improve R shoulder abduction PROM to >130 degrees to improve ability to don deodorant, don/doff shirts, and wash hair when cleared for AROM-Met  Pt will increase shoulder PROM ER to >80 degrees at 90 deg abduction to reach and fasten seatbelt when cleared for AROM-progressing  Pt will be independent and compliant with comprehensive HEP to maintain progress achieved in PT-On going     To be met in 8-10 weeks post op   Pt will improve shoulder flexion AROM to >150 degrees to be able to reach into overhead cabinets without pain  or restriction -Met  Pt will improve shoulder abduction AROM to >130 degrees to improve ability to don deodorant, don/doff shirts, and wash hair -Met  Pt will increase shoulder AROM ER to 80 degrees to be able to reach and fasten seatbelt-progressing  Pt will increase shoulder AROM IR to 70 degrees to be able to reach in back pocket, tuck in shirt, and turn steering wheel without pain-progressing  Pt will be independent and compliant with comprehensive HEP to maintain progress achieved in PT -On going     To be met 4-6 months post op (total visits of PT: ~35)  Pt will improve shoulder strength throughout to 4+/5 to improve function with carrying groceries and wash floors and vacuum    Pt will demonstrate increased mid/low trap strength to 4/5 to promote improved shoulder mechanics and stabilization with lifting and reaching   Pt will be independent and compliant with comprehensive HEP to maintain progress achieved in PT        Plan: PN next visit       Date:11/13/24  Tx#: 16/21 Date:11/19/24  Tx#: 17/21 Date:11/22/24  Tx#: 18/21 Date:11/26/24  Tx#: 19/21 Date:12/3/24  Tx#: 20/21   TherEx: 40 min  UBE: 2'/2', Lvl 2  -TRX I's and Y's: x10 each  -ER at 90/90 stretch at wall: 2x10  -ER at side turn out at wall: 5 sec x20  -Self ER stretch at wall w/ cane: 20 sec x5 reps  -Behind the back stretch w/ strap: 10 sec x10 (going up the spine)  -AROM shoulder flexion and abd to >90: 2x10 each  -wall wash circles: 2x to fatigue  -OH press w/ stick: 2x10-hold  -shoulder ext: GTB, 2x15  -ER at side isotonic: RTB, 2x10  -ER at 90 flexion against wall: 0#, 2x10  -bicep curls: 2#, 2x10  -s/l shoulder AROM abd: 2x10 each  -s/l ER: 0#, 2x10  -PROM to tolerance TherEx: 45 min  UBE: 2'/2', Lvl 3  -TRX I's and Y's: x10 each  -ER at 90/90 stretch at wall: 2x10  -Self ER stretch at wall (at side)w/ cane: 20 sec x5 reps  -Behind the back stretch w/ strap: 10 sec x10 (going up the spine)  -AROM shoulder flexion and abd to full range: 2x15  each  -wall wash circles: 1#, 2x to fatigue  -OH press w/ stick: 2#, 2x10  -shoulder ext: GTB, 2x15  -ER at side isotonic: RTB, 2x10  -ER at side walkouts: GTB, 2x to fatigue  -ER at 90 flexion against wall: 0#, 2x10-hold  -bicep curls: 3#, 2x10  -PROM to tolerance TherEx: 45 min  UBE: 2'/2', Lvl 3  -TRX I's and Y's: x10 each  -ER at 90/90 stretch at wall: 2x10  -Self ER stretch at wall (at side)w/ cane: 20 sec x5 reps  -Behind the back stretch w/ strap: 10 sec x10 (going up the spine)  -AROM shoulder flexion and abd to full range: 2x15 each  -wall wash circles: 1#, 2x to fatigue  -OH press w/ stick: 2#, 2x10  -shoulder reverse fly: RTB, 2x10  -ER at side isotonic: GTB, 2x10  -ER at 90 flexion against wall: 0#, 2x10  -bicep curls: 3#, 2x10  -Prone on bench: shoulder ext 2#, horiz abd 0#: 2x10  -wall push ups: 2x10  -PROM to tolerance TherEx: 50 min  UBE: 2'/2', Lvl 3  -TRX I's and Y's: x10 each  -ER at 90/90 stretch at wall: 2x10  -Self ER stretch at wall (at side)w/ cane: 20 sec x5 reps  -Behind the back stretch w/ strap: 10 sec x10 (going up the spine)  -AROM shoulder flexion and abd to full range: 2x15 each  -Shoulder flexion + abd raises to 90 de#, 2x10   -wall wash circles: 2#, 2x to fatigue  -OH press w/ stick: 2#, 2x10  -shoulder reverse fly: RTB, 2x10  -ER/IR at side isotonic +reactive iso: GTB, 2x10  -ER at 90 flexion against wall: 0#, 2x10  -bicep curls: 3#, 2x10  -Prone on bench: shoulder ext 2#, horiz abd 0#: 2x10  -wall push ups: 2x10  -s/l shoulder ER: 2#  -s/l shoulder abd: 2#  -PROM to tolerance TherEx: 55 min  UBE: 2'/2', Lvl 3  -TRX I's and Y's: x10 each  -ER at 90/90 stretch at wall: 2x10  -Self ER stretch at wall (at side)w/ cane: 20 sec x5 reps  -Behind the back stretch w/ strap: 10 sec x10 (going up the spine)  -AROM D2 flexion: 0#, x10 each  -Shoulder flexion + abd raises to 90 de#, 2x10   -wall wash circles: 2#, 2x to fatigue  -OH press: 1#, 2x10  -shoulder reverse fly: RTB,  2x10  -ER/IR at side isotonic +reactive iso: GTB/BTB, 2x10  -low trap lift off: 0#, 2x10  -serratus press: YTB, 2x10  -ER at 90 flexion against wall: 0#, 2x10  -bicep curls: 3#, 2x10  -Prone on bench: shoulder ext 2#, horiz abd 0#: 2x10  -wall push ups: 2x10  -s/l shoulder ER: 2#  -s/l shoulder abd: 2#  -PROM to tolerance   Modalities: 10 min  Game ready(L) shoulder post tx                     HEP:   Access Code: [email or web address]  URL: [email or web address]  Date: 12/03/2024  Prepared by: Batsheva Beckwith    Exercises  - Standing Shoulder External Rotation AAROM with Dowel  - 3 x daily - 7 x weekly - 3 sets - 10-20 seconds hold  - Doorway Pec Stretch at 60 Elevation  - 3 x daily - 7 x weekly - 3 sets - 20 sec hold  - Doorway Pec Stretch at 90 Degrees Abduction  - 3 x daily - 7 x weekly - 3 sets - 30 sec hold  - Shoulder Flexion Overhead with Dowel  - 3 x daily - 7 x weekly - 3 sets  - Standing Shoulder Internal Rotation Stretch with Towel  - 3 x daily - 7 x weekly - 5 sets - 20 sec hold  - Scaption with Dumbbells  - 1 x daily - 4 x weekly - 2 sets - 10 reps  - Shoulder Abduction with Dumbbells - Palms Down  - 1 x daily - 4 x weekly - 2 sets - 10 reps  - Shoulder Overhead Press in Abduction with Dumbbells  - 1 x daily - 4 x weekly - 2 sets - 10 reps  - Low Trap Setting at Wall  - 1 x daily - 4 x weekly - 2 sets - 10 reps  - Shoulder External Rotation with Anchored Resistance  - 1 x daily - 4 x weekly - 3 sets - 10 reps  - Shoulder Flexion Serratus Activation with Resistance  - 1 x daily - 4 x weekly - 2 sets - 10 reps    Charges: TherEx: 4 units  Total Timed Treatment: 55 min  Total Treatment Time: 55 min

## 2024-12-03 ENCOUNTER — OFFICE VISIT (OUTPATIENT)
Dept: PHYSICAL THERAPY | Age: 44
End: 2024-12-03
Attending: ORTHOPAEDIC SURGERY
Payer: MEDICAID

## 2024-12-03 PROCEDURE — 97110 THERAPEUTIC EXERCISES: CPT | Performed by: PHYSICAL THERAPIST

## 2024-12-06 ENCOUNTER — APPOINTMENT (OUTPATIENT)
Dept: PHYSICAL THERAPY | Age: 44
End: 2024-12-06
Payer: MEDICAID

## 2024-12-09 NOTE — PROGRESS NOTES
Progress Summary  Pt has attended 21 visits in Physical Therapy.    Diagnosis:   S/P arthroscopy of left shoulder (Z98.890)   (full supraspinatus tear and partial infraspinatus tear/repair)     Post op dx:  Left Shoulder Arthroscopy Rotator Cuff Repair Arthroscopic Biceps Tenodesis Subacromial Decompression       Referring Provider: Beverly Womack Date of Evaluation:    9/25/24    PN: 11/11/24    Precautions:   Activity Precautions: Passive ROMAT, 1 lb WB x 4 weeks. Begin PT POD #1.     Next MD visit:   none scheduled  Date of Surgery: 9/24/25    11 weeks post op on 12/10/24   Insurance Primary/Secondary: BLUE CROSS MEDICAID / N/A     # Auth Visits: 15 visits till 11/24  + 6 visits till 1/14/25      Subjective: Pt reports she is still having trouble fastening her bra behind her back. She is not lifting anything > 10 lbs. She is careful with lifting grocery bags. She has returned to most house hold chores. But continues to be limited in ability to perform sustained and repetitive overhead chores like hanging up curtains and lifting >10 lbs overhead. Some pain continues with lifting her arm over head and when reaching behind her back.     Pain: 0/10 at rest, 2/10 with overhead activities      Objective:     AROM: (* denotes performed with pain)  Shoulder    Flexion: L 166*, R 170  Abduction: L 167*, R 180  ER at side: L 70*, R 80  IR behind the back reach: L to T 10*, R T7      Strength/MMT: (* denotes performed with pain)  Shoulder Elbow Scapular   Flexion: R 5/5; L 3+/5  Abduction: R 5/5; L 4-/5*  ER: R 4/5; L 4-/5*  IR: R 4+/5; L 4/5 Flexion: R 4+/5; L 4/5  Extension: R 4+/5; L 4/5    Rhomboids: R 4-/5, L 3+/5  Mid trap: R 4-/5; L 3+/5   Low trap: R 4-/5; L 3+/5       Assessment: Pt is 11 weeks s/p Left Shoulder Arthroscopy Rotator Cuff Repair Arthroscopic Biceps Tenodesis Subacromial Decompression and has been progressing well. She has demonstrated improved (L) shoulder A/PROM, (L) GH joint mobility, (L) shoulder  strength and body mechanics. These objective gains have improved her ability to reach to over head cabinets for light dishes, clean/wipe surfaces at waist level, carry light groceries, wash and fold clothing, drive, cooking, and mopping/vacuuming. She continues to demonstrate mild end range shoulder ROM restrictions, (L) shoulder weakness, (L) shoulder endurance and altered body mechanics. These objective impairments limit her in ability to perform repetitive overhead reaching, sustained overhead house chores, lifting >5 lbs overhead, lifting > 20 lbs from floor to waist, and reaching behind her back to don/doff undergarments and wash her back. Pt will continue to benefit from skilled PT to return to PLOF and return to safe and independent house chores of moderate physical demand level.         Goals:   To be met in 6-8 weeks post op   Pt will report improved ability to sleep without waking due to shoulder pain-Met  Pt will improve R shoulder flexion PROM to >150 degrees to be able to reach into shoulder height cabinets when cleared for AROM-Met  Pt will improve R shoulder abduction PROM to >130 degrees to improve ability to don deodorant, don/doff shirts, and wash hair when cleared for AROM-Met  Pt will increase shoulder PROM ER to >80 degrees at 90 deg abduction to reach and fasten seatbelt when cleared for AROM-Met  Pt will be independent and compliant with comprehensive HEP to maintain progress achieved in PT-On going     To be met in 8-10 weeks post op   Pt will improve shoulder flexion AROM to >150 degrees to be able to reach into overhead cabinets without pain or restriction -Met  Pt will improve shoulder abduction AROM to >130 degrees to improve ability to don deodorant, don/doff shirts, and wash hair -Met  Pt will increase shoulder AROM ER to 80 degrees to be able to reach and fasten seatbelt-Progressing  Pt will increase shoulder AROM IR to 70 degrees to be able to reach in back pocket, tuck in shirt, and  turn steering wheel without pain-Met  Pt will be independent and compliant with comprehensive HEP to maintain progress achieved in PT -On going     To be met 4-6 months post op (total visits of PT: ~40)  Pt will improve shoulder strength throughout to 4+/5 to improve function with carrying groceries and wash floors and vacuum  -progressing  Pt will demonstrate increased mid/low trap strength to 4/5 to promote improved shoulder mechanics and stabilization with lifting and reaching -progressing  Pt will be independent and compliant with comprehensive HEP to maintain progress achieved in PT -On going       QuickDASH Outcome Score  Score: 65.91 % (9/25/2024  7:49 AM)    Post QuickDASH Outcome Score  Post Score: 29.55 % (12/10/2024  8:48 AM)    36.36 % improvement    Plan: Continue skilled Physical Therapy 1 x/week or a total of 12 visits over a 90 day period. Treatment will include: TherEx, neuro re-ed, manual tech    Patient/Family/Caregiver was advised of these findings, precautions, and treatment options and has agreed to actively participate in planning and for this course of care.    Thank you for your referral. If you have any questions, please contact me at Dept: [phone].    Sincerely,  Electronically signed by therapist: Batsheva Beckwith, PT     Physician's certification required:  Yes  Please co-sign or sign and return this letter via fax as soon as possible to [phone].   I certify the need for these services furnished under this plan of treatment and while under my care.    X___________________________________________________ Date____________________    Certification From: 12/9/2024  To:3/9/2025        Date:11/13/24  Tx#: 16/21 Date:11/19/24  Tx#: 17/21 Date:11/22/24  Tx#: 18/21 Date:11/26/24  Tx#: 19/21 Date:12/3/24  Tx#: 20/21 Date:12/10/24  Tx#: 21/21   TherEx: 40 min  UBE: 2'/2', Lvl 2  -TRX I's and Y's: x10 each  -ER at 90/90 stretch at wall: 2x10  -ER at side turn out at wall: 5 sec x20  -Self ER  stretch at wall w/ cane: 20 sec x5 reps  -Behind the back stretch w/ strap: 10 sec x10 (going up the spine)  -AROM shoulder flexion and abd to >90: 2x10 each  -wall wash circles: 2x to fatigue  -OH press w/ stick: 2x10-hold  -shoulder ext: GTB, 2x15  -ER at side isotonic: RTB, 2x10  -ER at 90 flexion against wall: 0#, 2x10  -bicep curls: 2#, 2x10  -s/l shoulder AROM abd: 2x10 each  -s/l ER: 0#, 2x10  -PROM to tolerance TherEx: 45 min  UBE: 2'/2', Lvl 3  -TRX I's and Y's: x10 each  -ER at 90/90 stretch at wall: 2x10  -Self ER stretch at wall (at side)w/ cane: 20 sec x5 reps  -Behind the back stretch w/ strap: 10 sec x10 (going up the spine)  -AROM shoulder flexion and abd to full range: 2x15 each  -wall wash circles: 1#, 2x to fatigue  -OH press w/ stick: 2#, 2x10  -shoulder ext: GTB, 2x15  -ER at side isotonic: RTB, 2x10  -ER at side walkouts: GTB, 2x to fatigue  -ER at 90 flexion against wall: 0#, 2x10-hold  -bicep curls: 3#, 2x10  -PROM to tolerance TherEx: 45 min  UBE: 2'/2', Lvl 3  -TRX I's and Y's: x10 each  -ER at 90/90 stretch at wall: 2x10  -Self ER stretch at wall (at side)w/ cane: 20 sec x5 reps  -Behind the back stretch w/ strap: 10 sec x10 (going up the spine)  -AROM shoulder flexion and abd to full range: 2x15 each  -wall wash circles: 1#, 2x to fatigue  -OH press w/ stick: 2#, 2x10  -shoulder reverse fly: RTB, 2x10  -ER at side isotonic: GTB, 2x10  -ER at 90 flexion against wall: 0#, 2x10  -bicep curls: 3#, 2x10  -Prone on bench: shoulder ext 2#, horiz abd 0#: 2x10  -wall push ups: 2x10  -PROM to tolerance TherEx: 50 min  UBE: 2'/2', Lvl 3  -TRX I's and Y's: x10 each  -ER at 90/90 stretch at wall: 2x10  -Self ER stretch at wall (at side)w/ cane: 20 sec x5 reps  -Behind the back stretch w/ strap: 10 sec x10 (going up the spine)  -AROM shoulder flexion and abd to full range: 2x15 each  -Shoulder flexion + abd raises to 90 de#, 2x10   -wall wash circles: 2#, 2x to fatigue  -OH press w/ stick: 2#,  2x10  -shoulder reverse fly: RTB, 2x10  -ER/IR at side isotonic +reactive iso: GTB, 2x10  -ER at 90 flexion against wall: 0#, 2x10  -bicep curls: 3#, 2x10  -Prone on bench: shoulder ext 2#, horiz abd 0#: 2x10  -wall push ups: 2x10  -s/l shoulder ER: 2#  -s/l shoulder abd: 2#  -PROM to tolerance TherEx: 55 min  UBE: 2'/2', Lvl 3  -TRX I's and Y's: x10 each  -ER at 90/90 stretch at wall: 2x10  -Self ER stretch at wall (at side)w/ cane: 20 sec x5 reps  -Behind the back stretch w/ strap: 10 sec x10 (going up the spine)  -AROM D2 flexion: 0#, x10 each  -Shoulder flexion + abd raises to 90 de#, 2x10   -wall wash circles: 2#, 2x to fatigue  -OH press: 1#, 2x10  -shoulder reverse fly: RTB, 2x10  -ER/IR at side isotonic +reactive iso: GTB/BTB, 2x10  -low trap lift off: 0#, 2x10  -serratus press: YTB, 2x10  -ER at 90 flexion against wall: 0#, 2x10  -bicep curls: 3#, 2x10  -Prone on bench: shoulder ext 2#, horiz abd 0#: 2x10  -wall push ups: 2x10  -s/l shoulder ER: 2#  -s/l shoulder abd: 2#  -PROM to tolerance TherEx: 45 min  UBE: 2'/2', Lvl 3  -TRX I's and Y's: x10 each  -ER at 90/90 and W pec stretch at wall/door: 2x10  -Self ER stretch at wall (at side)w/ cane: 20 sec x5 reps    -Reassessment  -AROM D2 flexion: 0#, x10 each  -Shoulder flexion + abd raises to 90 de#, 2x10   -wall wash circles: 2#, 2x to fatigue  -OH press: 2#, 2x10  -ER/IR at side isotonic +reactive iso: BTB, 2x20  -low trap lift off: YTB#, 2x10  -serratus press: YTB, 2x10  -wall push ups: 2x10  -YSB ball circles on wall: 2x10 each way  -PROM to tolerance      Hold  -ER at 90 flexion against wall: 0#, 2x10  -bicep curls: 3#, 2x10  -Prone on bench: shoulder ext 2#, horiz abd 0#: 2x10  -standing shoulder circles in 90 scap plane:   -clock wall taps 9-12 o'clock: 2x to fatigue   -shoulder reverse fly: RTB, 2x10   Modalities: 10 min  Game ready(L) shoulder post tx                        HEP:   Access Code: [email or web address]  URL:  [email or web address]  Date: 12/03/2024  Prepared by: Batsheva Beckwith    Exercises  - Standing Shoulder External Rotation AAROM with Dowel  - 3 x daily - 7 x weekly - 3 sets - 10-20 seconds hold  - Doorway Pec Stretch at 60 Elevation  - 3 x daily - 7 x weekly - 3 sets - 20 sec hold  - Doorway Pec Stretch at 90 Degrees Abduction  - 3 x daily - 7 x weekly - 3 sets - 30 sec hold  - Shoulder Flexion Overhead with Dowel  - 3 x daily - 7 x weekly - 3 sets  - Standing Shoulder Internal Rotation Stretch with Towel  - 3 x daily - 7 x weekly - 5 sets - 20 sec hold  - Scaption with Dumbbells  - 1 x daily - 4 x weekly - 2 sets - 10 reps  - Shoulder Abduction with Dumbbells - Palms Down  - 1 x daily - 4 x weekly - 2 sets - 10 reps  - Shoulder Overhead Press in Abduction with Dumbbells  - 1 x daily - 4 x weekly - 2 sets - 10 reps  - Low Trap Setting at Wall  - 1 x daily - 4 x weekly - 2 sets - 10 reps  - Shoulder External Rotation with Anchored Resistance  - 1 x daily - 4 x weekly - 3 sets - 10 reps  - Shoulder Flexion Serratus Activation with Resistance  - 1 x daily - 4 x weekly - 2 sets - 10 reps    Charges: TherEx: 3 units  Total Timed Treatment: 45 min  Total Treatment Time: 45 min

## 2024-12-10 ENCOUNTER — OFFICE VISIT (OUTPATIENT)
Dept: PHYSICAL THERAPY | Age: 44
End: 2024-12-10
Attending: ORTHOPAEDIC SURGERY
Payer: MEDICAID

## 2024-12-10 PROCEDURE — 97110 THERAPEUTIC EXERCISES: CPT | Performed by: PHYSICAL THERAPIST

## 2024-12-10 RX ORDER — MELOXICAM 15 MG/1
15 TABLET ORAL DAILY
Qty: 14 TABLET | Refills: 1 | Status: SHIPPED | OUTPATIENT
Start: 2024-12-10

## 2024-12-10 NOTE — TELEPHONE ENCOUNTER
Meloxicam 15 mg    DOS: 9/24/24  Last OV: 10/30/24  Last refill date: 10/15/24     #/refills: 14/1  Upcoming appt:   Future Appointments   Date Time Provider Department Center   12/17/2024  7:30 AM Batsheva Beckwith, PT [facility]   12/24/2024  7:30 AM Batsheva Beckwith, PT [facility]   12/31/2024  7:30 AM Batsheva Beckwith, PT [facility]   1/7/2025  7:30 AM Batsheva Beckwith, PT [facility]   1/14/2025  7:30 AM Batsheva Beckwith, PT [facility]   1/21/2025  7:30 AM Batsheva Beckwith, PT [facility]   1/28/2025  7:30 AM Batsheva Beckwith, PT [facility]   1/31/2025  8:00 AM Beverly Womack MD [id number]   2/5/2025  7:45 AM Tracey Liang, PT [facility]   2/19/2025  7:45 AM Tracey Liang, PT [facility]   3/5/2025  7:45 AM Tracey Liang, PT [facility]

## 2024-12-17 ENCOUNTER — APPOINTMENT (OUTPATIENT)
Dept: PHYSICAL THERAPY | Age: 44
End: 2024-12-17
Attending: ORTHOPAEDIC SURGERY
Payer: MEDICAID

## 2024-12-17 ENCOUNTER — TELEPHONE (OUTPATIENT)
Dept: PHYSICAL THERAPY | Facility: HOSPITAL | Age: 44
End: 2024-12-17

## 2024-12-24 ENCOUNTER — OFFICE VISIT (OUTPATIENT)
Dept: PHYSICAL THERAPY | Age: 44
End: 2024-12-24
Attending: ORTHOPAEDIC SURGERY
Payer: MEDICAID

## 2024-12-24 PROCEDURE — 97110 THERAPEUTIC EXERCISES: CPT | Performed by: PHYSICAL THERAPIST

## 2024-12-24 NOTE — PROGRESS NOTES
Diagnosis:   S/P arthroscopy of left shoulder (Z98.890)   (full supraspinatus tear and partial infraspinatus tear/repair)     Post op dx:  Left Shoulder Arthroscopy Rotator Cuff Repair Arthroscopic Biceps Tenodesis Subacromial Decompression       Referring Provider: Beverly Womack Date of Evaluation:    9/25/24    PN: 11/11/24    Precautions:   Activity Precautions: Passive ROMAT, 1 lb WB x 4 weeks. Begin PT POD #1.     Next MD visit:   none scheduled  Date of Surgery: 9/24/25    13 weeks post op on 12/24/24   Insurance Primary/Secondary: BLUE CROSS MEDICAID / N/A     # Auth Visits: 15 visits till 11/24  + 6 visits till 1/14/25  + 4 visits till 2/15/25    Subjective: Pt reports she did not do too much exercising in the last 2 weeks due to being ill.    Pain: 0/10 at rest      Objective:     AROM: (* denotes performed with pain)  Shoulder    Flexion: L 166*, R 170  Abduction: L 167*, R 180  ER at side: L 70*, R 80  IR behind the back reach: L to T 10*, R T7      Strength/MMT: (* denotes performed with pain)  Shoulder Elbow Scapular   Flexion: R 5/5; L 3+/5  Abduction: R 5/5; L 4-/5*  ER: R 4/5; L 4-/5*  IR: R 4+/5; L 4/5 Flexion: R 4+/5; L 4/5  Extension: R 4+/5; L 4/5    Rhomboids: R 4-/5, L 3+/5  Mid trap: R 4-/5; L 3+/5   Low trap: R 4-/5; L 3+/5       Assessment: Pt is able to progress with resistance on shoulder raises and OH press. She is progressed to RTC strengthening in 90 degrees of abduction and flexion and tolerates theses well.         Goals:   To be met in 6-8 weeks post op   Pt will report improved ability to sleep without waking due to shoulder pain-Met  Pt will improve R shoulder flexion PROM to >150 degrees to be able to reach into shoulder height cabinets when cleared for AROM-Met  Pt will improve R shoulder abduction PROM to >130 degrees to improve ability to don deodorant, don/doff shirts, and wash hair when cleared for AROM-Met  Pt will increase shoulder PROM ER to >80 degrees at 90 deg  abduction to reach and fasten seatbelt when cleared for AROM-Met  Pt will be independent and compliant with comprehensive HEP to maintain progress achieved in PT-On going     To be met in 8-10 weeks post op   Pt will improve shoulder flexion AROM to >150 degrees to be able to reach into overhead cabinets without pain or restriction -Met  Pt will improve shoulder abduction AROM to >130 degrees to improve ability to don deodorant, don/doff shirts, and wash hair -Met  Pt will increase shoulder AROM ER to 80 degrees to be able to reach and fasten seatbelt-Progressing  Pt will increase shoulder AROM IR to 70 degrees to be able to reach in back pocket, tuck in shirt, and turn steering wheel without pain-Met  Pt will be independent and compliant with comprehensive HEP to maintain progress achieved in PT -On going     To be met 4-6 months post op (total visits of PT: ~40)  Pt will improve shoulder strength throughout to 4+/5 to improve function with carrying groceries and wash floors and vacuum  -progressing  Pt will demonstrate increased mid/low trap strength to 4/5 to promote improved shoulder mechanics and stabilization with lifting and reaching -progressing  Pt will be independent and compliant with comprehensive HEP to maintain progress achieved in PT -On going           Plan: Continue skilled Physical Therapy 1 x/week or a total of 12 visits over a 90 day period. Treatment will include: TherEx, neuro re-ed, manual tech      Date:11/19/24  Tx#: 17/21 Date:11/22/24  Tx#: 18/21 Date:11/26/24  Tx#: 19/21 Date:12/3/24  Tx#: 20/21 Date:12/10/24  Tx#: 21/21 Date:12/10/24  Tx#: 1/4  (22 visits total)    TherEx: 45 min  UBE: 2'/2', Lvl 3  -TRX I's and Y's: x10 each  -ER at 90/90 stretch at wall: 2x10  -Self ER stretch at wall (at side)w/ cane: 20 sec x5 reps  -Behind the back stretch w/ strap: 10 sec x10 (going up the spine)  -AROM shoulder flexion and abd to full range: 2x15 each  -wall wash circles: 1#, 2x to fatigue  -OH  press w/ stick: 2#, 2x10  -shoulder ext: GTB, 2x15  -ER at side isotonic: RTB, 2x10  -ER at side walkouts: GTB, 2x to fatigue  -ER at 90 flexion against wall: 0#, 2x10-hold  -bicep curls: 3#, 2x10  -PROM to tolerance TherEx: 45 min  UBE: 2'/2', Lvl 3  -TRX I's and Y's: x10 each  -ER at 90/90 stretch at wall: 2x10  -Self ER stretch at wall (at side)w/ cane: 20 sec x5 reps  -Behind the back stretch w/ strap: 10 sec x10 (going up the spine)  -AROM shoulder flexion and abd to full range: 2x15 each  -wall wash circles: 1#, 2x to fatigue  -OH press w/ stick: 2#, 2x10  -shoulder reverse fly: RTB, 2x10  -ER at side isotonic: GTB, 2x10  -ER at 90 flexion against wall: 0#, 2x10  -bicep curls: 3#, 2x10  -Prone on bench: shoulder ext 2#, horiz abd 0#: 2x10  -wall push ups: 2x10  -PROM to tolerance TherEx: 50 min  UBE: 2'2', Lvl 3  -TRX I's and Y's: x10 each  -ER at 90/90 stretch at wall: 2x10  -Self ER stretch at wall (at side)w/ cane: 20 sec x5 reps  -Behind the back stretch w/ strap: 10 sec x10 (going up the spine)  -AROM shoulder flexion and abd to full range: 2x15 each  -Shoulder flexion + abd raises to 90 de#, 2x10   -wall wash circles: 2#, 2x to fatigue  -OH press w/ stick: 2#, 2x10  -shoulder reverse fly: RTB, 2x10  -ER/IR at side isotonic +reactive iso: GTB, 2x10  -ER at 90 flexion against wall: 0#, 2x10  -bicep curls: 3#, 2x10  -Prone on bench: shoulder ext 2#, horiz abd 0#: 2x10  -wall push ups: 2x10  -s/l shoulder ER: 2#  -s/l shoulder abd: 2#  -PROM to tolerance TherEx: 55 min  UBE: 2'/2', Lvl 3  -TRX I's and Y's: x10 each  -ER at 90/90 stretch at wall: 2x10  -Self ER stretch at wall (at side)w/ cane: 20 sec x5 reps  -Behind the back stretch w/ strap: 10 sec x10 (going up the spine)  -AROM D2 flexion: 0#, x10 each  -Shoulder flexion + abd raises to 90 de#, 2x10   -wall wash circles: 2#, 2x to fatigue  -OH press: 1#, 2x10  -shoulder reverse fly: RTB, 2x10  -ER/IR at side isotonic +reactive iso: GTB/BTB,  2x10  -low trap lift off: 0#, 2x10  -serratus press: YTB, 2x10  -ER at 90 flexion against wall: 0#, 2x10  -bicep curls: 3#, 2x10  -Prone on bench: shoulder ext 2#, horiz abd 0#: 2x10  -wall push ups: 2x10  -s/l shoulder ER: 2#  -s/l shoulder abd: 2#  -PROM to tolerance TherEx: 45 min  UBE: 2'/2', Lvl 3  -TRX I's and Y's: x10 each  -ER at 90/90 and W pec stretch at wall/door: 2x10  -Self ER stretch at wall (at side)w/ cane: 20 sec x5 reps    -Reassessment  -AROM D2 flexion: 0#, x10 each  -Shoulder flexion + abd raises to 90 de#, 2x10   -wall wash circles: 2#, 2x to fatigue  -OH press: 2#, 2x10  -ER/IR at side isotonic +reactive iso: BTB, 2x20  -low trap lift off: YTB#, 2x10  -serratus press: YTB, 2x10  -wall push ups: 2x10  -YSB ball circles on wall: 2x10 each way  -PROM to tolerance      Hold  -ER at 90 flexion against wall: 0#, 2x10  -bicep curls: 3#, 2x10  -Prone on bench: shoulder ext 2#, horiz abd 0#: 2x10  -standing shoulder circles in 90 scap plane:   -clock wall taps 9-12 o'clock: 2x to fatigue   -shoulder reverse fly: RTB, 2x10 TherEx: 45 min  UBE: 2'/2', Lvl 3  -TRX I's and Y's: x10 each  -ER at 90/90 and W pec stretch at wall/door: 2x10  -Shoulder ER at 90/90 against wall: 2#, 2x10  -Shoulder ER in 90 flexion: RTB, 2x10  -AROM D2 flexion: YTB, x10 each  -Shoulder flexion + abd raises to 90 deg: 3#, 2x10   -OH press: 4#, 2x10  -Bent over horiz abd 2#, Y's 0#: 2x10  -ER at side isotonic: BTB, 2x20  -wall push ups: 2x10  -PROM to tolerance      Hold  -standing shoulder circles in 90 scap plane:   -clock wall taps 9-12 o'clock: 2x to fatigue   -shoulder reverse fly: RTB, 2x10                           HEP:   Access Code: [email or web address]  URL: [email or web address]  Date: 2024  Prepared by: Batsheva Beckwith    Exercises  - Standing Shoulder External Rotation AAROM with Dowel  - 3 x daily - 7 x weekly - 3 sets - 10-20 seconds hold  - Doorway Pec Stretch at 60 Elevation  - 3 x daily - 7 x  weekly - 3 sets - 20 sec hold  - Doorway Pec Stretch at 90 Degrees Abduction  - 3 x daily - 7 x weekly - 3 sets - 30 sec hold  - Shoulder Flexion Overhead with Dowel  - 3 x daily - 7 x weekly - 3 sets  - Standing Shoulder Internal Rotation Stretch with Towel  - 3 x daily - 7 x weekly - 5 sets - 20 sec hold  - Scaption with Dumbbells  - 1 x daily - 4 x weekly - 2 sets - 10 reps  - Shoulder Abduction with Dumbbells - Palms Down  - 1 x daily - 4 x weekly - 2 sets - 10 reps  - Shoulder Overhead Press in Abduction with Dumbbells  - 1 x daily - 4 x weekly - 2 sets - 10 reps  - Shoulder External Rotation with Anchored Resistance  - 1 x daily - 4 x weekly - 3 sets - 10 reps  - Shoulder Flexion Serratus Activation with Resistance  - 1 x daily - 4 x weekly - 2 sets - 10 reps  - Single Arm Bent Over Shoulder Horizontal Abduction with Dumbbell - Palm Down  - 1 x daily - 4 x weekly - 2 sets - 10 reps  - Prone Single Arm Shoulder Y with Dumbbell  - 1 x daily - 4 x weekly - 2 sets - 10 reps      Charges: TherEx: 3 units  Total Timed Treatment: 45 min  Total Treatment Time: 45 min

## 2024-12-31 ENCOUNTER — OFFICE VISIT (OUTPATIENT)
Dept: PHYSICAL THERAPY | Age: 44
End: 2024-12-31
Attending: ORTHOPAEDIC SURGERY
Payer: MEDICAID

## 2024-12-31 PROCEDURE — 97110 THERAPEUTIC EXERCISES: CPT | Performed by: PHYSICAL THERAPIST

## 2024-12-31 NOTE — PROGRESS NOTES
Diagnosis:   S/P arthroscopy of left shoulder (Z98.890)   (full supraspinatus tear and partial infraspinatus tear/repair)     Post op dx:  Left Shoulder Arthroscopy Rotator Cuff Repair Arthroscopic Biceps Tenodesis Subacromial Decompression       Referring Provider: Beverly Womack Date of Evaluation:    9/25/24    PN: 11/11/24    Precautions:   Activity Precautions: Passive ROMAT, 1 lb WB x 4 weeks. Begin PT POD #1.     Next MD visit:   none scheduled  Date of Surgery: 9/24/25    13 weeks post op on 12/24/24   Insurance Primary/Secondary: BLUE CROSS MEDICAID / N/A     # Auth Visits: 15 visits till 11/24  + 6 visits till 1/14/25  + 4 visits till 2/15/25    Subjective: Pt reports mild pains intermittently. She is not taking the Meloxicam consistently and stretching here and there.     Pain: 0/10 at rest      Objective:     AROM: (* denotes performed with pain)  Shoulder    Flexion: L 166*, R 170  Abduction: L 167*, R 180  ER at side: L 70*, R 80  IR behind the back reach: L to T 10*, R T7      Strength/MMT: (* denotes performed with pain)  Shoulder Elbow Scapular   Flexion: R 5/5; L 4/5  Abduction: R 5/5; L 4-/5  ER: R 4/5; L 4-/5  IR: R 4+/5; L 4/5 Flexion: R 4+/5; L 4+/5  Extension: R 4+/5; L 4+/5    Rhomboids: R 4-/5, L 3+/5  Mid trap: R 4-/5; L 3+/5   Low trap: R 4-/5; L 3+/5       Assessment: Re-assessed pt's strength today and noted improvements in strength all around except for no changes noted in (L) shoulder abd, ER and mid trap/rhomboids. Went over HEP to highlight most important stretches and exercises to promote progress in these specific muscle groups. Progressed reps and sets on various RTC and para scap strengthening to her tolerance.         Goals:   To be met in 6-8 weeks post op   Pt will report improved ability to sleep without waking due to shoulder pain-Met  Pt will improve R shoulder flexion PROM to >150 degrees to be able to reach into shoulder height cabinets when cleared for AROM-Met  Pt will  improve R shoulder abduction PROM to >130 degrees to improve ability to don deodorant, don/doff shirts, and wash hair when cleared for AROM-Met  Pt will increase shoulder PROM ER to >80 degrees at 90 deg abduction to reach and fasten seatbelt when cleared for AROM-Met  Pt will be independent and compliant with comprehensive HEP to maintain progress achieved in PT-On going     To be met in 8-10 weeks post op   Pt will improve shoulder flexion AROM to >150 degrees to be able to reach into overhead cabinets without pain or restriction -Met  Pt will improve shoulder abduction AROM to >130 degrees to improve ability to don deodorant, don/doff shirts, and wash hair -Met  Pt will increase shoulder AROM ER to 80 degrees to be able to reach and fasten seatbelt-Progressing  Pt will increase shoulder AROM IR to 70 degrees to be able to reach in back pocket, tuck in shirt, and turn steering wheel without pain-Met  Pt will be independent and compliant with comprehensive HEP to maintain progress achieved in PT -On going     To be met 4-6 months post op (total visits of PT: ~40)  Pt will improve shoulder strength throughout to 4+/5 to improve function with carrying groceries and wash floors and vacuum  -progressing  Pt will demonstrate increased mid/low trap strength to 4/5 to promote improved shoulder mechanics and stabilization with lifting and reaching -progressing  Pt will be independent and compliant with comprehensive HEP to maintain progress achieved in PT -On going           Plan: Continue skilled Physical Therapy 1 x/week or a total of 12 visits over a 90 day period. Treatment will include: TherEx, neuro re-ed, manual tech      Date:11/26/24  Tx#: 19/21 Date:12/3/24  Tx#: 20/21 Date:12/10/24  Tx#: 21/21 Date:12/24/24  Tx#: 1/4  (22 visits total)  Date:12/31/24  Tx#: 2/4  (23 visits total)    TherEx: 50 min  UBE: 2'/2', Lvl 3  -TRX I's and Y's: x10 each  -ER at 90/90 stretch at wall: 2x10  -Self ER stretch at wall (at  side)w/ cane: 20 sec x5 reps  -Behind the back stretch w/ strap: 10 sec x10 (going up the spine)  -AROM shoulder flexion and abd to full range: 2x15 each  -Shoulder flexion + abd raises to 90 de#, 2x10   -wall wash circles: 2#, 2x to fatigue  -OH press w/ stick: 2#, 2x10  -shoulder reverse fly: RTB, 2x10  -ER/IR at side isotonic +reactive iso: GTB, 2x10  -ER at 90 flexion against wall: 0#, 2x10  -bicep curls: 3#, 2x10  -Prone on bench: shoulder ext 2#, horiz abd 0#: 2x10  -wall push ups: 2x10  -s/l shoulder ER: 2#  -s/l shoulder abd: 2#  -PROM to tolerance TherEx: 55 min  UBE: 2'/2', Lvl 3  -TRX I's and Y's: x10 each  -ER at 90/90 stretch at wall: 2x10  -Self ER stretch at wall (at side)w/ cane: 20 sec x5 reps  -Behind the back stretch w/ strap: 10 sec x10 (going up the spine)  -AROM D2 flexion: 0#, x10 each  -Shoulder flexion + abd raises to 90 de#, 2x10   -wall wash circles: 2#, 2x to fatigue  -OH press: 1#, 2x10  -shoulder reverse fly: RTB, 2x10  -ER/IR at side isotonic +reactive iso: GTB/BTB, 2x10  -low trap lift off: 0#, 2x10  -serratus press: YTB, 2x10  -ER at 90 flexion against wall: 0#, 2x10  -bicep curls: 3#, 2x10  -Prone on bench: shoulder ext 2#, horiz abd 0#: 2x10  -wall push ups: 2x10  -s/l shoulder ER: 2#  -s/l shoulder abd: 2#  -PROM to tolerance TherEx: 45 min  UBE: 2'/2', Lvl 3  -TRX I's and Y's: x10 each  -ER at 90/90 and W pec stretch at wall/door: 2x10  -Self ER stretch at wall (at side)w/ cane: 20 sec x5 reps    -Reassessment  -AROM D2 flexion: 0#, x10 each  -Shoulder flexion + abd raises to 90 de#, 2x10   -wall wash circles: 2#, 2x to fatigue  -OH press: 2#, 2x10  -ER/IR at side isotonic +reactive iso: BTB, 2x20  -low trap lift off: YTB#, 2x10  -serratus press: YTB, 2x10  -wall push ups: 2x10  -YSB ball circles on wall: 2x10 each way  -PROM to tolerance      Hold  -ER at 90 flexion against wall: 0#, 2x10  -bicep curls: 3#, 2x10  -Prone on bench: shoulder ext 2#, horiz abd 0#:  2x10  -standing shoulder circles in 90 scap plane:   -clock wall taps 9-12 o'clock: 2x to fatigue   -shoulder reverse fly: RTB, 2x10 TherEx: 45 min  UBE: 2'/2', Lvl 3  -TRX I's and Y's: x10 each  -ER at 90/90 and W pec stretch at wall/door: 2x10  -Shoulder ER at 90/90 against wall: 2#, 2x10  -Shoulder ER in 90 flexion: RTB, 2x10  -AROM D2 flexion: YTB, x10 each  -Shoulder flexion + abd raises to 90 deg: 3#, 2x10   -OH press: 4#, 2x10  -Bent over horiz abd 2#, Y's 0#: 2x10  -ER at side isotonic: BTB, 2x20  -wall push ups: 2x10  -PROM to tolerance      Hold  -standing shoulder circles in 90 scap plane:   -clock wall taps 9-12 o'clock: 2x to fatigue   -shoulder reverse fly: RTB, 2x10 TherEx: 45 min  UBE: 2'/2', Lvl 3  -TRX I's and Y's: x10 each  -ER at 90/90 and W pec stretch at wall/door: 2x10  -Shoulder ER at 90/90 against wall: 2#, 2x10  -Shoulder ER in 90 flexion: GTB, 2x10  -Shoulder ER at side: BTB, 3x20  -W to OH press: RTB  -standing shoulder circles in 90 abd plane: YTB, 2x to fatigue   -shoulder reverse fly: RTB, 2x10  -Shoulder abd raises to 90 deg: 3#, 2x10   -Bent over horiz abd 3: 2x10  -ER at side isotonic: BTB, 2x20  -bar push ups: 2x10  -HEP update and education  -PROM to tolerance                              HEP:   Access Code: [email or web address]  URL: [email or web address]  Date: 12/24/2024  Prepared by: Batsheva Beckwith    Exercises  - Standing Shoulder External Rotation AAROM with Dowel  - 3 x daily - 7 x weekly - 3 sets - 10-20 seconds hold  - Doorway Pec Stretch at 60 Elevation  - 3 x daily - 7 x weekly - 3 sets - 20 sec hold  - Doorway Pec Stretch at 90 Degrees Abduction  - 3 x daily - 7 x weekly - 3 sets - 30 sec hold  - Shoulder Flexion Overhead with Dowel  - 3 x daily - 7 x weekly - 3 sets  - Standing Shoulder Internal Rotation Stretch with Towel  - 3 x daily - 7 x weekly - 5 sets - 20 sec hold  - Scaption with Dumbbells  - 1 x daily - 4 x weekly - 2 sets - 10 reps  - Shoulder  Abduction with Dumbbells - Palms Down  - 1 x daily - 4 x weekly - 2 sets - 10 reps  - Shoulder Overhead Press in Abduction with Dumbbells  - 1 x daily - 4 x weekly - 2 sets - 10 reps  - Shoulder External Rotation with Anchored Resistance  - 1 x daily - 4 x weekly - 3 sets - 10 reps  - Shoulder Flexion Serratus Activation with Resistance  - 1 x daily - 4 x weekly - 2 sets - 10 reps  - Single Arm Bent Over Shoulder Horizontal Abduction with Dumbbell - Palm Down  - 1 x daily - 4 x weekly - 2 sets - 10 reps  - Prone Single Arm Shoulder Y with Dumbbell  - 1 x daily - 4 x weekly - 2 sets - 10 reps      Charges: TherEx: 3 units  Total Timed Treatment: 45 min  Total Treatment Time: 45 min

## 2025-01-03 ENCOUNTER — APPOINTMENT (OUTPATIENT)
Dept: PHYSICAL THERAPY | Age: 45
End: 2025-01-03
Payer: MEDICAID

## 2025-01-04 NOTE — PROGRESS NOTES
Diagnosis:   S/P arthroscopy of left shoulder (Z98.890)   (full supraspinatus tear and partial infraspinatus tear/repair)     Post op dx:  Left Shoulder Arthroscopy Rotator Cuff Repair Arthroscopic Biceps Tenodesis Subacromial Decompression       Referring Provider: Beverly Womack Date of Evaluation:    9/25/24    PN: 11/11/24    Precautions:   Activity Precautions: Passive ROMAT, 1 lb WB x 4 weeks. Begin PT POD #1.     Next MD visit:   none scheduled  Date of Surgery: 9/24/25    13 weeks post op on 12/24/24   Insurance Primary/Secondary: BLUE CROSS MEDICAID / N/A     # Auth Visits: 15 visits till 11/24  + 6 visits till 1/14/25  + 4 visits till 2/15/25    Subjective: Pt reports she went to a [employer] party and dancing felt fine. Just one time she was turned in a way where it cause shoulder pain. She does her HEP but not everyday.     Pain: 0/10 at rest      Objective:     AROM: (* denotes performed with pain)  Shoulder    Flexion: L 166*, R 170  Abduction: L 167*, R 180  ER at side: L 70*, R 80  IR behind the back reach: L to T 10*, R T7      Strength/MMT: (* denotes performed with pain)  Shoulder Elbow Scapular   Flexion: R 5/5; L 4/5  Abduction: R 5/5; L 4-/5  ER: R 4/5; L 4-/5  IR: R 4+/5; L 4/5 Flexion: R 4+/5; L 4+/5  Extension: R 4+/5; L 4+/5    Rhomboids: R 4-/5, L 3+/5  Mid trap: R 4-/5; L 3+/5   Low trap: R 4-/5; L 3+/5       Assessment: Focused tx session on improving strength and shoulder endurance. She is able to progress in resistance on OH press and introduced to a single arm waist to shelf lift. She continues to demonstrate early onset of fatigue during overhead and supraspinatus focused exercises. Mild passive end range restriction of (L) shoulder flexion and ER continue but improve with manual stretches.         Goals:   To be met in 6-8 weeks post op   Pt will report improved ability to sleep without waking due to shoulder pain-Met  Pt will improve R shoulder flexion PROM to >150 degrees to be able to  reach into shoulder height cabinets when cleared for AROM-Met  Pt will improve R shoulder abduction PROM to >130 degrees to improve ability to don deodorant, don/doff shirts, and wash hair when cleared for AROM-Met  Pt will increase shoulder PROM ER to >80 degrees at 90 deg abduction to reach and fasten seatbelt when cleared for AROM-Met  Pt will be independent and compliant with comprehensive HEP to maintain progress achieved in PT-On going     To be met in 8-10 weeks post op   Pt will improve shoulder flexion AROM to >150 degrees to be able to reach into overhead cabinets without pain or restriction -Met  Pt will improve shoulder abduction AROM to >130 degrees to improve ability to don deodorant, don/doff shirts, and wash hair -Met  Pt will increase shoulder AROM ER to 80 degrees to be able to reach and fasten seatbelt-Progressing  Pt will increase shoulder AROM IR to 70 degrees to be able to reach in back pocket, tuck in shirt, and turn steering wheel without pain-Met  Pt will be independent and compliant with comprehensive HEP to maintain progress achieved in PT -On going     To be met 4-6 months post op (total visits of PT: ~40)  Pt will improve shoulder strength throughout to 4+/5 to improve function with carrying groceries and wash floors and vacuum  -progressing  Pt will demonstrate increased mid/low trap strength to 4/5 to promote improved shoulder mechanics and stabilization with lifting and reaching -progressing  Pt will be independent and compliant with comprehensive HEP to maintain progress achieved in PT -On going           Plan: Continue skilled Physical Therapy 1 x/week or a total of 12 visits over a 90 day period. Treatment will include: TherEx, neuro re-ed, manual tech    Progress OH strengthening as tolerated      Date:11/26/24  Tx#: 19/21 Date:12/3/24  Tx#: 20/21 Date:12/10/24  Tx#: 21/21 Date:12/24/24  Tx#: 1/4  (22 visits total)  Date:12/31/24  Tx#: 2/4  (23 visits total)  Date:1/7/25  Tx#:  3/4  (24 visits total)    TherEx: 50 min  UBE: 2'/2', Lvl 3  -TRX I's and Y's: x10 each  -ER at 90/90 stretch at wall: 2x10  -Self ER stretch at wall (at side)w/ cane: 20 sec x5 reps  -Behind the back stretch w/ strap: 10 sec x10 (going up the spine)  -AROM shoulder flexion and abd to full range: 2x15 each  -Shoulder flexion + abd raises to 90 de#, 2x10   -wall wash circles: 2#, 2x to fatigue  -OH press w/ stick: 2#, 2x10  -shoulder reverse fly: RTB, 2x10  -ER/IR at side isotonic +reactive iso: GTB, 2x10  -ER at 90 flexion against wall: 0#, 2x10  -bicep curls: 3#, 2x10  -Prone on bench: shoulder ext 2#, horiz abd 0#: 2x10  -wall push ups: 2x10  -s/l shoulder ER: 2#  -s/l shoulder abd: 2#  -PROM to tolerance TherEx: 55 min  UBE: 2/2', Lvl 3  -TRX I's and Y's: x10 each  -ER at 90/90 stretch at wall: 2x10  -Self ER stretch at wall (at side)w/ cane: 20 sec x5 reps  -Behind the back stretch w/ strap: 10 sec x10 (going up the spine)  -AROM D2 flexion: 0#, x10 each  -Shoulder flexion + abd raises to 90 de#, 2x10   -wall wash circles: 2#, 2x to fatigue  -OH press: 1#, 2x10  -shoulder reverse fly: RTB, 2x10  -ER/IR at side isotonic +reactive iso: GTB/BTB, 2x10  -low trap lift off: 0#, 2x10  -serratus press: YTB, 2x10  -ER at 90 flexion against wall: 0#, 2x10  -bicep curls: 3#, 2x10  -Prone on bench: shoulder ext 2#, horiz abd 0#: 2x10  -wall push ups: 2x10  -s/l shoulder ER: 2#  -s/l shoulder abd: 2#  -PROM to tolerance TherEx: 45 min  UBE: 2'/2', Lvl 3  -TRX I's and Y's: x10 each  -ER at 90/90 and W pec stretch at wall/door: 2x10  -Self ER stretch at wall (at side)w/ cane: 20 sec x5 reps    -Reassessment  -AROM D2 flexion: 0#, x10 each  -Shoulder flexion + abd raises to 90 de#, 2x10   -wall wash circles: 2#, 2x to fatigue  -OH press: 2#, 2x10  -ER/IR at side isotonic +reactive iso: BTB, 2x20  -low trap lift off: YTB#, 2x10  -serratus press: YTB, 2x10  -wall push ups: 2x10  -YSB ball circles on wall: 2x10 each  way  -PROM to tolerance      Hold  -ER at 90 flexion against wall: 0#, 2x10  -bicep curls: 3#, 2x10  -Prone on bench: shoulder ext 2#, horiz abd 0#: 2x10  -standing shoulder circles in 90 scap plane:   -clock wall taps 9-12 o'clock: 2x to fatigue   -shoulder reverse fly: RTB, 2x10 TherEx: 45 min  UBE: 2'/2', Lvl 3  -TRX I's and Y's: x10 each  -ER at 90/90 and W pec stretch at wall/door: 2x10  -Shoulder ER at 90/90 against wall: 2#, 2x10  -Shoulder ER in 90 flexion: RTB, 2x10  -AROM D2 flexion: YTB, x10 each  -Shoulder flexion + abd raises to 90 deg: 3#, 2x10   -OH press: 4#, 2x10  -Bent over horiz abd 2#, Y's 0#: 2x10  -ER at side isotonic: BTB, 2x20  -wall push ups: 2x10  -PROM to tolerance      Hold  -standing shoulder circles in 90 scap plane:   -clock wall taps 9-12 o'clock: 2x to fatigue   -shoulder reverse fly: RTB, 2x10 TherEx: 45 min  UBE: 2'/2', Lvl 3  -TRX I's and Y's: x10 each  -ER at 90/90 and W pec stretch at wall/door: 2x10  -Shoulder ER at 90/90 against wall: 2#, 2x10  -Shoulder ER in 90 flexion: GTB, 2x10  -Shoulder ER at side: BTB, 3x20  -W to OH press: RTB  -standing shoulder circles in 90 abd plane: YTB, 2x to fatigue   -shoulder reverse fly: RTB, 2x10  -Shoulder abd raises to 90 deg: 3#, 2x10   -Bent over horiz abd 3: 2x10  -ER at side isotonic: BTB, 2x20  -bar push ups: 2x10  -HEP update and education  -PROM to tolerance       TherEx: 48 min  UBE: 2'/2', Lvl 3  -TRX I's and Y's: x10 each  -ER at 90/90 and W pec stretch at wall/door: 2x10  -waist to shoulder lift: abduction and flexion 3#: 2x to fatigue    -OH press: 5#, 2x to fatigue   -Shoulder ER at 90/90: RTB, 2x10  -Shoulder ER in 90 flexion: GTB, 3x10  -Shoulder ER at side: BTB, 3x20  -W to OH press: RTB, 2x10  -standing shoulder circles in 90 abd plane: YTB, 2x to fatigue  -shoulder standing angels: RTB, 2x10  -Shoulder abd raises to 90 deg: 3#, 2x10   -Bent over horiz abd 3#: 2x10-hold  -bar push ups: 2x10  -body blade at side: 3x30 sec    -D2 flexion: YTB, 2x 10  -PROM to tolerance                           HEP:   Access Code: [email or web address]  URL: [email or web address]  Date: 12/24/2024  Prepared by: Batsheva Beckwith    Exercises  - Standing Shoulder External Rotation AAROM with Dowel  - 3 x daily - 7 x weekly - 3 sets - 10-20 seconds hold  - Doorway Pec Stretch at 60 Elevation  - 3 x daily - 7 x weekly - 3 sets - 20 sec hold  - Doorway Pec Stretch at 90 Degrees Abduction  - 3 x daily - 7 x weekly - 3 sets - 30 sec hold  - Shoulder Flexion Overhead with Dowel  - 3 x daily - 7 x weekly - 3 sets  - Standing Shoulder Internal Rotation Stretch with Towel  - 3 x daily - 7 x weekly - 5 sets - 20 sec hold  - Scaption with Dumbbells  - 1 x daily - 4 x weekly - 2 sets - 10 reps  - Shoulder Abduction with Dumbbells - Palms Down  - 1 x daily - 4 x weekly - 2 sets - 10 reps  - Shoulder Overhead Press in Abduction with Dumbbells  - 1 x daily - 4 x weekly - 2 sets - 10 reps  - Shoulder External Rotation with Anchored Resistance  - 1 x daily - 4 x weekly - 3 sets - 10 reps  - Shoulder Flexion Serratus Activation with Resistance  - 1 x daily - 4 x weekly - 2 sets - 10 reps  - Single Arm Bent Over Shoulder Horizontal Abduction with Dumbbell - Palm Down  - 1 x daily - 4 x weekly - 2 sets - 10 reps  - Prone Single Arm Shoulder Y with Dumbbell  - 1 x daily - 4 x weekly - 2 sets - 10 reps      Charges: TherEx: 3 units  Total Timed Treatment: 48 min  Total Treatment Time: 48 min

## 2025-01-07 ENCOUNTER — OFFICE VISIT (OUTPATIENT)
Dept: PHYSICAL THERAPY | Age: 45
End: 2025-01-07
Attending: ORTHOPAEDIC SURGERY
Payer: MEDICAID

## 2025-01-07 PROCEDURE — 97110 THERAPEUTIC EXERCISES: CPT | Performed by: PHYSICAL THERAPIST

## 2025-01-10 ENCOUNTER — APPOINTMENT (OUTPATIENT)
Dept: PHYSICAL THERAPY | Age: 45
End: 2025-01-10
Payer: MEDICAID

## 2025-01-13 NOTE — PROGRESS NOTES
Progress Summary  Pt has attended 25 visits in Physical Therapy.    Diagnosis:   S/P arthroscopy of left shoulder (Z98.890)   (full supraspinatus tear and partial infraspinatus tear/repair)     Post op dx:  Left Shoulder Arthroscopy Rotator Cuff Repair Arthroscopic Biceps Tenodesis Subacromial Decompression       Referring Provider: Beverly Womack Date of Evaluation:    9/25/24    PN: 11/11/24    Precautions:   Activity Precautions: Passive ROMAT, 1 lb WB x 4 weeks. Begin PT POD #1.     Next MD visit:   none scheduled  Date of Surgery: 9/24/25    16 weeks post op on 1/14/25   Insurance Primary/Secondary: BLUE CROSS MEDICAID / N/A     # Auth Visits: 15 visits till 11/24  + 6 visits till 1/14/25  + 4 visits till 2/15/25    Subjective: Pt reports she is not able to lift heavy objects over her head, shoveling slow, and gardening due to weakness. Reports that she feels earlier onset of fatigue when using (L) arm for house cleaning. She experiences mild pains intermittently.      Pain: 0/10 at rest      Objective:     AROM: (* denotes performed with pain)  Shoulder    Flexion: L 167, R 170  Abduction: L 178, R 180  ER at 90 abd: L 80, R 90  IR behind the back reach: L to T 10, R T7       Strength/MMT: (* denotes performed with pain)  Shoulder Elbow Scapular   Flexion: R 5/5; L 4/5  Abduction: R 5/5; L 4/5  ER: R 4/5; L 4/5  IR: R 4+/5; L 4+/5 Flexion: R 4+/5; L 4+/5  Extension: R 5/5; L 5/5    Rhomboids: R 4/5, L 4-/5  Mid trap: R 4/5; L 4-/5   Low trap: R 4/5; L 4-/5        Functional testing:   Overhead press single arm: (R) 10lbs x10 reps, (L) 6lbs x 3 reps (increased shoulder hiking)       Assessment: Pt is 4 months s/p (L) rotator cuff repair, biceps tenodesis and has demonstrated consistent progress objectively and functionally. Since last PN she has demonstrated improved (L) shoulder AROM with no pain at end ranges, significantly improved (L) shoulder strength, and body mechanics. These objective improvements have  allowed her to return to cooking, light to medium physically demanding house chores, reach behind her back to don/doff undergarments and wash her back, perform repetitive overhead reaching, and sustained overhead activities with light weighted objects. She continues to demonstrate strength and endurance deficits as compared to the contralateral upper extremity and continued shoulder hiking when lifting  greater than 6 lbs overhead. The altered body mechanics place her at higher risk of re-inuring the rotator cuff and increased risk of injuring her cervical spine. These objective impairments limit her in ability to perform lifting >6 lbs overhead, lifting > 50 lbs from floor to waist, moving/lifting house hold furniture, shoveling snow and performing gardening/digging/raking. Pt will benefit form continued PT to promote return to PLOF and reduce risk of re-injury.         Goals:   To be met in 6-8 weeks post op   Pt will report improved ability to sleep without waking due to shoulder pain-Met  Pt will improve R shoulder flexion PROM to >150 degrees to be able to reach into shoulder height cabinets when cleared for AROM-Met  Pt will improve R shoulder abduction PROM to >130 degrees to improve ability to don deodorant, don/doff shirts, and wash hair when cleared for AROM-Met  Pt will increase shoulder PROM ER to >80 degrees at 90 deg abduction to reach and fasten seatbelt when cleared for AROM-Met  Pt will be independent and compliant with comprehensive HEP to maintain progress achieved in PT-On going     To be met in 8-10 weeks post op   Pt will improve shoulder flexion AROM to >150 degrees to be able to reach into overhead cabinets without pain or restriction -Met  Pt will improve shoulder abduction AROM to >130 degrees to improve ability to don deodorant, don/doff shirts, and wash hair -Met  Pt will increase shoulder AROM ER to 80 degrees in 90 shoulder abduction to be able to reach and fasten seatbelt-Met  Pt will  increase shoulder AROM IR to 70 degrees to be able to reach in back pocket, tuck in shirt, and turn steering wheel without pain-Met  Pt will be independent and compliant with comprehensive HEP to maintain progress achieved in PT -On going     To be met 4-6 months post op (total visits of PT: ~40)  Pt will improve shoulder strength throughout to 4+/5 to improve function with carrying groceries and wash floors and vacuum  -progressing  Pt will demonstrate increased mid/low trap strength to 4/5 to promote improved shoulder mechanics and stabilization with lifting and reaching -progressing  Pt will be independent and compliant with comprehensive HEP to maintain progress achieved in PT -On going         QuickDASH Outcome Score  Score: 65.91 % (9/25/2024  7:49 AM)    Post QuickDASH Outcome Score  Post Score: 20.45 % (1/14/2025  7:56 AM)    45.46 % improvement    Plan: Continue skilled Physical Therapy 1 x/week or a total of 4 visits over a 90 day period. Treatment will include: TherEx, neuro Re-ed, manual tech       Patient/Family/Caregiver was advised of these findings, precautions, and treatment options and has agreed to actively participate in planning and for this course of care.    Thank you for your referral. If you have any questions, please contact me at Dept: [phone].    Sincerely,  Electronically signed by therapist: Batsheva Beckwith, PT     Physician's certification required:  Yes  Please co-sign or sign and return this letter via fax as soon as possible to [phone].   I certify the need for these services furnished under this plan of treatment and while under my care.    X___________________________________________________ Date____________________    Certification From: 1/13/2025  To:4/13/2025        Date:12/3/24  Tx#: 20/21 Date:12/10/24  Tx#: 21/21 Date:12/24/24  Tx#: 1/4  (22 visits total)  Date:12/31/24  Tx#: 2/4  (23 visits total)  Date:1/7/25  Tx#: 3/4  (24 visits total)  Date: 1/14/25  Tx#:  4/4  (25 visits total)    TherEx: 55 min  UBE: 2'/2', Lvl 3  -TRX I's and Y's: x10 each  -ER at 90/90 stretch at wall: 2x10  -Self ER stretch at wall (at side)w/ cane: 20 sec x5 reps  -Behind the back stretch w/ strap: 10 sec x10 (going up the spine)  -AROM D2 flexion: 0#, x10 each  -Shoulder flexion + abd raises to 90 de#, 2x10   -wall wash circles: 2#, 2x to fatigue  -OH press: 1#, 2x10  -shoulder reverse fly: RTB, 2x10  -ER/IR at side isotonic +reactive iso: GTB/BTB, 2x10  -low trap lift off: 0#, 2x10  -serratus press: YTB, 2x10  -ER at 90 flexion against wall: 0#, 2x10  -bicep curls: 3#, 2x10  -Prone on bench: shoulder ext 2#, horiz abd 0#: 2x10  -wall push ups: 2x10  -s/l shoulder ER: 2#  -s/l shoulder abd: 2#  -PROM to tolerance TherEx: 45 min  UBE: 2'/2', Lvl 3  -TRX I's and Y's: x10 each  -ER at 90/90 and W pec stretch at wall/door: 2x10  -Self ER stretch at wall (at side)w/ cane: 20 sec x5 reps    -Reassessment  -AROM D2 flexion: 0#, x10 each  -Shoulder flexion + abd raises to 90 de#, 2x10   -wall wash circles: 2#, 2x to fatigue  -OH press: 2#, 2x10  -ER/IR at side isotonic +reactive iso: BTB, 2x20  -low trap lift off: YTB#, 2x10  -serratus press: YTB, 2x10  -wall push ups: 2x10  -YSB ball circles on wall: 2x10 each way  -PROM to tolerance      Hold  -ER at 90 flexion against wall: 0#, 2x10  -bicep curls: 3#, 2x10  -Prone on bench: shoulder ext 2#, horiz abd 0#: 2x10  -standing shoulder circles in 90 scap plane:   -clock wall taps 9-12 o'clock: 2x to fatigue   -shoulder reverse fly: RTB, 2x10 TherEx: 45 min  UBE: 2'/2', Lvl 3  -TRX I's and Y's: x10 each  -ER at 90/90 and W pec stretch at wall/door: 2x10  -Shoulder ER at 90/90 against wall: 2#, 2x10  -Shoulder ER in 90 flexion: RTB, 2x10  -AROM D2 flexion: YTB, x10 each  -Shoulder flexion + abd raises to 90 deg: 3#, 2x10   -OH press: 4#, 2x10  -Bent over horiz abd 2#, Y's 0#: 2x10  -ER at side isotonic: BTB, 2x20  -wall push ups: 2x10  -PROM to  tolerance      Hold  -standing shoulder circles in 90 scap plane:   -clock wall taps 9-12 o'clock: 2x to fatigue   -shoulder reverse fly: RTB, 2x10 TherEx: 45 min  UBE: 2'/2', Lvl 3  -TRX I's and Y's: x10 each  -ER at 90/90 and W pec stretch at wall/door: 2x10  -Shoulder ER at 90/90 against wall: 2#, 2x10  -Shoulder ER in 90 flexion: GTB, 2x10  -Shoulder ER at side: BTB, 3x20  -W to OH press: RTB  -standing shoulder circles in 90 abd plane: YTB, 2x to fatigue   -shoulder reverse fly: RTB, 2x10  -Shoulder abd raises to 90 deg: 3#, 2x10   -Bent over horiz abd 3: 2x10  -ER at side isotonic: BTB, 2x20  -bar push ups: 2x10  -HEP update and education  -PROM to tolerance       TherEx: 48 min  UBE: 2'/2', Lvl 3  -TRX I's and Y's: x10 each  -ER at 90/90 and W pec stretch at wall/door: 2x10  -waist to shoulder lift: abduction and flexion 3#: 2x to fatigue    -OH press: 5#, 2x to fatigue   -Shoulder ER at 90/90: RTB, 2x10  -Shoulder ER in 90 flexion: GTB, 3x10  -Shoulder ER at side: BTB, 3x20  -W to OH press: RTB, 2x10  -standing shoulder circles in 90 abd plane: YTB, 2x to fatigue  -shoulder standing angels: RTB, 2x10  -Shoulder abd raises to 90 deg: 3#, 2x10   -Bent over horiz abd 3#: 2x10-hold  -bar push ups: 2x10  -body blade at side: 3x30 sec   -D2 flexion: YTB, 2x 10  -PROM to tolerance TherEx: 50 min  UBE: 2'/2', Lvl 3  -TRX I's and Y's: x10 each  -Re-assessment   -ER at 90/90 and W pec stretch at wall/door: 2x10  -waist to shoulder lift: abduction and flexion 4#: 2x to fatigue    -OH press: 5#, 2x to fatigue   -Shoulder ER at 90/90: RTB, 2x10  -Shoulder ER in 90 flexion: GTB, 3x10  -Shoulder ER at side: Thick GTB, 3x20  -W to OH press: RTB, 2x10  -Bicep curls: 6#, 2x10  -standing shoulder circles in 90 abd plane: YTB, 2x to fatigue  -shoulder standing angels: RTB, 2x10  -Bent over horiz abd 3#: 2x10  -bar push ups: 2x10-hold  -body blade at side: 3x30 sec -hold  -D2 flexion: YTB, 2x 10-hold  -PROM to tolerance                            HEP:   Access Code: [email or web address]  URL: [email or web address]  Date: 12/24/2024  Prepared by: Batsheva Beckwith    Exercises  - Standing Shoulder External Rotation AAROM with Dowel  - 3 x daily - 7 x weekly - 3 sets - 10-20 seconds hold  - Doorway Pec Stretch at 60 Elevation  - 3 x daily - 7 x weekly - 3 sets - 20 sec hold  - Doorway Pec Stretch at 90 Degrees Abduction  - 3 x daily - 7 x weekly - 3 sets - 30 sec hold  - Shoulder Flexion Overhead with Dowel  - 3 x daily - 7 x weekly - 3 sets  - Standing Shoulder Internal Rotation Stretch with Towel  - 3 x daily - 7 x weekly - 5 sets - 20 sec hold  - Scaption with Dumbbells  - 1 x daily - 4 x weekly - 2 sets - 10 reps  - Shoulder Abduction with Dumbbells - Palms Down  - 1 x daily - 4 x weekly - 2 sets - 10 reps  - Shoulder Overhead Press in Abduction with Dumbbells  - 1 x daily - 4 x weekly - 2 sets - 10 reps  - Shoulder External Rotation with Anchored Resistance  - 1 x daily - 4 x weekly - 3 sets - 10 reps  - Shoulder Flexion Serratus Activation with Resistance  - 1 x daily - 4 x weekly - 2 sets - 10 reps  - Single Arm Bent Over Shoulder Horizontal Abduction with Dumbbell - Palm Down  - 1 x daily - 4 x weekly - 2 sets - 10 reps  - Prone Single Arm Shoulder Y with Dumbbell  - 1 x daily - 4 x weekly - 2 sets - 10 reps      Charges: TherEx: 3 units  Total Timed Treatment: 50 min  Total Treatment Time: 50 min

## 2025-01-14 ENCOUNTER — OFFICE VISIT (OUTPATIENT)
Dept: PHYSICAL THERAPY | Age: 45
End: 2025-01-14
Attending: ORTHOPAEDIC SURGERY
Payer: MEDICAID

## 2025-01-14 PROCEDURE — 97110 THERAPEUTIC EXERCISES: CPT | Performed by: PHYSICAL THERAPIST

## 2025-01-17 ENCOUNTER — APPOINTMENT (OUTPATIENT)
Dept: PHYSICAL THERAPY | Age: 45
End: 2025-01-17
Payer: MEDICAID

## 2025-01-20 NOTE — PROGRESS NOTES
Diagnosis:   S/P arthroscopy of left shoulder (Z98.890)   (full supraspinatus tear and partial infraspinatus tear/repair)     Post op dx:  Left Shoulder Arthroscopy Rotator Cuff Repair Arthroscopic Biceps Tenodesis Subacromial Decompression       Referring Provider: Beverly Womack Date of Evaluation:    9/25/24    PN: 11/11/24    Precautions:   Activity Precautions: Passive ROMAT, 1 lb WB x 4 weeks. Begin PT POD #1.     Next MD visit:   none scheduled  Date of Surgery: 9/24/25    16 weeks post op on 1/14/25   Insurance Primary/Secondary: BLUE CROSS MEDICAID / N/A     # Auth Visits: 15 visits till 11/24  + 6 visits till 1/14/25  + 4 visits till 2/15/25+ 4 visits 1/14/25-3/22/25    Subjective: Pt reports shoulder is starting to feel better and better.     Pain: 0/10 at rest      Objective:     PROM: (L) shoulder flexion and ER at 90 abd limited by 5 deg at end range  Observation: Continued UT hiking with shoulder abduction raises and overhead strengthening      Assessment: Pt is progressed with bent over Y's and 3 directional wall reaches to promote OH strength and endurance. She continues to fatigue quickly and requires cuing to allow for rest to reduce shoulder hiking. Her passive shoulders mobility is improving.         Goals:   To be met in 6-8 weeks post op   Pt will report improved ability to sleep without waking due to shoulder pain-Met  Pt will improve R shoulder flexion PROM to >150 degrees to be able to reach into shoulder height cabinets when cleared for AROM-Met  Pt will improve R shoulder abduction PROM to >130 degrees to improve ability to don deodorant, don/doff shirts, and wash hair when cleared for AROM-Met  Pt will increase shoulder PROM ER to >80 degrees at 90 deg abduction to reach and fasten seatbelt when cleared for AROM-Met  Pt will be independent and compliant with comprehensive HEP to maintain progress achieved in PT-On going     To be met in 8-10 weeks post op   Pt will improve shoulder  flexion AROM to >150 degrees to be able to reach into overhead cabinets without pain or restriction -Met  Pt will improve shoulder abduction AROM to >130 degrees to improve ability to don deodorant, don/doff shirts, and wash hair -Met  Pt will increase shoulder AROM ER to 80 degrees in 90 shoulder abduction to be able to reach and fasten seatbelt-Met  Pt will increase shoulder AROM IR to 70 degrees to be able to reach in back pocket, tuck in shirt, and turn steering wheel without pain-Met  Pt will be independent and compliant with comprehensive HEP to maintain progress achieved in PT -On going     To be met 4-6 months post op (total visits of PT: ~40)  Pt will improve shoulder strength throughout to 4+/5 to improve function with carrying groceries and wash floors and vacuum  -progressing  Pt will demonstrate increased mid/low trap strength to 4/5 to promote improved shoulder mechanics and stabilization with lifting and reaching -progressing  Pt will be independent and compliant with comprehensive HEP to maintain progress achieved in PT -On going       Plan: Continue skilled Physical Therapy 1 x/week or a total of 4 visits over a 90 day period. Treatment will include: TherEx, neuro Re-ed, manual tech       Progress OH strength and endurance to tolerance       Date:12/24/24  Tx#: 1/4  (22 visits total)  Date:12/31/24  Tx#: 2/4  (23 visits total)  Date:1/7/25  Tx#: 3/4  (24 visits total)  Date: 1/14/25  Tx#: 4/4  (25 visits total)  Date: 1/21/25  Tx#: 1/4  (26 visits total)    TherEx: 45 min  UBE: 2'/2', Lvl 3  -TRX I's and Y's: x10 each  -ER at 90/90 and W pec stretch at wall/door: 2x10  -Shoulder ER at 90/90 against wall: 2#, 2x10  -Shoulder ER in 90 flexion: RTB, 2x10  -AROM D2 flexion: YTB, x10 each  -Shoulder flexion + abd raises to 90 deg: 3#, 2x10   -OH press: 4#, 2x10  -Bent over horiz abd 2#, Y's 0#: 2x10  -ER at side isotonic: BTB, 2x20  -wall push ups: 2x10  -PROM to tolerance      Hold  -standing shoulder  circles in 90 scap plane:   -clock wall taps 9-12 o'clock: 2x to fatigue   -shoulder reverse fly: RTB, 2x10 TherEx: 45 min  UBE: 2'/2', Lvl 3  -TRX I's and Y's: x10 each  -ER at 90/90 and W pec stretch at wall/door: 2x10  -Shoulder ER at 90/90 against wall: 2#, 2x10  -Shoulder ER in 90 flexion: GTB, 2x10  -Shoulder ER at side: BTB, 3x20  -W to OH press: RTB  -standing shoulder circles in 90 abd plane: YTB, 2x to fatigue   -shoulder reverse fly: RTB, 2x10  -Shoulder abd raises to 90 deg: 3#, 2x10   -Bent over horiz abd 3: 2x10  -ER at side isotonic: BTB, 2x20  -bar push ups: 2x10  -HEP update and education  -PROM to tolerance       TherEx: 48 min  UBE: 2'/2', Lvl 3  -TRX I's and Y's: x10 each  -ER at 90/90 and W pec stretch at wall/door: 2x10  -waist to shoulder lift: abduction and flexion 3#: 2x to fatigue    -OH press: 5#, 2x to fatigue   -Shoulder ER at 90/90: RTB, 2x10  -Shoulder ER in 90 flexion: GTB, 3x10  -Shoulder ER at side: BTB, 3x20  -W to OH press: RTB, 2x10  -standing shoulder circles in 90 abd plane: YTB, 2x to fatigue  -shoulder standing angels: RTB, 2x10  -Shoulder abd raises to 90 deg: 3#, 2x10   -Bent over horiz abd 3#: 2x10-hold  -bar push ups: 2x10  -body blade at side: 3x30 sec   -D2 flexion: YTB, 2x 10  -PROM to tolerance TherEx: 50 min  UBE: 2'/2', Lvl 3  -TRX I's and Y's: x10 each  -Re-assessment   -ER at 90/90 and W pec stretch at wall/door: 2x10  -waist to shoulder lift: abduction and flexion 4#: 2x to fatigue    -OH press: 5#, 2x to fatigue   -Shoulder ER at 90/90: RTB, 2x10  -Shoulder ER in 90 flexion: GTB, 3x10  -Shoulder ER at side: Thick GTB, 3x20  -W to OH press: RTB, 2x10  -Bicep curls: 6#, 2x10  -standing shoulder circles in 90 abd plane: YTB, 2x to fatigue  -shoulder standing angels: RTB, 2x10  -Bent over horiz abd 3#: 2x10  -bar push ups: 2x10-hold  -body blade at side: 3x30 sec -hold  -D2 flexion: YTB, 2x 10-hold  -PROM to tolerance TherEx: 40 min  UBE: 2'/2', Lvl 3  -TRX I's and  Y's: x10 each   -ER at 90/90  pec stretch at door: 3x30 sec  -standing reverse fly's: RTB, 3x10  -waist to shoulder lift: abduction and flexion 4#: 2x to fatigue    -OH press: 5#, 2x to fatigue   -Shoulder ER at 90/90: RTB, 2x10  -Shoulder ER in 90 flexion: GTB, 2x10  -Shoulder ER at side: Thick GTB, 3x20  -W pull to OH press: RTB, 2x10  -Bicep curls: 6#, 2x10  -standing shoulder circles in 90 abd plane: YTB, 2x to fatigue  -shoulder standing angels: RTB, 2x10  -Bent over horiz abd 3#: 2x10  -bent over Y's: 1#, 2x10  -Push ups on mat (mid height): 2x10-hold  -body blade (small) at 90 flexion M/L, inf/sup perturbations: 2x30 sec each  -D2 flexion: YTB, 2x 10  -3 way reach on wall: RTB, 2x to fatigue         Hold  Side plank  Shoulder taps  Single arm press with ball on leg press       Manual tech: 10 min  -STM to (L) UT, pec  -PROM to tolerance end of tx  -(L) GH post and inferior glide, level IV                 HEP:   Access Code: [email or web address]  URL: [email or web address]  Date: 12/24/2024  Prepared by: Batsheva Beckwith    Exercises  - Standing Shoulder External Rotation AAROM with Dowel  - 3 x daily - 7 x weekly - 3 sets - 10-20 seconds hold  - Doorway Pec Stretch at 60 Elevation  - 3 x daily - 7 x weekly - 3 sets - 20 sec hold  - Doorway Pec Stretch at 90 Degrees Abduction  - 3 x daily - 7 x weekly - 3 sets - 30 sec hold  - Shoulder Flexion Overhead with Dowel  - 3 x daily - 7 x weekly - 3 sets  - Standing Shoulder Internal Rotation Stretch with Towel  - 3 x daily - 7 x weekly - 5 sets - 20 sec hold  - Scaption with Dumbbells  - 1 x daily - 4 x weekly - 2 sets - 10 reps  - Shoulder Abduction with Dumbbells - Palms Down  - 1 x daily - 4 x weekly - 2 sets - 10 reps  - Shoulder Overhead Press in Abduction with Dumbbells  - 1 x daily - 4 x weekly - 2 sets - 10 reps  - Shoulder External Rotation with Anchored Resistance  - 1 x daily - 4 x weekly - 3 sets - 10 reps  - Shoulder Flexion Serratus Activation with  Resistance  - 1 x daily - 4 x weekly - 2 sets - 10 reps  - Single Arm Bent Over Shoulder Horizontal Abduction with Dumbbell - Palm Down  - 1 x daily - 4 x weekly - 2 sets - 10 reps  - Prone Single Arm Shoulder Y with Dumbbell  - 1 x daily - 4 x weekly - 2 sets - 10 reps      Charges: TherEx: 3 units, Manual tech: 1 unit  Total Timed Treatment: 50 min  Total Treatment Time: 50 min

## 2025-01-21 ENCOUNTER — OFFICE VISIT (OUTPATIENT)
Dept: PHYSICAL THERAPY | Age: 45
End: 2025-01-21
Attending: ORTHOPAEDIC SURGERY
Payer: MEDICAID

## 2025-01-21 PROCEDURE — 97110 THERAPEUTIC EXERCISES: CPT | Performed by: PHYSICAL THERAPIST

## 2025-01-21 PROCEDURE — 97140 MANUAL THERAPY 1/> REGIONS: CPT | Performed by: PHYSICAL THERAPIST

## 2025-01-24 ENCOUNTER — APPOINTMENT (OUTPATIENT)
Dept: PHYSICAL THERAPY | Age: 45
End: 2025-01-24
Payer: MEDICAID

## 2025-01-27 NOTE — PROGRESS NOTES
Diagnosis:   S/P arthroscopy of left shoulder (Z98.890)   (full supraspinatus tear and partial infraspinatus tear/repair)     Post op dx:  Left Shoulder Arthroscopy Rotator Cuff Repair Arthroscopic Biceps Tenodesis Subacromial Decompression       Referring Provider: Beverly Womack Date of Evaluation:    9/25/24    Last PN: 1/14/25    Precautions:   Activity Precautions: Passive ROMAT, 1 lb WB x 4 weeks. Begin PT POD #1.     Next MD visit: 2/3/25    Date of Surgery: 9/24/25    16 weeks post op on 1/14/25   Insurance Primary/Secondary: BLUE CROSS MEDICAID / N/A     # Auth Visits: 15 visits till 11/24  + 6 visits till 1/14/25  + 4 visits till 2/15/25+ 4 visits 1/14/25-3/22/25    Subjective: Pt reports shoulder is feeling fine. She danced this weekend and the shoulder is fine but her knee is aching. She is keeping up with HEP at home.     Pain: 0/10 at rest      Objective:     PROM: (L) shoulder flexion and ER at 90 deg abd limited by 5-10 deg at end range each direction     Observation: mild UT hiking with shoulder abduction raises and overhead press end of tx once she is fatigued      Assessment: Pt presents with increased ER and shoulder flexion stiffness due to reduced focus on stretches at home and more focus spent on strengthening. She was educated on importance of continued stretching at home on consistent basis as she has still not regained full passive mobility. She is demonstrating gradual improvements in strength and function, however, continued (L) shoulder hiking end of tx with fatigue.         Goals:   To be met in 6-8 weeks post op   Pt will report improved ability to sleep without waking due to shoulder pain-Met  Pt will improve R shoulder flexion PROM to >150 degrees to be able to reach into shoulder height cabinets when cleared for AROM-Met  Pt will improve R shoulder abduction PROM to >130 degrees to improve ability to don deodorant, don/doff shirts, and wash hair when cleared for AROM-Met  Pt will  increase shoulder PROM ER to >80 degrees at 90 deg abduction to reach and fasten seatbelt when cleared for AROM-Met  Pt will be independent and compliant with comprehensive HEP to maintain progress achieved in PT-On going     To be met in 8-10 weeks post op   Pt will improve shoulder flexion AROM to >150 degrees to be able to reach into overhead cabinets without pain or restriction -Met  Pt will improve shoulder abduction AROM to >130 degrees to improve ability to don deodorant, don/doff shirts, and wash hair -Met  Pt will increase shoulder AROM ER to 80 degrees in 90 shoulder abduction to be able to reach and fasten seatbelt-Met  Pt will increase shoulder AROM IR to 70 degrees to be able to reach in back pocket, tuck in shirt, and turn steering wheel without pain-Met  Pt will be independent and compliant with comprehensive HEP to maintain progress achieved in PT -On going     To be met 4-6 months post op (total visits of PT: ~40)  Pt will improve shoulder strength throughout to 4+/5 to improve function with carrying groceries and wash floors and vacuum  -progressing  Pt will demonstrate increased mid/low trap strength to 4/5 to promote improved shoulder mechanics and stabilization with lifting and reaching -progressing  Pt will be independent and compliant with comprehensive HEP to maintain progress achieved in PT -On going       Plan: Continue skilled Physical Therapy 1 x/week or a total of 4 visits over a 90 day period. Treatment will include: TherEx, neuro Re-ed, manual tech       Progress OH strength and endurance to tolerance       Date:1/7/25  Tx#: 3/4  (24 visits total)  Date: 1/14/25  Tx#: 4/4  (25 visits total)  Date: 1/21/25  Tx#: 1/4  (26 visits total)  Date: 1/28/25  Tx#: 2/4  (27 visits total)     TherEx: 48 min  UBE: 2'/2', Lvl 3  -TRX I's and Y's: x10 each  -ER at 90/90 and W pec stretch at wall/door: 2x10  -waist to shoulder lift: abduction and flexion 3#: 2x to fatigue    -OH press: 5#, 2x to  fatigue   -Shoulder ER at 90/90: RTB, 2x10  -Shoulder ER in 90 flexion: GTB, 3x10  -Shoulder ER at side: BTB, 3x20  -W to OH press: RTB, 2x10  -standing shoulder circles in 90 abd plane: YTB, 2x to fatigue  -shoulder standing angels: RTB, 2x10  -Shoulder abd raises to 90 deg: 3#, 2x10   -Bent over horiz abd 3#: 2x10-hold  -bar push ups: 2x10  -body blade at side: 3x30 sec   -D2 flexion: YTB, 2x 10  -PROM to tolerance TherEx: 50 min  UBE: 2'/2', Lvl 3  -TRX I's and Y's: x10 each  -Re-assessment   -ER at 90/90 and W pec stretch at wall/door: 2x10  -waist to shoulder lift: abduction and flexion 4#: 2x to fatigue    -OH press: 5#, 2x to fatigue   -Shoulder ER at 90/90: RTB, 2x10  -Shoulder ER in 90 flexion: GTB, 3x10  -Shoulder ER at side: Thick GTB, 3x20  -W to OH press: RTB, 2x10  -Bicep curls: 6#, 2x10  -standing shoulder circles in 90 abd plane: YTB, 2x to fatigue  -shoulder standing angels: RTB, 2x10  -Bent over horiz abd 3#: 2x10  -bar push ups: 2x10-hold  -body blade at side: 3x30 sec -hold  -D2 flexion: YTB, 2x 10-hold  -PROM to tolerance TherEx: 40 min  UBE: 2'/2', Lvl 3  -TRX I's and Y's: x10 each   -ER at 90/90  pec stretch at door: 3x30 sec  -standing reverse fly's: RTB, 3x10  -waist to shoulder lift: abduction and flexion 4#: 2x to fatigue    -OH press: 5#, 2x to fatigue   -Shoulder ER at 90/90: RTB, 2x10  -Shoulder ER in 90 flexion: GTB, 2x10  -Shoulder ER at side: Thick GTB, 3x20  -W pull to OH press: RTB, 2x10  -Bicep curls: 6#, 2x10  -standing shoulder circles in 90 abd plane: YTB, 2x to fatigue  -shoulder standing angels: RTB, 2x10  -Bent over horiz abd 3#: 2x10  -bent over Y's: 1#, 2x10  -Push ups on mat (mid height): 2x10-hold  -body blade (small) at 90 flexion M/L, inf/sup perturbations: 2x30 sec each  -D2 flexion: YTB, 2x 10  -3 way reach on wall: RTB, 2x to fatigue         Hold  Side plank  Shoulder taps  Single arm press with ball on leg press TherEx: 40 min  UBE: 2'/2', Lvl 3  -TRX I's and Y's:  x10 each   -ER at 90/90  pec stretch at door: 3x30 sec  -waist to shoulder lift: abduction and flexion 4#: 2x to fatigue    -OH press: 5#, 2x to fatigue   -Shoulder ER at 90/90 abduction: RTB, 2x20  -Shoulder ER in 90 flexion: GTB, 2x20  -Shoulder ER at side: Thick GTB, 3x20  -W pull to OH press: RTB, 2x10  -standing shoulder circles in 90 abd plane: RTB, 2x to fatigue  -shoulder standing angels: forward pull from bar, RTB, 2x10  -Bent over horiz abd 3#: 2x10-hold out of time  -bent over Y's: 1#, 2x10-hold out of time  -Push ups on mat (mid height): 2x10  -body blade (small) at 90 scaption M/L, inf/sup perturbations: 2x30 sec each  -D2 flexion: RTB, 2x 10  -3 way reach on wall: RTB, 2x to fatigue   -prone 90/90 ER: 0#, 2x10        Hold for future   Side plank  Shoulder taps  Single arm press with ball on leg press      Manual tech: 10 min  -STM to (L) UT, pec  -PROM to tolerance end of tx  -(L) GH post and inferior glide, level IV Manual tech: 10 min  -STM to (L) UT, pec  -PROM to tolerance end of tx  -(L) GH post and inferior glide, level IV                  HEP:   Access Code: [email or web address]  URL: [email or web address]  Date: 12/24/2024  Prepared by: Batsheva Beckwith    Exercises  - Standing Shoulder External Rotation AAROM with Dowel  - 3 x daily - 7 x weekly - 3 sets - 10-20 seconds hold  - Doorway Pec Stretch at 60 Elevation  - 3 x daily - 7 x weekly - 3 sets - 20 sec hold  - Doorway Pec Stretch at 90 Degrees Abduction  - 3 x daily - 7 x weekly - 3 sets - 30 sec hold  - Shoulder Flexion Overhead with Dowel  - 3 x daily - 7 x weekly - 3 sets  - Standing Shoulder Internal Rotation Stretch with Towel  - 3 x daily - 7 x weekly - 5 sets - 20 sec hold  - Scaption with Dumbbells  - 1 x daily - 4 x weekly - 2 sets - 10 reps  - Shoulder Abduction with Dumbbells - Palms Down  - 1 x daily - 4 x weekly - 2 sets - 10 reps  - Shoulder Overhead Press in Abduction with Dumbbells  - 1 x daily - 4 x weekly - 2 sets -  10 reps  - Shoulder External Rotation with Anchored Resistance  - 1 x daily - 4 x weekly - 3 sets - 10 reps  - Shoulder Flexion Serratus Activation with Resistance  - 1 x daily - 4 x weekly - 2 sets - 10 reps  - Single Arm Bent Over Shoulder Horizontal Abduction with Dumbbell - Palm Down  - 1 x daily - 4 x weekly - 2 sets - 10 reps  - Prone Single Arm Shoulder Y with Dumbbell  - 1 x daily - 4 x weekly - 2 sets - 10 reps      Charges: TherEx: 3 units, Manual tech: 1 unit  Total Timed Treatment: 50 min  Total Treatment Time: 50 min   Cheiloplasty (Less Than 50%) Text: A decision was made to reconstruct the defect with a  cheiloplasty.  The defect was undermined extensively.  Additional obicularis oris muscle was excised with a 15 blade scalpel.  The defect was converted into a full thickness wedge, of less than 50% of the vertical height of the lip, to facilite a better cosmetic result.  Small vessels were then tied off with 5-0 monocyrl. The obicularis oris, superficial fascia, adipose and dermis were then reapproximated.  After the deeper layers were approximated the epidermis was reapproximated with particular care given to realign the vermilion border.

## 2025-01-28 ENCOUNTER — OFFICE VISIT (OUTPATIENT)
Dept: PHYSICAL THERAPY | Age: 45
End: 2025-01-28
Attending: ORTHOPAEDIC SURGERY
Payer: MEDICAID

## 2025-01-28 PROCEDURE — 97110 THERAPEUTIC EXERCISES: CPT | Performed by: PHYSICAL THERAPIST

## 2025-01-28 PROCEDURE — 97140 MANUAL THERAPY 1/> REGIONS: CPT | Performed by: PHYSICAL THERAPIST

## 2025-02-03 ENCOUNTER — OFFICE VISIT (OUTPATIENT)
Dept: ORTHOPEDICS CLINIC | Facility: CLINIC | Age: 45
End: 2025-02-03
Payer: MEDICAID

## 2025-02-03 DIAGNOSIS — Z98.890 S/P ARTHROSCOPY OF SHOULDER: Primary | ICD-10-CM

## 2025-02-03 DIAGNOSIS — Z98.890 S/P ACL RECONSTRUCTION: ICD-10-CM

## 2025-02-03 PROCEDURE — 99213 OFFICE O/P EST LOW 20 MIN: CPT | Performed by: ORTHOPAEDIC SURGERY

## 2025-02-03 NOTE — PROGRESS NOTES
[facility]  [address] [place] [id number]  [phone]       Name: Sandee Rodriguez   MRN: [id number]  Date: 02/03/2025    REASON FOR VISIT: Third Post-Surgical Visit   Surgery: Left shoulder arthroscopic rotator cuff repair, biceps tenodesis, subacromial decompression, extensive debridement on September 24, 2024.     INTERVAL HISTORY:  Sandee Eddy is a 44 year old female who returns for 4 months S/P arthroscopy of the left shoulder. The patient feels like her ROM is back, she does report some limitations, but overall she is doing very well and she is happy with results. The post-operative course has been unremarkable with pain well controlled and overall progress noted. She states that she does have the occasional discomfort if she has overused her shoulder.     PE:   There were no vitals filed for this visit.  Estimated body mass index is 36.11 kg/m² as calculated from the following:    Height as of 10/4/24: 5' 5\" (1.651 m).    Weight as of 10/4/24: 217 lb.    Physical Exam  Constitutional:       Appearance: Normal appearance.   HENT:      Head: Normocephalic and atraumatic.   Eyes:      Extraocular Movements: Extraocular movements intact.   Neck:      Musculoskeletal: Normal range of motion and neck supple.   Cardiovascular:      Pulses: Normal pulses.   Pulmonary:      Effort: Pulmonary effort is normal. No respiratory distress.   Abdominal:      General: There is no distension.   Skin:     General: Skin is warm.      Capillary Refill: Capillary refill takes less than 2 seconds.      Findings: No bruising.   Neurological:      General: No focal deficit present.      Mental Status: She is alert.   Psychiatric:         Mood and Affect: Mood normal.     Examination of the left shoulder demonstrates:     Skin is intact, warm and dry.   Cervical:  Full ROM  Spurling's  Negative    Deformity:   none  Atrophy:   none    Scapular  winging: Negative    Palpation:     AC Joint   Negative  Biceps Tendon  Negative  Greater Tuberosity Negative    ROM:   Forward Flexion:  Lacking approximately 5 degrees   Abduction:   Lacking approximately 5 degrees   External Rotation:  Lacking approximately 5 degrees   Internal Rotation:  Lacking approximately 5 degrees     Rotator Cuff Strength:   Supraspinatus:   5/5  Subscapularis:   5/5  Infraspinatus/Teres: 5/5    Provocative Tests:   Sood:   Negative  Speed's:   Negative  Valier's:   Negative  Lift-off:    Negative  Apprehension:  Negative  Sulcus Sign:   Negative    Neurovascular Upper Extremity (Bilateral)  Motor:    5/5 EPL, Finger Abduction, , Pinch, Deltoid  Sensation:   intact to light touch median, ulnar, radial and axillary nerve  Circulation:   Normal, 2+ radial pulse    The contralateral upper extremity is without limitation in range of motion or strength, no positive provocative maneuvers.        Full ROM stable contralateral side on right knee.    The contralateral shoulder is without limitation in range of motion or strength, no positive provocative maneuvers.       IMPRESSION: Sandee Eddy is a 44 year old female who presents 4 months s/p left shoulder arthroscopic rotator cuff repair, biceps tenodesis, subacromial decompression, extensive debridement on September 24, 2024.     Overall doing very well.    PLAN:   The patient is doing well. She will follow up in 3 months, if there are any questions or concerns she can call to schedule an appointment.     All questions were answered appropriately and the patient was in agreement with the treatment plan.       FOLLOW-UP:  Return to clinic in 3 months. No imaging required at next visit.       Beverly Womack MD  Knee, Shoulder, & Elbow Surgery / Sports Medicine Specialist  Orthopaedic Surgery  [address] [place] [address] [id number] [place] [address].org  [email or web address]  t: [phone]  o: [phone]  f: [phone]      This note was dictated using Dragon software.  While it was briefly proofread prior to completion, some grammatical, spelling, and word choice errors due to dictation may still occur.  I, Ronda Castañeda, attest that this documentation has been prepared under the direction and in the presence of Dr. Beverly Womack MD.

## 2025-02-05 ENCOUNTER — APPOINTMENT (OUTPATIENT)
Dept: PHYSICAL THERAPY | Age: 45
End: 2025-02-05
Payer: MEDICAID

## 2025-02-12 ENCOUNTER — OFFICE VISIT (OUTPATIENT)
Dept: PHYSICAL THERAPY | Age: 45
End: 2025-02-12
Attending: ORTHOPAEDIC SURGERY
Payer: MEDICAID

## 2025-02-12 PROCEDURE — 97110 THERAPEUTIC EXERCISES: CPT

## 2025-02-12 PROCEDURE — 97140 MANUAL THERAPY 1/> REGIONS: CPT

## 2025-02-12 NOTE — PROGRESS NOTES
Diagnosis:   S/P arthroscopy of left shoulder (Z98.890)   (full supraspinatus tear and partial infraspinatus tear/repair)     Post op dx:  Left Shoulder Arthroscopy Rotator Cuff Repair Arthroscopic Biceps Tenodesis Subacromial Decompression       Referring Provider: Beverly Womack Date of Evaluation:    9/25/24    Last PN: 1/14/25    Precautions:   Activity Precautions: Passive ROMAT, 1 lb WB x 4 weeks. Begin PT POD #1.     Next MD visit: 2/3/25    Date of Surgery: 9/24/25    16 weeks post op on 1/14/25   Insurance Primary/Secondary: BLUE CROSS MEDICAID / N/A     # Auth Visits: 15 visits till 11/24  + 6 visits till 1/14/25  + 4 visits till 2/15/25+ 4 visits 1/14/25-3/22/25    Subjective: Patient reports she is doing well. She is not doing a lot of exercises at home, but feels like she can do everything she needs/wants to do.    Pain: 0/10 at rest      Objective:     PROM: (L) shoulder flexion and ER at 90 deg abd limited by 5-10 deg at end range each direction >> continued    Observation: mild UT hiking with shoulder abduction raises and overhead press end of tx once she is fatigued      Assessment: educated patient on benefits of HEP for further strengthening and mobility and provided specific, manageable recommendations to improve compliance. Discussed benefits of more time as well. Patient limited with ER PROM in 90 degrees abduction and provided self stretching following manual intervention to promote mobility. Continued strengthening of parascapular region and RTC to promote endurance. Patient demonstrates mild compensations throughout session including scapular elevation and elbow flexion, but cueing improve quality of movement. She tolerates session well.      Goals:   To be met in 6-8 weeks post op   Pt will report improved ability to sleep without waking due to shoulder pain-Met  Pt will improve R shoulder flexion PROM to >150 degrees to be able to reach into shoulder height cabinets when cleared for  AROM-Met  Pt will improve R shoulder abduction PROM to >130 degrees to improve ability to don deodorant, don/doff shirts, and wash hair when cleared for AROM-Met  Pt will increase shoulder PROM ER to >80 degrees at 90 deg abduction to reach and fasten seatbelt when cleared for AROM-Met  Pt will be independent and compliant with comprehensive HEP to maintain progress achieved in PT-On going     To be met in 8-10 weeks post op   Pt will improve shoulder flexion AROM to >150 degrees to be able to reach into overhead cabinets without pain or restriction -Met  Pt will improve shoulder abduction AROM to >130 degrees to improve ability to don deodorant, don/doff shirts, and wash hair -Met  Pt will increase shoulder AROM ER to 80 degrees in 90 shoulder abduction to be able to reach and fasten seatbelt-Met  Pt will increase shoulder AROM IR to 70 degrees to be able to reach in back pocket, tuck in shirt, and turn steering wheel without pain-Met  Pt will be independent and compliant with comprehensive HEP to maintain progress achieved in PT -On going     To be met 4-6 months post op (total visits of PT: ~40)  Pt will improve shoulder strength throughout to 4+/5 to improve function with carrying groceries and wash floors and vacuum  -progressing  Pt will demonstrate increased mid/low trap strength to 4/5 to promote improved shoulder mechanics and stabilization with lifting and reaching -progressing  Pt will be independent and compliant with comprehensive HEP to maintain progress achieved in PT -On going       Plan: Continue skilled Physical Therapy 1 x/week or a total of 4 visits over a 90 day period. Treatment will include: TherEx, neuro Re-ed, manual tech       Progress OH strength and endurance to tolerance       Date:1/7/25  Tx#: 3/4  (24 visits total)  Date: 1/14/25  Tx#: 4/4  (25 visits total)  Date: 1/21/25  Tx#: 1/4  (26 visits total)  Date: 1/28/25  Tx#: 2/4  (27 visits total)  Date: 2/12/25  Tx#: 3/4  (28 visits  total)    TherEx: 48 min  UBE: 2'/2', Lvl 3  -TRX I's and Y's: x10 each  -ER at 90/90 and W pec stretch at wall/door: 2x10  -waist to shoulder lift: abduction and flexion 3#: 2x to fatigue    -OH press: 5#, 2x to fatigue   -Shoulder ER at 90/90: RTB, 2x10  -Shoulder ER in 90 flexion: GTB, 3x10  -Shoulder ER at side: BTB, 3x20  -W to OH press: RTB, 2x10  -standing shoulder circles in 90 abd plane: YTB, 2x to fatigue  -shoulder standing angels: RTB, 2x10  -Shoulder abd raises to 90 deg: 3#, 2x10   -Bent over horiz abd 3#: 2x10-hold  -bar push ups: 2x10  -body blade at side: 3x30 sec   -D2 flexion: YTB, 2x 10  -PROM to tolerance TherEx: 50 min  UBE: 2'/2', Lvl 3  -TRX I's and Y's: x10 each  -Re-assessment   -ER at 90/90 and W pec stretch at wall/door: 2x10  -waist to shoulder lift: abduction and flexion 4#: 2x to fatigue    -OH press: 5#, 2x to fatigue   -Shoulder ER at 90/90: RTB, 2x10  -Shoulder ER in 90 flexion: GTB, 3x10  -Shoulder ER at side: Thick GTB, 3x20  -W to OH press: RTB, 2x10  -Bicep curls: 6#, 2x10  -standing shoulder circles in 90 abd plane: YTB, 2x to fatigue  -shoulder standing angels: RTB, 2x10  -Bent over horiz abd 3#: 2x10  -bar push ups: 2x10-hold  -body blade at side: 3x30 sec -hold  -D2 flexion: YTB, 2x 10-hold  -PROM to tolerance TherEx: 40 min  UBE: 2'/2', Lvl 3  -TRX I's and Y's: x10 each   -ER at 90/90  pec stretch at door: 3x30 sec  -standing reverse fly's: RTB, 3x10  -waist to shoulder lift: abduction and flexion 4#: 2x to fatigue    -OH press: 5#, 2x to fatigue   -Shoulder ER at 90/90: RTB, 2x10  -Shoulder ER in 90 flexion: GTB, 2x10  -Shoulder ER at side: Thick GTB, 3x20  -W pull to OH press: RTB, 2x10  -Bicep curls: 6#, 2x10  -standing shoulder circles in 90 abd plane: YTB, 2x to fatigue  -shoulder standing angels: RTB, 2x10  -Bent over horiz abd 3#: 2x10  -bent over Y's: 1#, 2x10  -Push ups on mat (mid height): 2x10-hold  -body blade (small) at 90 flexion M/L, inf/sup perturbations:  2x30 sec each  -D2 flexion: YTB, 2x 10  -3 way reach on wall: RTB, 2x to fatigue         Hold  Side plank  Shoulder taps  Single arm press with ball on leg press TherEx: 40 min  UBE: 2'/2', Lvl 3  -TRX I's and Y's: x10 each   -ER at 90/90  pec stretch at door: 3x30 sec  -waist to shoulder lift: abduction and flexion 4#: 2x to fatigue    -OH press: 5#, 2x to fatigue   -Shoulder ER at 90/90 abduction: RTB, 2x20  -Shoulder ER in 90 flexion: GTB, 2x20  -Shoulder ER at side: Thick GTB, 3x20  -W pull to OH press: RTB, 2x10  -standing shoulder circles in 90 abd plane: RTB, 2x to fatigue  -shoulder standing angels: forward pull from bar, RTB, 2x10  -Bent over horiz abd 3#: 2x10-hold out of time  -bent over Y's: 1#, 2x10-hold out of time  -Push ups on mat (mid height): 2x10  -body blade (small) at 90 scaption M/L, inf/sup perturbations: 2x30 sec each  -D2 flexion: RTB, 2x 10  -3 way reach on wall: RTB, 2x to fatigue   -prone 90/90 ER: 0#, 2x10        Hold for future   Side plank  Shoulder taps  Single arm press with ball on leg press TherEx: 40 min  UBE: 2'/2', Lvl 3  -TRX I's and Y's: x10 each   -Supine ER stretch 10x10\"  -ER at 90/90  pec stretch in corner: 10x10 sec  -Waist to shoulder lift: abduction and flexion 4#: 2x10  -Wall ball walk up + Y x20 RSB   -Scaption 2x10 RTB  -W's 2x10 RTB   -Counter push up 2x10  -Wall rainbow 2x10 YTB  -[place] and arrow 2x10 YTB  -Rows - alternating isometrics 2x10 BTB each  -Supinated shoulder extension 3x10 RTB  -Shoulder ER 3x10 GTB  -B shoulder ER serve the platter 2x10  -Shoulder ER at 90/90 abduction: RTB, 2x20  -Chest press x30 GTB  -Diagonal TB pull apart 2x20 RTB  -Shoulder IR stretch with strap 2x10x5\"       Manual tech: 10 min  -STM to (L) UT, pec  -PROM to tolerance end of tx  -(L) GH post and inferior glide, level IV Manual tech: 10 min  -STM to (L) UT, pec  -PROM to tolerance end of tx  -(L) GH post and inferior glide, level IV Manual tech: 10 min  -PROM to tolerance end of  tx  -(L) GH post in ER and inferior glide, level IV                 HEP:   Access Code: [email or web address]  URL: [email or web address]  Date: 12/24/2024  Prepared by: Batsheva Beckwith    Exercises  - Standing Shoulder External Rotation AAROM with Dowel  - 3 x daily - 7 x weekly - 3 sets - 10-20 seconds hold  - Doorway Pec Stretch at 60 Elevation  - 3 x daily - 7 x weekly - 3 sets - 20 sec hold  - Doorway Pec Stretch at 90 Degrees Abduction  - 3 x daily - 7 x weekly - 3 sets - 30 sec hold  - Shoulder Flexion Overhead with Dowel  - 3 x daily - 7 x weekly - 3 sets  - Standing Shoulder Internal Rotation Stretch with Towel  - 3 x daily - 7 x weekly - 5 sets - 20 sec hold  - Scaption with Dumbbells  - 1 x daily - 4 x weekly - 2 sets - 10 reps  - Shoulder Abduction with Dumbbells - Palms Down  - 1 x daily - 4 x weekly - 2 sets - 10 reps  - Shoulder Overhead Press in Abduction with Dumbbells  - 1 x daily - 4 x weekly - 2 sets - 10 reps  - Shoulder External Rotation with Anchored Resistance  - 1 x daily - 4 x weekly - 3 sets - 10 reps  - Shoulder Flexion Serratus Activation with Resistance  - 1 x daily - 4 x weekly - 2 sets - 10 reps  - Single Arm Bent Over Shoulder Horizontal Abduction with Dumbbell - Palm Down  - 1 x daily - 4 x weekly - 2 sets - 10 reps  - Prone Single Arm Shoulder Y with Dumbbell  - 1 x daily - 4 x weekly - 2 sets - 10 reps    Charges: TherEx: 3 units, Manual tech: 1 unit  Total Timed Treatment: 50 min  Total Treatment Time: 50 min

## 2025-02-19 ENCOUNTER — APPOINTMENT (OUTPATIENT)
Dept: PHYSICAL THERAPY | Age: 45
End: 2025-02-19
Payer: MEDICAID

## 2025-02-26 ENCOUNTER — OFFICE VISIT (OUTPATIENT)
Dept: PHYSICAL THERAPY | Age: 45
End: 2025-02-26
Attending: ORTHOPAEDIC SURGERY
Payer: MEDICAID

## 2025-02-26 PROCEDURE — 97110 THERAPEUTIC EXERCISES: CPT

## 2025-02-26 PROCEDURE — 97112 NEUROMUSCULAR REEDUCATION: CPT

## 2025-02-26 NOTE — PROGRESS NOTES
Discharge Summary  Pt has attended 29 visits in Physical Therapy.     Diagnosis:   S/P arthroscopy of left shoulder (Z98.890)   (full supraspinatus tear and partial infraspinatus tear/repair)     Post op dx:  Left Shoulder Arthroscopy Rotator Cuff Repair Arthroscopic Biceps Tenodesis Subacromial Decompression       Referring Provider: Beverly Womack Date of Evaluation:    9/25/24    Last PN: 1/14/25    Precautions:   Activity Precautions: Passive ROMAT, 1 lb WB x 4 weeks. Begin PT POD #1.     Next MD visit: 2/3/25    Date of Surgery: 9/24/25    16 weeks post op on 1/14/25   Insurance Primary/Secondary: BLUE [employer]OSS MEDICAID / N/A     # Auth Visits: 15 visits till 11/24  + 6 visits till 1/14/25  + 4 visits till 2/15/25+ 4 visits 1/14/25-3/22/25    Subjective: Patient reports she has a little pain but her ROM is fine and has just some weakness when lifting something heavy. She states overall she feels about 90% better.     Pain: 0/10 at rest      Objective:   AROM: (* denotes performed with pain)  Shoulder    Flexion: L 170, R 170  Abduction: L 155, R 180  ER at side: L 70*, R 80  IR behind the back reach: L to T 10*, R T7       Strength/MMT: (* denotes performed with pain)  Shoulder Scapular   Flexion: R 5/5; L 4/5  Abduction: R 5/5; L 4+/5  ER: R 4/5; L 4-/5  IR: R 4+/5; L 4/5 Rhomboids: R 4-/5, L 3+/5  Mid trap: R 4-/5; L 4-/5   Low trap: R 4-/5; L 4-/5          Assessment: Pt presents with an increase in ROM is some planes, increased strength is some planes and a reduction in overall pain leading to functional gains and meeting all goals. Pt and PT discuss HEP and progression independently. Pt voiced understanding and performs HEP correctly without reported pain. Although pt remains with some weakness with lifting she reports no functional limitations. She is education on importance of HEP as she reports she no longer wants to attend PT. Pt states they do not have any concerns with discharge at this time. Pt will no  longer benefit from skilled therapy at this time.      Goals:   To be met in 6-8 weeks post op   Pt will report improved ability to sleep without waking due to shoulder pain-Met  Pt will improve R shoulder flexion PROM to >150 degrees to be able to reach into shoulder height cabinets when cleared for AROM-Met  Pt will improve R shoulder abduction PROM to >130 degrees to improve ability to don deodorant, don/doff shirts, and wash hair when cleared for AROM-Met  Pt will increase shoulder PROM ER to >80 degrees at 90 deg abduction to reach and fasten seatbelt when cleared for AROM-Met  Pt will be independent and compliant with comprehensive HEP to maintain progress achieved in PT-On going     To be met in 8-10 weeks post op   Pt will improve shoulder flexion AROM to >150 degrees to be able to reach into overhead cabinets without pain or restriction -Met  Pt will improve shoulder abduction AROM to >130 degrees to improve ability to don deodorant, don/doff shirts, and wash hair -Met  Pt will increase shoulder AROM ER to 80 degrees in 90 shoulder abduction to be able to reach and fasten seatbelt-Met  Pt will increase shoulder AROM IR to 70 degrees to be able to reach in back pocket, tuck in shirt, and turn steering wheel without pain-Met  Pt will be independent and compliant with comprehensive HEP to maintain progress achieved in PT -On going     To be met 4-6 months post op (total visits of PT: ~40)  Pt will improve shoulder strength throughout to 4+/5 to improve function with carrying groceries and wash floors and vacuum  -MET  Pt will demonstrate increased mid/low trap strength to 4/5 to promote improved shoulder mechanics and stabilization with lifting and reaching -PARTIALLY MET  Pt will be independent and compliant with comprehensive HEP to maintain progress achieved in PT -On going           Date:1/7/25  Tx#: 3/4  (24 visits total)  Date: 1/14/25  Tx#: 4/4  (25 visits total)  Date: 1/21/25  Tx#: 1/4  (26 visits  total)  Date: 1/28/25  Tx#: 2/4  (27 visits total)  Date: 2/12/25  Tx#: 3/4  (28 visits total)  2/26/25 4/4   TherEx: 48 min  UBE: 2'/2', Lvl 3  -TRX I's and Y's: x10 each  -ER at 90/90 and W pec stretch at wall/door: 2x10  -waist to shoulder lift: abduction and flexion 3#: 2x to fatigue    -OH press: 5#, 2x to fatigue   -Shoulder ER at 90/90: RTB, 2x10  -Shoulder ER in 90 flexion: GTB, 3x10  -Shoulder ER at side: BTB, 3x20  -W to OH press: RTB, 2x10  -standing shoulder circles in 90 abd plane: YTB, 2x to fatigue  -shoulder standing angels: RTB, 2x10  -Shoulder abd raises to 90 deg: 3#, 2x10   -Bent over horiz abd 3#: 2x10-hold  -bar push ups: 2x10  -body blade at side: 3x30 sec   -D2 flexion: YTB, 2x 10  -PROM to tolerance TherEx: 50 min  UBE: 2'/2', Lvl 3  -TRX I's and Y's: x10 each  -Re-assessment   -ER at 90/90 and W pec stretch at wall/door: 2x10  -waist to shoulder lift: abduction and flexion 4#: 2x to fatigue    -OH press: 5#, 2x to fatigue   -Shoulder ER at 90/90: RTB, 2x10  -Shoulder ER in 90 flexion: GTB, 3x10  -Shoulder ER at side: Thick GTB, 3x20  -W to OH press: RTB, 2x10  -Bicep curls: 6#, 2x10  -standing shoulder circles in 90 abd plane: YTB, 2x to fatigue  -shoulder standing angels: RTB, 2x10  -Bent over horiz abd 3#: 2x10  -bar push ups: 2x10-hold  -body blade at side: 3x30 sec -hold  -D2 flexion: YTB, 2x 10-hold  -PROM to tolerance TherEx: 40 min  UBE: 2'/2', Lvl 3  -TRX I's and Y's: x10 each   -ER at 90/90  pec stretch at door: 3x30 sec  -standing reverse fly's: RTB, 3x10  -waist to shoulder lift: abduction and flexion 4#: 2x to fatigue    -OH press: 5#, 2x to fatigue   -Shoulder ER at 90/90: RTB, 2x10  -Shoulder ER in 90 flexion: GTB, 2x10  -Shoulder ER at side: Thick GTB, 3x20  -W pull to OH press: RTB, 2x10  -Bicep curls: 6#, 2x10  -standing shoulder circles in 90 abd plane: YTB, 2x to fatigue  -shoulder standing angels: RTB, 2x10  -Bent over horiz abd 3#: 2x10  -bent over Y's: 1#, 2x10  -Push  ups on mat (mid height): 2x10-hold  -body blade (small) at 90 flexion M/L, inf/sup perturbations: 2x30 sec each  -D2 flexion: YTB, 2x 10  -3 way reach on wall: RTB, 2x to fatigue         Hold  Side plank  Shoulder taps  Single arm press with ball on leg press TherEx: 40 min  UBE: 2'/2', Lvl 3  -TRX I's and Y's: x10 each   -ER at 90/90  pec stretch at door: 3x30 sec  -waist to shoulder lift: abduction and flexion 4#: 2x to fatigue    -OH press: 5#, 2x to fatigue   -Shoulder ER at 90/90 abduction: RTB, 2x20  -Shoulder ER in 90 flexion: GTB, 2x20  -Shoulder ER at side: Thick GTB, 3x20  -W pull to OH press: RTB, 2x10  -standing shoulder circles in 90 abd plane: RTB, 2x to fatigue  -shoulder standing angels: forward pull from bar, RTB, 2x10  -Bent over horiz abd 3#: 2x10-hold out of time  -bent over Y's: 1#, 2x10-hold out of time  -Push ups on mat (mid height): 2x10  -body blade (small) at 90 scaption M/L, inf/sup perturbations: 2x30 sec each  -D2 flexion: RTB, 2x 10  -3 way reach on wall: RTB, 2x to fatigue   -prone 90/90 ER: 0#, 2x10        Hold for future   Side plank  Shoulder taps  Single arm press with ball on leg press TherEx: 40 min  UBE: 2'/2', Lvl 3  -TRX I's and Y's: x10 each   -Supine ER stretch 10x10\"  -ER at 90/90  pec stretch in corner: 10x10 sec  -Waist to shoulder lift: abduction and flexion 4#: 2x10  -Wall ball walk up + Y x20 RSB   -Scaption 2x10 RTB  -W's 2x10 RTB   -Counter push up 2x10  -Wall rainbow 2x10 YTB  -[place] and arrow 2x10 YTB  -Rows - alternating isometrics 2x10 BTB each  -Supinated shoulder extension 3x10 RTB  -Shoulder ER 3x10 GTB  -B shoulder ER serve the platter 2x10  -Shoulder ER at 90/90 abduction: RTB, 2x20  -Chest press x30 GTB  -Diagonal TB pull apart 2x20 RTB  -Shoulder IR stretch with strap 2x10x5\"   TherEx: 30 min  Completion of HEP noted below     Manual tech: 10 min  -STM to (L) UT, pec  -PROM to tolerance end of tx  -(L) GH post and inferior glide, level IV Manual tech: 10  min  -STM to (L) UT, pec  -PROM to tolerance end of tx  -(L) GH post and inferior glide, level IV Manual tech: 10 min  -PROM to tolerance end of t  -(L) GH post in ER and inferior glide, level IV Neuro x15 min  Reassessment and pt education                   HEP:   Access Code: [email or web address]  URL: [email or web address]  Date: 12/24/2024  Prepared by: Batsheva Beckwith    Exercises  - Standing Shoulder External Rotation AAROM with Dowel  - 3 x daily - 7 x weekly - 3 sets - 10-20 seconds hold  - Doorway Pec Stretch at 60 Elevation  - 3 x daily - 7 x weekly - 3 sets - 20 sec hold  - Doorway Pec Stretch at 90 Degrees Abduction  - 3 x daily - 7 x weekly - 3 sets - 30 sec hold  - Shoulder Flexion Overhead with Dowel  - 3 x daily - 7 x weekly - 3 sets  - Standing Shoulder Internal Rotation Stretch with Towel  - 3 x daily - 7 x weekly - 5 sets - 20 sec hold  - Scaption with Dumbbells  - 1 x daily - 4 x weekly - 2 sets - 10 reps  - Shoulder Abduction with Dumbbells - Palms Down  - 1 x daily - 4 x weekly - 2 sets - 10 reps  - Shoulder Overhead Press in Abduction with Dumbbells  - 1 x daily - 4 x weekly - 2 sets - 10 reps  - Shoulder External Rotation with Anchored Resistance  - 1 x daily - 4 x weekly - 3 sets - 10 reps  - Shoulder Flexion Serratus Activation with Resistance  - 1 x daily - 4 x weekly - 2 sets - 10 reps  - Single Arm Bent Over Shoulder Horizontal Abduction with Dumbbell - Palm Down  - 1 x daily - 4 x weekly - 2 sets - 10 reps  - Prone Single Arm Shoulder Y with Dumbbell  - 1 x daily - 4 x weekly - 2 sets - 10 reps    Charges: TherEx: 1 units,neuro x1  Total Timed Treatment: 35 min  Total Treatment Time: 35 min

## 2025-03-05 ENCOUNTER — APPOINTMENT (OUTPATIENT)
Dept: PHYSICAL THERAPY | Age: 45
End: 2025-03-05
Payer: MEDICAID

## 2025-03-25 DIAGNOSIS — E66.9 OBESITY (BMI 35.0-39.9 WITHOUT COMORBIDITY): ICD-10-CM

## 2025-03-25 RX ORDER — PHENTERMINE HYDROCHLORIDE 37.5 MG/1
37.5 TABLET ORAL
Qty: 30 TABLET | Refills: 0 | Status: SHIPPED | OUTPATIENT
Start: 2025-03-25

## 2025-05-05 ENCOUNTER — OFFICE VISIT (OUTPATIENT)
Dept: ORTHOPEDICS CLINIC | Facility: CLINIC | Age: 45
End: 2025-05-05
Payer: MEDICAID

## 2025-05-05 DIAGNOSIS — Z98.890 S/P ARTHROSCOPY OF SHOULDER: Primary | ICD-10-CM

## 2025-05-05 PROCEDURE — 99213 OFFICE O/P EST LOW 20 MIN: CPT | Performed by: ORTHOPAEDIC SURGERY

## 2025-05-05 NOTE — PROGRESS NOTES
[facility] ORTHOPED[facility]  [address] [place] [address]  [phone]       Name: Sandee Rodriguez   MRN: [id number]  Date: 5/5/25    REASON FOR VISIT: Third Post-Surgical Visit   Surgery: Left shoulder arthroscopic rotator cuff repair, biceps tenodesis, subacromial decompression, extensive debridement on September 24, 2024.     History of Present Illness  Sandee Eddy is a 44 year old female who presents for follow-up eight months after left shoulder surgery.    Eight months post left shoulder surgery, she experiences slight discomfort when lifting her arms, although her pain level is zero out of ten. She sometimes sleeps with her arm under the pillow, which may contribute to the discomfort. Previously, she was unable to fully lift her arm.    She discusses her knee, which was injured in a ski accident resulting in a torn ACL. She notes a pulling sensation inside the knee when she overdoes activities. It has been over a year since the injury, and she describes the knee as feeling stable overall. She is no longer skiing to avoid further injuries.        PE:   There were no vitals filed for this visit.  Estimated body mass index is 36.11 kg/m² as calculated from the following:    Height as of 10/4/24: 5' 5\" (1.651 m).    Weight as of 10/4/24: 217 lb (98.4 kg).    Physical Exam  Constitutional:       Appearance: Normal appearance.   HENT:      Head: Normocephalic and atraumatic.   Eyes:      Extraocular Movements: Extraocular movements intact.   Neck:      Musculoskeletal: Normal range of motion and neck supple.   Cardiovascular:      Pulses: Normal pulses.   Pulmonary:      Effort: Pulmonary effort is normal. No respiratory distress.   Abdominal:      General: There is no distension.   Skin:     General: Skin is warm.      Capillary Refill: Capillary refill takes less than 2 seconds.      Findings: No bruising.   Neurological:      General: No focal deficit present.       Mental Status: She is alert.   Psychiatric:         Mood and Affect: Mood normal.     Examination of the left shoulder demonstrates:     Skin is intact, warm and dry.   Cervical:  Full ROM  Spurling's  Negative    Deformity:   none  Atrophy:   none    Scapular winging: Negative    Palpation:     AC Joint   Negative  Biceps Tendon  Negative  Greater Tuberosity Negative    ROM:   Full and symmetric    Rotator Cuff Strength:   Supraspinatus:   5/5  Subscapularis:   5/5  Infraspinatus/Teres: 5/5    Provocative Tests:   Sood:   Negative  Speed's:   Negative  Aiken's:   Negative  Lift-off:    Negative  Apprehension:  Negative  Sulcus Sign:   Negative    Neurovascular Upper Extremity (Bilateral)  Motor:    5/5 EPL, Finger Abduction, , Pinch, Deltoid  Sensation:   intact to light touch median, ulnar, radial and axillary nerve  Circulation:   Normal, 2+ radial pulse    The contralateral upper extremity is without limitation in range of motion or strength, no positive provocative maneuvers.        Full ROM stable contralateral side on right knee.    The contralateral shoulder is without limitation in range of motion or strength, no positive provocative maneuvers.       IMPRESSION: Sandee Eddy is a 44 year old female who presents 8 months s/p left shoulder arthroscopic rotator cuff repair, biceps tenodesis, subacromial decompression, extensive debridement on September 24, 2024.     Overall doing very well.    Assessment & Plan  Postoperative left shoulder surgery  Eight months post-arthroscopic rotator cuff repair, biceps tenodesis, subacromial decompression, and debridement. Slight discomfort with arm lifting, pain level zero. Intermittent soreness expected, not concerning.  - Continue current activity level as tolerated.  - Follow up if symptoms worsen.    ACL tear  Post-surgical intervention for ACL tear. Reports pulling sensation with overexertion, knee stability maintained. Full recovery expected in 1.5  to 2 years.  - Continue current activity level as tolerated.  - Follow up if symptoms worsen.    It has been a pleasure to take care of her!     FOLLOW-UP:  Follow up on and as needed basis.     Beverly Womack MD  Knee, Shoulder, & Elbow Surgery / Sports Medicine Specialist  Orthopaedic Surgery  [address] [place] [id number]   [email or web address]  [email or web address]  t: [phone]  o: [phone]  f: [phone]     This note was dictated using Dragon software.  While it was briefly proofread prior to completion, some grammatical, spelling, and word choice errors due to dictation may still occur.  I, Ronda Castañeda, attest that this documentation has been prepared under the direction and in the presence of Dr. Beverly Womack MD.

## 2025-06-24 ENCOUNTER — PATIENT MESSAGE (OUTPATIENT)
Dept: INTERNAL MEDICINE CLINIC | Facility: CLINIC | Age: 45
End: 2025-06-24

## 2025-06-24 DIAGNOSIS — Z12.11 COLON CANCER SCREENING: Primary | ICD-10-CM

## 2025-07-07 ENCOUNTER — NURSE ONLY (OUTPATIENT)
Facility: CLINIC | Age: 45
End: 2025-07-07

## 2025-07-07 DIAGNOSIS — Z12.11 COLON CANCER SCREENING: Primary | ICD-10-CM

## 2025-07-07 NOTE — PROGRESS NOTES
Called patient for scheduled TCS/FIT+ result.   Medications, pharmacy, and allergies reviewed.   Please advise on colonoscopy and bowel prep orders.     Age 45-76 y/o:   › MD preference: None   › Insurance: Medicaid   › Last PCP visit: 8/20/2024  Pcp within [facility]: Kasey Calderon MD   › Last CBC: 8/23/2024  › Date of positive FIT: n/a  › H/W/BMI: 65 in / 219 lbs / 36.44 kg/m²     Special comments/notes:    Telephone Colon Screening Questionnaire Yes No   Are you currently experiencing any new/abnormal GI symptoms? [] [x]   If yes, explain:                              Rectal bleeding?            [] [x]   Black stool?              [] [x]   Dysphagia or food \"feeling stuck\" when eating?    [] [x]   Intractable vomiting?          [] [x]   Unexplained weight loss?                        [] [x]   First colonoscopy?                         [x] []   Family history of colon cancer?           [] [x]   Any issues with anesthesia?                   [] [x]   If yes, explain:                                   Any recent complaints of chest pain or shortness of breath?  [] [x]   Referred to a cardiologist?  [] [x]   If yes, explain:             Stroke, MI (heart attack) or stent placement in the last 12 months:  [] [x]   History of  respiratory issues/oxygen/HERRERA/COPD: [x] []   If yes, CPAP/BiPAP:                                    History of devices (pacemaker/defibrillator) [] [x]   History of cardiac/CVA/(MI/stroke):           [] [x]     Medication Reconciliation  Yes  No   Anticoagulation                                 [] [x]   Diuretics                                          [] [x]   ACE/ARB's/Combo                                      [] [x]   Diabetic medication (oral/insulin)                           [] [x]   Weight loss medication (phentermine/vyvanse/saxsenda)  Phentermine             [x] []   Iron/vitamin E/herbal/multivitamin supplements                            [x] []   NSAID/ASA   (ex: aspirin,  Ibuprofen, naproxen, meloxicam, ketorolac, celecoxib, sulindac, indomethacin)  [x] []   Marijuana/CBD/vaping use                                  [] [x]

## 2025-07-07 NOTE — PROGRESS NOTES
GI Staff:  TCS Colon Screening Orders    Please schedule: Colonoscopy [address] with MAC      Please send split dose Golytely bowel prep     Diagnosis: Colon Screening Z12.11 /    Medication adjustments:  Phentermine hold 7 days prior   Multivitamin hold 14 days prior     >>>Please inform patient if new medications are started after scheduling procedure they need to call clinic to notify us.

## 2025-07-09 NOTE — PROGRESS NOTES
Scheduled for: Colonoscopy [id number]    Provider Name:  Dr Lovell    Date:  7/21/2025    Location:   [facility]     Sedation:  MAC    Prep:  Golytley    Meds/Allergies Reconciled?:  Physician reviewed     Diagnosis with codes:    Colon cancer screening [Z12.11]     Was patient informed to call insurance with codes (Y/N):  Yes, I confirmed [employer] insurance with the patient.    Advised Patient: Please be sure to advise our office of any insurance changes as soon as possible to avoid possible cancellation of procedure      Referral sent?:  N/A    EM or [facility] notified?:  I sent an electronic request to Endo Scheduling and received a confirmation today.      Medication Orders:  This patient verbally confirmed that she is not taking:    Iron, blood thinners, BP meds, and is not diabetic    No latex allergy,  PCN allergy and does not have a pacemaker     Misc Orders:         Further instructions given by staff:   I discussed the prep instructions with the patient which she verbally understood and is aware that I will send the instructions today via [employer].    Advised patient:    You will not be able to drive, operate machinery or make critical decisions the day of your procedure. Please make arrangements for transportation. You must have a  (age 18 or older) to accompany you, stay in the facility for the duration of your procedure and drive you home after the procedure.  You cannot use public transportation (Uber, Lyft, Taxi). The procedure involves sedation, and you will not be allowed to leave unaccompanied. Your procedure will not proceed forward if you're unable to confirm your  planned to escort you home.    Advised Patient:    [facility] requires payment of copay and any patient responsibility at the time of registration.   The [facility] requires copay and 50% of the patient responsibility at the time of service for all Esophagogastroduodenoscopy and diagnostic Colonoscopies.     They do offer payment plans and Care  Credit options if unable to pay the full amount at the time of registration.     If you have any questions regarding your potential responsibility, please contact [facility] Insurance Department at [phone] option 1.    You may receive 4 bills related to your medical procedure:   [facility] (the facility)  The procedural physician  The anesthesiologist  The pathology lab (if applicable)

## 2025-07-21 ENCOUNTER — HOSPITAL ENCOUNTER (OUTPATIENT)
Facility: HOSPITAL | Age: 45
Setting detail: HOSPITAL OUTPATIENT SURGERY
Discharge: HOME | End: 2025-07-21
Attending: STUDENT IN AN ORGANIZED HEALTH CARE EDUCATION/TRAINING PROGRAM | Admitting: STUDENT IN AN ORGANIZED HEALTH CARE EDUCATION/TRAINING PROGRAM
Payer: MEDICAID

## 2025-07-21 ENCOUNTER — ANESTHESIA EVENT (OUTPATIENT)
Dept: ENDOSCOPY | Facility: HOSPITAL | Age: 45
End: 2025-07-21
Payer: MEDICAID

## 2025-07-21 ENCOUNTER — ANESTHESIA (OUTPATIENT)
Dept: ENDOSCOPY | Facility: HOSPITAL | Age: 45
End: 2025-07-21
Payer: MEDICAID

## 2025-07-21 DIAGNOSIS — Z12.11 COLON CANCER SCREENING: ICD-10-CM

## 2025-07-21 DIAGNOSIS — K62.1 RECTAL POLYP: Primary | ICD-10-CM

## 2025-07-21 LAB — B-HCG UR QL: NEGATIVE

## 2025-07-21 PROCEDURE — 45385 COLONOSCOPY W/LESION REMOVAL: CPT | Performed by: STUDENT IN AN ORGANIZED HEALTH CARE EDUCATION/TRAINING PROGRAM

## 2025-07-21 RX ORDER — LIDOCAINE HYDROCHLORIDE 10 MG/ML
INJECTION, SOLUTION EPIDURAL; INFILTRATION; INTRACAUDAL; PERINEURAL AS NEEDED
Status: DISCONTINUED | OUTPATIENT
Start: 2025-07-21 | End: 2025-07-21 | Stop reason: SURG

## 2025-07-21 RX ORDER — SODIUM CHLORIDE, SODIUM LACTATE, POTASSIUM CHLORIDE, CALCIUM CHLORIDE 600; 310; 30; 20 MG/100ML; MG/100ML; MG/100ML; MG/100ML
INJECTION, SOLUTION INTRAVENOUS CONTINUOUS
Status: DISCONTINUED | OUTPATIENT
Start: 2025-07-21 | End: 2025-07-21

## 2025-07-21 RX ORDER — NALOXONE HYDROCHLORIDE 0.4 MG/ML
0.08 INJECTION, SOLUTION INTRAMUSCULAR; INTRAVENOUS; SUBCUTANEOUS ONCE AS NEEDED
Status: DISCONTINUED | OUTPATIENT
Start: 2025-07-21 | End: 2025-07-21

## 2025-07-21 RX ADMIN — SODIUM CHLORIDE, SODIUM LACTATE, POTASSIUM CHLORIDE, CALCIUM CHLORIDE: 600; 310; 30; 20 INJECTION, SOLUTION INTRAVENOUS at 10:52:00

## 2025-07-21 RX ADMIN — SODIUM CHLORIDE, SODIUM LACTATE, POTASSIUM CHLORIDE, CALCIUM CHLORIDE: 600; 310; 30; 20 INJECTION, SOLUTION INTRAVENOUS at 10:29:00

## 2025-07-21 RX ADMIN — LIDOCAINE HYDROCHLORIDE 50 MG: 10 INJECTION, SOLUTION EPIDURAL; INFILTRATION; INTRACAUDAL; PERINEURAL at 10:33:00

## 2025-07-21 NOTE — DISCHARGE INSTRUCTIONS
Home Care Instructions for Colonoscopy with Sedation    Diet:  - Resume your regular diet as tolerated unless otherwise instructed.  - Start with light meals to minimize bloating.  - Do not drink alcohol today.    Medication:  - If you have questions about resuming your normal medications, please contact your Primary Care Physician.    Activities:  - Take it easy today. Do not return to work today.  - Do not drive today.  - Do not operate any machinery today (including kitchen equipment).    Colonoscopy:  - You may notice some rectal \"spotting\" (a little blood on the toilet tissue) for a day or two after the exam. This is normal.  - If you experience any rectal bleeding (not spotting), persistent tenderness or sharp severe abdominal pains, oral temperature over 100 degrees Fahrenheit, light-headedness or dizziness, or any other problems, contact your doctor.      **If unable to reach your doctor, please go to the [facility] Emergency Room**    - Your referring physician will receive a full report of your examination.  - If you do not hear from your doctor's office within two weeks of your biopsy, please call them for your results.    You may be able to see your laboratory results in [employer] between 4 and 7 business days.  In some cases, your physician may not have viewed the results before they are released to [employer].  If you have questions regarding your results contact the physician who ordered the test/exam by phone or via [employer] by choosing \"Ask a Medical Question.\"

## 2025-07-21 NOTE — OPERATIVE REPORT
COLONOSCOPY REPORT    Sandee Eddy     1980 Age 45 year old   PCP Kasey Calderon MD Endoscopist Korey Lovell MD       Date of procedure: 25    Procedure: Colonoscopy w/ cold snare polypectomy    Pre-operative diagnosis: screening    Post-operative diagnosis: see impression    Medications: MAC    Withdrawal time: 12 minutes    Procedure:  Informed consent was obtained from the patient after the risks of the procedure were discussed, including but not limited to bleeding, perforation, aspiration, infection, or possibility of a missed lesion. After discussions of the risks/benefits and alternatives to this procedure, as well as the planned sedation, the patient was placed in the left lateral decubitus position and begun on continuous blood pressure pulse oximetry and EKG monitoring and this was maintained throughout the procedure. Once an adequate level of sedation was obtained a digital rectal exam was completed. Then the lubricated tip of the [id number] diagnostic video colonoscope was inserted and advanced without difficulty to the cecum using water immersion and CO2 insufflation technique. The cecum was identified by localizing the trifold, the appendix and the ileocecal valve. Withdrawal was begun with thorough washing and careful examination of the colonic walls and folds. A routine second examination of the cecum/ascending colon was performed. Photodocumentation was obtained. The bowel prep was good. Views of the colon were good with washing. I then carefully withdrew the instrument from the patient who tolerated the procedure well.     Complications: none.    Findings:   1. 1 polyp noted as follows:      A. 4 mm polyp in the rectum; sessile morphology; cold snare polypectomy performed, polyp retrieved.    2. Diverticulosis: no large diverticula appreciated.    3. Ileocecal valve appeared healthy and normal.    4. The colonic mucosa throughout the colon showed normal  vascular pattern, without evidence of angioectasias or inflammation.     5. Rectum was meticulously evaluated in antegrade view and notable for internal hemorrhoids.    6. HEIDI: normal rectal tone, no masses palpated.     Impression:   4 mm polyp removed.  Hemorrhoids.  The colon was otherwise normal with glistening mucosa and intact vascular pattern.    Recommend:  Await pathology. The interval for the next colonoscopy will be determined after reviewing pathology. If new signs or symptoms develop, colonoscopy may need to be repeated sooner.   High fiber diet.  Monitor for blood in the stool. If having more than just tinge of blood, call office or go to the ER.    >>>If tissue was obtained and you have not received your pathology results either by phone or letter within 2 weeks, please call our office at [phone].    Specimens: polyp    Blood loss: <1 ml

## 2025-07-21 NOTE — H&P
History & Physical Examination    Patient Name: Sandee Eddy  MRN: [id number]  CSN: [id number]  YOB: 1980    Diagnosis: screening for colon cancer    Prescriptions Prior to Admission[1]  Current Hospital Medications[2]    Allergies: Allergies[3]    Past Medical History[4]  Past Surgical History[5]  Family History[6]  Social History     Tobacco Use    Smoking status: Never    Smokeless tobacco: Never   Substance Use Topics    Alcohol use: Not Currently     Comment: OCC       SYSTEM Check if Review is Normal Check if Physical Exam is Normal If not normal, please explain:   HEENT [X ] [ X]    NECK  [X ] [ X]    HEART [X ] [ X]    LUNGS [X ] [ X]    ABDOMEN [X ] [ X]    EXTREMITIES [X ] [ X]    OTHER        I have discussed the risks and benefits and alternatives of the procedure with the patient/family.  They understand and agree to proceed with plan of care.   I have reviewed the History and Physical done within the last 30 days.  Any changes noted above.    Korey Lovell MD  [facility]                   [1]   Medications Prior to Admission   Medication Sig Dispense Refill Last Dose/Taking    [] polyethylene glycol, PEG 3350-KCl-NaBcb-NaCl-NaSulf, 236 g Oral Recon Soln Take 4,000 mL by mouth once for 1 dose. May substitute prescription with golytely/nulytely/gavilyte/colyte and or generic equivalence. Take bowel preparation as provided by gastroenterology office, or visit our website at [email or web address] 4000 mL 0     PHENTERMINE HCL 37.5 MG Oral Tab TAKE 1 TABLET(37.5 MG) BY MOUTH BEFORE BREAKFAST (Patient taking differently: Take 1 tablet (37.5 mg total) by mouth before breakfast. PT ALREADY STOPPED TAKING ALL MEDICATIONS) 30 tablet 0     vitamin E 200 UNITS Oral Cap Take 1 capsule (200 Units total) by mouth daily.       Cholecalciferol (VITAMIN D3) 25 MCG (1000 UT) Oral Cap Take 1 tablet by mouth once a  week. 50,000       multivitamin Oral Chew Tab Chew 1 tablet by mouth daily.      [2]   No current facility-administered medications for this encounter.   [3]   Allergies  Allergen Reactions    Ampicillin RASH    Penicillins RASH   [4]   Past Medical History:   Hx of motion sickness    Obesity (BMI 35.0-39.9 without comorbidity)    HERRERA (obstructive sleep apnea)    PONV (postoperative nausea and vomiting)    Sleep apnea    NO DEVICE   [5]   Past Surgical History:  Procedure Laterality Date    Anesth,surgery of shoulder Left 09/2024    arthroscopy of L shoulder    Arthroscopy of joint unlisted Right 02/06/2024    KNEE    Other surgical history      RT LEG SURGERY- PINS PLACED AND REMOVED 2-3 times   [6]   Family History  Problem Relation Age of Onset    Heart Disorder Father     Breast Cancer Neg     Ovarian Cancer Neg     Colon Cancer Neg

## 2025-07-21 NOTE — ANESTHESIA PREPROCEDURE EVALUATION
Anesthesia PreOp Note    HPI:     Sandee Eddy is a 45 year old female who presents for preoperative consultation requested by: Korey Lovell MD    Date of Surgery: 7/21/2025    Procedure(s):  COLONOSCOPY  Indication: Colon cancer screening    Relevant Problems   No relevant active problems       NPO:                         History Review:  Patient Active Problem List    Diagnosis Date Noted    Neuropathy 04/26/2023    Carpal tunnel syndrome of right wrist 04/26/2023    Tachycardia 04/26/2023    HERRERA (obstructive sleep apnea) 02/02/2023    Obesity (BMI 35.0-39.9 without comorbidity) 06/29/2021       Past Medical History[1]    Past Surgical History[2]    Prescriptions Prior to Admission[3]  Current Medications and Prescriptions Ordered in Epic[4]    Allergies[5]    Family History[6]  Social Hx on file[7]    Available pre-op labs reviewed.             Vital Signs:  Body mass index is 38.27 kg/m².   height is 1.651 m (5' 5\") and weight is 104.3 kg (230 lb).   Vitals:    07/15/25 1312   Weight: 104.3 kg (230 lb)   Height: 1.651 m (5' 5\")        Anesthesia Evaluation     Patient summary reviewed and Nursing notes reviewed    Airway   Mallampati: II  TM distance: >3 FB  Neck ROM: full  Dental - Dentition appears grossly intact     Pulmonary - normal exam   (+) sleep apnea    ROS comment: Not using CPAP  Cardiovascular - normal exam  Exercise tolerance: good    Neuro/Psych      GI/Hepatic/Renal    (+) bowel prep    Endo/Other    Abdominal   (+) obese                 Anesthesia Plan:   ASA:  3  Plan:   MAC and general  Informed Consent Plan and Risks Discussed With:  Patient  Discussed plan with:  CRNA and surgeon      I have informed Sandee Eddy and/or legal guardian or family member of the nature of the anesthetic plan, benefits, risks including possible dental damage if relevant, major complications, and any alternative forms of anesthetic management.   All of the patient's questions were answered  to the best of my ability. The patient desires the anesthetic management as planned.  Cecilia QUESADAOmayra Rocha, CRNA  2025 9:47 AM  Present on Admission:  **None**           [1]   Past Medical History:   Hx of motion sickness    Obesity (BMI 35.0-39.9 without comorbidity)    HERRERA (obstructive sleep apnea)    PONV (postoperative nausea and vomiting)    Sleep apnea    NO DEVICE   [2]   Past Surgical History:  Procedure Laterality Date    Anesth,surgery of shoulder Left 2024    arthroscopy of L shoulder    Arthroscopy of joint unlisted Right 2024    KNEE    Other surgical history      RT LEG SURGERY- PINS PLACED AND REMOVED 2-3 times   [3]   Medications Prior to Admission   Medication Sig Dispense Refill Last Dose/Taking    [] polyethylene glycol, PEG 3350-KCl-NaBcb-NaCl-NaSulf, 236 g Oral Recon Soln Take 4,000 mL by mouth once for 1 dose. May substitute prescription with golytely/nulytely/gavilyte/colyte and or generic equivalence. Take bowel preparation as provided by gastroenterology office, or visit our website at [email or web address] 4000 mL 0     PHENTERMINE HCL 37.5 MG Oral Tab TAKE 1 TABLET(37.5 MG) BY MOUTH BEFORE BREAKFAST (Patient taking differently: Take 1 tablet (37.5 mg total) by mouth before breakfast. PT ALREADY STOPPED TAKING ALL MEDICATIONS) 30 tablet 0     vitamin E 200 UNITS Oral Cap Take 1 capsule (200 Units total) by mouth daily.       Cholecalciferol (VITAMIN D3) 25 MCG (1000 UT) Oral Cap Take 1 tablet by mouth once a week. 50,000       multivitamin Oral Chew Tab Chew 1 tablet by mouth daily.      [4]   No current Epic-ordered facility-administered medications on file.     No current [facility]-ordered outpatient medications on file.   [5]   Allergies  Allergen Reactions    Ampicillin RASH    Penicillins RASH   [6]   Family History  Problem Relation Age of Onset    Heart Disorder Father     Breast Cancer Neg     Ovarian Cancer Neg      Colon Cancer Neg    [7]   Social History  Socioeconomic History    Marital status:    Occupational History    Occupation:    Tobacco Use    Smoking status: Never    Smokeless tobacco: Never   Vaping Use    Vaping status: Never Used   Substance and Sexual Activity    Alcohol use: Not Currently     Comment: OCC    Drug use: No    Sexual activity: Yes     Partners: Male   Other Topics Concern    Blood Transfusions No

## 2025-07-21 NOTE — ANESTHESIA PROCEDURE NOTES
Peripheral IV  Date/Time: 7/21/2025 10:26 AM  Inserted by: Cecilia Rocha CRNA    Placement  Needle size: 22 G  Laterality: right  Location: forearm  Local anesthetic: none  Site prep: alcohol  Technique: anatomical landmarks  Attempts: 2

## 2025-07-22 VITALS
WEIGHT: 230 LBS | DIASTOLIC BLOOD PRESSURE: 85 MMHG | RESPIRATION RATE: 20 BRPM | OXYGEN SATURATION: 99 % | SYSTOLIC BLOOD PRESSURE: 125 MMHG | HEIGHT: 65 IN | BODY MASS INDEX: 38.32 KG/M2 | HEART RATE: 72 BPM

## 2025-07-23 ENCOUNTER — TELEPHONE (OUTPATIENT)
Facility: CLINIC | Age: 45
End: 2025-07-23

## 2025-07-23 ENCOUNTER — RESULTS FOLLOW-UP (OUTPATIENT)
Dept: ENDOSCOPY | Facility: HOSPITAL | Age: 45
End: 2025-07-23

## 2025-07-23 NOTE — TELEPHONE ENCOUNTER
Korey Lovell MD routed this conversation to [facility] Clinical Staff (Selected Message)  Korey Lovell MD    7/23/25  7:35 AM  Note     GI Staff:    Can you please place a recall to have this patient repeat a colonoscopy in 10 years.     Thank you.     Korey Lovell MD

## 2025-07-23 NOTE — TELEPHONE ENCOUNTER
Recall colonoscopy in 10 years per Dr. Lovell.     Last done 7/21/2025.     Recall entered into patient outreach for 7/21/2035.     Health maintenance updated.

## 2025-07-23 NOTE — TELEPHONE ENCOUNTER
GI Staff:    Can you please place a recall to have this patient repeat a colonoscopy in 10 years.    Thank you.    Korey Lovell MD

## 2025-08-11 ENCOUNTER — OFFICE VISIT (OUTPATIENT)
Dept: INTERNAL MEDICINE CLINIC | Facility: CLINIC | Age: 45
End: 2025-08-11

## 2025-08-11 VITALS
HEART RATE: 98 BPM | DIASTOLIC BLOOD PRESSURE: 78 MMHG | BODY MASS INDEX: 38.38 KG/M2 | HEIGHT: 65 IN | WEIGHT: 230.38 LBS | SYSTOLIC BLOOD PRESSURE: 120 MMHG | OXYGEN SATURATION: 97 %

## 2025-08-11 DIAGNOSIS — N95.1 PERIMENOPAUSE: ICD-10-CM

## 2025-08-11 DIAGNOSIS — E66.9 OBESITY (BMI 35.0-39.9 WITHOUT COMORBIDITY): ICD-10-CM

## 2025-08-11 DIAGNOSIS — R53.83 OTHER FATIGUE: ICD-10-CM

## 2025-08-11 DIAGNOSIS — Z00.00 WELLNESS EXAMINATION: Primary | ICD-10-CM

## 2025-08-11 PROCEDURE — 99396 PREV VISIT EST AGE 40-64: CPT | Performed by: INTERNAL MEDICINE

## 2025-08-11 RX ORDER — PHENTERMINE HYDROCHLORIDE 37.5 MG/1
37.5 TABLET ORAL
Qty: 30 TABLET | Refills: 2 | Status: SHIPPED | OUTPATIENT
Start: 2025-08-11

## 2025-08-12 ENCOUNTER — LAB ENCOUNTER (OUTPATIENT)
Dept: LAB | Age: 45
End: 2025-08-12
Attending: INTERNAL MEDICINE

## 2025-08-12 DIAGNOSIS — N95.1 PERIMENOPAUSE: ICD-10-CM

## 2025-08-12 DIAGNOSIS — Z00.00 WELLNESS EXAMINATION: ICD-10-CM

## 2025-08-12 DIAGNOSIS — R53.83 OTHER FATIGUE: ICD-10-CM

## 2025-08-12 LAB
ALBUMIN SERPL-MCNC: 4.3 G/DL (ref 3.2–4.8)
ALBUMIN/GLOB SERPL: 1.7 (ref 1–2)
ALP LIVER SERPL-CCNC: 50 U/L (ref 37–98)
ALT SERPL-CCNC: 17 U/L (ref 10–49)
ANION GAP SERPL CALC-SCNC: 5 MMOL/L (ref 0–18)
AST SERPL-CCNC: 22 U/L (ref ?–34)
BASOPHILS # BLD AUTO: 0.03 X10(3) UL (ref 0–0.2)
BASOPHILS NFR BLD AUTO: 0.5 %
BILIRUB SERPL-MCNC: 0.7 MG/DL (ref 0.3–1.2)
BUN BLD-MCNC: 11 MG/DL (ref 9–23)
BUN/CREAT SERPL: 14.9 (ref 10–20)
CALCIUM BLD-MCNC: 8.9 MG/DL (ref 8.7–10.4)
CHLORIDE SERPL-SCNC: 108 MMOL/L (ref 98–112)
CHOLEST SERPL-MCNC: 168 MG/DL (ref ?–200)
CO2 SERPL-SCNC: 25 MMOL/L (ref 21–32)
CREAT BLD-MCNC: 0.74 MG/DL (ref 0.55–1.02)
DEPRECATED HBV CORE AB SER IA-ACNC: 63 NG/ML (ref 50–306)
DEPRECATED RDW RBC AUTO: 43.8 FL (ref 35.1–46.3)
EGFRCR SERPLBLD CKD-EPI 2021: 102 ML/MIN/1.73M2 (ref 60–?)
EOSINOPHIL # BLD AUTO: 0.14 X10(3) UL (ref 0–0.7)
EOSINOPHIL NFR BLD AUTO: 2.1 %
ERYTHROCYTE [DISTWIDTH] IN BLOOD BY AUTOMATED COUNT: 13.1 % (ref 11–15)
ESTRADIOL SERPL-MCNC: 90.9 PG/ML
FASTING STATUS PATIENT QL REPORTED: YES
GLOBULIN PLAS-MCNC: 2.6 G/DL (ref 2–3.5)
GLUCOSE BLD-MCNC: 96 MG/DL (ref 70–99)
HCT VFR BLD AUTO: 39.2 % (ref 35–48)
HDLC SERPL-MCNC: 72 MG/DL (ref 40–59)
HGB BLD-MCNC: 12.4 G/DL (ref 12–16)
IMM GRANULOCYTES # BLD AUTO: 0.01 X10(3) UL (ref 0–1)
IMM GRANULOCYTES NFR BLD: 0.2 %
LDLC SERPL CALC-MCNC: 74 MG/DL (ref ?–100)
LYMPHOCYTES # BLD AUTO: 1.71 X10(3) UL (ref 1–4)
LYMPHOCYTES NFR BLD AUTO: 26 %
MAGNESIUM SERPL-MCNC: 2.1 MG/DL (ref 1.6–2.6)
MCH RBC QN AUTO: 29 PG (ref 26–34)
MCHC RBC AUTO-ENTMCNC: 31.6 G/DL (ref 31–37)
MCV RBC AUTO: 91.6 FL (ref 80–100)
MONOCYTES # BLD AUTO: 0.58 X10(3) UL (ref 0.1–1)
MONOCYTES NFR BLD AUTO: 8.8 %
NEUTROPHILS # BLD AUTO: 4.1 X10 (3) UL (ref 1.5–7.7)
NEUTROPHILS # BLD AUTO: 4.1 X10(3) UL (ref 1.5–7.7)
NEUTROPHILS NFR BLD AUTO: 62.4 %
NONHDLC SERPL-MCNC: 96 MG/DL (ref ?–130)
OSMOLALITY SERPL CALC.SUM OF ELEC: 285 MOSM/KG (ref 275–295)
PLATELET # BLD AUTO: 211 10(3)UL (ref 150–450)
POTASSIUM SERPL-SCNC: 4 MMOL/L (ref 3.5–5.1)
PROT SERPL-MCNC: 6.9 G/DL (ref 5.7–8.2)
RBC # BLD AUTO: 4.28 X10(6)UL (ref 3.8–5.3)
SODIUM SERPL-SCNC: 138 MMOL/L (ref 136–145)
T4 FREE SERPL-MCNC: 1.5 NG/DL (ref 0.8–1.7)
THYROPEROXIDASE AB SERPL-ACNC: 42 U/ML (ref ?–60)
TRIGL SERPL-MCNC: 130 MG/DL (ref 30–149)
TSI SER-ACNC: 2.22 UIU/ML (ref 0.55–4.78)
VLDLC SERPL CALC-MCNC: 20 MG/DL (ref 0–30)
WBC # BLD AUTO: 6.6 X10(3) UL (ref 4–11)

## 2025-08-12 PROCEDURE — 84439 ASSAY OF FREE THYROXINE: CPT

## 2025-08-12 PROCEDURE — 84443 ASSAY THYROID STIM HORMONE: CPT

## 2025-08-12 PROCEDURE — 80061 LIPID PANEL: CPT

## 2025-08-12 PROCEDURE — 83735 ASSAY OF MAGNESIUM: CPT

## 2025-08-12 PROCEDURE — 80053 COMPREHEN METABOLIC PANEL: CPT

## 2025-08-12 PROCEDURE — 85025 COMPLETE CBC W/AUTO DIFF WBC: CPT

## 2025-08-12 PROCEDURE — 82670 ASSAY OF TOTAL ESTRADIOL: CPT

## 2025-08-12 PROCEDURE — 82728 ASSAY OF FERRITIN: CPT

## 2025-08-12 PROCEDURE — 36415 COLL VENOUS BLD VENIPUNCTURE: CPT

## 2025-08-12 PROCEDURE — 86376 MICROSOMAL ANTIBODY EACH: CPT

## (undated) DEVICE — EXPRESSEW III SUTURE NEEDLE FOR USE WITH EXPRESSEW II OR III SUTURE PASSER: Brand: EXPRESSEW

## (undated) DEVICE — SHEET,DRAPE,70X100,STERILE: Brand: MEDLINE

## (undated) DEVICE — SUTURE FIBERWIRE 2-0 L18IN NONABSORBABLE BLU

## (undated) DEVICE — [RESECTOR CUTTER, ARTHROSCOPIC SHAVER BLADE,  DO NOT RESTERILIZE,  DO NOT USE IF PACKAGE IS DAMAGED,  KEEP DRY,  KEEP AWAY FROM SUNLIGHT]: Brand: FORMULA

## (undated) DEVICE — Device

## (undated) DEVICE — COVER LT HNDL PLAS RIG 2 PER PK

## (undated) DEVICE — BLADE SURG NO10 SS POLYMER COAT STR DISP

## (undated) DEVICE — POUCH INSTR W11XL7IN TRNSPAR PLAS 2 COMPT

## (undated) DEVICE — ADHESIVE SKIN TOP FOR WND CLSR DERMBND ADV

## (undated) DEVICE — SUTURE NABSB OTHCRD 2 OS-6

## (undated) DEVICE — SUT MCRYL 2-0 27IN SH ABSRB UD 26MM 1/2 CIR

## (undated) DEVICE — #15 STERILE STAINLESS BLADE: Brand: STERILE STAINLESS BLADES

## (undated) DEVICE — DRESSING TRNSPAR W2.375XL4IN IS HYPOALRG

## (undated) DEVICE — COVER MAYO STD W24XL53IN UNIV SMS DISP

## (undated) DEVICE — CONTAINER SPEC 4OZ POLYPR GRAD LEAK RESIST

## (undated) DEVICE — KIT MFLD FOR SPEC COLL

## (undated) DEVICE — MEGADYNE E-Z CLEAN NDLE 2.5IN

## (undated) DEVICE — BLADE #15 STRL STAINLESS

## (undated) DEVICE — COVER,LIGHT,CAMERA,HARD,1/PK,STRL: Brand: MEDLINE

## (undated) DEVICE — TUBING PMP L16FT DISP

## (undated) DEVICE — KIT BEACH CHR FOAM W/ SUPP ARM DRP

## (undated) DEVICE — SUTURE MCRYL SZ 2-0 L27IN ABSRB UD SH L26MM

## (undated) DEVICE — SLEEVE COMPR M KNEE LEN SGL USE KENDALL SCD

## (undated) DEVICE — STOCKINETTE SUR 9X48IN IMPERV FOR HANDLING

## (undated) DEVICE — DRAPE STERI 1000 UTIL AD

## (undated) DEVICE — BANDAGE ELASTIC ACE 4" STERILE

## (undated) DEVICE — KIT ENDO ORCAPOD 160/180/190

## (undated) DEVICE — 3M™ IOBAN™ 2 ANTIMICROBIAL INCISE DRAPE 6648EZ: Brand: IOBAN™ 2

## (undated) DEVICE — SUT TIGERLOOP 2 20IN N ABSRB WHT GRN

## (undated) DEVICE — 3M™ STERI-STRIP™ REINFORCED ADHESIVE SKIN CLOSURES, R1547, 1/2 IN X 4 IN (12 MM X 100 MM), 6 STRIPS/ENVELOPE: Brand: 3M™ STERI-STRIP™

## (undated) DEVICE — KIT CLEAN ENDOKIT 1.1OZ GOWNX2

## (undated) DEVICE — PADDING CAST W4INXL4YD 100% COT SFT SELF BOND

## (undated) DEVICE — SUPER TURBOVAC WAND

## (undated) DEVICE — NEEDLE SUT KNEE LP SCORPION

## (undated) DEVICE — HANDLE SUCT REG CAP FLANGETIP SMOOTH YANK

## (undated) DEVICE — 4.75MM ALPHAVENT AND OMEGA AWL: Brand: ALPHAVENT, OMEGA

## (undated) DEVICE — DRAIN SUR 18X1/4IN RADPQ PENROSE

## (undated) DEVICE — [TOMCAT CUTTER, ARTHROSCOPIC SHAVER BLADE,  DO NOT RESTERILIZE,  DO NOT USE IF PACKAGE IS DAMAGED,  KEEP DRY,  KEEP AWAY FROM SUNLIGHT]: Brand: FORMULA

## (undated) DEVICE — 5.0 MM I.D. UNIVERSAL CANNULA, 76                                    MM LONG, BLUE, LATEX SEAL,                                    SINGLE-USE STERILE PACKS, BOX OF 10.                                    INCLUDES OBTURATOR AND TROCAR

## (undated) DEVICE — WAND ARTHSCP 3.75MM TIP 5.25MM 90DEG

## (undated) DEVICE — SHOULDER ARTHROSCOPY CDS-LF: Brand: MEDLINE INDUSTRIES, INC.

## (undated) DEVICE — DRESSING TRNSPAR W2XL2.75IN IS HYPOALRG

## (undated) DEVICE — PAD SACRAL SPAN AID

## (undated) DEVICE — TUBING PMP L16FT DISP INFLOW

## (undated) DEVICE — SPONGE 4X4 10PK

## (undated) DEVICE — 40765 BEACH CHAIR HEAD RESTRAINT: Brand: 40765 BEACH CHAIR HEAD RESTRAINT

## (undated) DEVICE — SOLUTION IRRIG 3000ML 0.9% NACL FLX CONT

## (undated) DEVICE — SUT FIBERLOOP 2 20IN N ABSRB BLU L76MM

## (undated) DEVICE — GLOVE SUR 7.5 SENSICARE PI PIP GRN PWD F

## (undated) DEVICE — SPONGE GZ 4XL4IN 100% COT 12 PLY TYP VII WVN

## (undated) DEVICE — TOWEL SURG SM W12XL18IN CLR PLAS TEAR RESIST

## (undated) DEVICE — SUTURE MCRYL SZ 3-0 L27IN ABSRB UD L19MM PS-2

## (undated) DEVICE — SOLUTION RUBBING 4OZ 70% ISO ALC CLR

## (undated) DEVICE — SUT MCRYL 3-0 27IN ABSRB UD 19MM PS-2 3/8

## (undated) DEVICE — TOURNIQUET SURG W4XL34IN PUR 2 PRT QC SGL

## (undated) DEVICE — GOWN,SIRUS,FABRIC-REINFORCED,X-LARGE: Brand: MEDLINE

## (undated) DEVICE — SRG GLV PROTEXIS BLUE 7.5

## (undated) DEVICE — DRAPE SUR W70XL100IN STD SMS POLYPR FULL SHT

## (undated) DEVICE — NEEDLE ANCHOR 1824-3D

## (undated) DEVICE — LIGHT HANDLE

## (undated) DEVICE — DRAPE VI IOBAN II STERI 6648

## (undated) DEVICE — TUBING SUCTION 4 X 10

## (undated) DEVICE — CANNULA ARTHSCP L7CM DIA8.25MM NOSQUIRT CAP

## (undated) DEVICE — GLOVE SUR 7.5 SENSICARE PI PIP CRM PWD F

## (undated) DEVICE — 3M™ TEGADERM™ +PAD FILM DRESSING WITH NON-ADHERENT PAD, 3584, 2-3/8 IN X 4 IN (6 CM X 10 CM), 50/CAR, 4 CAR/CS: Brand: 3M™ TEGADERM™

## (undated) DEVICE — SLEEVE COMPR MD KNEE LEN SGL USE KENDALL SCD

## (undated) DEVICE — SUTURE MCRYL SZ 0 L27IN ABSRB VLT CT-1 L36MM

## (undated) DEVICE — NEEDLE SUT PASS FOR RC LABRAL REP MULTFI

## (undated) DEVICE — MEDI-VAC NON-CONDUCTIVE SUCTION TUBING: Brand: CARDINAL HEALTH

## (undated) DEVICE — TUBING SCT CLR 6FT .25IN MDVC

## (undated) DEVICE — GLOVE SUR 7 PROTEXIS PI PIP CRM PWD F

## (undated) DEVICE — PACK PBDS ARTHROSCOPY MODULE

## (undated) DEVICE — SUTURE VCRL SZ 0 L27IN ABSRB VLT L26MM UR-6

## (undated) DEVICE — PENCIL TELESCOPE MEGADYNE SE

## (undated) DEVICE — BANDAGE ELASTIC ACE 6" STERILE

## (undated) DEVICE — SUTURE TIGERLOOP SZ 2-0 L20IN NONABSORBABLE

## (undated) NOTE — LETTER
[facility]  [address], [place], [place]    Authorization for Surgical Operation and Procedure                               I hereby authorize Korey Lovell MD, my physician and his/her assistants (if applicable), which may include medical students, residents, and/or fellows, to perform the following surgical operation/ procedure and administer such anesthesia as may be determined necessary by my physician: Operation/Procedure name (s) COLONOSCOPY on Sandee Eddy   2.   I recognize that during the surgical operation/procedure, unforeseen conditions may necessitate additional or different procedures than those listed above.  I, therefore, further authorize and request that the above-named surgeon, assistants, or designees perform such procedures as are, in their judgment, necessary and desirable.    3.   My surgeon/physician has discussed prior to my surgery the potential benefits, risks and side effects of this procedure; the likelihood of achieving goals; and potential problems that might occur during recuperation.  They also discussed reasonable alternatives to the procedure, including risks, benefits, and side effects related to the alternatives and risks related to not receiving this procedure.  I have had all my questions answered and I acknowledge that no guarantee has been made as to the result that may be obtained.    4.   Should the need arise during my operation/procedure, which includes change of level of care prior to discharge, I also consent to the administration of blood and/or blood products.  Further, I understand that despite careful testing and screening of blood or blood products by collecting agencies, I may still be subject to ill effects as a result of receiving a blood transfusion and/or blood products.  The following are some, but not all, of the potential risks that can occur: fever and allergic reactions, hemolytic reactions, transmission of diseases  such as Hepatitis, AIDS and Cytomegalovirus (CMV) and fluid overload.  In the event that I wish to have an autologous transfusion of my own blood, or a directed donor transfusion, I will discuss this with my physician.  Check only if Refusing Blood or Blood Products  I understand refusal of blood or blood products as deemed necessary by my physician may have serious consequences to my condition to include possible death. I hereby assume responsibility for my refusal and release the hospital, its personnel, and my physicians from any responsibility for the consequences of my refusal.    o  Refuse   5.   I authorize the use of any specimen, organs, tissues, body parts or foreign objects that may be removed from my body during the operation/procedure for diagnosis, research or teaching purposes and their subsequent disposal by hospital authorities.  I also authorize the release of specimen test results and/or written reports to my treating physician on the hospital medical staff or other referring or consulting physicians involved in my care, at the discretion of the Pathologist or my treating physician.    6.   I consent to the photographing or videotaping of the operations or procedures to be performed, including appropriate portions of my body for medical, scientific, or educational purposes, provided my identity is not revealed by the pictures or by descriptive texts accompanying them.  If the procedure has been photographed/videotaped, the surgeon will obtain the original picture, image, videotape or CD.  The hospital will not be responsible for storage, release or maintenance of the picture, image, tape or CD.    7.   I consent to the presence of a  or observers in the operating room as deemed necessary by my physician or their designees.    8.   I recognize that in the event my procedure results in extended X-Ray/fluoroscopy time, I may develop a skin reaction.    9. If I have a Do Not Attempt  Resuscitation (DNAR) order in place, that status will be suspended while in the operating room, procedural suite, and during the recovery period unless otherwise explicitly stated by me (or a person authorized to consent on my behalf). The surgeon or my attending physician will determine when the applicable recovery period ends for purposes of reinstating the DNAR order.  10. Patients having a sterilization procedure: I understand that if the procedure is successful the results will be permanent and it will therefore be impossible for me to inseminate, conceive, or bear children.  I also understand that the procedure is intended to result in sterility, although the result has not been guaranteed.   11. I acknowledge that my physician has explained sedation/analgesia administration to me including the risk and benefits I consent to the administration of sedation/analgesia as may be necessary or desirable in the judgment of my physician.    I CERTIFY THAT I HAVE READ AND FULLY UNDERSTAND THE ABOVE CONSENT TO OPERATION and/or OTHER PROCEDURE.     ____________________________________  _________________________________        ______________________________  Signature of Patient    Signature of Responsible Person                Printed Name of Responsible Person                                      ____________________________________  _____________________________                ________________________________  Signature of Witness        Date  Time         Relationship to Patient    STATEMENT OF PHYSICIAN My signature below affirms that prior to the time of the procedure; I have explained to the patient and/or his/her legal representative, the risks and benefits involved in the proposed treatment and any reasonable alternative to the proposed treatment. I have also explained the risks and benefits involved in refusal of the proposed treatment and alternatives to the proposed treatment and have answered the patient's  questions. If I have a significant financial interest in a co-management agreement or a significant financial interest in any product or implant, or other significant relationship used in this procedure/surgery, I have disclosed this and had a discussion with my patient.     _____________________________________________________              _____________________________  (Signature of Physician)                                                                                         (Date)                                   (Time)  Patient Name: Sandee Eddy      : 1980      Printed: 2025     Medical Record #: [id number]                                      Page 1 of 1

## (undated) NOTE — LETTER
24      Orthopedic Surgery   Pre-Operative Clearance Request    Patient Name:   Sandee Eddy             :   1980    Surgeon: Dr. Womack             Date of Surgery: 2024    Surgical Procedure: RIGHT KNEE ARTHROSOCPY ANTERIOR CRUCIATE LIGAMENT RECONSTRUCTION HAMSTRING AUTOGRAFT MEDIAL MENISCUS REPAIR SYNOVECTOMY       Please complete all of the following 2-3 weeks PRIOR TO your scheduled surgery to avoid potential cancellation.     [x]  History and Physical      [x]  Medical  Clearance                             **Please fax test results, H&P, and clearance to [phone] and to [facility] at [phone]**

## (undated) NOTE — LETTER
Sandee Eddy, :1980    CONSENT FOR PROCEDURE/SEDATION    1. I authorize the performance upon Sandee Eddy  the following: Polypectomy    2. I authorize Dr. Sylvia Garcia MD (and whomever is designated as the doctor’s assistant), to perform the above-mentioned procedures.    3. If any unforeseen conditions arise during this procedure calling for additional  procedures, operations, or medications (including anesthesia and blood transfusion), I further request and authorize the doctor to do whatever he/she deems advisable in my interest.    4. I consent to the taking and reproduction of any photographs in the course of this procedure for professional purposes.    5. I consent to the administration of such sedation as may be considered necessary or advisable by the physician responsible for this service, with the exception of ______________________________________________________    6. I have been informed by my doctor of the nature and purpose of this procedure sedation, possible alternative methods of treatment, risk involved and possible complications.    Signature of Patient:_______________________________________________    Signature of person authorized to consent for patient:  _______________________________________________________________    Relationship to patient: ____________________________________________    Witness: _________________________________________ Date:___________     Physician Signature: _______________________________ Date:___________